# Patient Record
Sex: MALE | Race: BLACK OR AFRICAN AMERICAN | Employment: OTHER | URBAN - METROPOLITAN AREA
[De-identification: names, ages, dates, MRNs, and addresses within clinical notes are randomized per-mention and may not be internally consistent; named-entity substitution may affect disease eponyms.]

---

## 2017-01-10 ENCOUNTER — TELEPHONE (OUTPATIENT)
Dept: INTERNAL MEDICINE CLINIC | Age: 67
End: 2017-01-10

## 2017-01-10 NOTE — TELEPHONE ENCOUNTER
I called and spoke with Bryan Garcia. She states that patient has not refilled Pravastatin and wanted to know if we discontinued. She was made aware that patient is to continue this medication. She verbalized understanding.

## 2017-02-14 ENCOUNTER — APPOINTMENT (OUTPATIENT)
Dept: CT IMAGING | Age: 67
DRG: 065 | End: 2017-02-14
Attending: EMERGENCY MEDICINE
Payer: MEDICARE

## 2017-02-14 ENCOUNTER — APPOINTMENT (OUTPATIENT)
Dept: GENERAL RADIOLOGY | Age: 67
DRG: 065 | End: 2017-02-14
Attending: EMERGENCY MEDICINE
Payer: MEDICARE

## 2017-02-14 ENCOUNTER — HOSPITAL ENCOUNTER (INPATIENT)
Age: 67
LOS: 3 days | Discharge: NURSING, REHAB OR LONG-TERM CARE | DRG: 065 | End: 2017-02-17
Attending: EMERGENCY MEDICINE | Admitting: FAMILY MEDICINE
Payer: MEDICARE

## 2017-02-14 DIAGNOSIS — G45.1 TRANSIENT ISCHEMIC ATTACK INVOLVING RIGHT INTERNAL CAROTID ARTERY: ICD-10-CM

## 2017-02-14 DIAGNOSIS — I63.9 CEREBROVASCULAR ACCIDENT (CVA), UNSPECIFIED MECHANISM (HCC): Primary | ICD-10-CM

## 2017-02-14 DIAGNOSIS — I63.511 ACUTE RIGHT MCA STROKE (HCC): ICD-10-CM

## 2017-02-14 DIAGNOSIS — E66.01 MORBID OBESITY DUE TO EXCESS CALORIES (HCC): ICD-10-CM

## 2017-02-14 DIAGNOSIS — I10 ESSENTIAL HYPERTENSION, BENIGN: ICD-10-CM

## 2017-02-14 DIAGNOSIS — R53.1 LEFT-SIDED WEAKNESS: ICD-10-CM

## 2017-02-14 DIAGNOSIS — I69.354 HEMIPLEGIA AND HEMIPARESIS FOLLOWING CEREBRAL INFARCTION AFFECTING LEFT NON-DOMINANT SIDE (HCC): ICD-10-CM

## 2017-02-14 DIAGNOSIS — E11.69 UNCONTROLLED TYPE 2 DIABETES MELLITUS WITH OTHER SPECIFIED COMPLICATION, UNSPECIFIED LONG TERM INSULIN USE STATUS: ICD-10-CM

## 2017-02-14 DIAGNOSIS — I65.23 STENOSIS OF BOTH INTERNAL CAROTID ARTERIES: ICD-10-CM

## 2017-02-14 DIAGNOSIS — E11.65 UNCONTROLLED TYPE 2 DIABETES MELLITUS WITH OTHER SPECIFIED COMPLICATION, UNSPECIFIED LONG TERM INSULIN USE STATUS: ICD-10-CM

## 2017-02-14 LAB
ALBUMIN SERPL BCP-MCNC: 3.4 G/DL (ref 3.5–5)
ALBUMIN/GLOB SERPL: 0.9 {RATIO} (ref 1.1–2.2)
ALP SERPL-CCNC: 71 U/L (ref 45–117)
ALT SERPL-CCNC: 21 U/L (ref 12–78)
ANION GAP BLD CALC-SCNC: 9 MMOL/L (ref 5–15)
APPEARANCE UR: CLEAR
APTT PPP: 25.2 SEC (ref 22.1–32.5)
AST SERPL W P-5'-P-CCNC: 11 U/L (ref 15–37)
BACTERIA URNS QL MICRO: NEGATIVE /HPF
BASOPHILS # BLD AUTO: 0 K/UL (ref 0–0.1)
BASOPHILS # BLD: 0 % (ref 0–1)
BILIRUB SERPL-MCNC: 0.4 MG/DL (ref 0.2–1)
BILIRUB UR QL: NEGATIVE
BUN SERPL-MCNC: 15 MG/DL (ref 6–20)
BUN/CREAT SERPL: 14 (ref 12–20)
CALCIUM SERPL-MCNC: 8.9 MG/DL (ref 8.5–10.1)
CHLORIDE SERPL-SCNC: 103 MMOL/L (ref 97–108)
CK MB CFR SERPL CALC: NORMAL % (ref 0–2.5)
CK MB SERPL-MCNC: <1 NG/ML (ref 5–25)
CK SERPL-CCNC: 64 U/L (ref 39–308)
CO2 SERPL-SCNC: 26 MMOL/L (ref 21–32)
COLOR UR: NORMAL
CREAT SERPL-MCNC: 1.11 MG/DL (ref 0.7–1.3)
EOSINOPHIL # BLD: 0.3 K/UL (ref 0–0.4)
EOSINOPHIL NFR BLD: 4 % (ref 0–7)
EPITH CASTS URNS QL MICRO: NORMAL /LPF
ERYTHROCYTE [DISTWIDTH] IN BLOOD BY AUTOMATED COUNT: 14.5 % (ref 11.5–14.5)
GLOBULIN SER CALC-MCNC: 3.6 G/DL (ref 2–4)
GLUCOSE BLD STRIP.AUTO-MCNC: 177 MG/DL (ref 65–100)
GLUCOSE SERPL-MCNC: 189 MG/DL (ref 65–100)
GLUCOSE UR STRIP.AUTO-MCNC: NEGATIVE MG/DL
HCT VFR BLD AUTO: 41.1 % (ref 36.6–50.3)
HGB BLD-MCNC: 13.8 G/DL (ref 12.1–17)
HGB UR QL STRIP: NEGATIVE
HYALINE CASTS URNS QL MICRO: NORMAL /LPF (ref 0–5)
INR BLD: 1 (ref 0.9–1.2)
INR PPP: 1 (ref 0.9–1.1)
KETONES UR QL STRIP.AUTO: NEGATIVE MG/DL
LEUKOCYTE ESTERASE UR QL STRIP.AUTO: NEGATIVE
LYMPHOCYTES # BLD AUTO: 40 % (ref 12–49)
LYMPHOCYTES # BLD: 2.8 K/UL (ref 0.8–3.5)
MAGNESIUM SERPL-MCNC: 1.6 MG/DL (ref 1.6–2.4)
MCH RBC QN AUTO: 26.3 PG (ref 26–34)
MCHC RBC AUTO-ENTMCNC: 33.6 G/DL (ref 30–36.5)
MCV RBC AUTO: 78.4 FL (ref 80–99)
MONOCYTES # BLD: 0.3 K/UL (ref 0–1)
MONOCYTES NFR BLD AUTO: 5 % (ref 5–13)
NEUTS SEG # BLD: 3.6 K/UL (ref 1.8–8)
NEUTS SEG NFR BLD AUTO: 51 % (ref 32–75)
NITRITE UR QL STRIP.AUTO: NEGATIVE
PH UR STRIP: 5 [PH] (ref 5–8)
PLATELET # BLD AUTO: 233 K/UL (ref 150–400)
POTASSIUM SERPL-SCNC: 3.5 MMOL/L (ref 3.5–5.1)
PROT SERPL-MCNC: 7 G/DL (ref 6.4–8.2)
PROT UR STRIP-MCNC: NEGATIVE MG/DL
PROTHROMBIN TIME: 10.4 SEC (ref 9–11.1)
RBC # BLD AUTO: 5.24 M/UL (ref 4.1–5.7)
RBC #/AREA URNS HPF: NORMAL /HPF (ref 0–5)
SERVICE CMNT-IMP: ABNORMAL
SODIUM SERPL-SCNC: 138 MMOL/L (ref 136–145)
SP GR UR REFRACTOMETRY: 1.02 (ref 1–1.03)
THERAPEUTIC RANGE,PTTT: NORMAL SECS (ref 58–77)
TROPONIN I SERPL-MCNC: <0.04 NG/ML
UA: UC IF INDICATED,UAUC: NORMAL
UROBILINOGEN UR QL STRIP.AUTO: 0.2 EU/DL (ref 0.2–1)
WBC # BLD AUTO: 7 K/UL (ref 4.1–11.1)
WBC URNS QL MICRO: NORMAL /HPF (ref 0–4)

## 2017-02-14 PROCEDURE — 70496 CT ANGIOGRAPHY HEAD: CPT

## 2017-02-14 PROCEDURE — 83735 ASSAY OF MAGNESIUM: CPT | Performed by: EMERGENCY MEDICINE

## 2017-02-14 PROCEDURE — 99285 EMERGENCY DEPT VISIT HI MDM: CPT

## 2017-02-14 PROCEDURE — 81001 URINALYSIS AUTO W/SCOPE: CPT | Performed by: EMERGENCY MEDICINE

## 2017-02-14 PROCEDURE — 70450 CT HEAD/BRAIN W/O DYE: CPT

## 2017-02-14 PROCEDURE — 93005 ELECTROCARDIOGRAM TRACING: CPT

## 2017-02-14 PROCEDURE — 74011636320 HC RX REV CODE- 636/320: Performed by: EMERGENCY MEDICINE

## 2017-02-14 PROCEDURE — 84484 ASSAY OF TROPONIN QUANT: CPT | Performed by: EMERGENCY MEDICINE

## 2017-02-14 PROCEDURE — 36415 COLL VENOUS BLD VENIPUNCTURE: CPT | Performed by: EMERGENCY MEDICINE

## 2017-02-14 PROCEDURE — 85025 COMPLETE CBC W/AUTO DIFF WBC: CPT | Performed by: EMERGENCY MEDICINE

## 2017-02-14 PROCEDURE — 85610 PROTHROMBIN TIME: CPT

## 2017-02-14 PROCEDURE — 65660000000 HC RM CCU STEPDOWN

## 2017-02-14 PROCEDURE — 82550 ASSAY OF CK (CPK): CPT | Performed by: EMERGENCY MEDICINE

## 2017-02-14 PROCEDURE — 74011250636 HC RX REV CODE- 250/636: Performed by: EMERGENCY MEDICINE

## 2017-02-14 PROCEDURE — 82962 GLUCOSE BLOOD TEST: CPT

## 2017-02-14 PROCEDURE — 85610 PROTHROMBIN TIME: CPT | Performed by: EMERGENCY MEDICINE

## 2017-02-14 PROCEDURE — 85730 THROMBOPLASTIN TIME PARTIAL: CPT | Performed by: EMERGENCY MEDICINE

## 2017-02-14 PROCEDURE — 74011250637 HC RX REV CODE- 250/637: Performed by: EMERGENCY MEDICINE

## 2017-02-14 PROCEDURE — 71010 XR CHEST PORT: CPT

## 2017-02-14 PROCEDURE — 80053 COMPREHEN METABOLIC PANEL: CPT | Performed by: EMERGENCY MEDICINE

## 2017-02-14 RX ORDER — DEXTROSE 50 % IN WATER (D50W) INTRAVENOUS SYRINGE
12.5-25 AS NEEDED
Status: DISCONTINUED | OUTPATIENT
Start: 2017-02-14 | End: 2017-02-17 | Stop reason: HOSPADM

## 2017-02-14 RX ORDER — ACETAMINOPHEN 650 MG/1
650 SUPPOSITORY RECTAL
Status: DISCONTINUED | OUTPATIENT
Start: 2017-02-14 | End: 2017-02-17 | Stop reason: HOSPADM

## 2017-02-14 RX ORDER — ASPIRIN 325 MG
325 TABLET ORAL DAILY
Status: DISCONTINUED | OUTPATIENT
Start: 2017-02-15 | End: 2017-02-17 | Stop reason: HOSPADM

## 2017-02-14 RX ORDER — ENOXAPARIN SODIUM 100 MG/ML
40 INJECTION SUBCUTANEOUS EVERY 24 HOURS
Status: DISCONTINUED | OUTPATIENT
Start: 2017-02-14 | End: 2017-02-17 | Stop reason: HOSPADM

## 2017-02-14 RX ORDER — SODIUM CHLORIDE 0.9 % (FLUSH) 0.9 %
10 SYRINGE (ML) INJECTION
Status: COMPLETED | OUTPATIENT
Start: 2017-02-14 | End: 2017-02-14

## 2017-02-14 RX ORDER — SODIUM CHLORIDE 9 MG/ML
50 INJECTION, SOLUTION INTRAVENOUS
Status: COMPLETED | OUTPATIENT
Start: 2017-02-14 | End: 2017-02-16

## 2017-02-14 RX ORDER — ACETAMINOPHEN 325 MG/1
650 TABLET ORAL
Status: DISCONTINUED | OUTPATIENT
Start: 2017-02-14 | End: 2017-02-17 | Stop reason: HOSPADM

## 2017-02-14 RX ORDER — SODIUM CHLORIDE 0.9 % (FLUSH) 0.9 %
5-10 SYRINGE (ML) INJECTION AS NEEDED
Status: DISCONTINUED | OUTPATIENT
Start: 2017-02-14 | End: 2017-02-17 | Stop reason: HOSPADM

## 2017-02-14 RX ORDER — SODIUM CHLORIDE 0.9 % (FLUSH) 0.9 %
5-10 SYRINGE (ML) INJECTION EVERY 8 HOURS
Status: DISCONTINUED | OUTPATIENT
Start: 2017-02-14 | End: 2017-02-17 | Stop reason: HOSPADM

## 2017-02-14 RX ORDER — LABETALOL HYDROCHLORIDE 5 MG/ML
20 INJECTION, SOLUTION INTRAVENOUS
Status: DISPENSED | OUTPATIENT
Start: 2017-02-14 | End: 2017-02-15

## 2017-02-14 RX ORDER — INSULIN LISPRO 100 [IU]/ML
INJECTION, SOLUTION INTRAVENOUS; SUBCUTANEOUS
Status: DISCONTINUED | OUTPATIENT
Start: 2017-02-15 | End: 2017-02-17 | Stop reason: HOSPADM

## 2017-02-14 RX ORDER — MAGNESIUM SULFATE 100 %
4 CRYSTALS MISCELLANEOUS AS NEEDED
Status: DISCONTINUED | OUTPATIENT
Start: 2017-02-14 | End: 2017-02-17 | Stop reason: HOSPADM

## 2017-02-14 RX ORDER — ASPIRIN 325 MG
325 TABLET ORAL
Status: COMPLETED | OUTPATIENT
Start: 2017-02-14 | End: 2017-02-14

## 2017-02-14 RX ADMIN — IOPAMIDOL 100 ML: 755 INJECTION, SOLUTION INTRAVENOUS at 19:39

## 2017-02-14 RX ADMIN — Medication 10 ML: at 19:40

## 2017-02-14 RX ADMIN — SODIUM CHLORIDE 50 ML/HR: 900 INJECTION, SOLUTION INTRAVENOUS at 19:40

## 2017-02-14 RX ADMIN — ASPIRIN 325 MG ORAL TABLET 325 MG: 325 PILL ORAL at 22:19

## 2017-02-14 NOTE — ED PROVIDER NOTES
HPI Comments: Nakia Preciado is a 61 y.o. male with pmhx significant for DM, HTN who presents in [facility] to the ED c/o left lower extremity weakness since ~1200 today. He states he was getting out of his truck, when he noticed his left leg \"was not acting right. \" Per pt, his left leg was dragging as he used his cane to ambulate. He notes he has a cane for osteoarthritis of the knee. Pt states he was able to get into his house, but needed assistance by his son to ambulate down the steps of the house and into the truck. Per pt, he was unable to get out of his truck on arrival to the hospital. Pt also c/o left lower extremity numbness and left finger numbness. Per pt, he reports he stopped taking aspirin \"a while ago\" but is supposed to be taking it. He also notes he stopped one of his blood pressure medications ~1 month ago, as it made him dizzy. He notes he is right-hand dominant. Pt denies h/o strokes, falls or brain aneurysm. He specifically denies any chest pain, back pain, extremity pain, speech difficulty, headache or dizziness. PCP: Krista Oconnell MD    There are no other complaints, changes, or physical findings at this time. The history is provided by the patient.         Past Medical History:   Diagnosis Date    Allergic rhinitis     Arthritis      djd of knees    Diabetes ([address]     Hypercholesteremia     Hypertension     Obesity        Past Surgical History:   Procedure Laterality Date    Hx cholecystectomy       laparoscopic         Family History:   Problem Relation Age of Onset    Diabetes Mother     Hypertension Mother     Kidney Disease Mother     Arthritis-osteo Father     Diabetes Father     No Known Problems Sister     No Known Problems Sister     No Known Problems Sister     No Known Problems Sister     Cancer Sister      cancer       Social History     Social History    Marital status:      Spouse name: N/A    Number of children: N/A    Years of education: N/A     Occupational History    Not on file. Social History Main Topics    Smoking status: Never Smoker    Smokeless tobacco: Never Used    Alcohol use Yes      Comment: occ    Drug use: No    Sexual activity: Yes     Other Topics Concern    Not on file     Social History Narrative         ALLERGIES: Review of patient's allergies indicates no known allergies. Review of Systems   Constitutional: Negative for chills, fatigue and fever. HENT: Negative for congestion and rhinorrhea. Eyes: Negative for visual disturbance. Respiratory: Negative for cough, shortness of breath and wheezing. Cardiovascular: Negative for chest pain and palpitations. Gastrointestinal: Negative for abdominal distention, abdominal pain, constipation, diarrhea, nausea and vomiting. Endocrine: Negative. Genitourinary: Negative for difficulty urinating and dysuria. Musculoskeletal: Negative. Skin: Negative for rash. Neurological: Positive for weakness (left lower extremity) and numbness (left lower extremity, left fingers). Negative for dizziness and light-headedness. Psychiatric/Behavioral: Negative for suicidal ideas. All other systems reviewed and are negative. Patient Vitals for the past 12 hrs:   Temp Pulse Resp BP SpO2   02/14/17 2016 - - - - 98 %   02/14/17 2007 - 70 24 158/88 -   02/14/17 1915 - 70 16 183/88 94 %   02/14/17 1810 - 72 22 - 97 %   02/14/17 1808 - - - (!) 168/101 -   02/14/17 1754 97.4 °F (36.3 °C) 75 16 (!) 210/108 96 %     Physical Exam   Constitutional: He is oriented to person, place, and time. He appears well-developed and well-nourished. No distress. HENT:   Head: Normocephalic and atraumatic. Mouth/Throat: No oropharyngeal exudate. Eyes: Conjunctivae are normal. Pupils are equal, round, and reactive to light. Right eye exhibits no discharge. Left eye exhibits no discharge. No scleral icterus. Neck: Normal range of motion. Neck supple.    Cardiovascular: Normal rate, regular rhythm and normal heart sounds. Exam reveals no gallop and no friction rub. No murmur heard. Pulmonary/Chest: Effort normal and breath sounds normal. No stridor. No respiratory distress. He has no wheezes. He has no rales. He exhibits no tenderness. Abdominal: Soft. Normal appearance and bowel sounds are normal. He exhibits no distension, no abdominal bruit, no pulsatile midline mass and no mass. There is no hepatosplenomegaly. There is no tenderness. There is no rigidity, no rebound, no guarding, no CVA tenderness, no tenderness at McBurney's point and negative Howard's sign. No hernia. Musculoskeletal: He exhibits no tenderness. Neurological: He is alert and oriented to person, place, and time. He has normal reflexes. A sensory deficit is present. No cranial nerve deficit. He exhibits abnormal muscle tone. Coordination abnormal. He displays no Babinski's sign on the right side. He displays no Babinski's sign on the left side. Reflex Scores:       Patellar reflexes are 2+ on the right side and 2+ on the left side. 4/5 left upper extremity weakness and clumsy left leg    Numbness left hand 4th 5th fingers    No significant pronator drift   Skin: Skin is warm and dry. No rash noted. He is not diaphoretic. No erythema. No pallor. Nursing note and vitals reviewed. MDM  Number of Diagnoses or Management Options  Cerebrovascular accident (CVA), unspecified mechanism [facility]):   Diagnosis management comments: DDx: CVA, TIA, cervical/lumbar myelopathy, electrolyte abnormality, seizure, hypertensive urgency, malignancy    H/o HTN, DM and poor medical compliance to the ER with 6 hours of left leg weakness and subjective left hand numbness. Code S was called to see if candidate for possible interventional radiology, but had already passed window for systemic tPA . Discussed with neurology who thought he had NIH scale of 2 or 3 possibly. His CTA was otherwise unremarkable.  Will be admitted to hospitalist for further neurological evaluation. Amount and/or Complexity of Data Reviewed  Clinical lab tests: reviewed and ordered  Tests in the radiology section of CPT®: ordered and reviewed  Tests in the medicine section of CPT®: ordered and reviewed  Review and summarize past medical records: yes  Discuss the patient with other providers: yes (Neurology, hospitalist)  Independent visualization of images, tracings, or specimens: yes    Risk of Complications, Morbidity, and/or Mortality  Presenting problems: high  Diagnostic procedures: moderate  Management options: moderate    Patient Progress  Patient progress: stable    ED Course       Procedures     Evelina Mackey  1950    Arrival time to ED: [address]: 7912    Physician at Bedside: Stevenson Morales MD     CT Order Time: 1800    ACT Page: [id number]    ACT Call Back: [id number]    Cancel Code S: N/A    EKG interpretation: (Preliminary)  1806  Rhythm: normal sinus rhythm with sinus arrythmia; and regular . Rate (approx.): 68 bpm; Axis: normal; P wave: normal; QRS interval: normal ; ST/T wave: non-specific T wave abnormality  Written by Tyson Kan, ED Scribe, as dictated by Stevenson Morales MD.    CONSULT NOTE:  6:38 PM  Stevenson Morales MD spoke with Dr. Fei Mason  Specialty: Neurology  Discussed patient's hx, disposition, and available diagnostic and imaging results. Reviewed care plans. Consultant agrees with plans as outlined. Agrees patient is not in the window for systemic tPA. Recommends CTA, although patient is also reaching time limit for that as well. Will examine pt with tele-neuro. Written by Tyson Kan ED Scribe, as dictated by Stevenson Morales MD.    CONSULT NOTE:   8:57 PM  Stevenson Morales MD spoke with Dr. Mirtha Villanueva,   Specialty: Hospitalist  Discussed pt's hx, disposition, and available diagnostic and imaging results. Reviewed care plans. Consultant will evaluate pt for admission.   Written by Tyson Kan ED Scribe, as dictated by Lida Kebede MD.    CRITICAL CARE NOTE :    8:25 PM      IMPENDING DETERIORATION -Cardiovascular and CNS    ASSOCIATED RISK FACTORS - Dysrhythmia and CNS Decompensation    MANAGEMENT- Bedside Assessment and Supervision of Care    INTERPRETATION -  CT Scan, ECG and Blood Pressure    INTERVENTIONS - Neurologic interventions  and neurologic monitoring    CASE REVIEW - Hospitalist, Medical Sub-Specialist, Nursing and Family    TREATMENT RESPONSE -Stable    PERFORMED BY - Self        NOTES   :      I have spent 40 minutes of critical care time involved in lab review, consultations with specialist, family decision- making, bedside attention and documentation. During this entire length of time I was immediately available to the patient .     Lida Kebede MD                                                  LABORATORY TESTS:  Recent Results (from the past 12 hour(s))   GLUCOSE, POC    Collection Time: 02/14/17  5:49 PM   Result Value Ref Range    Glucose (POC) 177 (H) 65 - 100 mg/dL    Performed by Teodoro Garza    EKG, 12 LEAD, INITIAL    Collection Time: 02/14/17  6:06 PM   Result Value Ref Range    Ventricular Rate 68 BPM    Atrial Rate 68 BPM    P-R Interval 184 ms    QRS Duration 80 ms    Q-T Interval 370 ms    QTC Calculation (Bezet) 393 ms    Calculated P Axis 34 degrees    Calculated R Axis 4 degrees    Calculated T Axis 13 degrees    Diagnosis       Normal sinus rhythm with sinus arrhythmia  Nonspecific T wave abnormality  Abnormal ECG  When compared with ECG of 12-NOV-2009 13:20,  No significant change was found     CBC WITH AUTOMATED DIFF    Collection Time: 02/14/17  6:10 PM   Result Value Ref Range    WBC 7.0 4.1 - 11.1 K/uL    RBC 5.24 4.10 - 5.70 M/uL    HGB 13.8 12.1 - 17.0 g/dL    HCT 41.1 36.6 - 50.3 %    MCV 78.4 (L) 80.0 - 99.0 FL    MCH 26.3 26.0 - 34.0 PG    MCHC 33.6 30.0 - 36.5 g/dL    RDW 14.5 11.5 - 14.5 %    PLATELET 536 304 - 359 K/uL    NEUTROPHILS 51 32 - 75 %    LYMPHOCYTES 40 12 - 49 %    MONOCYTES 5 5 - 13 %    EOSINOPHILS 4 0 - 7 %    BASOPHILS 0 0 - 1 %    ABS. NEUTROPHILS 3.6 1.8 - 8.0 K/UL    ABS. LYMPHOCYTES 2.8 0.8 - 3.5 K/UL    ABS. MONOCYTES 0.3 0.0 - 1.0 K/UL    ABS. EOSINOPHILS 0.3 0.0 - 0.4 K/UL    ABS. BASOPHILS 0.0 0.0 - 0.1 K/UL   PROTHROMBIN TIME + INR    Collection Time: 02/14/17  6:10 PM   Result Value Ref Range    INR 1.0 0.9 - 1.1      Prothrombin time 10.4 9.0 - 14.2 sec   METABOLIC PANEL, COMPREHENSIVE    Collection Time: 02/14/17  6:10 PM   Result Value Ref Range    Sodium 138 136 - 145 mmol/L    Potassium 3.5 3.5 - 5.1 mmol/L    Chloride 103 97 - 108 mmol/L    CO2 26 21 - 32 mmol/L    Anion gap 9 5 - 15 mmol/L    Glucose 189 (H) 65 - 100 mg/dL    BUN 15 6 - 20 MG/DL    Creatinine 1.11 0.70 - 1.30 MG/DL    BUN/Creatinine ratio 14 12 - 20      GFR est AA >60 >60 ml/min/1.73m2    GFR est non-AA >60 >60 ml/min/1.73m2    Calcium 8.9 8.5 - 10.1 MG/DL    Bilirubin, total 0.4 0.2 - 1.0 MG/DL    ALT (SGPT) 21 12 - 78 U/L    AST (SGOT) 11 (L) 15 - 37 U/L    Alk.  phosphatase 71 45 - 117 U/L    Protein, total 7.0 6.4 - 8.2 g/dL    Albumin 3.4 (L) 3.5 - 5.0 g/dL    Globulin 3.6 2.0 - 4.0 g/dL    A-G Ratio 0.9 (L) 1.1 - 2.2     PTT    Collection Time: 02/14/17  6:10 PM   Result Value Ref Range    aPTT 25.2 22.1 - 32.5 sec    aPTT, therapeutic range     58.0 - 77.0 SECS   MAGNESIUM    Collection Time: 02/14/17  6:10 PM   Result Value Ref Range    Magnesium 1.6 1.6 - 2.4 mg/dL   CK W/ CKMB & INDEX    Collection Time: 02/14/17  6:10 PM   Result Value Ref Range    CK 64 39 - 308 U/L    CK - MB <1.0 <3.6 NG/ML    CK-MB Index CANNOT BE CALCULATED 0 - 2.5     TROPONIN I    Collection Time: 02/14/17  6:10 PM   Result Value Ref Range    Troponin-I, Qt. <0.04 <0.05 ng/mL   POC INR    Collection Time: 02/14/17  6:14 PM   Result Value Ref Range    INR (POC) 1.0 <1.2     URINALYSIS W/ REFLEX CULTURE    Collection Time: 02/14/17  8:08 PM   Result Value Ref Range    Color YELLOW/STRAW      Appearance CLEAR CLEAR      Specific gravity 1.020 1.003 - 1.030      pH (UA) 5.0 5.0 - 8.0      Protein NEGATIVE  NEG mg/dL    Glucose NEGATIVE  NEG mg/dL    Ketone NEGATIVE  NEG mg/dL    Bilirubin NEGATIVE  NEG      Blood NEGATIVE  NEG      Urobilinogen 0.2 0.2 - 1.0 EU/dL    Nitrites NEGATIVE  NEG      Leukocyte Esterase NEGATIVE  NEG      UA:UC IF INDICATED CULTURE NOT INDICATED BY UA RESULT CNI      WBC 0-4 0 - 4 /hpf    RBC 0-5 0 - 5 /hpf    Epithelial cells FEW FEW /lpf    Bacteria NEGATIVE  NEG /hpf    Hyaline cast 0-2 0 - 5 /lpf       IMAGING RESULTS:  CTA CODE NEURO HEAD AND NECK W CONT         XR CHEST PORT   Final Result      CT CODE NEURO HEAD WO CONTRAST   Final Result          INDICATION: left sided weakness started today. Left hand fourth and fifth digit  numbness, started today.     Exam: Noncontrast CT of the brain is performed with 5 mm collimation.     CT dose reduction was achieved with the use of the standardized protocol  tailored for this examination and automatic exposure control for dose  modulation.     FINDINGS: There is no acute intracranial hemorrhage, mass, mass effect or  herniation. Ventricular system is normal. The gray-white matter differentiation  is well-preserved. The mastoid air cells are well pneumatized. The visualized  paranasal sinuses are normal.     IMPRESSION  IMPRESSION: No acute intracranial hemorrhage, mass or infarct. MEDICATIONS GIVEN:  Medications   sodium chloride (NS) flush 5-10 mL (not administered)   sodium chloride (NS) flush 5-10 mL (not administered)   0.9% sodium chloride infusion (50 mL/hr IntraVENous New Bag 2/14/17 1940)   iopamidol (ISOVUE-370) 76 % injection 100 mL (100 mL IntraVENous Given 2/14/17 1939)   sodium chloride (NS) flush 10 mL (10 mL IntraVENous Given 2/14/17 1940)       IMPRESSION:  1. Cerebrovascular accident (CVA), unspecified mechanism ([employer] [facility] [address]      PLAN:  1.  Admit    ADMIT NOTE:  8:58 PM  Patient is being admitted to the hospital by Dr. Marilin Varner. The results of their tests and reasons for their admission have been discussed with them and/or available family. They convey agreement and understanding for the need to be admitted and for their admission diagnosis. Consultation has been made with the inpatient physician specialist for hospitalization. This note is prepared by Sebastian Phillip, acting as Scribe for Rashad Bruce MD.    Rashad Bruce MD: The the scribe's documentation has been prepared under my direction and personally reviewed by me in its entirety. I confirm that the note above accurately reflects all work, treatment, procedures, and medical decision making performed by me.

## 2017-02-14 NOTE — IP AVS SNAPSHOT
[address] [facility] 
[phone] Patient: Luz Marina Aguirre MRN: [id number] You are allergic to the following No active allergies Recent Documentation Height Weight BMI Smoking Status 1.778 m 115.1 kg 36.41 kg/m2 Never Smoker Emergency Contacts Name Discharge Info Relation Home Work Mobile [address]  CAREGIVER [3] Spouse [3] [phone] About your hospitalization You were admitted on:  February 14, 2017 You last received care in the:  [facility] 3 NEUROSCIENCE TELEMETRY You were discharged on:  February 17, 2017 Unit phone number:  [phone] Why you were hospitalized Your primary diagnosis was:  Not on File Your diagnoses also included:  Left-Sided Weakness, 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 
[phone] Your Appointments Tuesday March 07, 2017  9:00 AM EST  
ROUTINE CARE with Kit Galdamez [address] [place] [address],[address] [facility] [address] [facility]  
[phone] Current Discharge Medication List  
  
START taking these medications Dose & Instructions Dispensing Information Comments Morning Noon Evening Bedtime  
 aspirin 325 mg tablet Commonly known as:  ASPIRIN Replaces:  TREVA CHEWABLE ASPIRIN 81 mg chewable tablet Your next dose is: Today, Tomorrow Other:  _________ Dose:  325 mg Take 1 Tab by mouth daily. Quantity:  30 Tab Refills:  0  
     
   
   
   
  
 hydroCHLOROthiazide 25 mg tablet Commonly known as:  HYDRODIURIL Your next dose is: Today, Tomorrow Other:  _________ Dose:  25 mg Take 1 Tab by mouth daily. Quantity:  30 Tab Refills:  0  
     
   
   
   
  
 lisinopril 40 mg tablet Commonly known as:  Yoly Blake Your next dose is: Today, Tomorrow Other:  _________ Dose:  40 mg Take 1 Tab by mouth daily. Quantity:  30 Tab Refills:  0 CONTINUE these medications which have CHANGED Dose & Instructions Dispensing Information Comments Morning Noon Evening Bedtime  
 amLODIPine 10 mg tablet Commonly known as:  Juan Ramon Nieves What changed:  See the new instructions. Your next dose is: Today, Tomorrow Other:  _________ Dose:  10 mg Take 1 Tab by mouth daily. Quantity:  30 Tab Refills:  0  
     
   
   
   
  
 metFORMIN 1,000 mg tablet Commonly known as:  GLUCOPHAGE What changed:  Another medication with the same name was removed. Continue taking this medication, and follow the directions you see here. Your next dose is: Today, Tomorrow Other:  _________ Dose:  1000 mg Take 1,000 mg by mouth two (2) times daily (with meals). Refills:  0 CONTINUE these medications which have NOT CHANGED Dose & Instructions Dispensing Information Comments Morning Noon Evening Bedtime  
 clotrimazole 1 % topical cream  
Commonly known as:  Leonardo Sacks Your next dose is: Today, Tomorrow Other:  _________ Apply  to affected area two (2) times a day. Quantity:  15 g Refills:  1  
     
   
   
   
  
 * glucose blood VI test strips strip Commonly known as:  Ascensia CONTOUR Your next dose is: Today, Tomorrow Other:  _________ Test twice daily. Quantity:  3 Package Refills:  1  
     
   
   
   
  
 * glucose blood VI test strips strip Commonly known as:  ASCENSIA AUTODISC VI, ONE TOUCH ULTRA TEST VI Your next dose is: Today, Tomorrow Other:  _________ Embrace test strip--check fsbs bid 250.02 Quantity:  50 Each Refills:  11  
     
   
   
   
  
 * glucose blood VI test strips strip Commonly known as:  ASCENSIA AUTODISC VI, ONE TOUCH ULTRA TEST VI Your next dose is: Today, Tomorrow Other:  _________ Check fsbs bid  250.02  Contour next Quantity:  100 Strip Refills:  11 Insulin Needles (Disposable) 31 gauge x 5/16\" Ndle Your next dose is: Today, Tomorrow Other:  _________ Inject once daily. Quantity:  1 Package Refills:  3  
     
   
   
   
  
 pravastatin 10 mg tablet Commonly known as:  PRAVACHOL Your next dose is: Today, Tomorrow Other:  _________ TAKE ONE TABLET BY MOUTH IN THE EVENING Quantity:  30 Tab Refills:  11  
     
   
   
   
  
 sildenafil 20 mg tablet Commonly known as:  REVATIO Your next dose is: Today, Tomorrow Other:  _________ Dose:  20 mg Take 1 Tab by mouth daily as needed. Quantity:  30 Tab Refills:  3  
     
   
   
   
  
 * traMADol 50 mg tablet Commonly known as:  ULTRAM  
   
Your next dose is: Today, Tomorrow Other:  _________ Dose:  50 mg Take 1 tablet by mouth every six (6) hours as needed for Pain. Quantity:  30 tablet Refills:  1  
     
   
   
   
  
 * traMADol 50 mg tablet Commonly known as:  Vilma Ewing  
   
 Your next dose is: Today, Tomorrow Other:  _________ Dose:  50 mg Take 1 Tab by mouth every six (6) hours as needed for Pain. Max Daily Amount: 200 mg. Quantity:  60 Tab Refills:  3  
     
   
   
   
  
 * Notice: This list has 5 medication(s) that are the same as other medications prescribed for you. Read the directions carefully, and ask your doctor or other care provider to review them with you. STOP taking these medications TREVA CHEWABLE ASPIRIN 81 mg chewable tablet Generic drug:  aspirin Replaced by:  aspirin 325 mg tablet  
   
  
 doxazosin 2 mg tablet Commonly known as:  CARDURA  
   
  
 glipiZIDE 10 mg tablet Commonly known as:  GLUCOTROL  
   
  
 glipiZIDE 5 mg tablet Commonly known as:  Jeffery Holland  
   
  
 lisinopril-hydroCHLOROthiazide 20-12.5 mg per tablet Commonly known as:  Lorie Cazares Where to Get Your Medications Information on where to get these meds will be given to you by the nurse or doctor. ! Ask your nurse or doctor about these medications  
  amLODIPine 10 mg tablet  
 aspirin 325 mg tablet  
 hydroCHLOROthiazide 25 mg tablet  
 lisinopril 40 mg tablet Discharge Instructions HOSPITALIST DISCHARGE INSTRUCTIONS 
 
NAME: Waldemar Shields. :  1950 MRN:  [id number] Date/Time:  2017 1:16 PM 
 
ADMIT DATE: 2017 DISCHARGE DATE: 2017 DISCHARGE DIAGNOSIS: 
Acute CVA Accelerated HTN 
DM type 2 MEDICATIONS: 
· It is important that you take the medication exactly as they are prescribed. · Keep your medication in the bottles provided by the pharmacist and keep a list of the medication names, dosages, and times to be taken in your wallet. · Do not take other medications without consulting your doctor. Pain Management: per above medications What to do at [facility] Recommended diet:  Diabetic Diet Recommended activity: Activity as tolerated If you have questions regarding the hospital related prescriptions or hospital related issues please call Melchor Sierra at . If you experience any of the following symptoms then please call your primary care physician or return to the emergency room if you cannot get hold of your doctor: 
Fever, chills, nausea, vomiting, diarrhea, change in mentation, falling, bleeding, shortness of breath Information obtained by : 
I understand that if any problems occur once I am at home I am to contact my physician. I understand and acknowledge receipt of the instructions indicated above. Physician's or R.N.'s Signature                                                                  Date/Time Patient or Representative Signature                                                          Date/Time Discharge Orders None [email or web address] Announcement We are excited to announce that we are making your provider's discharge notes available to you in [email or web address]. You will see these notes when they are completed and signed by the physician that discharged you from your recent hospital stay. If you have any questions or concerns about any information you see in [email or web address], please call the Health Information Department where you were seen or reach out to your Primary Care Provider for more information about your plan of care. Introducing [facility]! Daenna Dumont introduces [email or web address] patient portal. Now you can access parts of your medical record, email your doctor's office, and request medication refills online.    
 
1. In your internet browser, go to [email or web address] 2. Click on the First Time User? Click Here link in the Sign In box. You will see the New Member Sign Up page. 3. Enter your [employer] Access Code exactly as it appears below. You will not need to use this code after youve completed the sign-up process. If you do not sign up before the expiration date, you must request a new code. · [employer] Access Code: [email or web address] Expires: 5/15/2017  6:26 PM 
 
4. Enter the last four digits of your Social Security Number (xxxx) and Date of Birth (mm/dd/yyyy) as indicated and click Submit. You will be taken to the next sign-up page. 5. Create a [employer] ID. This will be your [employer] login ID and cannot be changed, so think of one that is secure and easy to remember. 6. Create a [employer] password. You can change your password at any time. 7. Enter your Password Reset Question and Answer. This can be used at a later time if you forget your password. 8. Enter your e-mail address. You will receive e-mail notification when new information is available in [address] 9. Click Sign Up. You can now view and download portions of your medical record. 10. Click the Download Summary menu link to download a portable copy of your medical information. If you have questions, please visit the Frequently Asked Questions section of the [employer] website. Remember, [employer] is NOT to be used for urgent needs. For medical emergencies, dial 911. Now available from your iPhone and Android! General Information Please provide this summary of care documentation to your next provider. Patient Signature:  ____________________________________________________________ Date:  ____________________________________________________________  
  
[place] Provider Signature:  ____________________________________________________________ Date:  ____________________________________________________________

## 2017-02-14 NOTE — ED NOTES
Pt complains of left leg weakness and left arm numbness starting at 12:00 today. Pt denies any other symptoms. Pt in no apparent distress at this time. Pt placed on monitor x3, call bell within reach and plan of care discussed.

## 2017-02-15 ENCOUNTER — APPOINTMENT (OUTPATIENT)
Dept: MRI IMAGING | Age: 67
DRG: 065 | End: 2017-02-15
Attending: FAMILY MEDICINE
Payer: MEDICARE

## 2017-02-15 PROBLEM — I65.23 STENOSIS OF BOTH INTERNAL CAROTID ARTERIES: Status: ACTIVE | Noted: 2017-02-15

## 2017-02-15 PROBLEM — G45.1 TRANSIENT ISCHEMIC ATTACK INVOLVING RIGHT INTERNAL CAROTID ARTERY: Status: ACTIVE | Noted: 2017-02-15

## 2017-02-15 LAB
ATRIAL RATE: 68 BPM
CALCULATED P AXIS, ECG09: 34 DEGREES
CALCULATED R AXIS, ECG10: 4 DEGREES
CALCULATED T AXIS, ECG11: 13 DEGREES
CHOLEST SERPL-MCNC: 152 MG/DL
DIAGNOSIS, 93000: NORMAL
EST. AVERAGE GLUCOSE BLD GHB EST-MCNC: 223 MG/DL
GLUCOSE BLD STRIP.AUTO-MCNC: 115 MG/DL (ref 65–100)
GLUCOSE BLD STRIP.AUTO-MCNC: 166 MG/DL (ref 65–100)
GLUCOSE BLD STRIP.AUTO-MCNC: 184 MG/DL (ref 65–100)
GLUCOSE BLD STRIP.AUTO-MCNC: 213 MG/DL (ref 65–100)
GLUCOSE BLD STRIP.AUTO-MCNC: 216 MG/DL (ref 65–100)
HBA1C MFR BLD: 9.4 % (ref 4.2–6.3)
HDLC SERPL-MCNC: 30 MG/DL
HDLC SERPL: 5.1 {RATIO} (ref 0–5)
LDLC SERPL CALC-MCNC: 70 MG/DL (ref 0–100)
LIPID PROFILE,FLP: ABNORMAL
P-R INTERVAL, ECG05: 184 MS
Q-T INTERVAL, ECG07: 370 MS
QRS DURATION, ECG06: 80 MS
QTC CALCULATION (BEZET), ECG08: 393 MS
SERVICE CMNT-IMP: ABNORMAL
TRIGL SERPL-MCNC: 260 MG/DL (ref ?–150)
VENTRICULAR RATE, ECG03: 68 BPM
VLDLC SERPL CALC-MCNC: 52 MG/DL

## 2017-02-15 PROCEDURE — 36415 COLL VENOUS BLD VENIPUNCTURE: CPT | Performed by: FAMILY MEDICINE

## 2017-02-15 PROCEDURE — G8978 MOBILITY CURRENT STATUS: HCPCS

## 2017-02-15 PROCEDURE — 97161 PT EVAL LOW COMPLEX 20 MIN: CPT

## 2017-02-15 PROCEDURE — 74011250636 HC RX REV CODE- 250/636: Performed by: FAMILY MEDICINE

## 2017-02-15 PROCEDURE — 70551 MRI BRAIN STEM W/O DYE: CPT

## 2017-02-15 PROCEDURE — 74011250637 HC RX REV CODE- 250/637: Performed by: FAMILY MEDICINE

## 2017-02-15 PROCEDURE — 93880 EXTRACRANIAL BILAT STUDY: CPT

## 2017-02-15 PROCEDURE — 82962 GLUCOSE BLOOD TEST: CPT

## 2017-02-15 PROCEDURE — 97530 THERAPEUTIC ACTIVITIES: CPT

## 2017-02-15 PROCEDURE — 83036 HEMOGLOBIN GLYCOSYLATED A1C: CPT | Performed by: FAMILY MEDICINE

## 2017-02-15 PROCEDURE — G8979 MOBILITY GOAL STATUS: HCPCS

## 2017-02-15 PROCEDURE — 74011636637 HC RX REV CODE- 636/637: Performed by: FAMILY MEDICINE

## 2017-02-15 PROCEDURE — 93306 TTE W/DOPPLER COMPLETE: CPT

## 2017-02-15 PROCEDURE — 65660000000 HC RM CCU STEPDOWN

## 2017-02-15 PROCEDURE — 80061 LIPID PANEL: CPT | Performed by: FAMILY MEDICINE

## 2017-02-15 RX ORDER — DOXAZOSIN 2 MG/1
2 TABLET ORAL DAILY
Status: DISCONTINUED | OUTPATIENT
Start: 2017-02-15 | End: 2017-02-17 | Stop reason: HOSPADM

## 2017-02-15 RX ORDER — PRAVASTATIN SODIUM 10 MG/1
10 TABLET ORAL DAILY
Status: DISCONTINUED | OUTPATIENT
Start: 2017-02-15 | End: 2017-02-17 | Stop reason: HOSPADM

## 2017-02-15 RX ORDER — AMLODIPINE BESYLATE 5 MG/1
5 TABLET ORAL DAILY
Status: DISCONTINUED | OUTPATIENT
Start: 2017-02-15 | End: 2017-02-16

## 2017-02-15 RX ADMIN — ENOXAPARIN SODIUM 40 MG: 40 INJECTION SUBCUTANEOUS at 00:13

## 2017-02-15 RX ADMIN — ASPIRIN 325 MG ORAL TABLET 325 MG: 325 PILL ORAL at 10:48

## 2017-02-15 RX ADMIN — Medication 10 ML: at 19:35

## 2017-02-15 RX ADMIN — PRAVASTATIN SODIUM 10 MG: 10 TABLET ORAL at 10:48

## 2017-02-15 RX ADMIN — INSULIN LISPRO 2 UNITS: 100 INJECTION, SOLUTION INTRAVENOUS; SUBCUTANEOUS at 10:58

## 2017-02-15 RX ADMIN — AMLODIPINE BESYLATE 5 MG: 5 TABLET ORAL at 10:48

## 2017-02-15 RX ADMIN — Medication 10 ML: at 23:18

## 2017-02-15 RX ADMIN — ENOXAPARIN SODIUM 40 MG: 40 INJECTION SUBCUTANEOUS at 23:33

## 2017-02-15 RX ADMIN — HYDROCHLOROTHIAZIDE: 25 TABLET ORAL at 10:48

## 2017-02-15 NOTE — PROCEDURES
[facility]  *** FINAL REPORT ***    Name: Elvis Temple  MRN: [id number]    Inpatient  : 07 Aug 1950  HIS Order #: [id number]  [id number] [facility] Visit #: [id number]  Date: 15 Feb 2017    TYPE OF TEST: Cerebrovascular Duplex    REASON FOR TEST  Stroke    Right Carotid:-             Proximal               Mid                 Distal  cm/s  Systolic  Diastolic  Systolic  Diastolic  Systolic  Diastolic  CCA:     36.5                                      64.0  Bulb:  ECA:     44.0  ICA:     39.0                 60.0                 67.0  ICA/CCA:  0.6    ICA Stenosis: Normal    Right Vertebral:-  Finding: Antegrade  Sys:       26.0  Diane:    Right Subclavian: Normal    Left Carotid:-            Proximal                Mid                 Distal  cm/s  Systolic  Diastolic  Systolic  Diastolic  Systolic  Diastolic  CCA:     12.1                                      60.0  Bulb:  ECA:     58.0  ICA:     32.0                 44.0                 57.0  ICA/CCA:  0.5    ICA Stenosis: Normal    Left Vertebral:-  Finding: Antegrade  Sys:       45.0  Diane:    Left Subclavian: Normal    INTERPRETATION/FINDINGS  PROCEDURE:  Carotid Duplex Examination using B-mode, color and  spectral Doppler of the extracranial cerebrovascular arteries. 1. No evidence of significant arterial occlusive disease in the  internal carotid arteries. 2. No significant stenosis in the external carotid arteries  bilaterally. 3. Antegrade flow in both vertebral arteries. 4. Normal flow in both subclavian arteries. ADDITIONAL COMMENTS    I have personally reviewed the data relevant to the interpretation of  this  study. TECHNOLOGIST: Jackie Ribeiro RVT  Signed: 02/15/2017 02:58 PM    PHYSICIAN: Cece Eduardo MD  Signed: 02/15/2017 04:04 PM

## 2017-02-15 NOTE — H&P
Hospitalist Admission Note    NAME: Shlomo Gutierrez. :  1950   MRN:  [id number]     Date/Time:  2017 11:36 PM    Patient PCP: Porfirio Jesus MD  ______________________________________________________________________        My assessment of this patient's clinical condition and my plan of care is as follows. Given the patient's current clinical presentation, I have a high level of concern for decompensation if discharged from the ED. Complex decision making was performed which includes reviewing the patient's available past medical records, laboratory results, and Xray films. I have also directly communicated my plan and discussed this case with the involved ED physician.      Assessment / Plan:  1. Left sided weakness(POA): possibly CVA vs TIA. CT head negative. CTA done, results pending. Tele neurology consulted. Given ASA. Neurology consult. Check ECHO, carotid doppler. chk lipid profile. 2. Accelerated HTN (POA): now improved. Will not aggressively lower BP as concern for CVA. Resume home meds. 3. DM: holding metformin as got CTA. Add SSI, accu checks. chk hgA1c  4. Speech/PT/OT  5. H/o prostate ca. Code Status: FULL  Surrogate Decision Maker: his wife    DVT Prophylaxis: Sq lovenox  GI Prophylaxis: not indicated    Baseline: ambulatory with cane        Subjective:   CHIEF COMPLAINT:  Left sided weakness with gait difficulty    HISTORY OF PRESENT ILLNESS:     Tino Marcelino is a 61 y.o.  male who presents with  Left sided weakness with gait difficulty. Pt with h/o DM, HTN, obesity, prostate ca comes with above. Per pt, this started this afternoon when he was getting off his truck. He felt numbness in his LLE and felt it gave away. also having tingling in his LUE. Denies slurring of speech/LOC/CP/SOB/headcahe. Says his BP have been running high sometimes, BS in the 200's. No change in diet/no change in medications. In ED, /108, cr 1.1, CT head negative. Tele neurology consulted. Given ASA. We were asked to admit for work up and evaluation of the above problems. Past Medical History   Diagnosis Date    Allergic rhinitis     Arthritis      djd of knees    Diabetes ([address]     Hypercholesteremia     Hypertension     Obesity         Past Surgical History   Procedure Laterality Date    Hx cholecystectomy       laparoscopic       Social History   Substance Use Topics    Smoking status: Never Smoker    Smokeless tobacco: Never Used    Alcohol use Yes      Comment: occ        Family History   Problem Relation Age of Onset    Diabetes Mother     Hypertension Mother     Kidney Disease Mother     Arthritis-osteo Father     Diabetes Father     No Known Problems Sister     No Known Problems Sister     No Known Problems Sister     No Known Problems Sister     Cancer Sister      cancer      No Known Allergies     Prior to Admission medications    Medication Sig Start Date End Date Taking? Authorizing Provider   pravastatin (PRAVACHOL) 10 mg tablet TAKE ONE TABLET BY MOUTH IN THE EVENING 12/7/16   Domenica Turk MD   glipiZIDE (GLUCOTROL) 10 mg tablet Take 10 mg by mouth two (2) times a day. Historical Provider   metFORMIN (GLUCOPHAGE) 1,000 mg tablet Take 1,000 mg by mouth two (2) times daily (with meals). Historical Provider   sildenafil (REVATIO) 20 mg tablet Take 1 Tab by mouth daily as needed. 10/5/16   Domenica Turk MD   traMADol [place]) 50 mg tablet Take 1 Tab by mouth every six (6) hours as needed for Pain. Max Daily Amount: 200 mg. 10/5/16   Domenica Turk MD   amLODIPine (NORVASC) 5 mg tablet TAKE ONE TABLET BY MOUTH ONCE DAILY 8/29/16   Domenica Turk MD   lisinopril-hydrochlorothiazide (PRINZIDE, ZESTORETIC) 20-12.5 mg per tablet TAKE TWO TABLETS BY MOUTH ONCE DAILY 8/19/16   Domenica Turk MD   clotrimazole (LOTRIMIN) 1 % topical cream Apply  to affected area two (2) times a day.  7/25/16   Domenica Turk MD   metFORMIN (GLUCOPHAGE) 500 mg tablet TAKE ONE TABLET BY MOUTH TWICE DAILY WITH MEALS 6/2/16   Crescencio Mcdaniels MD   doxazosin (CARDURA) 2 mg tablet TAKE ONE TABLET BY MOUTH ONCE DAILY 1/14/16   Crescencio Mcdaniels MD   glipiZIDE (GLUCOTROL) 5 mg tablet Take 1 Tab by mouth two (2) times a day. 1/14/16   Crescencio Mcdaniels MD   glucose blood VI test strips (ASCENSIA AUTODISC VI, ONE TOUCH ULTRA TEST VI) strip Check fsbs bid  250.02  Contour next 9/11/15   Crescencio Mcdaniels MD   Lancets misc Check blood sugar twice daily. 9/11/15   Crescencio Mcdaniels MD   glucose blood VI test strips (ASCENSIA AUTODISC VI, ONE TOUCH ULTRA TEST VI) strip Embrace test strip--check fsbs bid 250.02 7/6/15   Crescencio Mcdaniels MD   aspirin (TREVA CHEWABLE ASPIRIN) 81 mg chewable tablet Take 81 mg by mouth daily. Historical Provider   tramadol (ULTRAM) 50 mg tablet Take 1 tablet by mouth every six (6) hours as needed for Pain. 9/3/14   Crescencio Mcdaniels MD   glucose blood VI test strips (ASCENSIA CONTOUR) strip Test twice daily. 5/23/14   Crescencio Mcdaniels MD   Insulin Needles, Disposable, 31 X 5/16 \" ndle Inject once daily. 5/8/14   Crescencio Mcdaniels MD       REVIEW OF SYSTEMS:     I am not able to complete the review of systems because:    The patient is intubated and sedated    The patient has altered mental status due to his acute medical problems    The patient has baseline aphasia from prior stroke(s)    The patient has baseline dementia and is not reliable historian    The patient is in acute medical distress and unable to provide information           Total of 12 systems reviewed as follows:       POSITIVE= underlined text  Negative = text not underlined  General:  fever, chills, sweats, generalized weakness, weight loss/gain,      loss of appetite   Eyes:    blurred vision, eye pain, loss of vision, double vision  ENT:    rhinorrhea, pharyngitis   Respiratory:   cough, sputum production, SOB, DE JESUS, wheezing, pleuritic pain   Cardiology:   chest pain, palpitations, orthopnea, PND, edema, syncope   Gastrointestinal: abdominal pain , N/V, diarrhea, dysphagia, constipation, bleeding   Genitourinary:  frequency, urgency, dysuria, hematuria, incontinence   Muskuloskeletal :  arthralgia, myalgia, back pain  Hematology:  easy bruising, nose or gum bleeding, lymphadenopathy   Dermatological: rash, ulceration, pruritis, color change / jaundice  Endocrine:   hot flashes or polydipsia   Neurological:  headache, dizziness, confusion, focal weakness, paresthesia,     Speech difficulties, memory loss, gait difficulty  Psychological: Feelings of anxiety, depression, agitation    Objective:   VITALS:    Visit Vitals    BP (!) 160/96    Pulse 75    Temp 97.4 °F (36.3 °C)    Resp 20    Ht 5' 10\" (1.778 m)    Wt 115.1 kg (253 lb 12 oz)    SpO2 98%    BMI 36.41 kg/m2       PHYSICAL EXAM:    General:    Alert, cooperative, no distress, appears stated age. HEENT: Atraumatic, anicteric sclerae, pink conjunctivae     No oral ulcers, mucosa moist, throat clear, dentition fair  Neck:  Supple, symmetrical,  thyroid: non tender  Lungs:   Clear to auscultation bilaterally. No Wheezing or Rhonchi. No rales. Chest wall:  No tenderness  No Accessory muscle use. Heart:   Regular  rhythm,  No  murmur   No edema  Abdomen:   Soft, non-tender. Not distended. Bowel sounds normal  Extremities: No cyanosis. No clubbing  Skin:     Not pale. Not Jaundiced  No rashes   Psych:   Not depressed. Not anxious or agitated. Neurologic: EOMs intact. No facial asymmetry. No aphasia or slurred speech. power 5/5 in both UE, 4/5 in LLE 5/5 in RLE Sensation grossly intact.  Alert and oriented X 4.     _______________________________________________________________________  Care Plan discussed with:    Comments   Patient x    Family  x    RN x    Care Manager                    Consultant:      _______________________________________________________________________  Recommended Disposition:   Home with Family x   HH/PT/OT/RN    SNF/LTC    [facility] ________________________________________________________________________  TOTAL TIME:  55 Minutes    Critical Care Provided     Minutes non procedure based      Comments   >50% of visit spent in counseling and coordination of care  Chart review  Discussion with patient and/or family and questions answered     ________________________________________________________________________  Adeel Alvardao MD    Procedures: see electronic medical records for all procedures/Xrays and details which were not copied into this note but were reviewed prior to creation of Plan. LAB DATA REVIEWED:    Recent Results (from the past 24 hour(s))   GLUCOSE, POC    Collection Time: 02/14/17  5:49 PM   Result Value Ref Range    Glucose (POC) 177 (H) 65 - 100 mg/dL    Performed by Munsonviki Jacobs    EKG, 12 LEAD, INITIAL    Collection Time: 02/14/17  6:06 PM   Result Value Ref Range    Ventricular Rate 68 BPM    Atrial Rate 68 BPM    P-R Interval 184 ms    QRS Duration 80 ms    Q-T Interval 370 ms    QTC Calculation (Bezet) 393 ms    Calculated P Axis 34 degrees    Calculated R Axis 4 degrees    Calculated T Axis 13 degrees    Diagnosis       Normal sinus rhythm with sinus arrhythmia  Nonspecific T wave abnormality  Abnormal ECG  When compared with ECG of 12-NOV-2009 13:20,  No significant change was found     CBC WITH AUTOMATED DIFF    Collection Time: 02/14/17  6:10 PM   Result Value Ref Range    WBC 7.0 4.1 - 11.1 K/uL    RBC 5.24 4.10 - 5.70 M/uL    HGB 13.8 12.1 - 17.0 g/dL    HCT 41.1 36.6 - 50.3 %    MCV 78.4 (L) 80.0 - 99.0 FL    MCH 26.3 26.0 - 34.0 PG    MCHC 33.6 30.0 - 36.5 g/dL    RDW 14.5 11.5 - 14.5 %    PLATELET 320 582 - 354 K/uL    NEUTROPHILS 51 32 - 75 %    LYMPHOCYTES 40 12 - 49 %    MONOCYTES 5 5 - 13 %    EOSINOPHILS 4 0 - 7 %    BASOPHILS 0 0 - 1 %    ABS. NEUTROPHILS 3.6 1.8 - 8.0 K/UL    ABS. LYMPHOCYTES 2.8 0.8 - 3.5 K/UL    ABS. MONOCYTES 0.3 0.0 - 1.0 K/UL    ABS. EOSINOPHILS 0.3 0.0 - 0.4 K/UL    ABS. BASOPHILS 0.0 0.0 - 0.1 K/UL   PROTHROMBIN TIME + INR    Collection Time: 02/14/17  6:10 PM   Result Value Ref Range    INR 1.0 0.9 - 1.1      Prothrombin time 10.4 9.0 - 53.1 sec   METABOLIC PANEL, COMPREHENSIVE    Collection Time: 02/14/17  6:10 PM   Result Value Ref Range    Sodium 138 136 - 145 mmol/L    Potassium 3.5 3.5 - 5.1 mmol/L    Chloride 103 97 - 108 mmol/L    CO2 26 21 - 32 mmol/L    Anion gap 9 5 - 15 mmol/L    Glucose 189 (H) 65 - 100 mg/dL    BUN 15 6 - 20 MG/DL    Creatinine 1.11 0.70 - 1.30 MG/DL    BUN/Creatinine ratio 14 12 - 20      GFR est AA >60 >60 ml/min/1.73m2    GFR est non-AA >60 >60 ml/min/1.73m2    Calcium 8.9 8.5 - 10.1 MG/DL    Bilirubin, total 0.4 0.2 - 1.0 MG/DL    ALT (SGPT) 21 12 - 78 U/L    AST (SGOT) 11 (L) 15 - 37 U/L    Alk.  phosphatase 71 45 - 117 U/L    Protein, total 7.0 6.4 - 8.2 g/dL    Albumin 3.4 (L) 3.5 - 5.0 g/dL    Globulin 3.6 2.0 - 4.0 g/dL    A-G Ratio 0.9 (L) 1.1 - 2.2     PTT    Collection Time: 02/14/17  6:10 PM   Result Value Ref Range    aPTT 25.2 22.1 - 32.5 sec    aPTT, therapeutic range     58.0 - 77.0 SECS   MAGNESIUM    Collection Time: 02/14/17  6:10 PM   Result Value Ref Range    Magnesium 1.6 1.6 - 2.4 mg/dL   CK W/ CKMB & INDEX    Collection Time: 02/14/17  6:10 PM   Result Value Ref Range    CK 64 39 - 308 U/L    CK - MB <1.0 <3.6 NG/ML    CK-MB Index CANNOT BE CALCULATED 0 - 2.5     TROPONIN I    Collection Time: 02/14/17  6:10 PM   Result Value Ref Range    Troponin-I, Qt. <0.04 <0.05 ng/mL   POC INR    Collection Time: 02/14/17  6:14 PM   Result Value Ref Range    INR (POC) 1.0 <1.2     URINALYSIS W/ REFLEX CULTURE    Collection Time: 02/14/17  8:08 PM   Result Value Ref Range    Color YELLOW/STRAW      Appearance CLEAR CLEAR      Specific gravity 1.020 1.003 - 1.030      pH (UA) 5.0 5.0 - 8.0      Protein NEGATIVE  NEG mg/dL    Glucose NEGATIVE  NEG mg/dL    Ketone NEGATIVE  NEG mg/dL    Bilirubin NEGATIVE  NEG      Blood NEGATIVE  NEG Urobilinogen 0.2 0.2 - 1.0 EU/dL    Nitrites NEGATIVE  NEG      Leukocyte Esterase NEGATIVE  NEG      UA:UC IF INDICATED CULTURE NOT INDICATED BY UA RESULT CNI      WBC 0-4 0 - 4 /hpf    RBC 0-5 0 - 5 /hpf    Epithelial cells FEW FEW /lpf    Bacteria NEGATIVE  NEG /hpf    Hyaline cast 0-2 0 - 5 /lpf

## 2017-02-15 NOTE — PROGRESS NOTES
Problem: Mobility Impaired (Adult and Pediatric)  Goal: *Acute Goals and Plan of Care (Insert Text)  Physical Therapy Goals  Initiated 2/15/2017  1. Patient will transfer from bed to chair and chair to bed with modified independence using the least restrictive device within 7 day(s). 2. Patient will perform sit to stand with modified independence within 7 day(s). 3. Patient will ambulate with modified independence for 250 feet with the least restrictive device within 7 day(s). 4. Patient will ascend/descend 4 stairs with single handrail(s) with modified independence within 7 day(s). 5. Patient will improve Jeronimo Balance score by 7 points within 7 days. PHYSICAL THERAPY EVALUATION- NEURO POPULATION     Patient: Hollis Petty (64 y.o. male)  Date: 2/15/2017  Primary Diagnosis: Left-sided weakness        Precautions:   Fall      ASSESSMENT :  Based on the objective data described below, the patient presents with impaired balance and coordination, impaired sensation, ataxic gait, and overall decreased functional mobility from baseline level of function. Pt admitted w/ possible CVA/TIA 2/2 c/o LUE/LE weakness/numbness; CT negative and awaiting MRI. Pt lives on 1st floor of 2 [place] home w/ wife and children, 1 WILD; amb w/ cane at baseline 2/2 B knee OA. Pt reports ind w/ ADLs and functional mobility at baseline. Mod I for bed mobility w/ additional time; transfers required Min A. Amb x 50 ft w/ Min A L HHA, pt w/ ataxic gait pattern. VCs for decreased L foot clearance (despite DF strength WFL); upon cueing pt compensating w/ exaggerated hip/knee flexion in order to clear foot. Patient and/or family was verbally educated on the BE FAST acronym for signs/symptoms of CVA and TIA. BE FAST was written on patient's communication board  for visual education and reinforcement. All questions answered with patient indicating good understanding.  In addition to BEFAST principle, patient educated on their specifiic CVA/TIA risk factors (including non-modifiable, and modifiable), as well as role of neuroplasticity principles on recovery via activity. Patient educated on CVA/TIA effects to neurological, musculoskeletal systems with metaphor of construction site and detour around in order to emphasize importance of neuroplasticity on recovery in terms that patient is able to easily understand. DC recommendations TBD based on pt progress, considering pt has progressed well since onset of symptoms. Patient will benefit from skilled intervention to address the above impairments. Patients rehabilitation potential is considered to be Good  Factors which may influence rehabilitation potential include:   [X]           None noted  [ ]           Mental ability/status  [ ]           Medical condition  [ ]           Home/family situation and support systems  [ ]           Safety awareness  [ ]           Pain tolerance/management  [ ]           Other:        PLAN :  Recommendations and Planned Interventions:  [X]             Bed Mobility Training             [X]      Neuromuscular Re-Education  [X]             Transfer Training                   [ ]      Orthotic/Prosthetic Training  [X]             Gait Training                         [ ]      Modalities  [X]             Therapeutic Exercises           [ ]      Edema Management/Control  [X]             Therapeutic Activities            [X]      Patient and Family Training/Education  [ ]             Other (comment):  Frequency/Duration: Patient will be followed by physical therapy 5 times a week to address goals. Discharge Recommendations: To be determined  Further Equipment Recommendations for Discharge: TBD       SUBJECTIVE:   Patient stated It didn't use to be this hard to move.       OBJECTIVE DATA SUMMARY:   HISTORY:    Past Medical History   Diagnosis Date    Allergic rhinitis      Arthritis         djd of knees    Diabetes ([address]      Hypercholesteremia      Hypertension  Obesity       Past Surgical History   Procedure Laterality Date    Hx cholecystectomy           laparoscopic     Prior Level of Function/Home Situation: Lives at home w/ wife and children; ind w/ ADLs, use of SPC for amb 2/2 knee OA  Personal factors and/or comorbidities impacting plan of care:      Home Situation  Home Environment: Private residence  # Steps to Enter: 1  Rails to Enter: No  One/Two Story Residence: Two story, live on 1st floor  Living Alone: No  Support Systems: Spouse/Significant Other/Partner, Child(sergei)  Current DME Used/Available at Home: Cane, quad  Tub or Shower Type: Tub/Shower combination     EXAMINATION/PRESENTATION/DECISION MAKING:   Critical Behavior:  Neurologic State: Alert  Orientation Level: Oriented X4  Cognition: Appropriate decision making  Safety/Judgement: Awareness of environment     Strength:    Strength: Within functional limits                    Coordination:  Coordination: Generally decreased, functional  Tone & Sensation:   Tone: Normal              Sensation: Impaired               Range Of Motion:  AROM: Generally decreased, functional                       Functional Mobility:  Bed Mobility:  Rolling: Modified independent; Additional time  Supine to Sit: Modified independent; Additional time        Transfers:  Sit to Stand: Minimum assistance  Stand to Sit: Minimum assistance (for safe positioning)                       Balance:   Sitting: Intact; Without support  Standing: Impaired; With support  Ambulation/Gait Training:  Distance (ft): 50 Feet (ft)  Assistive Device: Gait belt  Ambulation - Level of Assistance: Minimal assistance; Moderate assistance (L HHA)     Gait Description (WDL): Exceptions to WDL  Gait Abnormalities: Ataxic; Altered arm swing;Decreased step clearance; Path deviations        Base of Support: Widened     Speed/Saige: Slow  Step Length: Left shortened;Right shortened  Swing Pattern: Left asymmetrical                          Pt demonstrates ataxic gait w/ decreased foot clearance on L. When cued to  L foot pt w/ exaggerated hip and knee flexion in order to clear foot (despite WFL L DF strength). Functional Measure  Jeronimo Balance Test:      Sitting to Standin  Standing Unsupported: 3  Sitting with Back Unsupported: 3  Standing to Sitting: 3  Transfers: 2  Standing Unsupported with Eyes Closed: 3  Standing Unsupported with Feet Together: 1  Reach Forward with Outstretched Arm: 1   Object: 3  Turn to Look Over Shoulders: 1  Turn 360 Degrees: 1  Alternate Foot on Step/Stool: 1  Standing Unsupported One Foot in Front: 0  Stand on One Le  Total: 23             56=Maximum possible score;   0-20=High fall risk  21-40=Moderate fall risk   41-56=Low fall risk      Jeronimo Balance Test and G-code impairment scale:  Percentage of Impairment CH     0%    CI     1-19% CJ     20-39% CK     40-59% CL     60-79% CM     80-99% CN      100%   Jeronimo   Score 0-56 56 45-55 34-44 23-33 12-22 1-11 0         G codes: In compliance with CMSs Claims Based Outcome Reporting, the following G-code set was chosen for this patient based on their primary functional limitation being treated: The outcome measure chosen to determine the severity of the functional limitation was the [facility] Scale with a score of 23/56 which was correlated with the impairment scale. · Mobility - Walking and Moving Around:               - CURRENT STATUS:    CK - 40%-59% impaired, limited or restricted               - GOAL STATUS:           CJ - 20%-39% impaired, limited or restricted               - D/C STATUS:                       ---------------To be determined---------------                  Activity Tolerance: WNL  Please refer to the flowsheet for vital signs taken during this treatment.   After treatment:   [X]     Patient left in no apparent distress sitting up in chair  [ ]     Patient left in no apparent distress in bed  [X]     Call bell left within reach  [X]     Nursing notified  [ ]     Caregiver present  [ ]     Bed alarm activated      COMMUNICATION/EDUCATION:   The patients plan of care was discussed with: Registered Nurse and . Patient was educated regarding His deficit(s) of impaired balance, decreased sensation, and decreased functional mobility as this relates to His diagnosis of possible CVA. He demonstrated Good understanding as evidenced by report back. [X]  Fall prevention education was provided and the patient/caregiver indicated understanding. [X]  Patient/family have participated as able in goal setting and plan of care. [X]  Patient/family agree to work toward stated goals and plan of care. [ ]  Patient understands intent and goals of therapy, but is neutral about his/her participation. [ ]  Patient is unable to participate in goal setting and plan of care. Thank you for this referral.  Elena Wade, [facility]   Time Calculation: 24 mins  Regarding student involvement in patient care:  A student participated in this treatment session. Per CMS Medicare statements and APTA guidelines I certify that the following was true:  1. I was present and directly observed the entire session. 2. I made all skilled judgments and clinical decisions regarding care. 3. I am the practitioner responsible for assessment, treatment, and documentation.

## 2017-02-15 NOTE — ED NOTES
Bedside and Verbal shift change report given to Benjy Sims RN (oncoming nurse) by Will Wolf RN  (offgoing nurse). Report included the following information SBAR, Kardex, Intake/Output, MAR, Recent Results and Med Rec Status.

## 2017-02-15 NOTE — ED NOTES
Bedside and Verbal shift change report given to Chely Rivera (oncoming nurse) by Vonda Maurer RN (offgoing nurse). Report included the following information SBAR, ED Summary and MAR.

## 2017-02-15 NOTE — PROGRESS NOTES
OT consult received, chart reviewed. Patient is currently off the floor for testing. Will follow up tomorrow to complete OT evaluation.

## 2017-02-15 NOTE — PROGRESS NOTES
Spiritual Care Assessment/Progress Notes    Yakelin Dia [id number]  [id number]    1950  61 y.o.  male    Patient Telephone Number: [phone] (home)   Jew Affiliation: Jocelynn Levi   Language: English   Extended Emergency Contact Information  Primary Emergency Contact: Leo العراقي Phone: [phone]  Mobile Phone: [phone]  Relation: Spouse   Patient Active Problem List    Diagnosis Date Noted    Left-sided weakness 02/14/2017    OA (osteoarthritis) of knee 08/06/2013    [facility] 03/10/2011    Type II or unspecified type diabetes mellitus without mention of complication, uncontrolled 08/03/2009    Essential hypertension, benign 08/03/2009    Pure hypercholesterolemia 08/03/2009    Obesity 08/03/2009        Date: 2/15/2017       Level of Jew/Spiritual Activity:  []         Involved in tata tradition/spiritual practice    []         Not involved in tata tradition/spiritual practice  [x]         Spiritually oriented    []         Claims no spiritual orientation    []         seeking spiritual identity  []         Feels alienated from Muslim practice/tradition  []         Feels angry about Muslim practice/tradition  [x]         Spirituality/Muslim tradition is a resource for coping at this time.   []         Not able to assess due to medical condition    Services Provided Today:  []         crisis intervention    []         reading Scriptures  [x]         spiritual assessment    [x]         prayer  [x]         empathic listening/emotional support  []         rites and rituals (cite in comments)  []         life review     []         Muslim support  []         theological development    []         advocacy  []         ethical dialog     []         blessing  []         bereavement support    []         support to family  []         anticipatory grief support   []         help with AMD  []         spiritual guidance    [] meditation      Spiritual Care Needs  []         Emotional Support  [x]         Spiritual/Scientology Care  []         Loss/Adjustment  []         Advocacy/Referral                /Ethics  []         No needs expressed at               this time  [x]         Other: (note in               comments)  [address]  []         Follow up visits with               pt/family  []         Provide materials  []         Schedule sacraments  []         Contact Community               Clergy  [x]         Follow up as needed  []         Other: (note in               comments)     Comments:   Visited with patient in ER. Patient is expecting his wife to return soon. Offered supportive listening presence as patient shared events of yesterday that brought him to the ER. He added that he feels there is some improvement in the movement of his left leg and arm, but still not completely resolved. His spirits are good. Patient requested prayer which was shared aloud. Consulted patient about Advance Medical Directive. Explained the purpose, but at this time patient is comfortable knowing that his wife knows what medical choices he would make and she would be his medical power of  as next of kin. Offered assurance of continued prayer and advised him of  availability.   Abrahan Patient, MPS, [facility], [address] [facility] [address]     Paging Service  [phone]

## 2017-02-15 NOTE — PROGRESS NOTES
[facility]  Preliminary Report:  Carotid Duplex Scan    Right:  No plaque noted in the right carotid system. Right ICA velocities suggest 0% diameter reduction. Right vertebral artery flow is antegrade. Left:  No plaque noted in the left carotid system. Left ICA velocities suggest 0% diameter reduction. Left vertebral artery flow is antegrade. Final report to follow.

## 2017-02-15 NOTE — ED NOTES
TRANSFER - OUT REPORT:    Verbal report given to Raymundo Corea RN (name) on Alfredo Branham  being transferred to [facility] (unit) for routine progression of care       Report consisted of patients Situation, Background, Assessment and   Recommendations(SBAR). Information from the following report(s) SBAR, Kardex, ED Summary, Intake/Output, MAR, Recent Results and Med Rec Status was reviewed with the receiving nurse. Lines:   Peripheral IV 02/14/17 Right Antecubital (Active)   Site Assessment Clean, dry, & intact 2/14/2017  6:32 PM   Phlebitis Assessment 0 2/14/2017  6:32 PM   Infiltration Assessment 0 2/14/2017  6:32 PM   Dressing Status Clean, dry, & intact 2/14/2017  6:32 PM   Dressing Type Tape;Transparent 2/14/2017  6:32 PM   Hub Color/Line Status Pink;Flushed;Patent 2/14/2017  6:32 PM   Action Taken Blood drawn 2/14/2017  6:32 PM        Opportunity for questions and clarification was provided.       Patient transported with:   Casabu

## 2017-02-15 NOTE — PROGRESS NOTES
ORDERS RECEIVED AND ACKNOWLEDGED. PATIENT IS OFF THE FLOOR FOR TESTING. Will f/u tomorrow as needed. Thanks!     Kaykay Lopez MS CCC-SLP

## 2017-02-15 NOTE — PROGRESS NOTES
Hospitalist Progress Note    NAME: Noah Sofia :  1950   MRN:  [id number]       Interim Hospital Summary: 61 y.o. male whom presented on 2017 with      Assessment / Plan:  Left sided weakness   Concern for CVA  CT head / CTA negative  Carotid Dopplers negative  Awaiting MRI and 2D echo  Awaiting neurology input  PT/OT  Continue ASA/Statins  LDL at goal  Continue tele monitoring  PT/OT    Accelerated HTN  Permissive HTN due to concerning CVA  Continue home meds    DM type 2  holding metformin as got CTA  Continue SSI  A1c 9.4    H/o prostate ca.     Code Status: FULL  Surrogate Decision Maker: his wife     DVT Prophylaxis: Sq lovenox  GI Prophylaxis: not indicated     Baseline: ambulatory with cane          Subjective: Pt seen and examined at beside. Left sided weakness. Overnight events d/w RN   CHIEF COMPLAINT: f/u \"Left sided weakness with gait difficulty\"       Review of Systems:  Symptom Y/N Comments  Symptom Y/N Comments   Fever/Chills n   Chest Pain n    Poor Appetite    Edema     Cough n   Abdominal Pain n    Sputum    Joint Pain     SOB/DE JESUS n   Pruritis/Rash     Nausea/vomit    Tolerating PT/OT     Diarrhea    Tolerating Diet y    Constipation    Other y Left sided weakness     Could NOT obtain due to:      Objective:     VITALS:   Last 24hrs VS reviewed since prior progress note.  Most recent are:  Patient Vitals for the past 24 hrs:   Temp Pulse Resp BP SpO2   02/15/17 1245 - 79 21 151/82 94 %   02/15/17 1145 - 78 15 98/87 95 %   02/15/17 1045 - 79 24 (!) 157/107 97 %   02/15/17 0945 - 75 20 164/86 97 %   02/15/17 0931 - 74 19 (!) 187/102 98 %   02/15/17 0815 - 70 20 (!) 151/98 95 %   02/15/17 0345 - 77 20 (!) 154/93 96 %   02/15/17 0300 - 80 20 (!) 147/121 95 %   02/15/17 0145 - 77 20 166/89 94 %   02/15/17 0045 - 72 22 (!) 165/93 97 %   17 2345 - 74 21 148/89 98 %   17 2230 - 75 20 (!) 160/96 98 %   17 2215 - 81 20 (!) 158/99 94 %   17 1440 - 79 20 (!) 182/94 95 %   02/14/17 2016 - - - - 98 %   02/14/17 2007 - 70 24 158/88 -   02/14/17 1915 - 70 16 183/88 94 %   02/14/17 1810 - 72 22 - 97 %   02/14/17 1808 - - - (!) 168/101 -   02/14/17 1754 97.4 °F (36.3 °C) 75 16 (!) 210/108 96 %       Intake/Output Summary (Last 24 hours) at 02/15/17 1658  Last data filed at 02/15/17 0500   Gross per 24 hour   Intake                0 ml   Output              250 ml   Net             -250 ml        PHYSICAL EXAM:  General: WD, WN. Alert, cooperative, no acute distress    EENT:  EOMI. Anicteric sclerae. MMM  Resp:  CTA bilaterally, no wheezing or rales. No accessory muscle use  CV:  Regular  rhythm,  No edema  GI:  Soft, Non distended, Non tender.  +Bowel sounds  Neurologic:  Alert and oriented X 3, normal speech, left sided weakness  Psych:   Good insight. Not anxious nor agitated  Skin:  No rashes. No jaundice    Reviewed most current lab test results and cultures  YES  Reviewed most current radiology test results   YES  Review and summation of old records today    NO  Reviewed patient's current orders and MAR    YES  PMH/SH reviewed - no change compared to H&P  ________________________________________________________________________  Care Plan discussed with:    Comments   Patient y    Family  y wife   RN y    Care Manager     Consultant                        Multidiciplinary team rounds were held today with , nursing, pharmacist and clinical coordinator. Patient's plan of care was discussed; medications were reviewed and discharge planning was addressed.      ________________________________________________________________________  Total NON critical care TIME:  35  Minutes    Total CRITICAL CARE TIME Spent:   Minutes non procedure based      Comments   >50% of visit spent in counseling and coordination of care     ________________________________________________________________________  Carroll Jones MD     Procedures: see electronic medical records for all procedures/Xrays and details which were not copied into this note but were reviewed prior to creation of Plan. LABS:  I reviewed today's most current labs and imaging studies.   Pertinent labs include:  Recent Labs      02/14/17 1810   WBC  7.0   HGB  13.8   HCT  41.1   PLT  233     Recent Labs      02/14/17 1814 02/14/17 1810   NA   --   138   K   --   3.5   CL   --   103   CO2   --   26   GLU   --   189*   BUN   --   15   CREA   --   1.11   CA   --   8.9   MG   --   1.6   ALB   --   3.4*   TBILI   --   0.4   SGOT   --   11*   ALT   --   21   INR  1.0  1.0       Signed: Steven Cates MD

## 2017-02-15 NOTE — ED NOTES
Bedside and Verbal shift change report given to Jens Martin (oncoming nurse) by Yrn Drake RN (offgoing nurse). Report included the following information SBAR, ED Summary and MAR. Patient in CT at this time.

## 2017-02-15 NOTE — CDMP QUERY
Please clarify if this patient is being treated/managed for:    =>What is the most likely cause of pt's L sided numbness, weakness, gait difficulty in the setting of uncontrolled BP and diabetes? TIA POA? CVA POA? Diabetic neuropathy POA? Hemiplegia POA? Hypertensive crisis POA?    =>Other Explanation of clinical findings  =>Unable to Determine (no explanation of clinical findings)    The medical record reflects the following clinical findings, treatment, and risk factors:    Risk Factors: 66yoM pmh t2dm, HTN, obesity, OA, prostate ca c/o L sided weakness, gait diff x 18 hrs while getting out of truck, leg gave away tingling. Clinical Indicators: hgb a1c-9.4, 210/108 adm, after norvasc, cardura, lisinopril, labetelol came down to 147/121-154/93    Treatment: CTA, tele, asa, neuro cs, echo, cduplex, ot/pt cs, ssi, lvnx, norvasc, cardura, lisinopril, prn labetelol. Please clarify and document your clinical opinion in the progress notes and discharge summary. Thank you!   Kandace Reyes, [facility] [address]

## 2017-02-16 LAB
ANION GAP BLD CALC-SCNC: 10 MMOL/L (ref 5–15)
BUN SERPL-MCNC: 11 MG/DL (ref 6–20)
BUN/CREAT SERPL: 12 (ref 12–20)
CALCIUM SERPL-MCNC: 8.6 MG/DL (ref 8.5–10.1)
CHLORIDE SERPL-SCNC: 104 MMOL/L (ref 97–108)
CO2 SERPL-SCNC: 26 MMOL/L (ref 21–32)
CREAT SERPL-MCNC: 0.94 MG/DL (ref 0.7–1.3)
GLUCOSE BLD STRIP.AUTO-MCNC: 189 MG/DL (ref 65–100)
GLUCOSE BLD STRIP.AUTO-MCNC: 235 MG/DL (ref 65–100)
GLUCOSE BLD STRIP.AUTO-MCNC: 250 MG/DL (ref 65–100)
GLUCOSE BLD STRIP.AUTO-MCNC: 276 MG/DL (ref 65–100)
GLUCOSE BLD STRIP.AUTO-MCNC: 308 MG/DL (ref 65–100)
GLUCOSE SERPL-MCNC: 166 MG/DL (ref 65–100)
POTASSIUM SERPL-SCNC: 3.6 MMOL/L (ref 3.5–5.1)
SERVICE CMNT-IMP: ABNORMAL
SODIUM SERPL-SCNC: 140 MMOL/L (ref 136–145)

## 2017-02-16 PROCEDURE — 97166 OT EVAL MOD COMPLEX 45 MIN: CPT | Performed by: OCCUPATIONAL THERAPIST

## 2017-02-16 PROCEDURE — 80048 BASIC METABOLIC PNL TOTAL CA: CPT | Performed by: INTERNAL MEDICINE

## 2017-02-16 PROCEDURE — 36415 COLL VENOUS BLD VENIPUNCTURE: CPT | Performed by: INTERNAL MEDICINE

## 2017-02-16 PROCEDURE — 65660000000 HC RM CCU STEPDOWN

## 2017-02-16 PROCEDURE — 97535 SELF CARE MNGMENT TRAINING: CPT | Performed by: OCCUPATIONAL THERAPIST

## 2017-02-16 PROCEDURE — G8988 SELF CARE GOAL STATUS: HCPCS | Performed by: OCCUPATIONAL THERAPIST

## 2017-02-16 PROCEDURE — 74011636637 HC RX REV CODE- 636/637: Performed by: FAMILY MEDICINE

## 2017-02-16 PROCEDURE — 97112 NEUROMUSCULAR REEDUCATION: CPT

## 2017-02-16 PROCEDURE — 74011250637 HC RX REV CODE- 250/637: Performed by: FAMILY MEDICINE

## 2017-02-16 PROCEDURE — 82962 GLUCOSE BLOOD TEST: CPT

## 2017-02-16 PROCEDURE — 77030032490 HC SLV COMPR SCD KNE COVD -B

## 2017-02-16 PROCEDURE — 97116 GAIT TRAINING THERAPY: CPT

## 2017-02-16 PROCEDURE — G8987 SELF CARE CURRENT STATUS: HCPCS | Performed by: OCCUPATIONAL THERAPIST

## 2017-02-16 RX ORDER — AMLODIPINE BESYLATE 5 MG/1
10 TABLET ORAL DAILY
Status: DISCONTINUED | OUTPATIENT
Start: 2017-02-17 | End: 2017-02-17 | Stop reason: HOSPADM

## 2017-02-16 RX ADMIN — INSULIN LISPRO 1 UNITS: 100 INJECTION, SOLUTION INTRAVENOUS; SUBCUTANEOUS at 22:20

## 2017-02-16 RX ADMIN — HYDROCHLOROTHIAZIDE: 25 TABLET ORAL at 10:06

## 2017-02-16 RX ADMIN — ASPIRIN 325 MG ORAL TABLET 325 MG: 325 PILL ORAL at 10:05

## 2017-02-16 RX ADMIN — AMLODIPINE BESYLATE 5 MG: 5 TABLET ORAL at 10:06

## 2017-02-16 RX ADMIN — Medication 10 ML: at 20:20

## 2017-02-16 RX ADMIN — DOXAZOSIN 2 MG: 2 TABLET ORAL at 10:05

## 2017-02-16 RX ADMIN — PRAVASTATIN SODIUM 10 MG: 10 TABLET ORAL at 10:06

## 2017-02-16 RX ADMIN — INSULIN LISPRO 4 UNITS: 100 INJECTION, SOLUTION INTRAVENOUS; SUBCUTANEOUS at 10:12

## 2017-02-16 RX ADMIN — Medication 10 ML: at 17:01

## 2017-02-16 RX ADMIN — Medication 10 ML: at 03:26

## 2017-02-16 RX ADMIN — INSULIN LISPRO 3 UNITS: 100 INJECTION, SOLUTION INTRAVENOUS; SUBCUTANEOUS at 17:00

## 2017-02-16 RX ADMIN — ACETAMINOPHEN 650 MG: 325 TABLET, FILM COATED ORAL at 10:06

## 2017-02-16 NOTE — ROUTINE PROCESS
* No surgery found *  Bedside and Verbal shift change report given to [address] [place] [address] (oncoming nurse) by Sasha Elam RN (offgoing nurse). Report included the following information SBAR, Kardex, MAR and Recent Results. Zone Phone:   [phone]      Significant changes during shift:    1) none      Patient Information    Reagan Zepeda  61 y.o.  2/14/2017  5:56 PM by Enzo Atwood MD. Reagan Zepeda was admitted from Home    Problem List    Patient Active Problem List    Diagnosis Date Noted    Transient ischemic attack involving right internal carotid artery 02/15/2017    Stenosis of both internal carotid arteries 02/15/2017    Left-sided weakness 02/14/2017    OA (osteoarthritis) of knee 08/06/2013    Prostate cancer ([facility] 03/10/2011    Type II or unspecified type diabetes mellitus without mention of complication, uncontrolled 08/03/2009    Essential hypertension, benign 08/03/2009    Pure hypercholesterolemia 08/03/2009    Obesity 08/03/2009     Past Medical History   Diagnosis Date    Allergic rhinitis     Arthritis      djd of knees    Diabetes ([facility]     Hypercholesteremia     Hypertension     Obesity          Core Measures:    CVA: Yes Yes  CHF:No Not applicable  PNA:No Not applicable    Activity Status:    OOB to Chair No  Ambulated this shift Yes to bathroom  Bed Rest No    Supplemental O2: (If Applicable)    NC No  NRB No  Venti-mask No  On room air Liters/min      LINES AND DRAINS:    Central Line? No     PICC LINE? No     Urinary Catheter? No     DVT prophylaxis:    DVT prophylaxis Med- Yes  DVT prophylaxis SCD or MOHINI- No     Wounds: (If Applicable)    Wounds- No    Location none    Patient Safety:    Falls Score Total Score: 3  Safety Level_______  Bed Alarm On? Yes  Sitter?  No    Plan for upcoming shift: PT, OT        Discharge Plan: No just admitted    Active Consults:  IP CONSULT TO HOSPITALIST  IP CONSULT TO NEUROLOGY

## 2017-02-16 NOTE — PROGRESS NOTES
CM was advised [facility] is not in-network for [employer]. CM sent referral to [address] [place] [address] via [employer].      ConradNENITA Roth, [address] [place] [address]   [phone]

## 2017-02-16 NOTE — PROGRESS NOTES
Speech pathology  Orders received, chart reviewed and spoke with RN. Patient passed STAND yesterday and was placed on a soft solid diet. Patient has tolerated without difficulty. Spoke with patient and his speech is clear and fluent. He denies any changes with speech/language and his only concern is decreasing numbless and tingling on his L side. Formal ST eval not indicated at this time. Thanks.  Karolee Severe M.S. CCC-SLP

## 2017-02-16 NOTE — CONSULTS
NEUROLOGY HISTORY AND PHYSICAL    Name Shlomo Gutierrez. Age 61 y.o. MRN [id number]  1950     Consulting Physician: Tiffanie Mcbride MD      Chief Complaint: left hand weakness     This is a 61 y.o. right handed female with a medical history of morbid obesity, hyperlipidemia and hypertension. He is admitted with acute onset left sided weakness and sensory loss. Onset was 12PM yesterday. He did not meet criteria for tpa. Symptoms have minimally resolved. He has no associated cognitive issues. I have personally reviewed the chart   I have personally reviewed available imaging   I have the reviewed the available laboratory results. Assessment and Plan     1. Cerebrovascular accident (CVA), unspecified mechanism ([facility]  · Goal systolic blood pressure 786 or below  · Imaging: MRI, carotid dopplers, transthoracic echocardiogram (reviewed)  · Labs:B12 and folate, HGBA1C, hyperlipidemia, homocysteine, TSH, lipid profile and migraines   · Consults: speech therapy, physical therapy and occupational therapy. · Start:81mg aspirin daily   · Educated on stroke risk factors, prevention and treatment. 2. Left hemiparesis  · Fall precautions  · Unsafe gait recommend inpatient rehabilitation. Heavy, clumsy will break something/someone if he falls. 3. Diabetes  · continue insulin  · A1C elevated    4. Hyperlipidemia  · Continue pravastatin        Patient Allergies    Review of patient's allergies indicates no known allergies.      Current Facility-Administered Medications   Medication Dose Route Frequency    amLODIPine (NORVASC) tablet 5 mg  5 mg Oral DAILY    doxazosin (CARDURA) tablet 2 mg  2 mg Oral DAILY    pravastatin (PRAVACHOL) tablet 10 mg  10 mg Oral DAILY    lisinopril/hydroCHLOROthiazide(PRINZIDE/ZESTORETIC) 20/12.5 mg   Oral DAILY    aspirin (ASPIRIN) tablet 325 mg  325 mg Oral DAILY    enoxaparin (LOVENOX) injection 40 mg  40 mg SubCUTAneous Q24H    insulin lispro (HUMALOG) injection SubCUTAneous AC&HS    glucose chewable tablet 16 g  4 Tab Oral PRN    glucagon (GLUCAGEN) injection 1 mg  1 mg IntraMUSCular PRN       Past Medical History   Diagnosis Date    Allergic rhinitis     Arthritis      djd of knees    Diabetes ([address]     Hypercholesteremia     Hypertension     Obesity        Social History   Substance Use Topics    Smoking status: Never Smoker    Smokeless tobacco: Never Used    Alcohol use Yes      Comment: occ       Family History   Problem Relation Age of Onset    Diabetes Mother     Hypertension Mother     Kidney Disease Mother     Arthritis-osteo Father     Diabetes Father     No Known Problems Sister     No Known Problems Sister     No Known Problems Sister     No Known Problems Sister     Cancer Sister      cancer       REVIEW OF SYSTEMS:   Neuro: No headaches, dizziness, seizures, memory problems,   paresthesia, tremor has weakness   Constitutional: fevers, chills  fatigue  Eyes: contacts/glasses, visual disturbance, irritation  ENT: hearing loss, tinnitus, ear drainage  RESP: cough, sputum, hemoptysis  CVS: chest pain, dyspnea, palpitations  GI: nausea, vomiting, constipation  :dysuria, nocturia, urinary incontinence  HEME: bleeding, lymphadenopathy, petechiae  MSKL:has myalgias, arthralgias, stiff joints,No neck pain  BHV: anxiety, mood swings, sleep disturbance      Exam      Visit Vitals    BP (!) 179/97 (BP 1 Location: Left arm, BP Patient Position: Sitting)    Pulse 83    Temp 97.4 °F (36.3 °C)    Resp 18    Ht 5' 10\" (1.778 m)    Wt 253 lb 12 oz (115.1 kg)    SpO2 100%    BMI 36.41 kg/m2        General: Well developed, well nourished. Patient in no apparent distress   Head: Normocephalic, atraumatic, anicteric sclera   Lungs:  Clear to auscultation bilaterally, No wheezes or rubs   Cardiac: regular, rate, rythm and no murmur   Abd: Bowel sounds were audible.  No tenderness on palpation   Ext: No pedal edema   Skin: No overt signs of rash or insect bites     Neurological Exam:  Mental Status:  alert, and oriented person, place and time   Speech: Fluent, no aphasia and no dysarthria   Cranial Nerves:   Intact visual fields. Facial sensation is normal. Facial movement is symmetric. Palate is midline. Normal sternocleidomastoid strength. Tongue is midline. Hearing is intact bilaterally. Eyes: PERRL, EOM's full, no nystagmus, no ptosis. Motor:  Left hemiparesis arm more than leg \"clumsy hand\"   Reflexes:   +2 and symmetric   Sensory:   symmetric with left dysthesia   Gait :  precarious needs support even for a few steps. Circumducts left leg. Tremor:   No tremor. Cerebellar:  Left dysmetria finger to nose   Neurovascular: no carotid bruits. No JVD     Reviewed Data  Imaging    MRI Results (most recent):    Results from Hospital Encounter encounter on 02/14/17   MRI BRAIN WO CONT   Narrative INDICATION:  CVA, numbness left upper extremity and left lower extremity. EXAM: MRI BRAIN WITHOUT CONTRAST. COMPARISON: CT head 2/14/2017 . CONTRAST: None administered. PULSE SEQUENCES: Sagittal T1-weighted images, axial T1-weighted images, coronal  T1-weighted images , axial FLAIR and T2 star weighted images, axial diffusion  weighted echo planar images, axial ADC mapping. FINDINGS: Image quality is degraded by patient motion artifact. Several tiny  foci of restricted diffusion appear in the right thalamus. There is no  associated hemorrhage or mass effect. Small foci of T2 and FLAIR hyperintensity  are scattered throughout the deep white matter tracts of both cerebral  hemispheres, extending into the basal ganglia and right thalamus and also to the  vibha. The brain parenchyma and ventricular system are otherwise unremarkable in  appearance for age. No parenchymal mass or hemorrhage and no shift of midline  structures. No extra-axial fluid or blood collection. No additional signal  abnormality.  The craniocervical junction is normal for age, as are other midline  structures. Incidentally noted large retention cyst in the right maxillary  sinus. .          Impression IMPRESSION:  1. Tiny foci of nonhemorrhagic acute or subacute ischemia in the right thalamus,  without associated mass effect. 2. Microvascular ischemic and other age-related change. The findings were called to Ms. Mckinney, the patient's nurse, on 2/15/2017 at 2211  hours by Dr. Terrance Geronimo. 1              CT Results (most recent):    Results from Hospital Encounter encounter on 02/14/17   CTA CODE NEURO HEAD AND NECK W CONT   Narrative Preliminary report: No dissection, stenosis or occlusion. Final report to  follow. CLINICAL HISTORY: Acute left-sided weakness    EXAMINATION:  CT ANGIOGRAPHY HEAD AND NECK     COMPARISON: CT head 2/14/2017    TECHNIQUE:  Following the uneventful administration of iodinated contrast  material, axial CT angiography of the head and neck was performed. Delayed axial  images through the head were also obtained. Coronal and sagittal reconstructions  were obtained. Manual postprocessing of images was performed. 3-D  Sagittal  maximal intensity projection images were obtained. 3-D Coronal maximal  intensity projections were obtained. CT dose reduction was achieved through use  of a standardized protocol tailored for this examination and automatic exposure  control for dose modulation. FINDINGS:    Delayed contrast-enhanced head CT:    The ventricles are midline without hydrocephalus. There is no acute intra or  extra-axial hemorrhage. Mild bilateral subcortical and periventricular areas of  patchy low attenuation is nonspecific but likely related to changes of chronic  small vessel ischemic disease. The basal cisterns are clear. Right maxillary  retention cyst versus polyp.     CTA NECK:    Great vessels: Patent    Right subclavian artery: Patent    Left subclavian artery: Patent    Right common carotid artery: Patent    Left common carotid artery: Patent    Cervical right internal carotid artery: Patent  with no significant stenosis by  NASCET criteria. Slight medial retropharyngeal course    Cervical left internal carotid artery: Patent  with no significant stenosis by  NASCET criteria. Slight medial retropharyngeal course    Right vertebral artery: Patent    Left vertebral artery: Patent    Moderate cervical spondylosis    CTA HEAD:    Right cavernous internal carotid artery: Patent    Left cavernous internal carotid artery: Patent    Anterior cerebral arteries: Patent with hypoplastic right A1 segment    Anterior communicating artery: Patent    Right middle cerebral artery: Patent    Left middle cerebral artery: Patent    Posterior communicating arteries: Right is patent. Left is hypoplastic    Posterior cerebral arteries: Patent with bilateral atherosclerotic change and  mild to moderate grade short segment stenoses     Basilar artery: Patent    Distal vertebral arteries: Patent         Impression Impression:    No acute large vessel arterial occlusion. Bilateral internal carotid arteries are patent with no significant stenosis by  NASCET criteria.     Mild to moderate atherosclerotic disease in bilateral posterior cerebral  arteries              Lab Review  Lab Results   Component Value Date/Time    WBC 7.0 02/14/2017 06:10 PM    HCT 41.1 02/14/2017 06:10 PM    HGB 13.8 02/14/2017 06:10 PM    PLATELET 507 14/03/4589 06:10 PM     Lab Results   Component Value Date/Time    Sodium 140 02/16/2017 03:31 AM    Potassium 3.6 02/16/2017 03:31 AM    Chloride 104 02/16/2017 03:31 AM    CO2 26 02/16/2017 03:31 AM    Glucose 166 02/16/2017 03:31 AM    BUN 11 02/16/2017 03:31 AM    Creatinine 0.94 02/16/2017 03:31 AM    Calcium 8.6 02/16/2017 03:31 AM     No results found for: B12LT, FOL, RBCF  Lab Results   Component Value Date/Time    LDL, calculated 70 02/15/2017 03:59 AM     Lab Results   Component Value Date/Time    Hemoglobin A1c 9.4 02/15/2017 03:59 AM    Hemoglobin A1c (POC) 10.0 10/05/2016 03:27 PM

## 2017-02-16 NOTE — PROGRESS NOTES
Bedside and Verbal shift change report given to Rena RN (oncoming nurse) by Cris Lord RN (offgoing nurse). Report included the following information SBAR, Kardex, [facility] and Recent Results.     Zone Phone: [phone]        Significant changes during shift:   1) none        Patient Information     Toby Carreon  61 y.o.  2/14/2017 5:56 PM by Chastity Abreu MD. Toby Carreon was admitted from Home     Problem List          Patient Active Problem List     Diagnosis Date Noted    Transient ischemic attack involving right internal carotid artery 02/15/2017    Stenosis of both internal carotid arteries 02/15/2017    Left-sided weakness 02/14/2017    OA (osteoarthritis) of knee 08/06/2013    Prostate cancer ([facility] [address] 03/10/2011    Type II or unspecified type diabetes mellitus without mention of complication, uncontrolled 08/03/2009    Essential hypertension, benign 08/03/2009    Pure hypercholesterolemia 08/03/2009    Obesity 08/03/2009            Past Medical History   Diagnosis Date    Allergic rhinitis      Arthritis         djd of knees    Diabetes ([facility] [address]      Hypercholesteremia      Hypertension      Obesity              Core Measures:     CVA: Yes Yes  CHF:No Not applicable  PNA:No Not applicable     Activity Status:     OOB to Chair YES  Ambulated this shift YES - in toney  Bed Rest No     Supplemental O2: (If Applicable)     NC No  NRB No  Venti-mask No  On room air Liters/min        LINES AND DRAINS:     Central Line? No      PICC LINE? No      Urinary Catheter? No      DVT prophylaxis:     DVT prophylaxis Med- Yes  DVT prophylaxis SCD or MOHINI- No      Wounds: (If Applicable)     Wounds- No     Location none     Patient Safety:     Falls Score Total Score: 3  Safety Level_______  Bed Alarm On? Yes  Sitter?  No     Plan for upcoming shift: PT, OT           Discharge Plan: DC to IP rehab, possibly tomorrow, [facility]     Active Consults:  IP CONSULT TO HOSPITALIST  IP CONSULT TO NEUROLOGY

## 2017-02-16 NOTE — DIABETES MGMT
[facility]    Elevated A1C Visit Note    Recommendations/ Comments: Please consider resuming glipizide and metformin. Patient is a 61 y.o. male with known Type 2 Diabetes on glipizide 10mg ac b/d and metformin 1000mg ac b/d at home. Pt reports occasionally missing medication doses in the evening. Discussed ways to remember taking his medications like placing them near where he eats or setting an alarm on his phone. Pt reports he was on insulin at one time. Discussed a1c value and meaning, a1c goal.  Reviewed basic meal planning using plate method. Enc pt to avoid concentrated sweets as he reports previously drinking a lot of regular soda. A1c:   Lab Results   Component Value Date/Time    Hemoglobin A1c 9.4 02/15/2017 03:59 AM    Hemoglobin A1c 13.1 04/10/2015 12:52 PM       Recent Glucose Results:   Lab Results   Component Value Date/Time     (H) 02/16/2017 03:31 AM    GLUCPOC 276 (H) 02/16/2017 11:28 AM    GLUCPOC 308 (H) 02/16/2017 10:08 AM    GLUCPOC 189 (H) 02/16/2017 06:45 AM        Lab Results   Component Value Date/Time    Creatinine 0.94 02/16/2017 03:31 AM       Active Orders   Diet    DIET DIABETIC CONSISTENT CARB Soft Solids        Provided patient with the following: [x]          Diabetes Self-Care Guide               []          Insulin education materials               []          Diabetes survival skills handout               []          BG guidelines for post-op patients               []          \"Decreasing  the Cost of Diabetes Care\"               [x]          Outpatient DTC contact number          Patient to follow up with PCP after discharge. Will continue to follow as needed. Thank you.   Abdon Garcia RN, [phone]

## 2017-02-16 NOTE — PROGRESS NOTES
* No surgery found *  Bedside shift change report given to Rena (oncoming nurse) by Moisés Ryder (offgoing nurse). Report included the following information SBAR, Kardex, Intake/Output, MAR and Recent Results. Zone Phone:   [phone]      Significant changes during shift:    Admitted to [facility]    Patient [address] Nieves.  61 y.o.  2/14/2017  5:56 PM by Tony Mccallum MD. Marleen Bridges. was admitted from Home    Problem List    Patient Active Problem List    Diagnosis Date Noted    Left-sided weakness 02/14/2017    OA (osteoarthritis) of knee 08/06/2013    Prostate cancer ([facility] [address] 03/10/2011    Type II or unspecified type diabetes mellitus without mention of complication, uncontrolled 08/03/2009    Essential hypertension, benign 08/03/2009    Pure hypercholesterolemia 08/03/2009    Obesity 08/03/2009     Past Medical History   Diagnosis Date    Allergic rhinitis     Arthritis      djd of knees    Diabetes ([facility] [address]     Hypercholesteremia     Hypertension     Obesity          Core Measures:    CVA: Yes Yes  CHF:No No  PNA:No No    Post Op Surgical (If Applicable): Activity Status:    OOB to Chair YES  Ambulated this shift YES  Bed Rest NO    Supplemental O2: (If Applicable)    NC No  NRB No  Venti-mask No  On - Liters/min      LINES AND DRAINS:    Central Line? No     PICC LINE? No     Urinary Catheter? No     DVT prophylaxis:    DVT prophylaxis Med- Yes  DVT prophylaxis SCD or MOHINI- No     Wounds: (If Applicable)    Wounds- No    Location -    Patient Safety:    Falls Score Total Score: 2  Safety Level_______  Bed Alarm On? No  Sitter?  No    Plan for upcoming shift: MRI        Discharge Plan: unk    Active Consults:  IP CONSULT TO HOSPITALIST  IP CONSULT TO NEUROLOGY

## 2017-02-16 NOTE — PROGRESS NOTES
Occupational Therapy Goals  Initiated 2/16/2017   1. Patient will perform grooming standing at sink with supervision/set-up within 7 day(s). 2.  Patient will perform upper body dressing with supervision/set-up within 7 day(s). 3.  Patient will perform lower body dressing with supervision/set-up within 7 day(s). 4.  Patient will perform toilet transfers with supervision/set-up within 7 day(s). 5.  Patient will perform all aspects of toileting with supervision/set-up within 7 day(s). 6.  Patient will participate in upper extremity therapeutic exercise/activities with supervision/set-up for 10 minutes within 7 day(s). 7.  Patient will improve their Fugl Martines score by 5 points within 7 days. Occupational Therapy EVALUATION  Patient: Nidhi Tran (64 y.o. male)  Date: 2/16/2017  Primary Diagnosis: Left-sided weakness        Precautions:  Fall    ASSESSMENT :  Based on the objective data described below, the patient presents with L hemiparesis, decreased balance, decreased safety awareness, decreased insight into the severity of his deficits, decreased activity tolerance and impaired LUE and LLE proprioception which is impairing his functional independence. He is below his independent to mod I baseline, now performing ADLs at a supervision/setup to mod A level , and is supervision to min A for transfers and ambulation with a RW. Patient will benefit from skilled intervention to address the above impairments.   Patients rehabilitation potential is considered to be Good  Factors which may influence rehabilitation potential include:   []                None noted  []                Mental ability/status  [x]                Medical condition  [x]                Home/family situation and support systems  [x]                Safety awareness  []                Pain tolerance/management  []                Other:      PLAN :  Recommendations and Planned Interventions:  [x]                  Self Care Training                  [x]           Therapeutic Activities  [x]                  Functional Mobility Training    []           Cognitive Retraining  [x]                  Therapeutic Exercises           [x]           Endurance Activities  [x]                  Balance Training                   [x]           Neuromuscular Re-Education  []                  Visual/Perceptual Training     [x]      Home Safety Training  [x]                  Patient Education                 [x]           Family Training/Education  []                  Other (comment):    Frequency/Duration: Patient will be followed by occupational therapy 5 times a week to address goals. Discharge Recommendations: Inpatient Rehab  Further Equipment Recommendations for Discharge: TBD       OBJECTIVE DATA SUMMARY:   HISTORY:   Past Medical History   Diagnosis Date    Allergic rhinitis     Arthritis      djd of knees    Diabetes ([facility] [address]     Hypercholesteremia     Hypertension     Obesity      Past Surgical History   Procedure Laterality Date    Hx cholecystectomy       laparoscopic       Prior Level of Function/Home Situation: independent with ADLs, ambulates with cane  Expanded or extensive additional review of patient history:     Home Situation  Home Environment: Private residence  # Steps to Enter: 1  Rails to Enter: No  One/Two Story Residence: Two story, live on 1st floor  Living Alone: No  Support Systems: Spouse/Significant Other/Partner, Child(sergei)  Patient Expects to be Discharged to[de-identified] Private residence  Current DME Used/Available at Home: Cane, quad  Tub or Shower Type: Tub/Shower combination  [x]  Right hand dominant   []  Left hand dominant    EXAMINATION OF PERFORMANCE DEFICITS:  Cognitive/Behavioral Status:  Neurologic State: Alert  Orientation Level: Oriented X4  Cognition: Impaired decision making;Poor safety awareness; Appropriate for age attention/concentration; Follows commands        Safety/Judgement: Decreased insight into deficits    Vision/Perceptual:                           Acuity: Within Defined Limits    Corrective Lenses: Glasses  Range of Motion:  AROM: Within functional limits (RUE and RLE, Generally decreased/functional for LLE and LUE)                       Strength:  Strength: Within functional limits (RUE and RLE, Generally decreased/functional for LLE and LUE)   LUE MMT: 2/5 L shoulder, 4-4+/5 t/o elbow, wrist and hand. Coordination:  Coordination: Within functional limits  Fine Motor Skills-Upper: Left Impaired;Right Intact    Gross Motor Skills-Upper: Left Impaired;Right Intact  Tone & Sensation:  Tone: Normal  Sensation: Impaired (light touch intact, proprioception impaired, paresthesias L hand and foot)                      Balance:  Sitting: Impaired  Sitting - Static: Good (unsupported)  Sitting - Dynamic: Fair (occasional)  Standing: Impaired  Standing - Static: Fair  Standing - Dynamic : Fair    Functional Mobility and Transfers for ADLs:  Bed Mobility:  Rolling: Supervision  Supine to Sit: Supervision  Sit to Supine: Supervision  Scooting: Supervision    Transfers:  Sit to Stand: Minimum assistance (2/2 patient losing balance Left)  Stand to Sit: Minimum assistance  Bed to Chair: Minimum assistance (ambulating with a RW)  Toilet Transfer : Minimum assistance (using grab bar)    ADL Assessment:  Feeding: Supervision;Setup    Oral Facial Hygiene/Grooming: Minimum assistance (standing at sink, for balance. )    Bathing: Moderate assistance    Upper Body Dressing: Moderate assistance    Lower Body Dressing: Moderate assistance    Toileting: Minimum assistance                ADL Intervention and task modifications:  Initiated dressing ADL, transfer, toileting and standing grooming and safety training.       Functional Measure:   Fugl-Martines Assessment of Motor Recovery after Stroke:     Reflex Activity  Flexors/Biceps/Fingers: Can be elicited  Extensors/Triceps: Can be elicited  Reflex Subtotal: 4    Volitional Movement Within Synergies  Shoulder Retraction: Partial  Shoulder Elevation: Partial  Shoulder Abduction (90 degrees): Partial  Shoulder External Rotation: Full  Elbow Flexion: Full  Forearm Supination: Full  Shoulder Adduction/Internal Rotation: Full  Elbow Extension: Full  Forearm Pronation: Full  Subtotal: 15    Volitional Movement Mixing Synergies  Hand to Lumbar Spine: Full  Shoulder Flexion (0-90 degrees): Partial  Pronation-Supination: Partial  Subtotal: 4    Volitional Movement With Little or No Synergy  Shoulder Abduction (0-90 degrees): Partial  Shoulder Flexion ( degrees): Partial  Pronation/Supination: Full  Subtotal : 4    Normal Reflex Activity  Biceps, Triceps, Finger Flexors: Full  Subtotal : 2    Upper Extremity Total   Upper Extremity Total: 29    Wrist  Stability at 15 Degree Dorsiflexion: Full  Repeated Dorsiflexion/ Volar Flexion: Full  Stability at 15 Degree Dorsiflexion: Full  Repeated Dorsiflexion/ Volar Flexion: Full  Circumduction: Partial  Wrist Total: 9    Hand  Mass Flexion: Full  Mass Extension: Full  Grasp A: Full  Grasp B: Full  Grasp C: Full  Grasp D: Full  Grasp E: Full  Hand Total: 14    Coordination/Speed  Tremor: Slight  Dysmetria: Marked  Time: >5s  Coordination/Speed Total : 1    Total A-D  Total A-D (Motor Function): 53/66     Percentage of impairment CH  0% CI  1-19% CJ  20-39% CK  40-59% CL  60-79% CM  80-99% CN  100%   Fugl-Martines score: 0-66 66 53-65 39-52 26-38 13-25 1-12   0      This is a reliable/valid measure of arm function after a neurological event. It has established value to characterize functional status and for measuring spontaneous and therapy-induced recovery; tests proximal and distal motor functions. Fugl-Martines Assessment - UE scores recorded between five and 30 days post neurologic event can be used to predict UE recovery at six months post neurologic event.   Severe = 0-21 points   Moderately Severe = 22-33 points   Moderate = 34-47 points   Mild = 48-66 points  MILES Craig, ALEXA Patel, & AUREA Dominguez (1992). Measurement of motor recovery after stroke: Outcome assessment and sample size requirements. Stroke, 23, pp. 7326-6416.   ------------------------------------------------------------------------------------------------------------------------------------------------------------------  MCID:  Stroke:   Chava Chase et al, 2001; n = 171; mean age 79 (5) years; assessed within 16 (12) days of stroke, Acute Stroke)  FMA Motor Scores from Admission to Discharge   10 point increase in FMA Upper Extremity = 1.5 change in discharge FIM   10 point increase in FMA Lower Extremity = 1.9 change in discharge FIM  MDC:   Stroke:   Ambar Giang et al, 2008, n = 14, mean age = 59.9 (14.6) years, assessed on average 14 (6.5) months post stroke, Chronic Stroke)   FMA = 5.2 points for the Upper Extremity portion of the assessment     G codes: In compliance with CMSs Claims Based Outcome Reporting, the following G-code set was chosen for this patient based on their primary functional limitation being treated: The outcome measure chosen to determine the severity of the functional limitation was the Fugl-Martines with a score of 53/66 which was correlated with the impairment scale. ?  Self Care:     - CURRENT STATUS: CI - 1%-19% impaired, limited or restricted    - GOAL STATUS: CH - 0% impaired, limited or restricted    - D/C STATUS:  ---------------To be determined---------------      Occupational Therapy Evaluation Charge Determination   History Examination Decision-Making   MEDIUM Complexity : Expanded review of history including physical, cognitive and psychosocial  history  MEDIUM Complexity : 3-5 performance deficits relating to physical, cognitive , or psychosocial skils that result in activity limitations and / or participation restrictions MEDIUM Complexity : Patient may present with comorbidities that affect occupational performnce. Miniml to moderate modification of tasks or assistance (eg, physical or verbal ) with assesment(s) is necessary to enable patient to complete evaluation       Based on the above components, the patient evaluation is determined to be of the following complexity level: MEDIUM    Pain:   no complaint of pain           After treatment:   []  Patient left in no apparent distress sitting up in chair  [x]  Patient left in no apparent distress in bed  [x]  Call bell left within reach  [x]  Nursing notified  []  Caregiver present  []  Bed alarm activated    COMMUNICATION/EDUCATION:   The patients plan of care was discussed with: Registered Nurse, Physician and . Patient was educated regarding His deficit(s) of L hemiparesis as this relates to His diagnosis of CVA. He demonstrated Good understanding as evidenced by verbalization. [x]    Home safety education was provided and the patient/caregiver indicated understanding. [x]    Patient/family have participated as able in goal setting and plan of care. [x]    Patient/family agree to work toward stated goals and plan of care. []    Patient understands intent and goals of therapy, but is neutral about his/her participation. []    Patient is unable to participate in goal setting and plan of care. This patients plan of care is appropriate for delegation to [facility].     Thank you for this referral.  Hugh Storm, OTR/L  Time Calculation: 31 mins

## 2017-02-16 NOTE — PROGRESS NOTES
Hospitalist Progress Note    NAME: Traci Lux. :  1950   MRN:  [id number]       Interim Hospital Summary: 61 y.o. male whom presented on 2017 with      Assessment / Plan:  Acute CVA POA  MRI with acute or subacute ischemia in the right thalamus  CTA head and neck negative  Carotid Dopplers negative  2D echo with normal EF  Appreciate neurology input  Need inpatient rehab as per OT recommendations, will ask CM for arrangements  Continue ASA/Statins  LDL at goal  Continue tele monitoring  PT/OT    Accelerated HTN  Permissive HTN due to concerning CVA  Continue cardura, lisinopril and HCTZ  Increase Norvasc to 10mg    DM type 2  holding metformin as got CTA  Continue SSI  A1c 9.4    H/o prostate ca.     Code Status: FULL  Surrogate Decision Maker: his wife     DVT Prophylaxis: Sq lovenox  GI Prophylaxis: not indicated     Baseline: ambulatory with cane          Subjective: Pt seen and examined at beside. Left sided weakness. Overnight events d/w RN   CHIEF COMPLAINT: f/u \"Left sided weakness with gait difficulty\"       Review of Systems:  Symptom Y/N Comments  Symptom Y/N Comments   Fever/Chills n   Chest Pain n    Poor Appetite    Edema     Cough n   Abdominal Pain n    Sputum    Joint Pain     SOB/DE JESUS n   Pruritis/Rash     Nausea/vomit    Tolerating PT/OT     Diarrhea    Tolerating Diet y    Constipation    Other y Left sided weakness     Could NOT obtain due to:      Objective:     VITALS:   Last 24hrs VS reviewed since prior progress note.  Most recent are:  Patient Vitals for the past 24 hrs:   Temp Pulse Resp BP SpO2   17 1514 97.8 °F (36.6 °C) 77 16 166/80 97 %   17 1144 97.4 °F (36.3 °C) 83 18 (!) 179/97 100 %   17 0725 98.1 °F (36.7 °C) 70 18 (!) 163/95 96 %   17 0321 98.1 °F (36.7 °C) 69 18 149/85 93 %   02/15/17 2323 98.1 °F (36.7 °C) 67 18 157/79 96 %   02/15/17 2025 98.1 °F (36.7 °C) 74 16 155/79 95 %       Intake/Output Summary (Last 24 hours) at 17 75 Jennifer Suárez filed at 02/16/17 0646   Gross per 24 hour   Intake                0 ml   Output              900 ml   Net             -900 ml        PHYSICAL EXAM:  General: WD, WN. Alert, cooperative, no acute distress    EENT:  EOMI. Anicteric sclerae. MMM  Resp:  CTA bilaterally, no wheezing or rales. No accessory muscle use  CV:  Regular  rhythm,  No edema  GI:  Soft, Non distended, Non tender.  +Bowel sounds  Neurologic:  Alert and oriented X 3, normal speech, left sided weakness  Psych:   Good insight. Not anxious nor agitated  Skin:  No rashes. No jaundice    Reviewed most current lab test results and cultures  YES  Reviewed most current radiology test results   YES  Review and summation of old records today    NO  Reviewed patient's current orders and MAR    YES  PMH/SH reviewed - no change compared to H&P  ________________________________________________________________________  Care Plan discussed with:    Comments   Patient y    Family  y wife   RN y    Care Manager     Consultant                        Multidiciplinary team rounds were held today with , nursing, pharmacist and clinical coordinator. Patient's plan of care was discussed; medications were reviewed and discharge planning was addressed. ________________________________________________________________________  Total NON critical care TIME:  25  Minutes    Total CRITICAL CARE TIME Spent:   Minutes non procedure based      Comments   >50% of visit spent in counseling and coordination of care     ________________________________________________________________________  Carroll Jones MD     Procedures: see electronic medical records for all procedures/Xrays and details which were not copied into this note but were reviewed prior to creation of Plan. LABS:  I reviewed today's most current labs and imaging studies.   Pertinent labs include:  Recent Labs      02/14/17   1810   WBC  7.0   HGB  13.8   HCT  41.1   PLT  233 Recent Labs      02/16/17   0331  02/14/17   1814 02/14/17 1810   NA  140   --   138   K  3.6   --   3.5   CL  104   --   103   CO2  26   --   26   GLU  166*   --   189*   BUN  11   --   15   CREA  0.94   --   1.11   CA  8.6   --   8.9   MG   --    --   1.6   ALB   --    --   3.4*   TBILI   --    --   0.4   SGOT   --    --   11*   ALT   --    --   21   INR   --   1.0  1.0       Signed: Cindy Ortiz MD

## 2017-02-16 NOTE — PROGRESS NOTES
Pt is a 60 y/o Black man who was admitted to the hospital for left-sided weakness. Pt lives with his wife in a one story home. Pt has no DME. Pt's wife will transport him at discharge. Pt usually drives himself and has supportive family. He has an adult son living at home with a flexible work schedule who can also help.     12:29 pm    PT told MSW intern that pt will need a rolling walker at discharge. MSW intern sent a referral to [facility] through [employer]. Pt was approved to get a walker and MSW intern may take one from the closet. The company will call the pt directly if there is a copay. MSW intern needs to call pt's health insurance company ([phone]) and give an authorization code () for pt. MSW intern called Lourdes Dasilva with the insurance company and left a voicemail with her giving the authorization code and asking her to call back. 12:59 pm    MSW intern received a call back from Lourdes Dasilva. Pt was approved to receive a walker. She requested a face sheet and H&P that MSW intern sent by fax ([phone]). Lourdes Dasilva will need to be called back if pt needs HH. MSW intern is waiting on notes from PT and OT to order [place].    2:19 pm    MSW intern spoke with pt about his [place] agency preference. Pt chose [address] Pt also stated that his bedroom is upstairs and that he wants to know if he can get a hospital bed set up downstairs. MSW intern sent a referral for [place] through [facility]. MSW intern called Lourdes Dasilva and left a VM updating her on pt's [place] choice and asking if his health insurance will provide a hospital bed for his home. 2:55 pm    MSW intern got a message that pt's discharge plan has been changed from [place] to inpatient rehab. MSW intern cancelled pt's [place] and [place] DME referrals. MSW intern sent a referral to [facility] through [employer]. MSW intern called Lourdes Dasilva and left a VM and updated her on pt's discharge plan.      3:49 pm    Dee Dee called MSW intern back and stated that pt is not in network for [facility] and that he can only have inpatient rehab if he needs in all 3 modalities (PT, OT, SLP). Pt can go to an in network SNF ([address] [place] [address] or [place]; [place]; [facility], or [facility]). Care Management Interventions  PCP Verified by CM: Yes  Mode of Transport at Discharge:  Other (see comment) (Pt's spouse will transport at discharge. )  Transition of Care Consult (CM Consult): Discharge Planning  Physical Therapy Consult: Yes  Occupational Therapy Consult: Yes  Speech Therapy Consult: Yes  Current Support Network: Lives with Spouse (Pt livse with his wife and adult son in a two story house; no DME)  Confirm Follow Up Transport: Self (Pt drives and has supportive family)    Arben Alford MSW Intern    NENITA Vicente  [facility]  Ext [phone]

## 2017-02-16 NOTE — PROGRESS NOTES
Problem: Mobility Impaired (Adult and Pediatric)  Goal: *Acute Goals and Plan of Care (Insert Text)  Physical Therapy Goals  Initiated 2/15/2017  1. Patient will transfer from bed to chair and chair to bed with modified independence using the least restrictive device within 7 day(s). 2. Patient will perform sit to stand with modified independence within 7 day(s). 3. Patient will ambulate with modified independence for 250 feet with the least restrictive device within 7 day(s). 4. Patient will ascend/descend 4 stairs with single handrail(s) with modified independence within 7 day(s). 5. Patient will improve Jeronimo Balance score by 7 points within 7 days. physical Therapy TREATMENT  Patient: Yaritza Escamilla (64 y.o. male)  Date: 2/16/2017  Diagnosis: Left-sided weakness <principal problem not specified>       Precautions: Fall    ASSESSMENT:  Pt progressing well toward goals w/ improvement noted in functional mobility and activity tolerance during todays session. Performed repeated trials of fwd/retro/side stepping w/ use of R kathy-rail and visual feedback via mirror. Pt requires continuous verbal/visual cues for increased foot clearance on L, with pt continuing to drag L foot at times remaining a major safety concern. Pt performed stair amb x 4 steps w/ use of B hand rails, CGA, and VCs for LE sequencing. Performed amb trial w/ SPC on R w/ pt mildly unsteady. Improved balance and safety noted w/ use of RW for amb. At end of session, pt seated in chair, all needs met. Patient educated on neuro plasticity principles and benefit of activity ahead as well as modifiable CVA risk factors. Pt would benefit from [facility] PT upon DC for return to prior level of functioning. Recommend use of RW for safety going home.    Progression toward goals:  [x]       Improving appropriately and progressing toward goals  []       Improving slowly and progressing toward goals  []       Not making progress toward goals and plan of care will be adjusted     PLAN:  Patient continues to benefit from skilled intervention to address the above impairments. Continue treatment per established plan of care. Discharge Recommendations:  Home Health  Further Equipment Recommendations for Discharge:  rolling walker     SUBJECTIVE:   Patient stated I feel like I'm getting around better than yesterday.     OBJECTIVE DATA SUMMARY:   Critical Behavior:  Neurologic State: Alert  Orientation Level: Oriented X4  Cognition: Follows commands  Safety/Judgement: Awareness of environment  Functional Mobility Training:  Transfers:  Sit to Stand: Contact guard assistance  Stand to Sit: Contact guard assistance                             Balance:  Sitting: Intact; Without support  Standing: Impaired; With support  Standing - Static: Good  Standing - Dynamic : Fair  Ambulation/Gait Training:  Distance (ft): 30 Feet (ft) (repeated trials fwd/retro/side stepping)  Assistive Device: Gait belt (trial w/ SPC and RW)  Ambulation - Level of Assistance: Minimal assistance (L HHA; CGA w/ use of R kathy-rail or RW)        Gait Abnormalities: Ataxic; Altered arm swing;Decreased step clearance; Path deviations        Base of Support: Widened     Speed/Saige: Slow  Step Length: Left shortened;Right shortened                  Constant visual/verbal cues for L foot clearance. Improvement noted w/ use of mirror vs w/out. Stairs:  Number of Stairs Trained: 4  Stairs - Level of Assistance: Contact guard assistance   Rail Use: Both  Neuro Re-Education:  Visual feedback w/ use of mirror for fwd/retro/side stepping w/ R kathy-rail. Activity Tolerance:   Good; fatigue noted w/ prolonged activity. Please refer to the flowsheet for vital signs taken during this treatment.   After treatment:   [x] Patient left in no apparent distress sitting up in chair  [] Patient left in no apparent distress in bed  [x] Call bell left within reach  [x] Nursing notified  [] Caregiver present  [] Bed alarm activated    COMMUNICATION/COLLABORATION:   The patients plan of care was discussed with: Registered Nurse and . Patient was educated regarding His deficit(s) of impaired balance, decreased sensation, and decreased functional mobility as this relates to His diagnosis of possible CVA. He demonstrated Good understanding as evidenced by report back. Rowena Ayala, SPT   Time Calculation: 36 mins  Regarding student involvement in patient care:  A student participated in this treatment session. Per CMS Medicare statements and APTA guidelines I certify that the following was true:  1. I was present and directly observed the entire session. 2. I made all skilled judgments and clinical decisions regarding care. 3. I am the practitioner responsible for assessment, treatment, and documentation.

## 2017-02-16 NOTE — PROGRESS NOTES
Occupational Therapy Goals  Initiated 2/16/2017   1. Patient will perform grooming standing at sink with supervision/set-up within 7 day(s). 2. Patient will perform upper body dressing with supervision/set-up within 7 day(s). 3. Patient will perform lower body dressing with supervision/set-up within 7 day(s). 4. Patient will perform toilet transfers with supervision/set-up within 7 day(s). 5. Patient will perform all aspects of toileting with supervision/set-up within 7 day(s). 6. Patient will participate in upper extremity therapeutic exercise/activities with supervision/set-up for 10 minutes within 7 day(s). 7. Patient will improve their Fugl Martines score by 5 points within 7 days.

## 2017-02-17 VITALS
OXYGEN SATURATION: 99 % | DIASTOLIC BLOOD PRESSURE: 94 MMHG | TEMPERATURE: 98.2 F | RESPIRATION RATE: 18 BRPM | SYSTOLIC BLOOD PRESSURE: 154 MMHG | HEIGHT: 70 IN | BODY MASS INDEX: 36.33 KG/M2 | WEIGHT: 253.75 LBS | HEART RATE: 83 BPM

## 2017-02-17 LAB
GLUCOSE BLD STRIP.AUTO-MCNC: 200 MG/DL (ref 65–100)
GLUCOSE BLD STRIP.AUTO-MCNC: 330 MG/DL (ref 65–100)
SERVICE CMNT-IMP: ABNORMAL
SERVICE CMNT-IMP: ABNORMAL

## 2017-02-17 PROCEDURE — 97116 GAIT TRAINING THERAPY: CPT

## 2017-02-17 PROCEDURE — 74011250637 HC RX REV CODE- 250/637: Performed by: FAMILY MEDICINE

## 2017-02-17 PROCEDURE — 74011250637 HC RX REV CODE- 250/637: Performed by: INTERNAL MEDICINE

## 2017-02-17 PROCEDURE — 97535 SELF CARE MNGMENT TRAINING: CPT | Performed by: OCCUPATIONAL THERAPIST

## 2017-02-17 PROCEDURE — 74011250636 HC RX REV CODE- 250/636: Performed by: FAMILY MEDICINE

## 2017-02-17 PROCEDURE — 97530 THERAPEUTIC ACTIVITIES: CPT | Performed by: OCCUPATIONAL THERAPIST

## 2017-02-17 PROCEDURE — 82962 GLUCOSE BLOOD TEST: CPT

## 2017-02-17 PROCEDURE — 74011636637 HC RX REV CODE- 636/637: Performed by: FAMILY MEDICINE

## 2017-02-17 PROCEDURE — 97112 NEUROMUSCULAR REEDUCATION: CPT

## 2017-02-17 RX ORDER — HYDROCHLOROTHIAZIDE 25 MG/1
25 TABLET ORAL DAILY
Qty: 30 TAB | Refills: 0 | Status: SHIPPED
Start: 2017-02-17 | End: 2017-03-07 | Stop reason: SDUPTHER

## 2017-02-17 RX ORDER — LISINOPRIL 40 MG/1
40 TABLET ORAL DAILY
Qty: 30 TAB | Refills: 0 | Status: SHIPPED
Start: 2017-02-17 | End: 2017-03-07 | Stop reason: SDUPTHER

## 2017-02-17 RX ORDER — AMLODIPINE BESYLATE 10 MG/1
10 TABLET ORAL DAILY
Qty: 30 TAB | Refills: 0 | Status: SHIPPED | OUTPATIENT
Start: 2017-02-17 | End: 2017-03-07 | Stop reason: SDUPTHER

## 2017-02-17 RX ORDER — ASPIRIN 325 MG
325 TABLET ORAL DAILY
Qty: 30 TAB | Refills: 0 | Status: SHIPPED
Start: 2017-02-17 | End: 2017-04-05

## 2017-02-17 RX ADMIN — INSULIN LISPRO 2 UNITS: 100 INJECTION, SOLUTION INTRAVENOUS; SUBCUTANEOUS at 09:55

## 2017-02-17 RX ADMIN — INSULIN LISPRO 4 UNITS: 100 INJECTION, SOLUTION INTRAVENOUS; SUBCUTANEOUS at 12:10

## 2017-02-17 RX ADMIN — PRAVASTATIN SODIUM 10 MG: 10 TABLET ORAL at 09:55

## 2017-02-17 RX ADMIN — AMLODIPINE BESYLATE 10 MG: 5 TABLET ORAL at 09:55

## 2017-02-17 RX ADMIN — HYDROCHLOROTHIAZIDE: 25 TABLET ORAL at 09:55

## 2017-02-17 RX ADMIN — ACETAMINOPHEN 650 MG: 325 TABLET, FILM COATED ORAL at 01:13

## 2017-02-17 RX ADMIN — ASPIRIN 325 MG ORAL TABLET 325 MG: 325 PILL ORAL at 09:55

## 2017-02-17 RX ADMIN — Medication 10 ML: at 07:16

## 2017-02-17 RX ADMIN — ENOXAPARIN SODIUM 40 MG: 40 INJECTION SUBCUTANEOUS at 00:03

## 2017-02-17 NOTE — PROGRESS NOTES
Problem: Mobility Impaired (Adult and Pediatric)  Goal: *Acute Goals and Plan of Care (Insert Text)  Physical Therapy Goals  Initiated 2/15/2017  1. Patient will transfer from bed to chair and chair to bed with modified independence using the least restrictive device within 7 day(s). 2. Patient will perform sit to stand with modified independence within 7 day(s). 3. Patient will ambulate with modified independence for 250 feet with the least restrictive device within 7 day(s). 4. Patient will ascend/descend 4 stairs with single handrail(s) with modified independence within 7 day(s). 5. Patient will improve Jeronimo Balance score by 7 points within 7 days. PHYSICAL THERAPY TREATMENT  Patient: Tyrese George (64 y.o. male)  Date: 2/17/2017  Diagnosis: Left-sided weakness <principal problem not specified>       Precautions: Fall      ASSESSMENT:  Patient quite fatigued this AM with session occurring immediately post OT treatment. With fatigue, he demonstrates significantly impaired functioning with up to total A required several times for avoidance of falls with tasks below. Patient received in bedside chair, able to complete initial gait period without use of AD in order to promote recovery with L and R kathy-bar utilized for UE weight bearing and stability within toney. Upon return to room, completed x5 sit<>stand with facilitation of L knee motor control through light touch provided; he demonstrates posterior lean in standing beyond 50% of full sit<>stand that impacts ability. Patient then completed second gait period, where x3 (turn around, entering doorway, sitting at chair) required total A for upright recovery as he inappropriately stutter steps without righting strategy. Patient also increasingly collapses with tasks despite reporting awareness of such posing significant concerns to functional mobility as recovery continues.  As such, prior recommendation of [facility] therapy is inappropriate; recommend IP rehabilitation for best recovery from CVA sequelae ahead. Patient is eager, motivated, and will do well with intensive rehabilitation for best recovery. Progression toward goals:  [ ]       Improving appropriately and progressing toward goals  [X]       Improving slowly and progressing toward goals  [ ]       Not making progress toward goals and plan of care will be adjusted       PLAN:  Patient continues to benefit from skilled intervention to address the above impairments. Continue treatment per established plan of care. Discharge Recommendations:  Inpatient Rehab  Further Equipment Recommendations for Discharge:  Defer to rehab       SUBJECTIVE:   Patient stated oh alright.       OBJECTIVE DATA SUMMARY:   Critical Behavior:  Neurologic State: Alert  Orientation Level: Oriented X4  Cognition: Decreased attention/concentration, Follows commands  Safety/Judgement: Decreased awareness of need for assistance, Decreased awareness of need for safety  Functional Mobility Training:  Bed Mobility:  Rolling:  (at bedside chair)  Supine to Sit: Contact guard assistance     Scooting: Independent        Transfers:  Sit to Stand: Contact guard assistance;Minimum assistance; Additional time  Stand to Sit: Contact guard assistance; Total assistance;Assist x1 (total A for collapse into chair)        Bed to Chair: Minimum assistance                    Balance:  Sitting: Intact; Without support  Standing: Impaired; With support  Standing - Static: Fair;Constant support  Standing - Dynamic : Fair (to poor with instability, fatigue)  Ambulation/Gait Training:  Distance (ft): 75 Feet (ft) (seated rest senior living)  Assistive Device: Gait belt  Ambulation - Level of Assistance: Minimal assistance; Moderate assistance; Total assistance;Assist x1 (x2 significant LOB with total A )        Gait Abnormalities: Ataxic;Decreased step clearance        Base of Support: Center of gravity altered;Shift to right     Speed/Saige: Fluctuations  Step Length: Left shortened (greater with fatigue)  Swing Pattern: Left asymmetrical (neglects L foot swing phase initiation, greater with fatigue)                          Patient demonstrates decreased stability, awareness of safety and fall avoidance strategies; provided VC's for increased L step height, avoidance of collapse upon turns, smaller spaces (ie door frame), especially with fatigue. Neuro Re-Education:  Manual facilitation of upright through light touch at B gluts with gait, as well as L knee extensor during sit<>stand with fading as began to demonstrate increased motor control. Activity Tolerance:   Fair; fatigue and R knee pain limiting      Please refer to the flowsheet for vital signs taken during this treatment. After treatment:   [X] Patient left in no apparent distress sitting up in chair  [ ] Patient left in no apparent distress in bed  [X] Call bell left within reach  [X] Nursing notified  [X] Caregiver present  [ ] Bed alarm activated      COMMUNICATION/COLLABORATION:   Patient was educated regarding His deficit(s) of impaired balance, coordination, mobility as this relates to His diagnosis of CVA. He demonstrated Fair understanding as evidenced by voiced understanding but lack of demonstration with activity.  .  The patients plan of care was discussed with: Occupational Therapist and Registered Nurse     Yosef Shetty, PT, DPT    Time Calculation: 25 mins

## 2017-02-17 NOTE — ROUTINE PROCESS
Called report to [facility] and rehab, [place]. Used SBAR, Kardex, doc flow and MAR. All questions answered and gave my number incase she has some later. Patient already on the way.

## 2017-02-17 NOTE — PROGRESS NOTES
MSW intern spoke with pt and updated him on his discharge plan. MSW intern let pt know that his [facility] and rolling walker order were cancelled. MSW intern gave pt a SNF list to choose from. Pt chose [facility] and [facility]. MSW intern sent a referral through [facility]. MSW intern will continue to follow and assist with discharge planning needs. 10:11 am    [facility] and has one bed available if pt will discharge today. MSW intern consulted with pt's nurse who stated that pt will likely discharge today. MSW intern informed [address] [place] [address] staff. Pt's bed will be available at 2:00pm. MSW intern informed pt of bed availability. Pt stated that is son will come to the hospital and can transport him to SNF if that is medically appropriate. 12:27 pm     Pt has [facility] to [facility] to [facility]. Pt's insurance program offered to send a wheel chair [facility] for pt at no charge. Pt agreed to this arrangement. MSW intern called [place] ([phone]) and gave pt's authorization number ([id number]) and arranged for the van to pick pt up at 2:30 pm. Pt's wife will ride in the [facility] with him to the rehab facility. Care Management Interventions  PCP Verified by CM: Yes  Mode of Transport at Discharge:  Other (see comment) (Pt's spouse will transport at discharge. )  Transition of Care Consult (CM Consult): SNF [facility] and Rehab)  Physical Therapy Consult: Yes  Occupational Therapy Consult: Yes  Speech Therapy Consult: Yes  Current Support Network: Lives with Spouse (Pt livse with his wife and adult son in a two story house; no DME)  Confirm Follow Up Transport: Self (Pt drives and has supportive family)  Plan discussed with Pt/Family/Caregiver: Yes  Freedom of Choice Offered: Yes  Discharge Location  Discharge Placement: Skilled nursing facility [facility] and Rehab)    Arben Watson MSW Intern Stephen Gasser, MSW  [employer]  Ext [phone]

## 2017-02-17 NOTE — DISCHARGE INSTRUCTIONS
HOSPITALIST DISCHARGE INSTRUCTIONS    NAME: Kailey Horton. :  1950   MRN:  [id number]     Date/Time:  2017 1:16 PM    ADMIT DATE: 2017     DISCHARGE DATE: 2017     DISCHARGE DIAGNOSIS:  Acute CVA   Accelerated HTN  DM type 2    MEDICATIONS:  · It is important that you take the medication exactly as they are prescribed. · Keep your medication in the bottles provided by the pharmacist and keep a list of the medication names, dosages, and times to be taken in your wallet. · Do not take other medications without consulting your doctor. Pain Management: per above medications    What to do at Home    Recommended diet:  Diabetic Diet    Recommended activity: Activity as tolerated    If you have questions regarding the hospital related prescriptions or hospital related issues please call [facility] at [phone]. If you experience any of the following symptoms then please call your primary care physician or return to the emergency room if you cannot get hold of your doctor:  Fever, chills, nausea, vomiting, diarrhea, change in mentation, falling, bleeding, shortness of breath        Information obtained by :  I understand that if any problems occur once I am at home I am to contact my physician. I understand and acknowledge receipt of the instructions indicated above.                                                                                                                                            Physician's or R.N.'s Signature                                                                  Date/Time                                                                                                                                              Patient or Representative Signature                                                          Date/Time

## 2017-02-17 NOTE — PROGRESS NOTES
Occupational Therapy Goals  Initiated 2/16/2017   1. Patient will perform grooming standing at sink with supervision/set-up within 7 day(s). 2. Patient will perform upper body dressing with supervision/set-up within 7 day(s). 3. Patient will perform lower body dressing with supervision/set-up within 7 day(s). 4. Patient will perform toilet transfers with supervision/set-up within 7 day(s). 5. Patient will perform all aspects of toileting with supervision/set-up within 7 day(s). 6. Patient will participate in upper extremity therapeutic exercise/activities with supervision/set-up for 10 minutes within 7 day(s). 7. Patient will improve their Fugl Martines score by 5 points within 7 days. Occupational Therapy TREATMENT  Patient: Elke Camilo (64 y.o. male)  Date: 2/17/2017  Diagnosis: Left-sided weakness <principal problem not specified>       Precautions: Fall    ASSESSMENT:  Patient remains very motivated and eager to regain his functional independence. He was able to perform ADLs at a supervision to min A level, bed mobility with CGA, transfers with CGA, and ambulation to/from the bathroom with CGA to min A. Patient requires intermittent cueing for safety during functional mobility and standing grooming. Tactile, verbal and visual cueing required for patient to maintain an upright standing posture to during standing ADLs and ambulation, demonstrating a flexed posture as he fatigues. Patient consistently incorporating use LUE as an active assist and coordination is improving. At this point patient will benefit from inpatient rehab after discharge in order to maximize his independence and safety.   Progression toward goals:  [x]       Improving appropriately and progressing toward goals  []       Improving slowly and progressing toward goals  []       Not making progress toward goals and plan of care will be adjusted     PLAN:  Patient continues to benefit from skilled intervention to address the above impairments. Continue treatment per established plan of care. Discharge Recommendations:  Inpatient Rehab  Further Equipment Recommendations for Discharge:  TBD         OBJECTIVE DATA SUMMARY:   Cognitive/Behavioral Status:  Neurologic State: Alert  Orientation Level: Oriented X4  Cognition: Decreased attention/concentration; Follows commands        Safety/Judgement: Decreased awareness of need for assistance;Decreased awareness of need for safety  Functional Mobility and Transfers for ADLs:  Bed Mobility:  Rolling: Modified independent  Supine to Sit: Contact guard assistance     Scooting: Independent  Transfers:  Sit to Stand: Contact guard assistance     Bed to Chair: Minimum assistance          Toilet Transfer : Contact guard assistance           Bathroom Mobility: Minimum assistance (ambulating with RW)      Balance:  Sitting: Intact  Standing: Impaired  Standing - Static: Good  Standing - Dynamic : Fair  ADL Intervention:     Grooming  Grooming Assistance: Contact guard assistance;Minimum assistance (occasional min A for standing balance)  Washing Face: Contact guard assistance;Minimum assistance  Washing Hands: Stand-by assistance;Minimum assistance  Brushing Teeth: Contact guard assistance;Minimum assistance  Cues: Tactile cues provided;Verbal cues provided;Visual cues provided    Lower Body Dressing Assistance  Underpants: Minimum assistance  Socks: Supervision/set-up (seated propping each foot on chair, bending forward)  Position Performed: Bending forward method;Seated edge of bed;Standing  Cues: Verbal cues provided;Visual cues provided     Toileting:   Minimal Assistance for clothing management    Cognitive Retraining  Attention to Task: Distractibility; Single task  Safety/Judgement: Decreased awareness of need for assistance;Decreased awareness of need for safety  Cues: Visual cues provided    Neuro Re-Education:  Focused on patient using LUE consistently as an active assist during bed mobility, LB dressing and grooming ADLs. Tapping required for facilitation of L triceps when pushing up from side lying on L. Verbal cueing and encouragement to visual focus on LUE during ADL performance given to promote the use of refined normal movement patterns.        After treatment:   [x] Patient left in no apparent distress sitting up in chair  [] Patient left in no apparent distress in bed  [x] Call bell left within reach  [x] Nursing notified  [] Caregiver present  [] Bed alarm activated    COMMUNICATION/COLLABORATION:   The patients plan of care was discussed with: Registered Nurse and     Hugh Cisse, OTR/L  Time Calculation: 20 mins

## 2017-02-17 NOTE — DISCHARGE SUMMARY
Hospitalist Discharge Summary     Patient ID:  Flakita Triplett  [id number]  61 y.o.  1950    PCP on record: Emilia Luevano MD    Admit date: 2/14/2017  Discharge date and time: 2/17/2017      DISCHARGE DIAGNOSIS:  Acute CVA   Accelerated HTN  DM type 2    CONSULTATIONS:  IP CONSULT TO HOSPITALIST  IP CONSULT TO NEUROLOGY    Excerpted HPI from H&P of Xuan Baig MD:  Alberta Case is a 61 y.o.  male who presents with Left sided weakness with gait difficulty. Pt with h/o DM, HTN, obesity, prostate ca comes with above. Per pt, this started this afternoon when he was getting off his truck. He felt numbness in his LLE and felt it gave away. also having tingling in his LUE. Denies slurring of speech/LOC/CP/SOB/headcahe. Says his BP have been running high sometimes, BS in the 200's. No change in diet/no change in medications. In ED, /108, cr 1.1, CT head negative. Tele neurology consulted. Given ASA.     We were asked to admit for work up and evaluation of the above problems. ______________________________________________________________________  DISCHARGE SUMMARY/HOSPITAL COURSE:  for full details see H&P, daily progress notes, labs, consult notes.      Acute CVA POA  MRI with acute or subacute ischemia in the right thalamus  CTA head and neck negative  Carotid Dopplers negative  2D echo with normal EF  Appreciate neurology input  Need inpatient rehab as per OT recommendations, insurance decline and approved for SNF instead, pt discharged to SNF  Continue ASA/Statins (pt stopped taking aspirin many days before stroke happened)  LDL at goal  Continue tele monitoring     Accelerated HTN  Permissive HTN due to concerning CVA  Pt claims to have stopped taking cardura OP due to side affect of dizziness  Increased dose of Norvasc, lisinopril and HCTZ     DM type 2  Resume metformin on discharge  Holding Sulfonylureas as blood sugar not running high at this time  A1c 9.4     H/o prostate ca.  _______________________________________________________________________  Patient seen and examined by me on discharge day. Pertinent Findings:  Gen:    Not in distress  Chest: Clear lungs  CVS:   Regular rhythm. No edema  Abd:  Soft, not distended, not tender  Neuro:  Alert, left side weakness  _______________________________________________________________________  DISCHARGE MEDICATIONS:   Current Discharge Medication List      START taking these medications    Details   aspirin (ASPIRIN) 325 mg tablet Take 1 Tab by mouth daily. Qty: 30 Tab, Refills: 0      lisinopril (PRINIVIL, ZESTRIL) 40 mg tablet Take 1 Tab by mouth daily. Qty: 30 Tab, Refills: 0      hydroCHLOROthiazide (HYDRODIURIL) 25 mg tablet Take 1 Tab by mouth daily. Qty: 30 Tab, Refills: 0         CONTINUE these medications which have CHANGED    Details   amLODIPine (NORVASC) 10 mg tablet Take 1 Tab by mouth daily. Qty: 30 Tab, Refills: 0         CONTINUE these medications which have NOT CHANGED    Details   pravastatin (PRAVACHOL) 10 mg tablet TAKE ONE TABLET BY MOUTH IN THE EVENING  Qty: 30 Tab, Refills: 11      metFORMIN (GLUCOPHAGE) 1,000 mg tablet Take 1,000 mg by mouth two (2) times daily (with meals). sildenafil (REVATIO) 20 mg tablet Take 1 Tab by mouth daily as needed. Qty: 30 Tab, Refills: 3      clotrimazole (LOTRIMIN) 1 % topical cream Apply  to affected area two (2) times a day. Qty: 15 g, Refills: 1      !! tramadol (ULTRAM) 50 mg tablet Take 1 tablet by mouth every six (6) hours as needed for Pain. Qty: 30 tablet, Refills: 1      !! traMADol (ULTRAM) 50 mg tablet Take 1 Tab by mouth every six (6) hours as needed for Pain. Max Daily Amount: 200 mg.   Qty: 60 Tab, Refills: 3      !! glucose blood VI test strips (ASCENSIA AUTODISC VI, ONE TOUCH ULTRA TEST VI) strip Check fsbs bid  250.02  Contour next  Qty: 100 Strip, Refills: 11      !! glucose blood VI test strips (ASCENSIA AUTODISC VI, ONE TOUCH ULTRA TEST VI) strip Embrace test strip--check fsbs bid 250.02  Qty: 50 Each, Refills: 11      !! glucose blood VI test strips (ASCENSIA CONTOUR) strip Test twice daily. Qty: 3 Package, Refills: 1      Insulin Needles, Disposable, 31 X 5/16 \" ndle Inject once daily. Qty: 1 Package, Refills: 3    Associated Diagnoses: Type II or unspecified type diabetes mellitus without mention of complication, uncontrolled       !! - Potential duplicate medications found. Please discuss with provider. STOP taking these medications       glipiZIDE (GLUCOTROL) 10 mg tablet Comments:   Reason for Stopping:         lisinopril-hydrochlorothiazide (PRINZIDE, ZESTORETIC) 20-12.5 mg per tablet Comments:   Reason for Stopping:         aspirin (TREVA CHEWABLE ASPIRIN) 81 mg chewable tablet Comments:   Reason for Stopping:         doxazosin (CARDURA) 2 mg tablet Comments:   Reason for Stopping:         glipiZIDE (GLUCOTROL) 5 mg tablet Comments:   Reason for Stopping:         Lancets misc Comments:   Reason for Stopping:               My Recommended Diet, Activity, Wound Care, and follow-up labs are listed in the patient's Discharge Insturctions which I have personally completed and reviewed.     _______________________________________________________________________  DISPOSITION:    Home with Family: y   Home with HH/PT/OT/RN:    SNF/LTC:    FERNANDEZ:    OTHER:        Condition at Discharge:  Stable  _______________________________________________________________________  Follow up with:   PCP : Marylene Prim, MD  Follow-up Information     Follow up With Details Comments [address], [place] Schedule an appointment as soon as possible for a visit  [facility] Wendy Driver  [address]  [phone]                Total time in minutes spent coordinating this discharge (includes going over instructions, follow-up, prescriptions, and preparing report for sign off to her PCP) : 35 minutes    Signed:  Cheri Duffy MD

## 2017-02-17 NOTE — ROUTINE PROCESS
Bedside and Verbal shift change report given to [address] [place] [address] (oncoming nurse) by Machelle Gomez RN (offgoing nurse). Report included the following information SBAR, Kardex, MAR and Recent Results.      Zone Phone: [phone]          Significant changes during shift:   1) patient reported hand pain at approx 0100- gave tylenol          Patient Information      Saristeven Treviño  61 y.o.  2/14/2017 5:56 PM by Dianna Calero MD. [facility]      Problem List              Patient Active Problem List      Diagnosis Date Noted    Transient ischemic attack involving right internal carotid artery 02/15/2017    Stenosis of both internal carotid arteries 02/15/2017    Left-sided weakness 02/14/2017    OA (osteoarthritis) of knee 08/06/2013    Prostate cancer ([facility] [address] 03/10/2011    Type II or unspecified type diabetes mellitus without mention of complication, uncontrolled 08/03/2009    Essential hypertension, benign 08/03/2009    Pure hypercholesterolemia 08/03/2009    Obesity 08/03/2009                 Past Medical History   Diagnosis Date    Allergic rhinitis       Arthritis            djd of knees    Diabetes ([facility] [address]       Hypercholesteremia       Hypertension       Obesity                  Core Measures:      CVA: Yes Yes  CHF:No Not applicable  PNA:No Not applicable      Activity Status:      OOB to Chair NO  Ambulated this shift NO  Bed Rest No      Supplemental O2: (If Applicable)      NC No  NRB No  Venti-mask No  On room air Liters/min          LINES AND DRAINS:  Sari Rajput? No       PICC LINE? No       Urinary Catheter? No       DVT prophylaxis:      DVT prophylaxis Med- Yes  DVT prophylaxis SCD or MOHINI- No       Wounds: (If Applicable)      Wounds- No      Location none      Patient Safety:      Falls Score Total Score: 3  Safety Level_______  Bed Alarm On? Yes  Sitter?  No      Plan for upcoming shift: PT, OT              Discharge Plan: YES CM sent referral to [facility] rehab (inpatient) 2/16 [facility]  Active Consults:  IP CONSULT TO HOSPITALIST  IP CONSULT TO Fermín Joy

## 2017-02-20 ENCOUNTER — PATIENT OUTREACH (OUTPATIENT)
Dept: INTERNAL MEDICINE CLINIC | Age: 67
End: 2017-02-20

## 2017-02-20 NOTE — PROGRESS NOTES
[facility] Hospitalization             Referral from [facility]. Patient admitted to [facility] on 2/14/17 and discharged on 2/17/17 with diagnosis of Acute CVA.             Significant Lab/Diagnostic Findings:  Lab Results  Component Value Date/Time   WBC 7.0 02/14/2017 06:10 PM   HGB 13.8 02/14/2017 06:10 PM   HCT 41.1 02/14/2017 06:10 PM   PLATELET 189 80/82/3326 06:10 PM   MCV 78.4 02/14/2017 06:10 PM       Lab Results  Component Value Date/Time   Cholesterol, total 152 02/15/2017 03:59 AM   HDL Cholesterol 30 02/15/2017 03:59 AM   LDL, calculated 70 02/15/2017 03:59 AM   Triglyceride 260 02/15/2017 03:59 AM   CHOL/HDL Ratio 5.1 02/15/2017 03:59 AM       Lab Results   Component Value Date/Time    INR 1.0 02/14/2017 06:10 PM    INR (POC) 1.0 02/14/2017 06:14 PM    Prothrombin time 10.4 02/14/2017 06:10 PM      Lab Results   Component Value Date/Time    CK 64 02/14/2017 06:10 PM    CK - MB <1.0 02/14/2017 06:10 PM    CK-MB Index CANNOT BE CALCULATED 02/14/2017 06:10 PM    Troponin-I, Qt. <0.04 02/14/2017 06:10 PM      No results found for: BNP, BNPP, BNPPPOC, XBNPT, BNPNT   Lab Results   Component Value Date/Time    Sodium 140 02/16/2017 03:31 AM    Potassium 3.6 02/16/2017 03:31 AM    Chloride 104 02/16/2017 03:31 AM    CO2 26 02/16/2017 03:31 AM    Anion gap 10 02/16/2017 03:31 AM    Glucose 166 02/16/2017 03:31 AM    BUN 11 02/16/2017 03:31 AM    Creatinine 0.94 02/16/2017 03:31 AM    BUN/Creatinine ratio 12 02/16/2017 03:31 AM    GFR est AA >60 02/16/2017 03:31 AM    GFR est non-AA >60 02/16/2017 03:31 AM    Calcium 8.6 02/16/2017 03:31 AM      Lab Results   Component Value Date/Time    Sodium 140 02/16/2017 03:31 AM    Potassium 3.6 02/16/2017 03:31 AM    Chloride 104 02/16/2017 03:31 AM    CO2 26 02/16/2017 03:31 AM    Anion gap 10 02/16/2017 03:31 AM    Glucose 166 02/16/2017 03:31 AM    BUN 11 02/16/2017 03:31 AM    Creatinine 0.94 02/16/2017 03:31 AM    BUN/Creatinine ratio 12 02/16/2017 03:31 AM GFR est AA >60 02/16/2017 03:31 AM    GFR est non-AA >60 02/16/2017 03:31 AM    Calcium 8.6 02/16/2017 03:31 AM    Bilirubin, total 0.4 02/14/2017 06:10 PM    ALT (SGPT) 21 02/14/2017 06:10 PM    AST (SGOT) 11 02/14/2017 06:10 PM    Alk. phosphatase 71 02/14/2017 06:10 PM    Protein, total 7.0 02/14/2017 06:10 PM    Albumin 3.4 02/14/2017 06:10 PM    Globulin 3.6 02/14/2017 06:10 PM    A-G Ratio 0.9 02/14/2017 06:10 PM      Lab Results   Component Value Date/Time    Hemoglobin A1c 9.4 02/15/2017 03:59 AM    Hemoglobin A1c (POC) 10.0 10/05/2016 03:27 PM      Component      Latest Ref Rng & Units 2/14/2017           6:10 PM   Magnesium      1.6 - 2.4 mg/dL 1.6                 RRAT score:   Medium Risk            15       Total Score        3 Relationship with PCP    2 Patient Living Status    4 More than 1 Admission in calendar year    6 Charlson Comorbidity Score        Criteria that do not apply:    Patient Length of Stay > 5    Patient Insurance is Medicare, Medicaid or Self Pay                  Advance Medical Directive on file in EMR? No.  Patient was evaluated by care management during hospitalization. Per notes, Discharge Disposition from [facility]: [address] [place] [address] ([facility]). Recommended Post-Hospital Discharge follow-up (see below as listed on Hospital Discharge AVS/Instructions). Follow-up Information     Follow up With Details Comments Contact Info     Varinder Saravia MD Schedule an appointment as soon as possible for a visit   [address] [phone]                 Most recent HIPAA form in the patient's chart (dated 1/14/16) was reviewed; authorized individual(s) listed on HIPAA form include Moisés Gallegos. - Patient currently admitted to [facility] and [address] [place] [address] ([facility]).         NN follow-up  NN fax sent to [facility]) today requesting a faxed copy of the patient's current list of medications and plan of care as well as a faxed copy of the patient's discharge medication list and instructions/plan of care upon discharge from SNF. New medications at discharge include: Aspirin 325 mg tablet, hydrochlorothiazide, lisinopril  Medication(s) changed at hospital discharge include: amlodipine, metformin  Medication(s) discontinued at hospital discharge include: aspirin 81 mg tablet, doxazosin, glipizide 10 mg tablet, glipizide 5 mg tablet, lisinopril-hctz            NN will route this encounter to Dr. Beryle Levee for notification/review. This note will not be viewable in [address]

## 2017-03-07 ENCOUNTER — OFFICE VISIT (OUTPATIENT)
Dept: INTERNAL MEDICINE CLINIC | Age: 67
End: 2017-03-07

## 2017-03-07 VITALS
DIASTOLIC BLOOD PRESSURE: 86 MMHG | OXYGEN SATURATION: 97 % | HEIGHT: 70 IN | SYSTOLIC BLOOD PRESSURE: 160 MMHG | BODY MASS INDEX: 35.5 KG/M2 | TEMPERATURE: 98 F | HEART RATE: 72 BPM | WEIGHT: 248 LBS

## 2017-03-07 DIAGNOSIS — I10 ESSENTIAL HYPERTENSION, BENIGN: ICD-10-CM

## 2017-03-07 DIAGNOSIS — E78.00 PURE HYPERCHOLESTEROLEMIA: ICD-10-CM

## 2017-03-07 DIAGNOSIS — Z86.73 HISTORY OF CVA (CEREBROVASCULAR ACCIDENT): Primary | ICD-10-CM

## 2017-03-07 RX ORDER — GLIPIZIDE 10 MG/1
10 TABLET ORAL 2 TIMES DAILY
Qty: 60 TAB | Refills: 11 | Status: SHIPPED | OUTPATIENT
Start: 2017-03-07 | End: 2018-11-03 | Stop reason: SDUPTHER

## 2017-03-07 RX ORDER — AMLODIPINE BESYLATE 10 MG/1
10 TABLET ORAL DAILY
Qty: 30 TAB | Refills: 6 | Status: SHIPPED | OUTPATIENT
Start: 2017-03-07 | End: 2017-11-07 | Stop reason: SDUPTHER

## 2017-03-07 RX ORDER — HYDROCHLOROTHIAZIDE 25 MG/1
25 TABLET ORAL DAILY
Qty: 30 TAB | Refills: 6 | Status: SHIPPED | OUTPATIENT
Start: 2017-03-07 | End: 2017-11-07 | Stop reason: SDUPTHER

## 2017-03-07 RX ORDER — LISINOPRIL 40 MG/1
40 TABLET ORAL DAILY
Qty: 30 TAB | Refills: 6 | Status: ON HOLD | OUTPATIENT
Start: 2017-03-07 | End: 2017-04-05

## 2017-03-07 NOTE — PROGRESS NOTES
HISTORY OF PRESENT ILLNESS  Yaritza Escamilla is a 61 y.o. male. HPI   Pt here for TCM visit  Admitted to ED [facility] 2/14-2/17 with acute CVA  Left leg was dragging PTA  MRI--right thalamus cva  bp was up-norvasc increased, permissive htn. Pt stopped cardura d/t dizziness  Not taking HCTZ for some reason now  Went to [facility] rehab and signed out 3-3-17 d/t family issues  Only c/o some numbness of left hand now. Strength intact  Has been off aspirin PTA, now taking 325 mg every day  fsbs  this week per pt      Patient Active Problem List    Diagnosis Date Noted    Transient ischemic attack involving right internal carotid artery 02/15/2017    Stenosis of both internal carotid arteries 02/15/2017    Left-sided weakness 02/14/2017    OA (osteoarthritis) of knee 08/06/2013    Prostate cancer ([facility] [address] 03/10/2011    Type II or unspecified type diabetes mellitus without mention of complication, uncontrolled 08/03/2009    Essential hypertension, benign 08/03/2009    Pure hypercholesterolemia 08/03/2009    Obesity 08/03/2009     Current Outpatient Prescriptions   Medication Sig Dispense Refill    amLODIPine (NORVASC) 10 mg tablet Take 1 Tab by mouth daily. 30 Tab 6    lisinopril (PRINIVIL, ZESTRIL) 40 mg tablet Take 1 Tab by mouth daily. 30 Tab 6    hydroCHLOROthiazide (HYDRODIURIL) 25 mg tablet Take 1 Tab by mouth daily. 30 Tab 6    glipiZIDE (GLUCOTROL) 10 mg tablet Take 1 Tab by mouth two (2) times a day. 60 Tab 11    aspirin (ASPIRIN) 325 mg tablet Take 1 Tab by mouth daily. 30 Tab 0    pravastatin (PRAVACHOL) 10 mg tablet TAKE ONE TABLET BY MOUTH IN THE EVENING 30 Tab 11    metFORMIN (GLUCOPHAGE) 1,000 mg tablet Take 1,000 mg by mouth two (2) times daily (with meals).  sildenafil (REVATIO) 20 mg tablet Take 1 Tab by mouth daily as needed.  30 Tab 3    glucose blood VI test strips (ASCENSIA AUTODISC VI, ONE TOUCH ULTRA TEST VI) strip Check fsbs bid  250.02  Contour next 100 Strip 11    glucose blood VI test strips (ASCENSIA AUTODISC VI, ONE TOUCH ULTRA TEST VI) strip Embrace test strip--check fsbs bid 250.02 50 Each 11    tramadol (ULTRAM) 50 mg tablet Take 1 tablet by mouth every six (6) hours as needed for Pain. 30 tablet 1    glucose blood VI test strips (ASCENSIA CONTOUR) strip Test twice daily. 3 Package 1    Insulin Needles, Disposable, 31 X 5/16 \" ndle Inject once daily. 1 Package 3    clotrimazole (LOTRIMIN) 1 % topical cream Apply  to affected area two (2) times a day. 15 g 1     No Known Allergies  Social History   Substance Use Topics    Smoking status: Never Smoker    Smokeless tobacco: Never Used    Alcohol use No      Comment: Stop 5 months       Lab Results  Component Value Date/Time   Hemoglobin A1c 9.4 02/15/2017 03:59 AM   Hemoglobin A1c 13.1 04/10/2015 12:52 PM   Hemoglobin A1c 12.9 05/08/2014 11:12 AM   Glucose 166 02/16/2017 03:31 AM   Glucose (POC) 330 02/17/2017 11:02 AM   Microalb/Creat ratio (ug/mg creat.) 10.5 01/14/2016 11:18 AM   LDL, calculated 70 02/15/2017 03:59 AM   Creatinine 0.94 02/16/2017 03:31 AM      Lab Results  Component Value Date/Time   Cholesterol, total 152 02/15/2017 03:59 AM   HDL Cholesterol 30 02/15/2017 03:59 AM   LDL, calculated 70 02/15/2017 03:59 AM   Triglyceride 260 02/15/2017 03:59 AM   CHOL/HDL Ratio 5.1 02/15/2017 03:59 AM       Lab Results  Component Value Date/Time   GFR est AA >60 02/16/2017 03:31 AM   GFR est non-AA >60 02/16/2017 03:31 AM   Creatinine 0.94 02/16/2017 03:31 AM   BUN 11 02/16/2017 03:31 AM   Sodium 140 02/16/2017 03:31 AM   Potassium 3.6 02/16/2017 03:31 AM   Chloride 104 02/16/2017 03:31 AM   CO2 26 02/16/2017 03:31 AM         ROS    Physical Exam   Constitutional: He is oriented to person, place, and time. He appears well-developed and well-nourished. No distress. Appears stated age   HENT:   Head: Normocephalic. Cardiovascular: Normal rate, regular rhythm and normal heart sounds.   Exam reveals no gallop and no friction rub. No murmur heard. Pulmonary/Chest: Effort normal and breath sounds normal. No respiratory distress. He has no wheezes. He has no rales. He exhibits no tenderness. Abdominal: Soft. He exhibits no distension and no mass. There is no tenderness. There is no rebound and no guarding. Musculoskeletal: He exhibits no edema. Neurological: He is alert and oriented to person, place, and time. He has normal reflexes. He displays normal reflexes. No cranial nerve deficit. He exhibits normal muscle tone. Coordination normal.   Psychiatric: He has a normal mood and affect. Nursing note and vitals reviewed. DISCHARGE DIAGNOSIS:  Acute CVA   Accelerated HTN  DM type 2     CONSULTATIONS:  IP CONSULT TO HOSPITALIST  IP CONSULT TO NEUROLOGY     Excerpted HPI from H&P of Xuan Baig MD:  Alberta Case is a 61 y.o.  male who presents with Left sided weakness with gait difficulty. Pt with h/o DM, HTN, obesity, prostate ca comes with above. Per pt, this started this afternoon when he was getting off his truck. He felt numbness in his LLE and felt it gave away. also having tingling in his LUE. Denies slurring of speech/LOC/CP/SOB/headcahe. Says his BP have been running high sometimes, BS in the 200's. No change in diet/no change in medications. In ED, /108, cr 1.1, CT head negative. Tele neurology consulted. Given ASA.      We were asked to admit for work up and evaluation of the above problems.    ______________________________________________________________________  DISCHARGE SUMMARY/HOSPITAL COURSE: for full details see H&P, daily progress notes, labs, consult notes.      Acute CVA POA  MRI with acute or subacute ischemia in the right thalamus  CTA head and neck negative  Carotid Dopplers negative  2D echo with normal EF  Appreciate neurology input  Need inpatient rehab as per OT recommendations, insurance decline and approved for SNF instead, pt discharged to SNF  Continue ASA/Statins (pt stopped taking aspirin many days before stroke happened)  LDL at goal  Continue tele monitoring      Accelerated HTN  Permissive HTN due to concerning CVA  Pt claims to have stopped taking cardura OP due to side affect of dizziness  Increased dose of Norvasc, lisinopril and HCTZ      DM type 2  Resume metformin on discharge  Holding Sulfonylureas as blood sugar not running high at this time  A1c 9.4      H/o prostate ca.  _______________________________________________________________________  Patient seen and examined by me on discharge day. Pertinent Findings:  Gen:  Not in distress  Chest: Clear lungs  CVS:  Regular rhythm. No edema  Abd:  Soft, not distended, not tender  Neuro: Alert, left side weakness  _______________________________________________________________________  DISCHARGE MEDICATIONS:         Current Discharge Medication List             START taking these medications     Details   aspirin (ASPIRIN) 325 mg tablet Take 1 Tab by mouth daily. Qty: 30 Tab, Refills: 0       lisinopril (PRINIVIL, ZESTRIL) 40 mg tablet Take 1 Tab by mouth daily. Qty: 30 Tab, Refills: 0       hydroCHLOROthiazide (HYDRODIURIL) 25 mg tablet Take 1 Tab by mouth daily. Qty: 30 Tab, Refills: 0                 CONTINUE these medications which have CHANGED     Details   amLODIPine (NORVASC) 10 mg tablet Take 1 Tab by mouth daily. Qty: 30 Tab, Refills: 0                 CONTINUE these medications which have NOT CHANGED     Details   pravastatin (PRAVACHOL) 10 mg tablet TAKE ONE TABLET BY MOUTH IN THE EVENING  Qty: 30 Tab, Refills: 11       metFORMIN (GLUCOPHAGE) 1,000 mg tablet Take 1,000 mg by mouth two (2) times daily (with meals).     sildenafil (REVATIO) 20 mg tablet Take 1 Tab by mouth daily as needed. Qty: 30 Tab, Refills: 3       clotrimazole (LOTRIMIN) 1 % topical cream Apply to affected area two (2) times a day.   Qty: 15 g, Refills: 1       !! tramadol (ULTRAM) 50 mg tablet Take 1 tablet by mouth every six (6) hours as needed for Pain. Qty: 30 tablet, Refills: 1       !! traMADol (ULTRAM) 50 mg tablet Take 1 Tab by mouth every six (6) hours as needed for Pain. Max Daily Amount: 200 mg. Qty: 60 Tab, Refills: 3       !! glucose blood VI test strips (ASCENSIA AUTODISC VI, ONE TOUCH ULTRA TEST VI) strip Check fsbs bid 250.02 Contour next  Qty: 100 Strip, Refills: 11       !! glucose blood VI test strips (ASCENSIA AUTODISC VI, ONE TOUCH ULTRA TEST VI) strip Embrace test strip--check fsbs bid 250.02  Qty: 50 Each, Refills: 11       !! glucose blood VI test strips (ASCENSIA CONTOUR) strip Test twice daily. Qty: 3 Package, Refills: 1       Insulin Needles, Disposable, 31 X 5/16 \" ndle Inject once daily. Qty: 1 Package, Refills: 3     Associated Diagnoses: Type II or unspecified type diabetes mellitus without mention of complication, uncontrolled       !! - Potential duplicate medications found. Please discuss with provider.            STOP taking these medications         glipiZIDE (GLUCOTROL) 10 mg tablet Comments:   Reason for Stopping:            lisinopril-hydrochlorothiazide (PRINZIDE, ZESTORETIC) 20-12.5 mg per tablet Comments:   Reason for Stopping:            aspirin (TREVA CHEWABLE ASPIRIN) 81 mg chewable tablet Comments:   Reason for Stopping:            doxazosin (CARDURA) 2 mg tablet Comments:   Reason for Stopping:            glipiZIDE (GLUCOTROL) 5 mg tablet Comments:   Reason for Stopping:            Lancets misc Comments:   Reason for Stopping:                    My Recommended Diet, Activity, Wound Care, and follow-up labs are listed in the patient's Discharge Insturctions which I have personally completed and reviewed.     _______________________________________________________________________  DISPOSITION:     Home with Family: y   Home with HH/PT/OT/RN:     SNF/LTC:     FERNANDEZ:     OTHER:          ASSESSMENT and PLAN  [facility] was seen today for hospital follow up and tingling.     Diagnoses and all orders for this visit:    History of CVA (cerebrovascular accident)   Continue aspirin 325 mg every day   Need to lower BP and glucose average   No outp PT/OT needs  Essential hypertension, benign   Moderate elevation   rx for hctz   Stressed med compliance    Pure hypercholesterolemia   LDL at goal on statin      DM-2 uncontrolled   a1c 9.4 last month   Refer to NN in our office for education   Might need insulin or weekly GLP -1  Other orders  -     amLODIPine (NORVASC) 10 mg tablet; Take 1 Tab by mouth daily. -     lisinopril (PRINIVIL, ZESTRIL) 40 mg tablet; Take 1 Tab by mouth daily. -     hydroCHLOROthiazide (HYDRODIURIL) 25 mg tablet; Take 1 Tab by mouth daily.  -     glipiZIDE (GLUCOTROL) 10 mg tablet; Take 1 Tab by mouth two (2) times a day. Follow-up Disposition:  Return in about 1 month (around 4/7/2017) for CVA HTN DM-2.

## 2017-03-07 NOTE — MR AVS SNAPSHOT
Visit Information Date & Time Provider Department Dept. Phone Encounter #  
 3/7/2017  4:00 PM Milly Ayoub, [address] [phone] [id number] Follow-up Instructions Return in about 1 month (around 4/7/2017) for CVA HTN DM-2. Upcoming Health Maintenance Date Due Hepatitis C Screening 1950 Pneumococcal 19-64 Highest Risk (1 of 3 - PCV13) 8/7/1971 DTaP/Tdap/Td series (1 - Tdap) 8/7/1973 FOBT Q 1 YEAR AGE 50-75 8/7/2002 ZOSTER VACCINE AGE 60> 8/7/2012 EYE EXAM RETINAL OR DILATED Q1 9/6/2014 MEDICARE YEARLY EXAM 4/9/2016 FOOT EXAM Q1 1/14/2017 MICROALBUMIN Q1 1/14/2017 HEMOGLOBIN A1C Q6M 8/15/2017 LIPID PANEL Q1 2/15/2018 Allergies as of 3/7/2017  Review Complete On: 3/7/2017 By: Milly Ayoub MD  
 No Known Allergies Current Immunizations  Never Reviewed Name Date Influenza Vaccine 10/1/2016 Influenza Vaccine (Quad) PF 1/14/2016 Not reviewed this visit You Were Diagnosed With   
  
 Codes Comments History of CVA (cerebrovascular accident)    -  Primary ICD-10-CM: Z86.73 
ICD-9-CM: V12.54 Essential hypertension, benign     ICD-10-CM: I10 
ICD-9-CM: 401.1 Pure hypercholesterolemia     ICD-10-CM: E78.00 ICD-9-CM: 272.0 Vitals BP Pulse Temp Height(growth percentile) Weight(growth percentile) SpO2  
 160/86 72 98 °F (36.7 °C) (Oral) 5' 10\" (1.778 m) 248 lb (112.5 kg) 97% BMI Smoking Status 35.58 kg/m2 Never Smoker Vitals History BMI and BSA Data Body Mass Index Body Surface Area 35.58 kg/m 2 2.36 m 2 Preferred Pharmacy Pharmacy Name Phone [facility] [address] , [address] [place] [address] [phone] Your Updated Medication List  
  
   
This list is accurate as of: 3/7/17  5:00 PM.  Always use your most recent med list. amLODIPine 10 mg tablet Commonly known as:  Melinda Subramanian Take 1 Tab by mouth daily. aspirin 325 mg tablet Commonly known as:  ASPIRIN Take 1 Tab by mouth daily. clotrimazole 1 % topical cream  
Commonly known as:  Floranchardy Mareska Apply  to affected area two (2) times a day. glipiZIDE 10 mg tablet Commonly known as:  Katherine Pelaez Take 1 Tab by mouth two (2) times a day. * glucose blood VI test strips strip Commonly known as:  Ascensia CONTOUR Test twice daily. * glucose blood VI test strips strip Commonly known as:  ASCENSIA AUTODISC VI, ONE TOUCH ULTRA TEST VI Embrace test strip--check fsbs bid 250.02  
  
 * glucose blood VI test strips strip Commonly known as:  ASCENSIA AUTODISC VI, ONE TOUCH ULTRA TEST VI Check fsbs bid  250.02  Contour next  
  
 hydroCHLOROthiazide 25 mg tablet Commonly known as:  HYDRODIURIL Take 1 Tab by mouth daily. Insulin Needles (Disposable) 31 gauge x 5/16\" Ndle Inject once daily. lisinopril 40 mg tablet Commonly known as:  Sukhjinder Economy Take 1 Tab by mouth daily. metFORMIN 1,000 mg tablet Commonly known as:  GLUCOPHAGE Take 1,000 mg by mouth two (2) times daily (with meals). pravastatin 10 mg tablet Commonly known as:  PRAVACHOL  
TAKE ONE TABLET BY MOUTH IN THE EVENING  
  
 sildenafil 20 mg tablet Commonly known as:  REVATIO Take 1 Tab by mouth daily as needed. traMADol 50 mg tablet Commonly known as:  ULTRAM  
Take 1 tablet by mouth every six (6) hours as needed for Pain. * Notice: This list has 3 medication(s) that are the same as other medications prescribed for you. Read the directions carefully, and ask your doctor or other care provider to review them with you. Prescriptions Sent to Pharmacy Refills  
 amLODIPine (NORVASC) 10 mg tablet 6 Sig: Take 1 Tab by mouth daily. Class: Normal  
 Pharmacy: [address], [address] [place] [address] Ph #: [phone]  Route: Oral  
 lisinopril (PRINIVIL, ZESTRIL) 40 mg tablet 6 Sig: Take 1 Tab by mouth daily. Class: Normal  
 Pharmacy: [facility] [address], [address] [place] [address] Ph #: [phone] Route: Oral  
 hydroCHLOROthiazide (HYDRODIURIL) 25 mg tablet 6 Sig: Take 1 Tab by mouth daily. Class: Normal  
 Pharmacy: [facility] [address], [address] [place] [address] Ph #: [phone] Route: Oral  
 glipiZIDE (GLUCOTROL) 10 mg tablet 11 Sig: Take 1 Tab by mouth two (2) times a day. Class: Normal  
 Pharmacy: [facility] [address], [address] [place] [address] Ph #: [phone] Route: Oral  
  
Follow-up Instructions Return in about 1 month (around 4/7/2017) for CVA HTN DM-2. Introducing [facility]! Michael Mustafa introduces MEPS Real-Time patient portal. Now you can access parts of your medical record, email your doctor's office, and request medication refills online. 1. In your internet browser, go to [email or web address] Story 2. Click on the First Time User? Click Here link in the Sign In box. You will see the New Member Sign Up page. 3. Enter your MEPS Real-Time Access Code exactly as it appears below. You will not need to use this code after youve completed the sign-up process. If you do not sign up before the expiration date, you must request a new code. · MEPS Real-Time Access Code: [email or web address] Expires: 5/15/2017  6:26 PM 
 
4. Enter the last four digits of your Social Security Number (xxxx) and Date of Birth (mm/dd/yyyy) as indicated and click Submit. You will be taken to the next sign-up page. 5. Create a CoverItLivet ID. This will be your CoverItLivet login ID and cannot be changed, so think of one that is secure and easy to remember. 6. Create a MEPS Real-Time password. You can change your password at any time. 7. Enter your Password Reset Question and Answer. This can be used at a later time if you forget your password. 8. Enter your e-mail address. You will receive e-mail notification when new information is available in [address] 9. Click Sign Up. You can now view and download portions of your medical record. 10. Click the Download Summary menu link to download a portable copy of your medical information. If you have questions, please visit the Frequently Asked Questions section of the Secpanel website. Remember, Secpanel is NOT to be used for urgent needs. For medical emergencies, dial 911. Now available from your iPhone and Android! Please provide this summary of care documentation to your next provider. Your primary care clinician is listed as Juan DAVIS. If you have any questions after today's visit, please call [phone].

## 2017-03-08 ENCOUNTER — TELEPHONE (OUTPATIENT)
Dept: INTERNAL MEDICINE CLINIC | Age: 67
End: 2017-03-08

## 2017-03-08 DIAGNOSIS — Z86.73 HISTORY OF CVA (CEREBROVASCULAR ACCIDENT): Primary | ICD-10-CM

## 2017-03-08 NOTE — TELEPHONE ENCOUNTER
I called and spoke with Rebel. She states that patient is to be scheduled for DM education. Pt scheduled with Jassi Esquivel 3/20/2017 @ 1550. She would like to know if patient should do PT for his stroke. If so he needs referral sent to [facility].

## 2017-03-08 NOTE — TELEPHONE ENCOUNTER
Patient's wife, Isaak Azul, states she needs a call back in reference to finding out who patient is to go see or schedule appt with for his diabetes that was discuss at office visit yesterday, 3/7/17. Please call.  Thank you

## 2017-03-08 NOTE — TELEPHONE ENCOUNTER
I called and spoke with Rebel. She states that patient would be going to [facility] after [facility] which is a [facility] facility. Information given to JARVIS OTT.

## 2017-03-20 ENCOUNTER — OFFICE VISIT (OUTPATIENT)
Dept: INTERNAL MEDICINE CLINIC | Age: 67
End: 2017-03-20

## 2017-03-20 ENCOUNTER — PATIENT OUTREACH (OUTPATIENT)
Dept: INTERNAL MEDICINE CLINIC | Age: 67
End: 2017-03-20

## 2017-03-20 DIAGNOSIS — E11.65 UNCONTROLLED TYPE 2 DIABETES MELLITUS WITH HYPERGLYCEMIA, WITHOUT LONG-TERM CURRENT USE OF INSULIN (HCC): Primary | ICD-10-CM

## 2017-03-20 RX ORDER — TRAMADOL HYDROCHLORIDE 50 MG/1
50 TABLET ORAL
Qty: 30 TAB | Refills: 1 | Status: SHIPPED | OUTPATIENT
Start: 2017-03-20 | End: 2017-05-01 | Stop reason: SDUPTHER

## 2017-03-20 NOTE — PROGRESS NOTES
Dr. Jazz Rivera referred pt today to diabetes education for meal planning. See patient outreach encounter.

## 2017-03-20 NOTE — TELEPHONE ENCOUNTER
Requested Prescriptions     Pending Prescriptions Disp Refills    traMADol (ULTRAM) 50 mg tablet 30 Tab 1     Sig: Take 1 Tab by mouth every six (6) hours as needed for Pain.    Last OV-3/7/17  Next OV-4/7/17  Med refilled-9/3/14

## 2017-03-20 NOTE — PROGRESS NOTES
NN - progress note: diabetes-uncontrolled; meal planning    Was asked by Dr. Srinivas Bowles to see pt for diabetes meal planning teaching with a goal to lose weight. Pt's son Robbi Rodriguez is present for visit today. Last lab results include A1c of 9.4% on 2/15/17, down from 10% on 10/5/17. Pt stated he wants to lose weight and not start taking insulin. Pt currently takes the following meds for diabetes: metformin and glipizide. Pt currently checks blood sugar twice daily. Orders from Dr. Srinivas Bowles. Using teaching material from [facility], Plate Method from [facility] [address] pt was instructed to follow a 1800 calorie diet. Understanding was verbalized. Any and all questions were answered. Pt will call Rai Davis at [phone] with any questions or concerns before next f/u. Pt is to see Dr. Srinivas Bowles for f/u 4/7/17. Pt also sees the NP at [facility] who is working with pt on diet and exercise. Pt states he craves sweets and has been following a strict diet. Pt eats light strawberry yogurt for a snack, substitutes turkey almeida for almeida for breakfast with oatmeal or 1 pancake, mixes 2% and 1% milk until he can drink 1% alone, and bakes meat. Goals      Understand Diabetic action plan. (Ie. when to seek medical care,  what to do with high and low blood glucose, how and when to adjust diabetes treatment. (pt-stated)            Pt will follow the 1800 calorie diet plan provided by [facility] and The Plate Method by [facility] and [facility]. Pt will ride the exercise bike at [facility]. Pt will take metformin and glipizide as prescribed. Pt will check blood sugar fasting before breakfast and before dinner and record in log book or store in meter. Pt will follow up with his provider at [facility] and ask them to fax visit notes to Dr. Srinivas Bowles at [phone]. Greater than thirty minutes was spent with coordination of pt's personal healthcare.      Future Appointments      Provider Yonathan Pena   4/7/2017 2:00 PM Abdoulaye King MD [facility] [address]     Will follow.

## 2017-03-21 ENCOUNTER — TELEPHONE (OUTPATIENT)
Dept: INTERNAL MEDICINE CLINIC | Age: 67
End: 2017-03-21

## 2017-04-04 ENCOUNTER — HOSPITAL ENCOUNTER (INPATIENT)
Age: 67
LOS: 1 days | Discharge: HOME | DRG: 069 | End: 2017-04-05
Attending: EMERGENCY MEDICINE | Admitting: HOSPITALIST
Payer: MEDICARE

## 2017-04-04 ENCOUNTER — APPOINTMENT (OUTPATIENT)
Dept: CT IMAGING | Age: 67
DRG: 069 | End: 2017-04-04
Attending: EMERGENCY MEDICINE
Payer: MEDICARE

## 2017-04-04 DIAGNOSIS — I65.23 STENOSIS OF BOTH INTERNAL CAROTID ARTERIES: ICD-10-CM

## 2017-04-04 DIAGNOSIS — R53.1 LEFT-SIDED WEAKNESS: ICD-10-CM

## 2017-04-04 DIAGNOSIS — I10 ESSENTIAL HYPERTENSION, BENIGN: ICD-10-CM

## 2017-04-04 DIAGNOSIS — G45.9 TRANSIENT CEREBRAL ISCHEMIA, UNSPECIFIED TYPE: Primary | ICD-10-CM

## 2017-04-04 DIAGNOSIS — E78.00 PURE HYPERCHOLESTEROLEMIA: ICD-10-CM

## 2017-04-04 LAB
ALBUMIN SERPL BCP-MCNC: 3.5 G/DL (ref 3.5–5)
ALBUMIN/GLOB SERPL: 0.9 {RATIO} (ref 1.1–2.2)
ALP SERPL-CCNC: 69 U/L (ref 45–117)
ALT SERPL-CCNC: 21 U/L (ref 12–78)
ANION GAP BLD CALC-SCNC: 8 MMOL/L (ref 5–15)
APPEARANCE UR: CLEAR
APTT PPP: 25.4 SEC (ref 22.1–32.5)
AST SERPL W P-5'-P-CCNC: 11 U/L (ref 15–37)
BACTERIA URNS QL MICRO: NEGATIVE /HPF
BASOPHILS # BLD AUTO: 0 K/UL (ref 0–0.1)
BASOPHILS # BLD: 0 % (ref 0–1)
BILIRUB SERPL-MCNC: 0.3 MG/DL (ref 0.2–1)
BILIRUB UR QL: NEGATIVE
BUN SERPL-MCNC: 18 MG/DL (ref 6–20)
BUN/CREAT SERPL: 15 (ref 12–20)
CALCIUM SERPL-MCNC: 8.7 MG/DL (ref 8.5–10.1)
CHLORIDE SERPL-SCNC: 105 MMOL/L (ref 97–108)
CK SERPL-CCNC: 57 U/L (ref 39–308)
CO2 SERPL-SCNC: 28 MMOL/L (ref 21–32)
COLOR UR: NORMAL
CREAT SERPL-MCNC: 1.2 MG/DL (ref 0.7–1.3)
EOSINOPHIL # BLD: 0.2 K/UL (ref 0–0.4)
EOSINOPHIL NFR BLD: 3 % (ref 0–7)
EPITH CASTS URNS QL MICRO: NORMAL /LPF
ERYTHROCYTE [DISTWIDTH] IN BLOOD BY AUTOMATED COUNT: 14.6 % (ref 11.5–14.5)
GLOBULIN SER CALC-MCNC: 3.9 G/DL (ref 2–4)
GLUCOSE BLD STRIP.AUTO-MCNC: 115 MG/DL (ref 65–100)
GLUCOSE BLD STRIP.AUTO-MCNC: 155 MG/DL (ref 65–100)
GLUCOSE SERPL-MCNC: 147 MG/DL (ref 65–100)
GLUCOSE UR STRIP.AUTO-MCNC: NEGATIVE MG/DL
HCT VFR BLD AUTO: 40.6 % (ref 36.6–50.3)
HGB BLD-MCNC: 13.4 G/DL (ref 12.1–17)
HGB UR QL STRIP: NEGATIVE
HYALINE CASTS URNS QL MICRO: NORMAL /LPF (ref 0–5)
INR PPP: 1 (ref 0.9–1.1)
KETONES UR QL STRIP.AUTO: NEGATIVE MG/DL
LEUKOCYTE ESTERASE UR QL STRIP.AUTO: NEGATIVE
LYMPHOCYTES # BLD AUTO: 41 % (ref 12–49)
LYMPHOCYTES # BLD: 2.7 K/UL (ref 0.8–3.5)
MAGNESIUM SERPL-MCNC: 1.7 MG/DL (ref 1.6–2.4)
MCH RBC QN AUTO: 26.6 PG (ref 26–34)
MCHC RBC AUTO-ENTMCNC: 33 G/DL (ref 30–36.5)
MCV RBC AUTO: 80.7 FL (ref 80–99)
MONOCYTES # BLD: 0.4 K/UL (ref 0–1)
MONOCYTES NFR BLD AUTO: 5 % (ref 5–13)
NEUTS SEG # BLD: 3.4 K/UL (ref 1.8–8)
NEUTS SEG NFR BLD AUTO: 51 % (ref 32–75)
NITRITE UR QL STRIP.AUTO: NEGATIVE
PH UR STRIP: 5 [PH] (ref 5–8)
PLATELET # BLD AUTO: 253 K/UL (ref 150–400)
POTASSIUM SERPL-SCNC: 3.4 MMOL/L (ref 3.5–5.1)
PROT SERPL-MCNC: 7.4 G/DL (ref 6.4–8.2)
PROT UR STRIP-MCNC: NEGATIVE MG/DL
PROTHROMBIN TIME: 9.9 SEC (ref 9–11.1)
RBC # BLD AUTO: 5.03 M/UL (ref 4.1–5.7)
RBC #/AREA URNS HPF: NORMAL /HPF (ref 0–5)
SERVICE CMNT-IMP: ABNORMAL
SERVICE CMNT-IMP: ABNORMAL
SODIUM SERPL-SCNC: 141 MMOL/L (ref 136–145)
SP GR UR REFRACTOMETRY: 1.03 (ref 1–1.03)
THERAPEUTIC RANGE,PTTT: NORMAL SECS (ref 58–77)
TROPONIN I SERPL-MCNC: <0.04 NG/ML
UROBILINOGEN UR QL STRIP.AUTO: 1 EU/DL (ref 0.2–1)
WBC # BLD AUTO: 6.6 K/UL (ref 4.1–11.1)
WBC URNS QL MICRO: NORMAL /HPF (ref 0–4)

## 2017-04-04 PROCEDURE — 74011636637 HC RX REV CODE- 636/637: Performed by: HOSPITALIST

## 2017-04-04 PROCEDURE — 74011250637 HC RX REV CODE- 250/637: Performed by: EMERGENCY MEDICINE

## 2017-04-04 PROCEDURE — 74011250637 HC RX REV CODE- 250/637: Performed by: HOSPITALIST

## 2017-04-04 PROCEDURE — 82962 GLUCOSE BLOOD TEST: CPT

## 2017-04-04 PROCEDURE — 80053 COMPREHEN METABOLIC PANEL: CPT | Performed by: EMERGENCY MEDICINE

## 2017-04-04 PROCEDURE — 70450 CT HEAD/BRAIN W/O DYE: CPT

## 2017-04-04 PROCEDURE — 84484 ASSAY OF TROPONIN QUANT: CPT | Performed by: EMERGENCY MEDICINE

## 2017-04-04 PROCEDURE — 85610 PROTHROMBIN TIME: CPT | Performed by: EMERGENCY MEDICINE

## 2017-04-04 PROCEDURE — 81003 URINALYSIS AUTO W/O SCOPE: CPT | Performed by: EMERGENCY MEDICINE

## 2017-04-04 PROCEDURE — 85025 COMPLETE CBC W/AUTO DIFF WBC: CPT | Performed by: EMERGENCY MEDICINE

## 2017-04-04 PROCEDURE — 99285 EMERGENCY DEPT VISIT HI MDM: CPT

## 2017-04-04 PROCEDURE — 74011250636 HC RX REV CODE- 250/636: Performed by: HOSPITALIST

## 2017-04-04 PROCEDURE — 36415 COLL VENOUS BLD VENIPUNCTURE: CPT | Performed by: EMERGENCY MEDICINE

## 2017-04-04 PROCEDURE — 82550 ASSAY OF CK (CPK): CPT | Performed by: EMERGENCY MEDICINE

## 2017-04-04 PROCEDURE — 83735 ASSAY OF MAGNESIUM: CPT | Performed by: EMERGENCY MEDICINE

## 2017-04-04 PROCEDURE — 65660000000 HC RM CCU STEPDOWN

## 2017-04-04 PROCEDURE — 85730 THROMBOPLASTIN TIME PARTIAL: CPT | Performed by: EMERGENCY MEDICINE

## 2017-04-04 RX ORDER — MAGNESIUM SULFATE 100 %
4 CRYSTALS MISCELLANEOUS AS NEEDED
Status: DISCONTINUED | OUTPATIENT
Start: 2017-04-04 | End: 2017-04-05 | Stop reason: HOSPADM

## 2017-04-04 RX ORDER — POTASSIUM CHLORIDE 20 MEQ/1
20 TABLET, EXTENDED RELEASE ORAL
Status: COMPLETED | OUTPATIENT
Start: 2017-04-04 | End: 2017-04-04

## 2017-04-04 RX ORDER — SODIUM CHLORIDE 0.9 % (FLUSH) 0.9 %
5-10 SYRINGE (ML) INJECTION EVERY 8 HOURS
Status: DISCONTINUED | OUTPATIENT
Start: 2017-04-04 | End: 2017-04-05 | Stop reason: HOSPADM

## 2017-04-04 RX ORDER — HYDROCHLOROTHIAZIDE 25 MG/1
25 TABLET ORAL DAILY
Status: DISCONTINUED | OUTPATIENT
Start: 2017-04-05 | End: 2017-04-04

## 2017-04-04 RX ORDER — POTASSIUM CHLORIDE 750 MG/1
40 TABLET, FILM COATED, EXTENDED RELEASE ORAL
Status: COMPLETED | OUTPATIENT
Start: 2017-04-04 | End: 2017-04-04

## 2017-04-04 RX ORDER — ACETAMINOPHEN 325 MG/1
650 TABLET ORAL
Status: DISCONTINUED | OUTPATIENT
Start: 2017-04-04 | End: 2017-04-05 | Stop reason: HOSPADM

## 2017-04-04 RX ORDER — GLIPIZIDE 5 MG/1
10 TABLET ORAL 2 TIMES DAILY
Status: DISCONTINUED | OUTPATIENT
Start: 2017-04-04 | End: 2017-04-05 | Stop reason: HOSPADM

## 2017-04-04 RX ORDER — ASPIRIN 325 MG
325 TABLET ORAL DAILY
Status: DISCONTINUED | OUTPATIENT
Start: 2017-04-05 | End: 2017-04-04

## 2017-04-04 RX ORDER — SODIUM CHLORIDE 0.9 % (FLUSH) 0.9 %
5-10 SYRINGE (ML) INJECTION AS NEEDED
Status: DISCONTINUED | OUTPATIENT
Start: 2017-04-04 | End: 2017-04-05 | Stop reason: HOSPADM

## 2017-04-04 RX ORDER — DEXTROSE 50 % IN WATER (D50W) INTRAVENOUS SYRINGE
12.5-25 AS NEEDED
Status: DISCONTINUED | OUTPATIENT
Start: 2017-04-04 | End: 2017-04-05 | Stop reason: HOSPADM

## 2017-04-04 RX ORDER — AMLODIPINE BESYLATE 5 MG/1
10 TABLET ORAL DAILY
Status: DISCONTINUED | OUTPATIENT
Start: 2017-04-05 | End: 2017-04-05 | Stop reason: HOSPADM

## 2017-04-04 RX ORDER — PRAVASTATIN SODIUM 10 MG/1
10 TABLET ORAL DAILY
Status: DISCONTINUED | OUTPATIENT
Start: 2017-04-05 | End: 2017-04-05 | Stop reason: HOSPADM

## 2017-04-04 RX ORDER — LISINOPRIL 20 MG/1
40 TABLET ORAL DAILY
Status: DISCONTINUED | OUTPATIENT
Start: 2017-04-05 | End: 2017-04-04

## 2017-04-04 RX ORDER — SODIUM CHLORIDE 9 MG/ML
75 INJECTION, SOLUTION INTRAVENOUS CONTINUOUS
Status: DISCONTINUED | OUTPATIENT
Start: 2017-04-04 | End: 2017-04-05

## 2017-04-04 RX ORDER — INSULIN LISPRO 100 [IU]/ML
INJECTION, SOLUTION INTRAVENOUS; SUBCUTANEOUS
Status: DISCONTINUED | OUTPATIENT
Start: 2017-04-04 | End: 2017-04-05 | Stop reason: HOSPADM

## 2017-04-04 RX ORDER — ACETAMINOPHEN 650 MG/1
650 SUPPOSITORY RECTAL
Status: DISCONTINUED | OUTPATIENT
Start: 2017-04-04 | End: 2017-04-05 | Stop reason: HOSPADM

## 2017-04-04 RX ORDER — ENOXAPARIN SODIUM 100 MG/ML
40 INJECTION SUBCUTANEOUS EVERY 24 HOURS
Status: DISCONTINUED | OUTPATIENT
Start: 2017-04-04 | End: 2017-04-05 | Stop reason: HOSPADM

## 2017-04-04 RX ORDER — CLOPIDOGREL BISULFATE 75 MG/1
75 TABLET ORAL DAILY
Status: DISCONTINUED | OUTPATIENT
Start: 2017-04-05 | End: 2017-04-05 | Stop reason: HOSPADM

## 2017-04-04 RX ORDER — CLOPIDOGREL BISULFATE 75 MG/1
75 TABLET ORAL
Status: COMPLETED | OUTPATIENT
Start: 2017-04-04 | End: 2017-04-04

## 2017-04-04 RX ADMIN — GLIPIZIDE 10 MG: 5 TABLET ORAL at 18:10

## 2017-04-04 RX ADMIN — ACETAMINOPHEN 650 MG: 325 TABLET, FILM COATED ORAL at 17:57

## 2017-04-04 RX ADMIN — POTASSIUM CHLORIDE 40 MEQ: 750 TABLET, FILM COATED, EXTENDED RELEASE ORAL at 11:41

## 2017-04-04 RX ADMIN — SODIUM CHLORIDE 75 ML/HR: 900 INJECTION, SOLUTION INTRAVENOUS at 18:54

## 2017-04-04 RX ADMIN — POTASSIUM CHLORIDE 20 MEQ: 20 TABLET, EXTENDED RELEASE ORAL at 17:57

## 2017-04-04 RX ADMIN — CLOPIDOGREL BISULFATE 75 MG: 75 TABLET, FILM COATED ORAL at 12:09

## 2017-04-04 RX ADMIN — ENOXAPARIN SODIUM 40 MG: 40 INJECTION SUBCUTANEOUS at 17:57

## 2017-04-04 RX ADMIN — INSULIN LISPRO 2 UNITS: 100 INJECTION, SOLUTION INTRAVENOUS; SUBCUTANEOUS at 18:10

## 2017-04-04 NOTE — PROGRESS NOTES
Speech path  Pt had speech therapy at [facility] and he left AMA. Will see if records can be retrieved from that facility.    Leopold Counts, SLP

## 2017-04-04 NOTE — ED NOTES
TRANSFER - OUT REPORT:  Verbal report given to Jarrod Zuniga on [facility].  being transferred to NSTU room [phone] for routine progression of care       Report consisted of patients Situation, Background, Assessment and   Recommendations(SBAR). Information from the following report(s) SBAR was reviewed with the receiving nurse. Lines:   Peripheral IV 04/04/17 Right Antecubital (Active)   Site Assessment Clean, dry, & intact 4/4/2017 10:53 AM   Phlebitis Assessment 0 4/4/2017 10:53 AM   Infiltration Assessment 0 4/4/2017 10:53 AM   Dressing Status Clean, dry, & intact 4/4/2017 10:53 AM   Dressing Type Tape;Transparent 4/4/2017 10:53 AM   Hub Color/Line Status Pink 4/4/2017 10:53 AM        Opportunity for questions and clarification was provided.

## 2017-04-04 NOTE — ROUTINE PROCESS
* No surgery found *  Bedside shift change report given to [facility] (oncoming nurse) by Mily Roberts (offgoing nurse). Report included the following information SBAR, Kardex and MAR. Zone Phone:   [phone]      Significant changes during shift:  Started fluids        Patient Information    Emely Decker.  61 y.o.  4/4/2017  9:44 AM by Rom Restrepo MD. Emely Pinedo was admitted from Home    Problem List    Patient Active Problem List    Diagnosis Date Noted    TIA (transient ischemic attack) 04/04/2017    Transient ischemic attack involving right internal carotid artery 02/15/2017    Stenosis of both internal carotid arteries 02/15/2017    Left-sided weakness 02/14/2017    OA (osteoarthritis) of knee 08/06/2013    Prostate cancer ([facility] 03/10/2011    Type II or unspecified type diabetes mellitus without mention of complication, uncontrolled 08/03/2009    Essential hypertension, benign 08/03/2009    Pure hypercholesterolemia 08/03/2009    Obesity 08/03/2009     Past Medical History:   Diagnosis Date    Allergic rhinitis     Arthritis     djd of knees    Diabetes ([facility]     Hypercholesteremia     Hypertension     Obesity     Stroke ([facility]     2/14/2017         Core Measures:    CVA: Yes Yes  CHF:No No  PNA:No No    Post Op Surgical (If Applicable):     Number times ambulated in hallway past shift:  0  Number of times OOB to chair past shift:   0    Activity Status:    OOB to Chair No  Ambulated this shift No   Bed Rest No    Supplemental O2: (If Applicable)    NC No  NRB No      DVT prophylaxis:    DVT prophylaxis Med- Yes  DVT prophylaxis SCD or MOHINI- No     Wounds: (If Applicable)    Wounds- No      Patient Safety:    Falls Score Total Score: 2  Safety Level_______  Bed Alarm On? No  Sitter?  No    Plan for upcoming shift: monitor and continue fluids        Discharge Plan: Yes when stable    Active Consults:  IP CONSULT TO NEUROLOGY  IP CONSULT TO NEUROLOGY  IP CONSULT TO HOSPITALIST

## 2017-04-04 NOTE — IP AVS SNAPSHOT
[address] [facility] 
[phone] Patient: Mayito Alvarez. MRN: [id number] QY:5[birth year]85 You are allergic to the following No active allergies Recent Documentation Height Weight BMI Smoking Status 1.778 m 111.9 kg 35.4 kg/m2 Never Smoker Emergency Contacts Name Discharge Info Relation Home Work Mobile [address] CAREGIVER [3] Spouse [3] [phone] About your hospitalization You were admitted on:  April 4, 2017 You last received care in the:  [facility] 3 NEUROSCIENCE TELEMETRY You were discharged on:  April 5, 2017 Unit phone number:  [phone] Why you were hospitalized Your primary diagnosis was:  Not on File Your diagnoses also included:  Tia (Transient Ischemic Attack), Stenosis Of Both Internal Carotid Arteries, Oa (Osteoarthritis) Of Knee, Essential Hypertension, Benign Providers Seen During Your Hospitalizations Provider Role Specialty Primary office phone Governor Timo MD Attending Provider Emergency Medicine [phone] Kyle Pichardo MD Attending Provider Internal Medicine [phone] Leopoldo Mcconnell MD Attending Provider Internal Medicine [phone] Your Primary Care Physician (PCP) Primary Care Physician Office Phone Office Fax Ciaran Vance [phone] Follow-up Information Follow up With Details Comments Contact Info [place]   [address] [facility] [address] [facility] 
[phone] Your Appointments Friday April 07, 2017  2:00 PM EDT ROUTINE CARE with Kit Vargas [address] [place] [address],4[address] Floor [facility]) [address] [facility] [address] [facility]  
[phone] Current Discharge Medication List  
  
START taking these medications Dose & Instructions Dispensing Information Comments Morning Noon Evening Bedtime  
 clopidogrel 75 mg Tab Commonly known as:  PLAVIX Your last dose was: Your next dose is:    
   
   
 Dose:  75 mg Take 1 Tab by mouth daily. Quantity:  30 Tab Refills:  1 CONTINUE these medications which have CHANGED Dose & Instructions Dispensing Information Comments Morning Noon Evening Bedtime  
 lisinopril 40 mg tablet Commonly known as:  Dulcie Mcburney What changed:  how much to take Your last dose was: Your next dose is:    
   
   
 Dose:  20 mg Take 0.5 Tabs by mouth daily. Quantity:  30 Tab Refills:  6 CONTINUE these medications which have NOT CHANGED Dose & Instructions Dispensing Information Comments Morning Noon Evening Bedtime  
 amLODIPine 10 mg tablet Commonly known as:  Kisha Yanez Your last dose was: Your next dose is:    
   
   
 Dose:  10 mg Take 1 Tab by mouth daily. Quantity:  30 Tab Refills:  6  
     
   
   
   
  
 clotrimazole 1 % topical cream  
Commonly known as:  Sheldon Lemus Your last dose was: Your next dose is:    
   
   
 Apply  to affected area two (2) times a day. Quantity:  15 g Refills:  1  
     
   
   
   
  
 glipiZIDE 10 mg tablet Commonly known as:  Jarvis Papa Your last dose was: Your next dose is:    
   
   
 Dose:  10 mg Take 1 Tab by mouth two (2) times a day. Quantity:  60 Tab Refills:  11  
     
   
   
   
  
 * glucose blood VI test strips strip Commonly known as:  Ascensia CONTOUR Your last dose was: Your next dose is:    
   
   
 Test twice daily. Quantity:  3 Package Refills:  1  
     
   
   
   
  
 * glucose blood VI test strips strip Commonly known as:  ASCENSIA AUTODISC VI, ONE TOUCH ULTRA TEST VI Your last dose was: Your next dose is: Embrace test strip--check fsbs bid 250.02 Quantity:  50 Each Refills:  11  
     
   
   
   
  
 * glucose blood VI test strips strip Commonly known as:  ASCENSIA AUTODISC VI, ONE TOUCH ULTRA TEST VI Your last dose was: Your next dose is:    
   
   
 Check fsbs bid  250.02  Contour next Quantity:  100 Strip Refills:  11  
     
   
   
   
  
 hydroCHLOROthiazide 25 mg tablet Commonly known as:  HYDRODIURIL Your last dose was: Your next dose is:    
   
   
 Dose:  25 mg Take 1 Tab by mouth daily. Quantity:  30 Tab Refills:  6 Insulin Needles (Disposable) 31 gauge x 5/16\" Ndle Your last dose was: Your next dose is:    
   
   
 Inject once daily. Quantity:  1 Package Refills:  3  
     
   
   
   
  
 metFORMIN 1,000 mg tablet Commonly known as:  GLUCOPHAGE Your last dose was: Your next dose is:    
   
   
 Dose:  1000 mg Take 1,000 mg by mouth two (2) times daily (with meals). Refills:  0  
     
   
   
   
  
 pravastatin 10 mg tablet Commonly known as:  PRAVACHOL Your last dose was: Your next dose is: TAKE ONE TABLET BY MOUTH IN THE EVENING Quantity:  30 Tab Refills:  11  
     
   
   
   
  
 traMADol 50 mg tablet Commonly known as:  ULTRAM  
   
Your last dose was: Your next dose is:    
   
   
 Dose:  50 mg Take 1 Tab by mouth every six (6) hours as needed for Pain. Quantity:  30 Tab Refills:  1  
     
   
   
   
  
 * Notice: This list has 3 medication(s) that are the same as other medications prescribed for you. Read the directions carefully, and ask your doctor or other care provider to review them with you. STOP taking these medications   
 aspirin 325 mg tablet Commonly known as:  ASPIRIN  
   
  
 sildenafil 20 mg tablet Commonly known as:  REVATIO Where to Get Your Medications Information on where to get these meds will be given to you by the nurse or doctor. ! Ask your nurse or doctor about these medications  
  clopidogrel 75 mg Tab  
 lisinopril 40 mg tablet Discharge Instructions None Discharge Orders None Phybridge Announcement We are excited to announce that we are making your provider's discharge notes available to you in Phybridge. You will see these notes when they are completed and signed by the physician that discharged you from your recent hospital stay. If you have any questions or concerns about any information you see in Phybridge, please call the Health Information Department where you were seen or reach out to your Primary Care Provider for more information about your plan of care. Introducing [facility] & HEALTH SERVICES! [employer] introduces Phybridge patient portal. Now you can access parts of your medical record, email your doctor's office, and request medication refills online. 1. In your internet browser, go to [email or web address] 2. Click on the First Time User? Click Here link in the Sign In box. You will see the New Member Sign Up page. 3. Enter your Phybridge Access Code exactly as it appears below. You will not need to use this code after youve completed the sign-up process. If you do not sign up before the expiration date, you must request a new code. · Phybridge Access Code: [email or web address] Expires: 5/15/2017  7:26 PM 
 
4. Enter the last four digits of your Social Security Number (xxxx) and Date of Birth (mm/dd/yyyy) as indicated and click Submit. You will be taken to the next sign-up page. 5. Create a T[employer]ine [employer] Enginest ID. This will be your Phybridge login ID and cannot be changed, so think of one that is secure and easy to remember. 6. Create a Phybridge password. You can change your password at any time. 7. Enter your Password Reset Question and Answer.  This can be used at a later time if you forget your password. 8. Enter your e-mail address. You will receive e-mail notification when new information is available in [address] 9. Click Sign Up. You can now view and download portions of your medical record. 10. Click the Download Summary menu link to download a portable copy of your medical information. If you have questions, please visit the Frequently Asked Questions section of the light website. Remember, light is NOT to be used for urgent needs. For medical emergencies, dial 911. Now available from your iPhone and Android! General Information Please provide this summary of care documentation to your next provider. Patient Signature:  ____________________________________________________________ Date:  ____________________________________________________________  
  
Rita Greenyle Provider Signature:  ____________________________________________________________ Date:  ____________________________________________________________

## 2017-04-04 NOTE — PROGRESS NOTES
OT referral received, chart reviewed, and attempted to see patient for evaluation. Patient is presently being seen by Neurologist. Will follow up tomorrow for evaluation.

## 2017-04-04 NOTE — H&P
Hospitalist Admission Note    NAME: Soumya Pardo. :  1950   MRN:  [id number]     Date/Time:  2017 2:24 PM    Patient PCP: Cristy Buenrostro MD  ________________________________________________________________________    My assessment of this patient's clinical condition and my plan of care is as follows. Assessment / Plan:  TIA r/o Stroke  With symptoms left foot heaviness which is resolved  Recent stroke in 2017 with residual left hand numbness and weak   Walks with a cane  Seen by tele neurology recommended admission , and start plavix and echo with bubble study which was not done in     CT: Chronic small vessel ischemic and/or senescent white matter change and right thalamic lacune   Risk factors: hypertension, diabetes, history of stroke    Neurology In house consult. Changed to plavix, cont pravachol  Risk factor evaluation with ECHO, with bubble study ( spoke with echo lab)   lipids panel recent with ldl 70,   A1C 9.4,    Monitor on tele for 24 hours  Would hold off on MRI for now, defer to neurology regarding the need for this  Pt/ot    Recent admission  Carotids neg  MRI Tiny foci of nonhemorrhagic acute or subacute ischemia in the right thalamus,  without associated mass effect.     Hypertension  PTA norvasc,hctz and lisinopril  norvasc for now, permissive hypertension for today  Would resume other meds in am depending on kidney function      Risk of kidney dysfunction  With 1.5 fold increase in cr    Holding hctz and lisinopril, gentle hydration  Need to resume meds in am    Diabetes: poc an achs  glipizide        Code Status:full  Surrogate Decision Maker:    DVT Prophylaxis: lovenox  GI Prophylaxis: not indicated    Baseline: fair functional status        Subjective:   CHIEF COMPLAINT: left foot heaviness    HISTORY OF PRESENT ILLNESS:     Sarahy Rosas is a 61 y.o.   male who with history of hypertension and diabetes  Recent admission with stroke in 2/2017 was discharged on asa, pravachol and  Antihypertensive meds  Got the therapy, residual, left hand fingers numbness, and weak   Came to the ED with complains of left foot heaviness,  Since resolved. Walks with a cane  No slurring of speech, no weakness at this time, no chest pain or sob    We were asked to admit for work up and evaluation of the above problems. Past Medical History:   Diagnosis Date    Allergic rhinitis     Arthritis     djd of knees    Diabetes ([facility] [address]     Hypercholesteremia     Hypertension     Obesity     Stroke ([facility] [address]     2/14/2017        Past Surgical History:   Procedure Laterality Date    HX CHOLECYSTECTOMY      laparoscopic       Social History   Substance Use Topics    Smoking status: Never Smoker    Smokeless tobacco: Never Used    Alcohol use No      Comment: Stop 5 months         Family History   Problem Relation Age of Onset    Diabetes Mother     Hypertension Mother     Kidney Disease Mother     Arthritis-osteo Father     Diabetes Father     No Known Problems Sister     No Known Problems Sister     No Known Problems Sister     No Known Problems Sister     Cancer Sister      cancer     No Known Allergies     Prior to Admission medications    Medication Sig Start Date End Date Taking? Authorizing Provider   traMADol (ULTRAM) 50 mg tablet Take 1 Tab by mouth every six (6) hours as needed for Pain. 3/20/17   Joanie Sharp MD   amLODIPine (NORVASC) 10 mg tablet Take 1 Tab by mouth daily. 3/7/17   Joanie Sharp MD   lisinopril (PRINIVIL, ZESTRIL) 40 mg tablet Take 1 Tab by mouth daily. 3/7/17   Joanie Sharp MD   hydroCHLOROthiazide (HYDRODIURIL) 25 mg tablet Take 1 Tab by mouth daily. 3/7/17   Joanie Sharp MD   glipiZIDE (GLUCOTROL) 10 mg tablet Take 1 Tab by mouth two (2) times a day. 3/7/17   Joanie Sharp MD   aspirin (ASPIRIN) 325 mg tablet Take 1 Tab by mouth daily.  2/17/17   Dane Coyne MD   pravastatin (PRAVACHOL) 10 mg tablet TAKE ONE TABLET BY MOUTH IN THE EVENING 12/7/16   Carissa Dawkins MD   metFORMIN (GLUCOPHAGE) 1,000 mg tablet Take 1,000 mg by mouth two (2) times daily (with meals). Historical Provider   sildenafil (REVATIO) 20 mg tablet Take 1 Tab by mouth daily as needed. 10/5/16   Carissa Dawkins MD   clotrimazole (LOTRIMIN) 1 % topical cream Apply  to affected area two (2) times a day. 7/25/16   Carissa Dawkins MD   glucose blood VI test strips (ASCENSIA AUTODISC VI, ONE TOUCH ULTRA TEST VI) strip Check fsbs bid  250.02  Contour next 9/11/15   Carissa Dawkins MD   glucose blood VI test strips (ASCENSIA AUTODISC VI, ONE TOUCH ULTRA TEST VI) strip Embrace test strip--check fsbs bid 250.02 7/6/15   Carissa Dawkins MD   glucose blood VI test strips (ASCENSIA CONTOUR) strip Test twice daily. 5/23/14   Carissa Dawkins MD   Insulin Needles, Disposable, 31 X 5/16 \" ndle Inject once daily.  5/8/14   Carissa Dawkins MD       REVIEW OF SYSTEMS:         Total of 12 systems reviewed as follows:       POSITIVE= underlined text  Negative = text not underlined  General:  fever, chills, sweats, generalized weakness, weight loss/gain,      loss of appetite   Eyes:    blurred vision, eye pain, loss of vision, double vision  ENT:    rhinorrhea, pharyngitis   Respiratory:   cough, sputum production, SOB, DE JESUS, wheezing, pleuritic pain   Cardiology:   chest pain, palpitations, orthopnea, PND, edema, syncope   Gastrointestinal:  abdominal pain , N/V, diarrhea, dysphagia, constipation, bleeding   Genitourinary:  frequency, urgency, dysuria, hematuria, incontinence   Muskuloskeletal :  arthralgia, myalgia, back pain  Hematology:  easy bruising, nose or gum bleeding, lymphadenopathy   Dermatological: rash, ulceration, pruritis, color change / jaundice  Endocrine:   hot flashes or polydipsia   Neurological:  headache, dizziness, confusion, focal weakness, paresthesia,     Speech difficulties, memory loss, gait difficulty, left foot heaviness  Psychological: Feelings of anxiety, depression, agitation    Objective:   VITALS:    Visit Vitals    BP (!) 138/100    Pulse 78    Temp 97.8 °F (36.6 °C)    Resp 22    Ht 5' 10\" (1.778 m)    Wt 111.9 kg (246 lb 11.1 oz)    SpO2 94%    BMI 35.4 kg/m2       PHYSICAL EXAM:    General:    Alert, cooperative, no distress, appears stated age. HEENT: Atraumatic, anicteric sclerae, pink conjunctivae     No oral ulcers, mucosa moist, throat clear, dentition fair  Neck:  Supple, symmetrical,  thyroid: non tender  Lungs:   Clear to auscultation bilaterally. No Wheezing or Rhonchi. No rales. Chest wall:  No tenderness  No Accessory muscle use. Heart:   Regular  rhythm,  No  murmur   No edema  Abdomen:   Soft, non-tender. Not distended. Bowel sounds normal  Extremities: No cyanosis. No clubbing  ,   Skin:     Not pale. Not Jaundiced  No rashes   Psych:  Good insight. Not depressed. Not anxious or agitated. Neurologic: EOMs intact. No facial asymmetry. No aphasia or slurred speech. strength 4-5/5 left side, left hand weak ._______________________________________________________________________  Care Plan discussed with:    Comments   Patient y    Family      RN y    Care Manager                    Consultant:      _______________________________________________________________________  Expected  Disposition:   Home with Family    HH/PT/OT/RN y   SNF/LTC    FERNANDEZ    ________________________________________________________________________  TOTAL TIME:  72 Minutes    Critical Care Provided     Minutes non procedure based      Comments    y Reviewed previous records   >50% of visit spent in counseling and coordination of care y Discussion with patient and/or family and questions answered       ________________________________________________________________________  Signed: Isabell Lopez MD    Procedures: see electronic medical records for all procedures/Xrays and details which were not copied into this note but were reviewed prior to creation of Plan.    LAB DATA REVIEWED:    Recent Results (from the past 24 hour(s))   CBC WITH AUTOMATED DIFF    Collection Time: 04/04/17 10:50 AM   Result Value Ref Range    WBC 6.6 4.1 - 11.1 K/uL    RBC 5.03 4. 10 - 5.70 M/uL    HGB 13.4 12.1 - 17.0 g/dL    HCT 40.6 36.6 - 50.3 %    MCV 80.7 80.0 - 99.0 FL    MCH 26.6 26.0 - 34.0 PG    MCHC 33.0 30.0 - 36.5 g/dL    RDW 14.6 (H) 11.5 - 14.5 %    PLATELET 814 594 - 483 K/uL    NEUTROPHILS 51 32 - 75 %    LYMPHOCYTES 41 12 - 49 %    MONOCYTES 5 5 - 13 %    EOSINOPHILS 3 0 - 7 %    BASOPHILS 0 0 - 1 %    ABS. NEUTROPHILS 3.4 1.8 - 8.0 K/UL    ABS. LYMPHOCYTES 2.7 0.8 - 3.5 K/UL    ABS. MONOCYTES 0.4 0.0 - 1.0 K/UL    ABS. EOSINOPHILS 0.2 0.0 - 0.4 K/UL    ABS. BASOPHILS 0.0 0.0 - 0.1 K/UL   PROTHROMBIN TIME + INR    Collection Time: 04/04/17 10:50 AM   Result Value Ref Range    INR 1.0 0.9 - 1.1      Prothrombin time 9.9 9.0 - 11.1 sec   PTT    Collection Time: 04/04/17 10:50 AM   Result Value Ref Range    aPTT 25.4 22.1 - 32.5 sec    aPTT, therapeutic range     58.0 - 68.9 SECS   METABOLIC PANEL, COMPREHENSIVE    Collection Time: 04/04/17 10:50 AM   Result Value Ref Range    Sodium 141 136 - 145 mmol/L    Potassium 3.4 (L) 3.5 - 5.1 mmol/L    Chloride 105 97 - 108 mmol/L    CO2 28 21 - 32 mmol/L    Anion gap 8 5 - 15 mmol/L    Glucose 147 (H) 65 - 100 mg/dL    BUN 18 6 - 20 MG/DL    Creatinine 1.20 0.70 - 1.30 MG/DL    BUN/Creatinine ratio 15 12 - 20      GFR est AA >60 >60 ml/min/1.73m2    GFR est non-AA >60 >60 ml/min/1.73m2    Calcium 8.7 8.5 - 10.1 MG/DL    Bilirubin, total 0.3 0.2 - 1.0 MG/DL    ALT (SGPT) 21 12 - 78 U/L    AST (SGOT) 11 (L) 15 - 37 U/L    Alk.  phosphatase 69 45 - 117 U/L    Protein, total 7.4 6.4 - 8.2 g/dL    Albumin 3.5 3.5 - 5.0 g/dL    Globulin 3.9 2.0 - 4.0 g/dL    A-G Ratio 0.9 (L) 1.1 - 2.2     MAGNESIUM    Collection Time: 04/04/17 10:50 AM   Result Value Ref Range    Magnesium 1.7 1.6 - 2.4 mg/dL   CK    Collection Time: 04/04/17 10:50 AM Result Value Ref Range    CK 57 39 - 308 U/L   TROPONIN I    Collection Time: 04/04/17 10:50 AM   Result Value Ref Range    Troponin-I, Qt. <0.04 <0.05 ng/mL   URINALYSIS W/ RFLX MICROSCOPIC    Collection Time: 04/04/17 11:13 AM   Result Value Ref Range    Color YELLOW/STRAW      Appearance CLEAR CLEAR      Specific gravity 1.028 1.003 - 1.030      pH (UA) 5.0 5.0 - 8.0      Protein NEGATIVE  NEG mg/dL    Glucose NEGATIVE  NEG mg/dL    Ketone NEGATIVE  NEG mg/dL    Bilirubin NEGATIVE  NEG      Blood NEGATIVE  NEG      Urobilinogen 1.0 0.2 - 1.0 EU/dL    Nitrites NEGATIVE  NEG      Leukocyte Esterase NEGATIVE  NEG      WBC 0-4 0 - 4 /hpf    RBC 0-5 0 - 5 /hpf    Epithelial cells FEW FEW /lpf    Bacteria NEGATIVE  NEG /hpf    Hyaline cast 0-2 0 - 5 /lpf

## 2017-04-04 NOTE — ED PROVIDER NOTES
HPI Comments:   Wendy Pope is a 61 y.o. male with a hx of stroke, DM, HTN, hypercholesteremia, and arthritis presenting to the ED with his wife and son C/O left foot \"heaviness\" which started yesterday and worsened this morning. Pt experienced a CVA on 02/14/17, was admitted to this hospital, and has had residual left hand numbness/weakness and left foot numbness since then, which remains unchanged today. He is currently taking 325mg aspirin daily and was given Plavix while in the hospital but not given a prescription to take home. Patient denies HA, CP, SOB, dizziness, or any other symptoms or complaints. PCP: Samia Scott MD    There are no other complaints, changes or physical findings at this time. Written by JARET Strickland, as dictated by Willem Bhatia MD      The history is provided by the patient. No  was used. Past Medical History:   Diagnosis Date    Allergic rhinitis     Arthritis     djd of knees    Diabetes ([facility] [address]     Hypercholesteremia     Hypertension     Obesity     Stroke ([facility] [address]     2/14/2017       Past Surgical History:   Procedure Laterality Date    HX CHOLECYSTECTOMY      laparoscopic         Family History:   Problem Relation Age of Onset    Diabetes Mother     Hypertension Mother     Kidney Disease Mother     Arthritis-osteo Father     Diabetes Father     No Known Problems Sister     No Known Problems Sister     No Known Problems Sister     No Known Problems Sister     Cancer Sister      cancer       Social History     Social History    Marital status:      Spouse name: N/A    Number of children: N/A    Years of education: N/A     Occupational History    Not on file.      Social History Main Topics    Smoking status: Never Smoker    Smokeless tobacco: Never Used    Alcohol use No      Comment: Stop 5 months     Drug use: Yes     Special: Prescription, OTC    Sexual activity: Yes     Other Topics Concern    Not on file     Social History Narrative         ALLERGIES: Review of patient's allergies indicates no known allergies. Review of Systems   HENT: Negative for congestion. Eyes: Negative. Respiratory: Negative for shortness of breath. Cardiovascular: Negative for chest pain. Gastrointestinal: Negative for abdominal pain. Endocrine: Negative for heat intolerance. Genitourinary: Negative. Musculoskeletal: Negative for back pain. Skin: Negative for rash. Allergic/Immunologic: Negative for immunocompromised state. Neurological: Positive for weakness (left hand) and numbness (left hand and left foot). Negative for dizziness and headaches. +Left foot \"heaviness\"   Hematological: Does not bruise/bleed easily. Psychiatric/Behavioral: Negative. All other systems reviewed and are negative. Vitals:    04/04/17 1049 04/04/17 1100 04/04/17 1130 04/04/17 1200   BP: (!) 153/91 (!) 156/96 139/78 151/83   Pulse: 70 71 81 76   Resp: 21 19 25 16   Temp:       SpO2: 94% 94% 96% 96%   Weight:       Height:                Physical Exam   Constitutional: He is oriented to person, place, and time. He appears well-developed and well-nourished. No acute distress   HENT:   Head: Normocephalic and atraumatic. Eyes: EOM are normal. Pupils are equal, round, and reactive to light. Neck: Normal range of motion. Neck supple. Cardiovascular: Normal rate, regular rhythm and normal heart sounds. Pulmonary/Chest: Effort normal and breath sounds normal. He has no wheezes. Abdominal: Soft. Bowel sounds are normal. There is no tenderness. Musculoskeletal: Normal range of motion. He exhibits no edema or tenderness. Neurological: He is alert and oriented to person, place, and time. No cranial nerve deficit. Decreased sensation to LUE and LLE and slightly decreased  on the left (baseline), motor intact to BLE's   Skin: Skin is warm and dry. Psychiatric: He has a normal mood and affect.    Nursing note and vitals reviewed. MDM  Number of Diagnoses or Management Options  Diagnosis management comments: DDx: TIA, electrolyte abnormality, peripheral neuropathy       Amount and/or Complexity of Data Reviewed  Clinical lab tests: ordered and reviewed  Tests in the radiology section of CPT®: ordered and reviewed  Review and summarize past medical records: yes  Discuss the patient with other providers: yes (Neurology  Hospitalist)    Patient Progress  Patient progress: stable    Procedures    CONSULT NOTE:   11:47 AM  Km DeL una MD spoke with Dr. Luis F Young   Specialty: Neurology  Discussed pt's hx, disposition, and available diagnostic and imaging results over the telephone. Reviewed care plans. Consulting physician agrees with plans as outlined. Dr. Luis F Young states the pt had everything performed when he was recently admitted for a stroke that she would recommend for a TIA workup except a bubble echo so admit him to have that performed and give Plavix. Written by Karlee Vail, ED Scribe, as dictated by Km De Luna MD.     CONSULT NOTE:   12:14 PM  Km De Luna MD spoke with Dr. Bartolo Dyer,   Specialty: Hospitalist  Discussed pt's hx, disposition, and available diagnostic and imaging results. Reviewed care plans. Consultant will evaluate pt for admission. Written by Karlee Vail, ED Scribe, as dictated by Km De Luna MD.    LABORATORY TESTS:  Recent Results (from the past 12 hour(s))   CBC WITH AUTOMATED DIFF    Collection Time: 04/04/17 10:50 AM   Result Value Ref Range    WBC 6.6 4.1 - 11.1 K/uL    RBC 5.03 4. 10 - 5.70 M/uL    HGB 13.4 12.1 - 17.0 g/dL    HCT 40.6 36.6 - 50.3 %    MCV 80.7 80.0 - 99.0 FL    MCH 26.6 26.0 - 34.0 PG    MCHC 33.0 30.0 - 36.5 g/dL    RDW 14.6 (H) 11.5 - 14.5 %    PLATELET 221 993 - 911 K/uL    NEUTROPHILS 51 32 - 75 %    LYMPHOCYTES 41 12 - 49 %    MONOCYTES 5 5 - 13 %    EOSINOPHILS 3 0 - 7 %    BASOPHILS 0 0 - 1 %    ABS.  NEUTROPHILS 3.4 1.8 - 8.0 K/UL ABS. LYMPHOCYTES 2.7 0.8 - 3.5 K/UL    ABS. MONOCYTES 0.4 0.0 - 1.0 K/UL    ABS. EOSINOPHILS 0.2 0.0 - 0.4 K/UL    ABS. BASOPHILS 0.0 0.0 - 0.1 K/UL   PROTHROMBIN TIME + INR    Collection Time: 04/04/17 10:50 AM   Result Value Ref Range    INR 1.0 0.9 - 1.1      Prothrombin time 9.9 9.0 - 11.1 sec   PTT    Collection Time: 04/04/17 10:50 AM   Result Value Ref Range    aPTT 25.4 22.1 - 32.5 sec    aPTT, therapeutic range     58.0 - 52.4 SECS   METABOLIC PANEL, COMPREHENSIVE    Collection Time: 04/04/17 10:50 AM   Result Value Ref Range    Sodium 141 136 - 145 mmol/L    Potassium 3.4 (L) 3.5 - 5.1 mmol/L    Chloride 105 97 - 108 mmol/L    CO2 28 21 - 32 mmol/L    Anion gap 8 5 - 15 mmol/L    Glucose 147 (H) 65 - 100 mg/dL    BUN 18 6 - 20 MG/DL    Creatinine 1.20 0.70 - 1.30 MG/DL    BUN/Creatinine ratio 15 12 - 20      GFR est AA >60 >60 ml/min/1.73m2    GFR est non-AA >60 >60 ml/min/1.73m2    Calcium 8.7 8.5 - 10.1 MG/DL    Bilirubin, total 0.3 0.2 - 1.0 MG/DL    ALT (SGPT) 21 12 - 78 U/L    AST (SGOT) 11 (L) 15 - 37 U/L    Alk.  phosphatase 69 45 - 117 U/L    Protein, total 7.4 6.4 - 8.2 g/dL    Albumin 3.5 3.5 - 5.0 g/dL    Globulin 3.9 2.0 - 4.0 g/dL    A-G Ratio 0.9 (L) 1.1 - 2.2     MAGNESIUM    Collection Time: 04/04/17 10:50 AM   Result Value Ref Range    Magnesium 1.7 1.6 - 2.4 mg/dL   CK    Collection Time: 04/04/17 10:50 AM   Result Value Ref Range    CK 57 39 - 308 U/L   TROPONIN I    Collection Time: 04/04/17 10:50 AM   Result Value Ref Range    Troponin-I, Qt. <0.04 <0.05 ng/mL   URINALYSIS W/ RFLX MICROSCOPIC    Collection Time: 04/04/17 11:13 AM   Result Value Ref Range    Color YELLOW/STRAW      Appearance CLEAR CLEAR      Specific gravity 1.028 1.003 - 1.030      pH (UA) 5.0 5.0 - 8.0      Protein NEGATIVE  NEG mg/dL    Glucose NEGATIVE  NEG mg/dL    Ketone NEGATIVE  NEG mg/dL    Bilirubin NEGATIVE  NEG      Blood NEGATIVE  NEG      Urobilinogen 1.0 0.2 - 1.0 EU/dL    Nitrites NEGATIVE  NEG Leukocyte Esterase NEGATIVE  NEG      WBC 0-4 0 - 4 /hpf    RBC 0-5 0 - 5 /hpf    Epithelial cells FEW FEW /lpf    Bacteria NEGATIVE  NEG /hpf    Hyaline cast 0-2 0 - 5 /lpf       IMAGING RESULTS:  CT HEAD WO CONT   Final Result   EXAM: CT HEAD WO CONT     INDICATION: extremity weakness. Numbness to the L hand that started when he  had his stroke in February and \"heaviness\" to the L foot that started yesterday. PT. States that he is completing rehab to help with the numbness.      COMPARISON: CT 2/14/2017. MRI 2/15/2017.     TECHNIQUE: Unenhanced CT of the head was performed using 5 mm images. Brain and  bone windows were generated. CT dose reduction was achieved through use of a  standardized protocol tailored for this examination and automatic exposure  control for dose modulation.      FINDINGS:  The ventricles and sulci are normal in size, shape and configuration and  midline. There is a right thalamic lacune. There is mild periventricular white  matter hypodensity. . There is no intracranial hemorrhage, extra-axial  collection, mass, mass effect or midline shift. The basilar cisterns are open. No acute infarct is identified. The bone windows demonstrate no abnormalities. The visualized portions of the paranasal sinuses and mastoid air cells are  clear.     IMPRESSION  IMPRESSION: No acute findings. Chronic small vessel ischemic and/or senescent  white matter change and right thalamic lacune. Signed by      Signed Date/Time    Phone Pager     Kenn Lo 4/04/2017 11:02 [phone]           MEDICATIONS GIVEN:  Medications   potassium chloride SR (KLOR-CON 10) tablet 40 mEq (40 mEq Oral Given 4/4/17 1141)   clopidogrel (PLAVIX) tablet 75 mg (75 mg Oral Given 4/4/17 1209)       IMPRESSION:  1. Transient cerebral ischemia, unspecified type    2. Essential hypertension, benign    3. Stenosis of both internal carotid arteries    4. Left-sided weakness    5.  Pure hypercholesterolemia        PLAN: Admit to Hospitalist    Admit Note:  12:14 PM  Patient is being admitted to the hospital by Dr. Frances Berry. The results of their tests and reasons for their admission have been discussed with the patient and/or available family. They convey their agreement and understanding for the need to be admitted and for their admission diagnosis. Written by Caden Wells, ED Scribe, as dictated by Oral Vasquez MD.     Attestation: This note is prepared by Caden Wells, acting as Scribe for Oral Vasquez MD.    Oral Vasquez MD: The scribe's documentation has been prepared under my direction and personally reviewed by me in its entirety. I confirm that the note above accurately reflects all work, treatment, procedures, and medical decision making performed by me.

## 2017-04-04 NOTE — ED NOTES
Spoke with Nicol RUEDA on Neuro. Aware that writer is unable to complete NIH at this time as patient is off the floor and none completed previously.

## 2017-04-04 NOTE — ROUTINE PROCESS
Looking at chart, noticed that the NIH was not done. Called down and spoke with [address], informed her it wasn't done.  She stated that the patient was on the way up and she would \"put one in.\"

## 2017-04-04 NOTE — CONSULTS
NEUROLOGY CONSULTATION NOTE       DATE OF CONSULTATION: 4/4/2017    CONSULTED BY: Jesús Pierre MD    Reason for Consult  I have been asked to see the patient in neurological consultation to render advice and opinion regarding stroke. HISTORY OF PRESENT ILLNESS  Sara Pate is a 61 y.o. male who presents to the hospital because of today at around 730 am, he did have sudden onset left foot heaviness that lasted for 2 hrs and resolved. He did not have any bulbar symptoms or any new sensory loss. He is back to his baseline. He was recently admitted in Feb 2017 because of left sided numbness and was diagnosed with right thalamic infarct. He does htn, dm and hld and is presently taking ASA at home after the stroke.      ROS  A ten system review of constitutional, cardiovascular, respiratory, musculoskeletal, endocrine, skin, SHEENT, genitourinary, psychiatric and neurologic systems was obtained and is unremarkable except as stated in HPI     PMH  Past Medical History:   Diagnosis Date    Allergic rhinitis     Arthritis     djd of knees    [facility] [address]     Hypercholesteremia     Hypertension     Obesity     Stroke ([facility] [address]     2/14/2017       FH  Family History   Problem Relation Age of Onset    Diabetes Mother     Hypertension Mother     Kidney Disease Mother     Arthritis-osteo Father     Diabetes Father     No Known Problems Sister     No Known Problems Sister     No Known Problems Sister     No Known Problems Sister     Cancer Sister      cancer       SH  Social History     Social History    Marital status:      Spouse name: N/A    Number of children: N/A    Years of education: N/A     Social History Main Topics    Smoking status: Never Smoker    Smokeless tobacco: Never Used    Alcohol use No      Comment: Stop 5 months     Drug use: Yes     Special: Prescription, OTC    Sexual activity: Yes     Other Topics Concern    None     Social History Narrative       ALLERGIES  No Known Allergies    PHYSICAL EXAM  EXAMINATION:   Patient Vitals for the past 24 hrs:   Temp Pulse Resp BP SpO2   04/04/17 1506 97.8 °F (36.6 °C) 72 - (!) 145/99 96 %   04/04/17 1330 - 78 22 (!) 138/100 94 %   04/04/17 1300 - 76 17 139/88 96 %   04/04/17 1230 - 75 15 (!) 155/100 96 %   04/04/17 1200 - 76 16 151/83 96 %   04/04/17 1130 - 81 25 139/78 96 %   04/04/17 1100 - 71 19 (!) 156/96 94 %   04/04/17 1049 - 70 21 (!) 153/91 94 %   04/04/17 1000 - 75 16 (!) 172/96 96 %   04/04/17 0953 - - - (!) 162/98 -   04/04/17 0943 97.8 °F (36.6 °C) 72 16 163/90 99 %        General:   General appearance: Pt is in no acute distress   Distal pulses are preserved  Fundoscopic exam: attempted    Neurological Examination:   Mental Status:  AAO x3. Speech is fluent. Follows commands, has normal fund of knowledge, attention, short term recall, comprehension and insight. Cranial Nerves: Visual fields are full. PERRL, Extraocular movements are full. Facial sensation intact V1- V3. Facial movement intact, symmetric. Hearing intact to conversation. Palate elevates symmetrically. Shoulder shrug symmetric. Tongue midline. Motor: Strength is 5/5 in all 4 ext. Normal tone. No atrophy. Sensation: Left upper and lower ext numbness    Reflexes: DTRs 2+ throughout. Plantar responses downgoing. Coordination/Cerebellar: Intact to finger-nose-finger     Gait: deferred     Skin: No significant bruising or lacerations. LAB DATA REVIEWED:    Results for orders placed or performed during the hospital encounter of 04/04/17   CBC WITH AUTOMATED DIFF   Result Value Ref Range    WBC 6.6 4.1 - 11.1 K/uL    RBC 5.03 4. 10 - 5.70 M/uL    HGB 13.4 12.1 - 17.0 g/dL    HCT 40.6 36.6 - 50.3 %    MCV 80.7 80.0 - 99.0 FL    MCH 26.6 26.0 - 34.0 PG    MCHC 33.0 30.0 - 36.5 g/dL    RDW 14.6 (H) 11.5 - 14.5 %    PLATELET 976 082 - 488 K/uL    NEUTROPHILS 51 32 - 75 %    LYMPHOCYTES 41 12 - 49 %    MONOCYTES 5 5 - 13 %    EOSINOPHILS 3 0 - 7 % BASOPHILS 0 0 - 1 %    ABS. NEUTROPHILS 3.4 1.8 - 8.0 K/UL    ABS. LYMPHOCYTES 2.7 0.8 - 3.5 K/UL    ABS. MONOCYTES 0.4 0.0 - 1.0 K/UL    ABS. EOSINOPHILS 0.2 0.0 - 0.4 K/UL    ABS. BASOPHILS 0.0 0.0 - 0.1 K/UL   PROTHROMBIN TIME + INR   Result Value Ref Range    INR 1.0 0.9 - 1.1      Prothrombin time 9.9 9.0 - 11.1 sec   PTT   Result Value Ref Range    aPTT 25.4 22.1 - 32.5 sec    aPTT, therapeutic range     58.0 - 17.4 SECS   METABOLIC PANEL, COMPREHENSIVE   Result Value Ref Range    Sodium 141 136 - 145 mmol/L    Potassium 3.4 (L) 3.5 - 5.1 mmol/L    Chloride 105 97 - 108 mmol/L    CO2 28 21 - 32 mmol/L    Anion gap 8 5 - 15 mmol/L    Glucose 147 (H) 65 - 100 mg/dL    BUN 18 6 - 20 MG/DL    Creatinine 1.20 0.70 - 1.30 MG/DL    BUN/Creatinine ratio 15 12 - 20      GFR est AA >60 >60 ml/min/1.73m2    GFR est non-AA >60 >60 ml/min/1.73m2    Calcium 8.7 8.5 - 10.1 MG/DL    Bilirubin, total 0.3 0.2 - 1.0 MG/DL    ALT (SGPT) 21 12 - 78 U/L    AST (SGOT) 11 (L) 15 - 37 U/L    Alk.  phosphatase 69 45 - 117 U/L    Protein, total 7.4 6.4 - 8.2 g/dL    Albumin 3.5 3.5 - 5.0 g/dL    Globulin 3.9 2.0 - 4.0 g/dL    A-G Ratio 0.9 (L) 1.1 - 2.2     MAGNESIUM   Result Value Ref Range    Magnesium 1.7 1.6 - 2.4 mg/dL   URINALYSIS W/ RFLX MICROSCOPIC   Result Value Ref Range    Color YELLOW/STRAW      Appearance CLEAR CLEAR      Specific gravity 1.028 1.003 - 1.030      pH (UA) 5.0 5.0 - 8.0      Protein NEGATIVE  NEG mg/dL    Glucose NEGATIVE  NEG mg/dL    Ketone NEGATIVE  NEG mg/dL    Bilirubin NEGATIVE  NEG      Blood NEGATIVE  NEG      Urobilinogen 1.0 0.2 - 1.0 EU/dL    Nitrites NEGATIVE  NEG      Leukocyte Esterase NEGATIVE  NEG      WBC 0-4 0 - 4 /hpf    RBC 0-5 0 - 5 /hpf    Epithelial cells FEW FEW /lpf    Bacteria NEGATIVE  NEG /hpf    Hyaline cast 0-2 0 - 5 /lpf   CK   Result Value Ref Range    CK 57 39 - 308 U/L   TROPONIN I   Result Value Ref Range    Troponin-I, Qt. <0.04 <0.05 ng/mL        Imaging review:  See below.    Stroke workup  CT head  No acute findings. Chronic small vessel ischemic and/or senescent  white matter change and right thalamic lacune. MRI Brain:   Pending    Carotids:   1. No evidence of significant arterial occlusive disease in the  internal carotid arteries. 2. No significant stenosis in the external carotid arteries  bilaterally. 3. Antegrade flow in both vertebral arteries. 4. Normal flow in both subclavian arteries. TTE:     Stroke labs:  HgBA1c    Lab Results   Component Value Date/Time    Hemoglobin A1c 9.4 02/15/2017 03:59 AM     LDL   Lab Results   Component Value Date/Time    LDL, calculated 70 02/15/2017 03:59 AM       HOME MEDS  Prior to Admission Medications   Prescriptions Last Dose Informant Patient Reported? Taking? Insulin Needles, Disposable, 31 X 5/16 \" ndle   No No   Sig: Inject once daily. amLODIPine (NORVASC) 10 mg tablet   No No   Sig: Take 1 Tab by mouth daily. aspirin (ASPIRIN) 325 mg tablet   No No   Sig: Take 1 Tab by mouth daily. clotrimazole (LOTRIMIN) 1 % topical cream   No No   Sig: Apply  to affected area two (2) times a day. glipiZIDE (GLUCOTROL) 10 mg tablet   No No   Sig: Take 1 Tab by mouth two (2) times a day. glucose blood VI test strips (ASCENSIA AUTODISC VI, ONE TOUCH ULTRA TEST VI) strip   No No   Sig: Embrace test strip--check fsbs bid 250.02   glucose blood VI test strips (ASCENSIA AUTODISC VI, ONE TOUCH ULTRA TEST VI) strip   No No   Sig: Check fsbs bid  250.02  Contour next   glucose blood VI test strips (ASCENSIA CONTOUR) strip   No No   Sig: Test twice daily. hydroCHLOROthiazide (HYDRODIURIL) 25 mg tablet   No No   Sig: Take 1 Tab by mouth daily. lisinopril (PRINIVIL, ZESTRIL) 40 mg tablet   No No   Sig: Take 1 Tab by mouth daily. metFORMIN (GLUCOPHAGE) 1,000 mg tablet   Yes No   Sig: Take 1,000 mg by mouth two (2) times daily (with meals).    pravastatin (PRAVACHOL) 10 mg tablet   No No   Sig: TAKE ONE TABLET BY MOUTH IN THE EVENING   sildenafil (REVATIO) 20 mg tablet   No No   Sig: Take 1 Tab by mouth daily as needed. traMADol (ULTRAM) 50 mg tablet   No No   Sig: Take 1 Tab by mouth every six (6) hours as needed for Pain. Facility-Administered Medications: None       CURRENT MEDS  Current Facility-Administered Medications   Medication Dose Route Frequency    [START ON 4/5/2017] amLODIPine (NORVASC) tablet 10 mg  10 mg Oral DAILY    glipiZIDE (GLUCOTROL) tablet 10 mg  10 mg Oral BID    [START ON 4/5/2017] pravastatin (PRAVACHOL) tablet 10 mg  10 mg Oral DAILY    sodium chloride (NS) flush 5-10 mL  5-10 mL IntraVENous Q8H    enoxaparin (LOVENOX) injection 40 mg  40 mg SubCUTAneous Q24H    [START ON 4/5/2017] clopidogrel (PLAVIX) tablet 75 mg  75 mg Oral DAILY    0.9% sodium chloride infusion  75 mL/hr IntraVENous CONTINUOUS    potassium chloride (K-DUR, KLOR-CON) SR tablet 20 mEq  20 mEq Oral NOW    insulin lispro (HUMALOG) injection   SubCUTAneous AC&HS       IMPRESSION:  Rogelio Boucher is a 61 y.o. male who presents with new onset left foot numbness which lasted for 2 hrs and resolved. Pt did have a stroke 6 wks ago. Pt does have htn, hld and dm. Will repeat MRI brain and carotid US. Will escalate ASA to plavix 75 mg a day. 1. Recent right thalamic infarct with new symptom of left leg heaviness  2. HTN  3. DM  4. HLD    RECOMMENDATIONS:  - MRI brain w/o C - pending  - Carotid US - pending  - TTE - pending  - Telemetry  - Permissive HTN (SBP<220/<120) for 24 hrs from symptom onset and then BP goal is less than 140/90  - Stroke labs (HgbA1c, TSH, lipid panel)  - Continue plavix 75 mg a daily  - Continue pravastatin 10mg daily   - ST/OT/PT eval  - Discussed healthy lifestyle changes, and modifiable risk factors for stroke with patient and family    Thank you very much for this consultation. We will follow up on the above studies and give further recommendations as indicated.       Jan Jones MD  Diplomate, American Board of Psychiatry & Neurology (Neurology)  [place] Board of Psychiatry & Neurology (Clinical Neurophysiology)

## 2017-04-04 NOTE — ED TRIAGE NOTES
PT. Presents to ED today for complaints of numbness to the L hand that started when he had his stroke in February and \"heaviness\" to the L foot that started yesterday. PT. States that he is completing rehab to help with the numbness. Pt. Alert and oriented x4. Pt. Placed on monitor x4. Pt. Placed in position of comfort with call bell in reach.

## 2017-04-05 VITALS
DIASTOLIC BLOOD PRESSURE: 88 MMHG | TEMPERATURE: 98.2 F | WEIGHT: 246.69 LBS | SYSTOLIC BLOOD PRESSURE: 155 MMHG | BODY MASS INDEX: 35.32 KG/M2 | OXYGEN SATURATION: 96 % | HEIGHT: 70 IN | RESPIRATION RATE: 18 BRPM | HEART RATE: 62 BPM

## 2017-04-05 LAB
ANION GAP BLD CALC-SCNC: 12 MMOL/L (ref 5–15)
BUN SERPL-MCNC: 14 MG/DL (ref 6–20)
BUN/CREAT SERPL: 14 (ref 12–20)
CALCIUM SERPL-MCNC: 8.5 MG/DL (ref 8.5–10.1)
CHLORIDE SERPL-SCNC: 108 MMOL/L (ref 97–108)
CHOLEST SERPL-MCNC: 177 MG/DL
CO2 SERPL-SCNC: 23 MMOL/L (ref 21–32)
CREAT SERPL-MCNC: 0.98 MG/DL (ref 0.7–1.3)
ERYTHROCYTE [DISTWIDTH] IN BLOOD BY AUTOMATED COUNT: 14.8 % (ref 11.5–14.5)
EST. AVERAGE GLUCOSE BLD GHB EST-MCNC: 217 MG/DL
GLUCOSE BLD STRIP.AUTO-MCNC: 174 MG/DL (ref 65–100)
GLUCOSE BLD STRIP.AUTO-MCNC: 99 MG/DL (ref 65–100)
GLUCOSE SERPL-MCNC: 106 MG/DL (ref 65–100)
HBA1C MFR BLD: 9.2 % (ref 4.2–6.3)
HCT VFR BLD AUTO: 38.8 % (ref 36.6–50.3)
HDLC SERPL-MCNC: 28 MG/DL
HDLC SERPL: 6.3 {RATIO} (ref 0–5)
HGB BLD-MCNC: 12.5 G/DL (ref 12.1–17)
LDLC SERPL CALC-MCNC: 90.4 MG/DL (ref 0–100)
LIPID PROFILE,FLP: ABNORMAL
MCH RBC QN AUTO: 25.9 PG (ref 26–34)
MCHC RBC AUTO-ENTMCNC: 32.2 G/DL (ref 30–36.5)
MCV RBC AUTO: 80.3 FL (ref 80–99)
PLATELET # BLD AUTO: 236 K/UL (ref 150–400)
POTASSIUM SERPL-SCNC: 3.7 MMOL/L (ref 3.5–5.1)
RBC # BLD AUTO: 4.83 M/UL (ref 4.1–5.7)
SERVICE CMNT-IMP: ABNORMAL
SERVICE CMNT-IMP: NORMAL
SODIUM SERPL-SCNC: 143 MMOL/L (ref 136–145)
TRIGL SERPL-MCNC: 293 MG/DL (ref ?–150)
VLDLC SERPL CALC-MCNC: 58.6 MG/DL
WBC # BLD AUTO: 5.9 K/UL (ref 4.1–11.1)

## 2017-04-05 PROCEDURE — G8988 SELF CARE GOAL STATUS: HCPCS | Performed by: OCCUPATIONAL THERAPIST

## 2017-04-05 PROCEDURE — G8980 MOBILITY D/C STATUS: HCPCS

## 2017-04-05 PROCEDURE — 74011250637 HC RX REV CODE- 250/637: Performed by: HOSPITALIST

## 2017-04-05 PROCEDURE — G8979 MOBILITY GOAL STATUS: HCPCS

## 2017-04-05 PROCEDURE — G8978 MOBILITY CURRENT STATUS: HCPCS

## 2017-04-05 PROCEDURE — 96374 THER/PROPH/DIAG INJ IV PUSH: CPT

## 2017-04-05 PROCEDURE — 80061 LIPID PANEL: CPT | Performed by: HOSPITALIST

## 2017-04-05 PROCEDURE — 97165 OT EVAL LOW COMPLEX 30 MIN: CPT | Performed by: OCCUPATIONAL THERAPIST

## 2017-04-05 PROCEDURE — G8989 SELF CARE D/C STATUS: HCPCS | Performed by: OCCUPATIONAL THERAPIST

## 2017-04-05 PROCEDURE — 85027 COMPLETE CBC AUTOMATED: CPT | Performed by: HOSPITALIST

## 2017-04-05 PROCEDURE — 74011636637 HC RX REV CODE- 636/637: Performed by: HOSPITALIST

## 2017-04-05 PROCEDURE — 97530 THERAPEUTIC ACTIVITIES: CPT

## 2017-04-05 PROCEDURE — 83036 HEMOGLOBIN GLYCOSYLATED A1C: CPT | Performed by: HOSPITALIST

## 2017-04-05 PROCEDURE — 82962 GLUCOSE BLOOD TEST: CPT

## 2017-04-05 PROCEDURE — 36415 COLL VENOUS BLD VENIPUNCTURE: CPT | Performed by: HOSPITALIST

## 2017-04-05 PROCEDURE — G8987 SELF CARE CURRENT STATUS: HCPCS | Performed by: OCCUPATIONAL THERAPIST

## 2017-04-05 PROCEDURE — 80048 BASIC METABOLIC PNL TOTAL CA: CPT | Performed by: HOSPITALIST

## 2017-04-05 PROCEDURE — 97161 PT EVAL LOW COMPLEX 20 MIN: CPT

## 2017-04-05 RX ORDER — LISINOPRIL 40 MG/1
20 TABLET ORAL DAILY
Qty: 30 TAB | Refills: 6 | Status: SHIPPED | OUTPATIENT
Start: 2017-04-05 | End: 2018-04-03 | Stop reason: SDUPTHER

## 2017-04-05 RX ORDER — HYDROCHLOROTHIAZIDE 25 MG/1
25 TABLET ORAL DAILY
Status: DISCONTINUED | OUTPATIENT
Start: 2017-04-06 | End: 2017-04-05 | Stop reason: HOSPADM

## 2017-04-05 RX ORDER — CLOPIDOGREL BISULFATE 75 MG/1
75 TABLET ORAL DAILY
Qty: 30 TAB | Refills: 1 | Status: SHIPPED | OUTPATIENT
Start: 2017-04-05 | End: 2017-04-07 | Stop reason: SDUPTHER

## 2017-04-05 RX ORDER — LISINOPRIL 20 MG/1
20 TABLET ORAL DAILY
Status: DISCONTINUED | OUTPATIENT
Start: 2017-04-05 | End: 2017-04-05 | Stop reason: HOSPADM

## 2017-04-05 RX ADMIN — GLIPIZIDE 10 MG: 5 TABLET ORAL at 09:12

## 2017-04-05 RX ADMIN — Medication 10 ML: at 05:05

## 2017-04-05 RX ADMIN — ACETAMINOPHEN 650 MG: 325 TABLET, FILM COATED ORAL at 05:01

## 2017-04-05 RX ADMIN — LISINOPRIL 20 MG: 20 TABLET ORAL at 12:20

## 2017-04-05 RX ADMIN — Medication 10 ML: at 00:53

## 2017-04-05 RX ADMIN — AMLODIPINE BESYLATE 10 MG: 5 TABLET ORAL at 09:12

## 2017-04-05 RX ADMIN — PRAVASTATIN SODIUM 10 MG: 10 TABLET ORAL at 09:12

## 2017-04-05 RX ADMIN — CLOPIDOGREL BISULFATE 75 MG: 75 TABLET ORAL at 09:12

## 2017-04-05 RX ADMIN — INSULIN LISPRO 2 UNITS: 100 INJECTION, SOLUTION INTRAVENOUS; SUBCUTANEOUS at 12:20

## 2017-04-05 NOTE — PROGRESS NOTES
Speech path   Pt deferred speech eval of cognition and memory. He reported he has no deficits. We will sign off.    Louie Torres, SLP

## 2017-04-05 NOTE — PROGRESS NOTES
physical Therapy EVALUATION/DISCHARGE- neuro population    Patient: Geronimo Issa (64 y.o. male)  Date: 4/5/2017  Primary Diagnosis: TIA (transient ischemic attack)        Precautions:       ASSESSMENT :  Based on the objective data described below, the patient presents with mildly impacted coordination at baseline 2/2 prior CVA as noted below. He demonstrates no focal new neurological concerns. Patient well known to author from prior admission where he was DC to IPR>home>fitness program. Patient safely demonstrated utilization of SPC with gait, stair training as detailed. Recommend return to home with resuming fitness programming. Patient educated in length regarding modifiable and non-modifiable CVA risk factors, BEFAST. He can mobilize ad xavier throughout remainder of admission. PLAN : DC PT        SUBJECTIVE:   Patient stated I don't need you man.     OBJECTIVE DATA SUMMARY:   HISTORY:    Past Medical History:   Diagnosis Date    Allergic rhinitis     Arthritis     djd of knees    [facility] [address]     Hypercholesteremia     Hypertension     Obesity     Stroke ([facility] [address]     2/14/2017     Past Surgical History:   Procedure Laterality Date    HX CHOLECYSTECTOMY      laparoscopic     Prior Level of Function/Home Situation: Pt lives with spouse, son and grandson in a two story home. Pt has 13 steps to the entrance of his residence. Pt has two steps to the entrance of his home. Pt is modified independent for mobility with use of SPC majority, at times furniture walking within the home. He attends an OP fitness program titled \"[employer]\" three times a week. He denies falls. He had been at [facility] and since then DC to home.    Personal factors and/or comorbidities impacting plan of care: HTN, DM, HLD, obesity, prior CVA         EXAMINATION/PRESENTATION/DECISION MAKING:   Critical Behavior:  Neurologic State: Alert  Orientation Level: Oriented X4  Cognition: Follows commands, Appropriate for age attention/concentration  Safety/Judgement: Awareness of environment, Insight into deficits  Hearing: Auditory  Auditory Impairment: None  Range Of Motion:  AROM: Within functional limits                       Strength:    Strength: Within functional limits                    Tone & Sensation:                  Sensation: Impaired (hypersensitivity in palm and ulnars side of L forearm )               Coordination:  Coordination: Generally decreased, functional (LUE and LLE, WFL RUE/RLE)  Vision:   Acuity: Within Defined Limits  Corrective Lenses: Glasses  Functional Mobility:  Bed Mobility:  Rolling: Independent  Supine to Sit: Independent  Sit to Supine: Independent  Scooting: Independent  Transfers:  Sit to Stand: Independent  Stand to Sit: Independent        Bed to Chair: Independent              Balance:   Sitting: Intact  Standing: Impaired  Standing - Static: Good  Standing - Dynamic : Good  Ambulation/Gait Training:  Distance (ft): 300 Feet (ft)  Assistive Device: Gait belt;Cane, straight  Ambulation - Level of Assistance: Modified independent     Gait Description (WDL): Exceptions to WDL           Base of Support: Widened     Speed/Saige: Pace decreased (<100 feet/min)                     No concerns.      Functional Measure  Jeronimo Balance Test:    Sitting to Standing: 3  Standing Unsupported: 4  Sitting with Back Unsupported: 4  Standing to Sitting: 3  Transfers: 4  Standing Unsupported with Eyes Closed: 4  Standing Unsupported with Feet Together: 4  Reach Forward with Outstretched Arm: 3   Object: 4  Turn to Look Over Shoulders: 4  Turn 360 Degrees: 3  Alternate Foot on Step/Stool: 3  Standing Unsupported One Foot in Front: 3  Stand on One Le  Total: 48         56=Maximum possible score;   0-20=High fall risk  21-40=Moderate fall risk   41-56=Low fall risk     Jeronimo Balance Test and G-code impairment scale:  Percentage of Impairment CH    0%   CI    1-19% CJ    20-39% CK    40-59% CL    60-79% CM    80-99% CN     100%   Jeronimo   Score 0-56 56 45-55 34-44 23-33 12-22 1-11 0       G codes: In compliance with CMSs Claims Based Outcome Reporting, the following G-code set was chosen for this patient based on their primary functional limitation being treated: The outcome measure chosen to determine the severity of the functional limitation was the 48 with a score of 48/56 which was correlated with the impairment scale. ? Mobility - Walking and Moving Around:     - CURRENT STATUS: CI - 1%-19% impaired, limited or restricted    - GOAL STATUS: CI - 1%-19% impaired, limited or restricted    - D/C STATUS:  CI - 1%-19% impaired, limited or restricted       Pain:  Pain Scale 1: Numeric (0 - 10)  Pain Intensity 1: 0              Activity Tolerance: WNL    Please refer to the flowsheet for vital signs taken during this treatment. After treatment:   []     Patient left in no apparent distress sitting up in chair  []     Patient left in no apparent distress in bed  []     Call bell left within reach  []     Nursing notified  []     Caregiver present  []     Bed alarm activated    COMMUNICATION/EDUCATION:   The patients plan of care was discussed with: Occupational Therapist and Registered Nurse. Patient and/or family was verbally educated on the BE FAST acronym for signs/symptoms of CVA and TIA. BE FAST was written on patient's communication board  for visual education and reinforcement. All questions answered with patient indicating full understanding. [x]  Fall prevention education was provided and the patient/caregiver indicated understanding.     Thank you for this referral.  Toby Robertson, PT   Time Calculation: 17 mins

## 2017-04-05 NOTE — PROGRESS NOTES
Pt is a 60 y/o AAM admitted for TIA. Pt lives with spouse, son and grandson in a two story home. Pt has 13 steps to the entrance of his residence. Pt has two steps to the entrance of his home. Pt is independent not including driving. Pt has no previous [facility] provider. Pt had previous SNF provider with [facility] for three weeks (it was reported pt left AMA). Pt has access to rolling walker, shower chair and three prong cane. Pt will be transported by family at discharge via private vehicle. CM met with pt to complete initial assessment. Pt is alert and oriented to person, place, time and situation. Pt attends a [facility] program [facility] [place] [id number] on [address] [place] [address]. Pt describes program as a six week program on Monday and Wednesday's from 2:00 pm - 3:00 pm.     Care Management Interventions  PCP Verified by CM: Yes (Dr. Alan Santamaria)  Mode of Transport at Discharge:  Other (see comment) (private vehicle )  Transition of Care Consult (CM Consult): Discharge Planning (CM to assist as needed )  Discharge Durable Medical Equipment: No (pt has access to three prong cane, rolling walker and  chair)  Health Maintenance Reviewed: Yes  Physical Therapy Consult: Yes  Occupational Therapy Consult: Yes  Speech Therapy Consult: Yes  Current Support Network: Lives with Spouse (pt lives with spouse, son and grandson )  Confirm Follow Up Transport: Family  Plan discussed with Pt/Family/Caregiver: Yes  Discharge Location  Discharge Placement: Home    NENITA Peoples, [facility]   [phone]

## 2017-04-05 NOTE — PROGRESS NOTES
Report given to Sudhir Elise RN shift change. Patient's condition and treatments discussed at bedside with oncoming nurse. Patient questions were answered. Today goals were discussed and written on the eraser board.

## 2017-04-05 NOTE — PROGRESS NOTES
Patient seen for PT evaluation; full note to follow. Will f/u on trial basis while admitted for higher level instability likely from prior recent CVA. No rec for DC other than return to his OP PT \"[employer]\" program.     Nikia Thornton PT, DPT.

## 2017-04-05 NOTE — PROGRESS NOTES
Pt discharged to home, instructions reviewed with pt and copies given along with prescription for plavix. Pt's IV & telemetry removed, opportunity for questions provided.

## 2017-04-05 NOTE — PROGRESS NOTES
Occupational Therapy EVALUATION/discharge  Patient: Reilly Ruiz. (64 y.o. male)  Date: 4/5/2017  Primary Diagnosis: TIA (transient ischemic attack)        Precautions: falls       ASSESSMENT:   Based on the objective data described below, the patient presents with mild coordination deficits in the LUE and LLE, as well as decreased balance from previous CVA. He is performing all ADLs and functional mobility at an independent to mod I level which is his recent baseline. No new focal neurological deficits noted with formal testing. Further skilled acute occupational therapy is not indicated at this time, but he will benefit from returning to out patient PT and OT to continue address LUE/LLE function and balance. Discharge Recommendations: Outpatient OT and PT          OBJECTIVE DATA SUMMARY:   HISTORY:   Past Medical History:   Diagnosis Date    Allergic rhinitis     Arthritis     djd of knees    Diabetes ([facility] [address]     Hypercholesteremia     Hypertension     Obesity     Stroke ([facility] [address]     2/14/2017     Past Surgical History:   Procedure Laterality Date    HX CHOLECYSTECTOMY      laparoscopic       Prior Level of Function/Home Situation: independent to mod I for ADLs, using shower seat for and grab bars in bathroom for bathing. Ambulates with a cane. Has a cane, grab bars and a a shower seat. Expanded or extensive additional review of patient history:        [x]  Right hand dominant   []  Left hand dominant    EXAMINATION OF PERFORMANCE DEFICITS:  Cognitive/Behavioral Status:  Neurologic State: Alert  Orientation Level: Oriented X4  Cognition: Follows commands; Appropriate for age attention/concentration  Perception: Appears intact  Perseveration: No perseveration noted  Safety/Judgement: Awareness of environment; Insight into deficits        Hearing:   Auditory  Auditory Impairment: None    Vision/Perceptual:    Acuity: Within Defined Limits    Corrective Lenses: Glasses    Range of Motion:  AROM: Within functional limits                         Strength:  Strength: Within functional limits                Coordination:  Coordination: Generally decreased, functional (LUE and LLE, WFL RUE/RLE)            Tone & Sensation:     Sensation: Impaired (hypersensitivity in palm and ulnars side of L forearm )                      Balance:  Sitting: Intact  Standing: Impaired  Standing - Static: Good  Standing - Dynamic : Good    Functional Mobility and Transfers for ADLs:  Bed Mobility:  Rolling: Independent  Supine to Sit: Independent  Sit to Supine: Independent  Scooting: Independent    Transfers:  Sit to Stand: Independent  Stand to Sit: Independent  Bed to Chair: Independent  Toilet Transfer : Independent    ADL Assessment:  Feeding: Independent    Oral Facial Hygiene/Grooming: Independent    Bathing: Independent    Upper Body Dressing: Independent    Lower Body Dressing: Independent    Toileting: Independent              Functional Measure:  Fugl-Martines Assessment of Motor Recovery after Stroke:     Reflex Activity  Flexors/Biceps/Fingers: Can be elicited  Extensors/Triceps: Can be elicited  Reflex Subtotal: 4    Volitional Movement Within Synergies  Shoulder Retraction: Full  Shoulder Elevation: Full  Shoulder Abduction (90 degrees): Full  Shoulder External Rotation: Full  Elbow Flexion: Full  Forearm Supination: Full  Shoulder Adduction/Internal Rotation: Full  Elbow Extension: Full  Forearm Pronation: Full  Subtotal: 18    Volitional Movement Mixing Synergies  Hand to Lumbar Spine: Full  Shoulder Flexion (0-90 degrees): Full  Pronation-Supination: Full  Subtotal: 6    Volitional Movement With Little or No Synergy  Shoulder Abduction (0-90 degrees): Full  Shoulder Flexion ( degrees): Full  Pronation/Supination: Full  Subtotal : 6    Normal Reflex Activity  Biceps, Triceps, Finger Flexors:  Full  Subtotal : 2    Upper Extremity Total   Upper Extremity Total: 36    Wrist  Stability at 15 Degree Dorsiflexion: Full  Repeated Dorsiflexion/ Volar Flexion: Full  Stability at 15 Degree Dorsiflexion: Full  Repeated Dorsiflexion/ Volar Flexion: Full  Circumduction: Full  Wrist Total: 10    Hand  Mass Flexion: Full  Mass Extension: Full  Grasp A: Full  Grasp B: Full  Grasp C: Full  Grasp D: Full  Grasp E: Full  Hand Total: 14    Coordination/Speed  Tremor: None  Dysmetria: Slight  Time: 2-5s  Coordination/Speed Total : 4    Total A-D  Total A-D (Motor Function): 64/66       Percentage of impairment CH  0% CI  1-19% CJ  20-39% CK  40-59% CL  60-79% CM  80-99% CN  100%   Fugl-Martines score: 0-66 66 53-65 39-52 26-38 13-25 1-12   0      This is a reliable/valid measure of arm function after a neurological event. It has established value to characterize functional status and for measuring spontaneous and therapy-induced recovery; tests proximal and distal motor functions. Fugl-Martines Assessment - UE scores recorded between five and 30 days post neurologic event can be used to predict UE recovery at six months post neurologic event. Severe = 0-21 points   Moderately Severe = 22-33 points   Moderate = 34-47 points   Mild = 48-66 points  MILES Pham, ALEXA Patel, & AUREA Dominguez (1992). Measurement of motor recovery after stroke: Outcome assessment and sample size requirements.  Stroke, 23, pp. 8842-3909.   --------------------------------------------------------------------------------------------------------------------------------------------------------------------  MCID:  Stroke:   Prabhakar Triana, 2001; n = 171; mean age 79 (5) years; assessed within 16 (12) days of stroke, Acute Stroke)  FMA Motor Scores from Admission to Discharge   10 point increase in FMA Upper Extremity = 1.5 change in discharge FIM   10 point increase in FMA Lower Extremity = 1.9 change in discharge FIM  MDC:   Stroke:   Audley Jeans et al, 2008, n = 14, mean age = 59.9 (14.6) years, assessed on average 14 (6.5) months post stroke, Chronic Stroke)   FMA = 5.2 points for the Upper Extremity portion of the assessment       G codes: In compliance with CMSs Claims Based Outcome Reporting, the following G-code set was chosen for this patient based on their primary functional limitation being treated: The outcome measure chosen to determine the severity of the functional limitation was the fugl-aparicio with a score of 64/66 which was correlated with the impairment scale. ? Self Care:     - CURRENT STATUS: CI - 1%-19% impaired, limited or restricted    - GOAL STATUS: CI - 1%-19% impaired, limited or restricted    - D/C STATUS:  CI - 1%-19% impaired, limited or restricted     Occupational Therapy Evaluation Charge Determination   History Examination Decision-Making   LOW Complexity : Brief history review  LOW Complexity : 1-3 performance deficits relating to physical, cognitive , or psychosocial skils that result in activity limitations and / or participation restrictions  LOW Complexity : No comorbidities that affect functional and no verbal or physical assistance needed to complete eval tasks       Based on the above components, the patient evaluation is determined to be of the following complexity level: LOW   Pain:  Pain Scale 1: Numeric (0 - 10)  Pain Intensity 1: 0     After treatment:   [x]  Patient left in no apparent distress sitting up in chair  []  Patient left in no apparent distress in bed  [x]  Call bell left within reach  [x]  Nursing notified  []  Caregiver present  []  Bed alarm activated    COMMUNICATION/EDUCATION:   Communication/Collaboration:  [x]      Home safety education was provided and the patient/caregiver indicated understanding. [x]      Patient/family have participated as able and agree with findings and recommendations. []      Patient is unable to participate in plan of care at this time.   Findings and recommendations were discussed with: Physical Therapist, Speech Therapist and     Hugh PACHECO Emeterio OTR/L  Time Calculation: 15 mins

## 2017-04-05 NOTE — PROGRESS NOTES
NEUROLOGY progress NOTE       DATE OF CONSULTATION: 4/5/2017    CONSULTED BY: Herbie Saravia MD    Reason for Consult  I have been asked to see the patient in neurological consultation to render advice and opinion regarding stroke. Subjective  Pt states that he is back to baseline. TTE performed and result is pending. HPI  Beatriz Sheikh is a 61 y.o. male who presents to the hospital because of today at around 730 am, he did have sudden onset left foot heaviness that lasted for 2 hrs and resolved. He did not have any bulbar symptoms or any new sensory loss. He is back to his baseline. He was recently admitted in Feb 2017 because of left sided numbness and was diagnosed with right thalamic infarct. He does htn, dm and hld and is presently taking ASA at home after the stroke. ROS  A ten system review of constitutional, cardiovascular, respiratory, musculoskeletal, endocrine, skin, SHEENT, genitourinary, psychiatric and neurologic systems was obtained and is unremarkable except as stated in HPI     ALLERGIES  No Known Allergies    PHYSICAL EXAM  EXAMINATION:   Patient Vitals for the past 24 hrs:   Temp Pulse Resp BP SpO2   04/05/17 0412 97.4 °F (36.3 °C) 64 18 (!) 154/101 97 %   04/05/17 0119 98.1 °F (36.7 °C) 80 18 (!) 154/94 96 %   04/04/17 1932 98.3 °F (36.8 °C) 78 18 (!) 153/93 97 %   04/04/17 1506 97.8 °F (36.6 °C) 72 - (!) 145/99 96 %   04/04/17 1330 - 78 22 (!) 138/100 94 %   04/04/17 1300 - 76 17 139/88 96 %   04/04/17 1230 - 75 15 (!) 155/100 96 %   04/04/17 1200 - 76 16 151/83 96 %   04/04/17 1130 - 81 25 139/78 96 %   04/04/17 1100 - 71 19 (!) 156/96 94 %   04/04/17 1049 - 70 21 (!) 153/91 94 %   04/04/17 1000 - 75 16 (!) 172/96 96 %   04/04/17 0953 - - - (!) 162/98 -   04/04/17 0943 97.8 °F (36.6 °C) 72 16 163/90 99 %        General:   General appearance: Pt is in no acute distress   Distal pulses are preserved    Neurological Examination:   Mental Status:  AAO x3. Speech is fluent. Follows commands, has normal fund of knowledge, attention, short term recall, comprehension and insight. Cranial Nerves: Visual fields are full. PERRL, Extraocular movements are full. Facial sensation intact V1- V3. Facial movement intact, symmetric. Hearing intact to conversation. Palate elevates symmetrically. Shoulder shrug symmetric. Tongue midline. Motor: Strength is 5/5 in all 4 ext. Normal tone. No atrophy. Sensation: Left upper and lower ext numbness    Reflexes: DTRs 2+ throughout. Plantar responses downgoing. Coordination/Cerebellar: Intact to finger-nose-finger     Gait: deferred     Skin: No significant bruising or lacerations. LAB DATA REVIEWED:    Results for orders placed or performed during the hospital encounter of 04/04/17   CBC WITH AUTOMATED DIFF   Result Value Ref Range    WBC 6.6 4.1 - 11.1 K/uL    RBC 5.03 4. 10 - 5.70 M/uL    HGB 13.4 12.1 - 17.0 g/dL    HCT 40.6 36.6 - 50.3 %    MCV 80.7 80.0 - 99.0 FL    MCH 26.6 26.0 - 34.0 PG    MCHC 33.0 30.0 - 36.5 g/dL    RDW 14.6 (H) 11.5 - 14.5 %    PLATELET 122 367 - 858 K/uL    NEUTROPHILS 51 32 - 75 %    LYMPHOCYTES 41 12 - 49 %    MONOCYTES 5 5 - 13 %    EOSINOPHILS 3 0 - 7 %    BASOPHILS 0 0 - 1 %    ABS. NEUTROPHILS 3.4 1.8 - 8.0 K/UL    ABS. LYMPHOCYTES 2.7 0.8 - 3.5 K/UL    ABS. MONOCYTES 0.4 0.0 - 1.0 K/UL    ABS. EOSINOPHILS 0.2 0.0 - 0.4 K/UL    ABS.  BASOPHILS 0.0 0.0 - 0.1 K/UL   PROTHROMBIN TIME + INR   Result Value Ref Range    INR 1.0 0.9 - 1.1      Prothrombin time 9.9 9.0 - 11.1 sec   PTT   Result Value Ref Range    aPTT 25.4 22.1 - 32.5 sec    aPTT, therapeutic range     58.0 - 51.4 SECS   METABOLIC PANEL, COMPREHENSIVE   Result Value Ref Range    Sodium 141 136 - 145 mmol/L    Potassium 3.4 (L) 3.5 - 5.1 mmol/L    Chloride 105 97 - 108 mmol/L    CO2 28 21 - 32 mmol/L    Anion gap 8 5 - 15 mmol/L    Glucose 147 (H) 65 - 100 mg/dL    BUN 18 6 - 20 MG/DL    Creatinine 1.20 0.70 - 1.30 MG/DL    BUN/Creatinine ratio 15 12 - 20      GFR est AA >60 >60 ml/min/1.73m2    GFR est non-AA >60 >60 ml/min/1.73m2    Calcium 8.7 8.5 - 10.1 MG/DL    Bilirubin, total 0.3 0.2 - 1.0 MG/DL    ALT (SGPT) 21 12 - 78 U/L    AST (SGOT) 11 (L) 15 - 37 U/L    Alk.  phosphatase 69 45 - 117 U/L    Protein, total 7.4 6.4 - 8.2 g/dL    Albumin 3.5 3.5 - 5.0 g/dL    Globulin 3.9 2.0 - 4.0 g/dL    A-G Ratio 0.9 (L) 1.1 - 2.2     MAGNESIUM   Result Value Ref Range    Magnesium 1.7 1.6 - 2.4 mg/dL   URINALYSIS W/ RFLX MICROSCOPIC   Result Value Ref Range    Color YELLOW/STRAW      Appearance CLEAR CLEAR      Specific gravity 1.028 1.003 - 1.030      pH (UA) 5.0 5.0 - 8.0      Protein NEGATIVE  NEG mg/dL    Glucose NEGATIVE  NEG mg/dL    Ketone NEGATIVE  NEG mg/dL    Bilirubin NEGATIVE  NEG      Blood NEGATIVE  NEG      Urobilinogen 1.0 0.2 - 1.0 EU/dL    Nitrites NEGATIVE  NEG      Leukocyte Esterase NEGATIVE  NEG      WBC 0-4 0 - 4 /hpf    RBC 0-5 0 - 5 /hpf    Epithelial cells FEW FEW /lpf    Bacteria NEGATIVE  NEG /hpf    Hyaline cast 0-2 0 - 5 /lpf   CK   Result Value Ref Range    CK 57 39 - 308 U/L   TROPONIN I   Result Value Ref Range    Troponin-I, Qt. <0.04 <0.05 ng/mL   LIPID PANEL   Result Value Ref Range    LIPID PROFILE          Cholesterol, total 177 <200 MG/DL    Triglyceride 293 (H) <150 MG/DL    HDL Cholesterol 28 MG/DL    LDL, calculated 90.4 0 - 100 MG/DL    VLDL, calculated 58.6 MG/DL    CHOL/HDL Ratio 6.3 (H) 0 - 5.0     HEMOGLOBIN A1C WITH EAG   Result Value Ref Range    Hemoglobin A1c 9.2 (H) 4.2 - 6.3 %    Est. average glucose 323 mg/dL   METABOLIC PANEL, BASIC   Result Value Ref Range    Sodium 143 136 - 145 mmol/L    Potassium 3.7 3.5 - 5.1 mmol/L    Chloride 108 97 - 108 mmol/L    CO2 23 21 - 32 mmol/L    Anion gap 12 5 - 15 mmol/L    Glucose 106 (H) 65 - 100 mg/dL    BUN 14 6 - 20 MG/DL    Creatinine 0.98 0.70 - 1.30 MG/DL    BUN/Creatinine ratio 14 12 - 20      GFR est AA >60 >60 ml/min/1.73m2    GFR est non-AA >60 >60 ml/min/1.73m2 Calcium 8.5 8.5 - 10.1 MG/DL   CBC W/O DIFF   Result Value Ref Range    WBC 5.9 4.1 - 11.1 K/uL    RBC 4.83 4.10 - 5.70 M/uL    HGB 12.5 12.1 - 17.0 g/dL    HCT 38.8 36.6 - 50.3 %    MCV 80.3 80.0 - 99.0 FL    MCH 25.9 (L) 26.0 - 34.0 PG    MCHC 32.2 30.0 - 36.5 g/dL    RDW 14.8 (H) 11.5 - 14.5 %    PLATELET 343 355 - 845 K/uL   GLUCOSE, POC   Result Value Ref Range    Glucose (POC) 155 (H) 65 - 100 mg/dL    Performed by Kelley Howard ([id number])    GLUCOSE, POC   Result Value Ref Range    Glucose (POC) 115 (H) 65 - 100 mg/dL    Performed by Carmen Salgado    GLUCOSE, POC   Result Value Ref Range    Glucose (POC) 99 65 - 100 mg/dL    Performed by Carmen Salgado         Imaging review:  See below. Stroke workup  CT head  No acute findings. Chronic small vessel ischemic and/or senescent  white matter change and right thalamic lacune. Carotids:   1. No evidence of significant arterial occlusive disease in the  internal carotid arteries. 2. No significant stenosis in the external carotid arteries  bilaterally. 3. Antegrade flow in both vertebral arteries. 4. Normal flow in both subclavian arteries. TTE:     Stroke labs:  HgBA1c    Lab Results   Component Value Date/Time    Hemoglobin A1c 9.2 04/05/2017 04:03 AM     LDL   Lab Results   Component Value Date/Time    LDL, calculated 90.4 04/05/2017 04:03 AM       HOME MEDS  Prior to Admission Medications   Prescriptions Last Dose Informant Patient Reported? Taking? Insulin Needles, Disposable, 31 X 5/16 \" ndle   No No   Sig: Inject once daily. amLODIPine (NORVASC) 10 mg tablet   No No   Sig: Take 1 Tab by mouth daily. aspirin (ASPIRIN) 325 mg tablet   No No   Sig: Take 1 Tab by mouth daily. clotrimazole (LOTRIMIN) 1 % topical cream   No No   Sig: Apply  to affected area two (2) times a day. glipiZIDE (GLUCOTROL) 10 mg tablet   No No   Sig: Take 1 Tab by mouth two (2) times a day.    glucose blood VI test strips (ASCENSIA AUTODISC VI, ONE TOUCH ULTRA TEST VI) strip   No No   Sig: Embrace test strip--check fsbs bid 250.02   glucose blood VI test strips (ASCENSIA AUTODISC VI, ONE TOUCH ULTRA TEST VI) strip   No No   Sig: Check fsbs bid  250.02  Contour next   glucose blood VI test strips (ASCENSIA CONTOUR) strip   No No   Sig: Test twice daily. hydroCHLOROthiazide (HYDRODIURIL) 25 mg tablet   No No   Sig: Take 1 Tab by mouth daily. lisinopril (PRINIVIL, ZESTRIL) 40 mg tablet   No No   Sig: Take 1 Tab by mouth daily. metFORMIN (GLUCOPHAGE) 1,000 mg tablet   Yes No   Sig: Take 1,000 mg by mouth two (2) times daily (with meals). pravastatin (PRAVACHOL) 10 mg tablet   No No   Sig: TAKE ONE TABLET BY MOUTH IN THE EVENING   sildenafil (REVATIO) 20 mg tablet   No No   Sig: Take 1 Tab by mouth daily as needed. traMADol (ULTRAM) 50 mg tablet   No No   Sig: Take 1 Tab by mouth every six (6) hours as needed for Pain. Facility-Administered Medications: None       CURRENT MEDS  Current Facility-Administered Medications   Medication Dose Route Frequency    amLODIPine (NORVASC) tablet 10 mg  10 mg Oral DAILY    glipiZIDE (GLUCOTROL) tablet 10 mg  10 mg Oral BID    pravastatin (PRAVACHOL) tablet 10 mg  10 mg Oral DAILY    sodium chloride (NS) flush 5-10 mL  5-10 mL IntraVENous Q8H    enoxaparin (LOVENOX) injection 40 mg  40 mg SubCUTAneous Q24H    clopidogrel (PLAVIX) tablet 75 mg  75 mg Oral DAILY    insulin lispro (HUMALOG) injection   SubCUTAneous AC&HS       IMPRESSION:  Clay White is a 61 y.o. male who presents with new onset left foot numbness which lasted for 2 hrs and resolved. Pt did have a stroke 6 wks ago. Pt does have htn, hld and dm. Will escalate ASA to plavix 75 mg a day. 1. Recent right thalamic infarct with new symptom of left leg heaviness  2. HTN  3. DM  4.  HLD    RECOMMENDATIONS:  - TTE - pending  - Telemetry  - Permissive HTN (SBP<220/<120) for 24 hrs from symptom onset and then BP goal is less than 140/90  - Stroke labs (HgbA1c, TSH, lipid panel)  - Continue plavix 75 mg a daily  - Continue pravastatin 10mg daily   - ST/OT/PT eval  - Discussed healthy lifestyle changes, and modifiable risk factors for stroke with patient and family    OK to d/c after TTE.        Estrella Lyons MD  Diplomate, American Board of Psychiatry & Neurology (Neurology)  [place] Board of Psychiatry & Neurology (Clinical Neurophysiology)

## 2017-04-06 ENCOUNTER — PATIENT OUTREACH (OUTPATIENT)
Dept: INTERNAL MEDICINE CLINIC | Age: 67
End: 2017-04-06

## 2017-04-06 NOTE — Clinical Note
Jalen Alfredo - unable to reach patient today; patient has scheduled appt with Dr. Ramon Machado tomorrow; [id number].

## 2017-04-06 NOTE — PROGRESS NOTES
[facility] Hospitalization           Referral from [place]. Patient admitted to [facility] on 4/4/17 and discharged on 4/5/17 with diagnosis of TIA.      - TTE completed on 4/5/17 and Ejection fraction was estimated in the range of 55 % to 60 %.         Significant Lab/Diagnostic Findings:  Lab Results  Component Value Date/Time   WBC 5.9 04/05/2017 04:03 AM   HGB 12.5 04/05/2017 04:03 AM   HCT 38.8 04/05/2017 04:03 AM   PLATELET 888 94/64/1711 04:03 AM   MCV 80.3 04/05/2017 04:03 AM       Lab Results  Component Value Date/Time   Cholesterol, total 177 04/05/2017 04:03 AM   HDL Cholesterol 28 04/05/2017 04:03 AM   LDL, calculated 90.4 04/05/2017 04:03 AM   Triglyceride 293 04/05/2017 04:03 AM   CHOL/HDL Ratio 6.3 04/05/2017 04:03 AM       Lab Results   Component Value Date/Time    CK 57 04/04/2017 10:50 AM    CK - MB <1.0 02/14/2017 06:10 PM    CK-MB Index CANNOT BE CALCULATED 02/14/2017 06:10 PM    Troponin-I, Qt. <0.04 04/04/2017 10:50 AM      No results found for: BNP, BNPP, BNPPPOC, XBNPT, BNPNT   Lab Results   Component Value Date/Time    Sodium 143 04/05/2017 04:03 AM    Potassium 3.7 04/05/2017 04:03 AM    Chloride 108 04/05/2017 04:03 AM    CO2 23 04/05/2017 04:03 AM    Anion gap 12 04/05/2017 04:03 AM    Glucose 106 04/05/2017 04:03 AM    BUN 14 04/05/2017 04:03 AM    Creatinine 0.98 04/05/2017 04:03 AM    BUN/Creatinine ratio 14 04/05/2017 04:03 AM    GFR est AA >60 04/05/2017 04:03 AM    GFR est non-AA >60 04/05/2017 04:03 AM    Calcium 8.5 04/05/2017 04:03 AM      Lab Results   Component Value Date/Time    Sodium 143 04/05/2017 04:03 AM    Potassium 3.7 04/05/2017 04:03 AM    Chloride 108 04/05/2017 04:03 AM    CO2 23 04/05/2017 04:03 AM    Anion gap 12 04/05/2017 04:03 AM    Glucose 106 04/05/2017 04:03 AM    BUN 14 04/05/2017 04:03 AM    Creatinine 0.98 04/05/2017 04:03 AM    BUN/Creatinine ratio 14 04/05/2017 04:03 AM    GFR est AA >60 04/05/2017 04:03 AM    GFR est non-AA >60 04/05/2017 04:03 AM    Calcium 8.5 04/05/2017 04:03 AM    Bilirubin, total 0.3 04/04/2017 10:50 AM    ALT (SGPT) 21 04/04/2017 10:50 AM    AST (SGOT) 11 04/04/2017 10:50 AM    Alk. phosphatase 69 04/04/2017 10:50 AM    Protein, total 7.4 04/04/2017 10:50 AM    Albumin 3.5 04/04/2017 10:50 AM    Globulin 3.9 04/04/2017 10:50 AM    A-G Ratio 0.9 04/04/2017 10:50 AM      Lab Results   Component Value Date/Time    Hemoglobin A1c 9.2 04/05/2017 04:03 AM    Hemoglobin A1c (POC) 10.0 10/05/2016 03:27 PM              NN has updated care team members in the patient's chart. RRAT score:   Medium Risk            15       Total Score        3 Relationship with PCP    2 Patient Living Status    4 More than 1 Admission in calendar year    6 Charlson Comorbidity Score        Criteria that do not apply:    Patient Length of Stay > 5    Patient Insurance is Medicare, Medicaid or Self Pay                  Advance Medical Directive on file in EMR? No.  Patient was evaluated by care management during hospitalization. Per notes, Discharge Disposition from [facility]: Home. Recommended Post-Hospital Discharge follow-up (see below as listed on Hospital Discharge AVS/Instructions).   - Dr. Garcia Beltran            Most recent HIPAA form in the patient's chart (dated 3/7/17) was reviewed; authorized individual(s) listed on HIPAA form include Mike Miles, Marene Lombard, Agapito Moran. NN follow-up phone call - First Attempt  NN attempted to reach the patient today in order to follow-up, complete post-hospital discharge functional assessment and medication reconciliation and to remind the patient of his scheduled appointment with Dr. Garcia Beltran tomorrow. NN was unable to reach the patient today. LVM requesting a return phone call to this NN; NN direct contact information provided.               New medications at discharge include: Plavix  Medication(s) changed at hospital discharge include: Lisinopril  Medication(s) discontinued at hospital discharge include: Aspirin, Revatio  Patient able to fill/pickup new medications without difficulty: *Unable to assess. Any Questions/Concerns regarding new Medication(s) and/or Medication Change(s): *Unable to assess.      -Goals:  Goals        Diabetes     Understand Diabetic action plan. (Ie. when to seek medical care,  what to do with high and low blood glucose, how and when to adjust diabetes treatment. (pt-stated)            Pt will follow the 1800 calorie diet plan provided by [facility] and The Plate Method by [employer] and [employer]. Pt will ride the exercise bike at [facility]. Pt will take metformin and glipizide as prescribed. Pt will check blood sugar fasting before breakfast and before dinner and record in log book or store in meter. Pt will follow up with his provider at [facility] and ask them to fax visit notes to Dr. Georgie Espinosa at [phone]. Post Hospitalization     Prevent complications post hospitalization. 4/6/17: NN attempted to reach the patient today to complete post-hospital discharge functional assessment, medication reconciliation and to remind the patient of his scheduled appointment with Dr. Georgie Espinosa tomorrow. NN was unable to reach the patient today; lvm. Patient's currently scheduled future appointments are listed below. Future Appointments      Provider Yonathan Pena   4/7/2017 2:00 PM Naveen Guevara [id number]                 NN will route this encounter to Dr. Georgie Espinosa for notification/review. NN will route this encounter to Ambulatory CM NN for notification/review and continued follow-up during FLY period. This note will not be viewable in [address]

## 2017-04-07 ENCOUNTER — PATIENT OUTREACH (OUTPATIENT)
Dept: INTERNAL MEDICINE CLINIC | Age: 67
End: 2017-04-07

## 2017-04-07 ENCOUNTER — OFFICE VISIT (OUTPATIENT)
Dept: INTERNAL MEDICINE CLINIC | Age: 67
End: 2017-04-07

## 2017-04-07 VITALS
WEIGHT: 248 LBS | BODY MASS INDEX: 35.5 KG/M2 | TEMPERATURE: 97.3 F | DIASTOLIC BLOOD PRESSURE: 90 MMHG | HEART RATE: 61 BPM | RESPIRATION RATE: 17 BRPM | SYSTOLIC BLOOD PRESSURE: 140 MMHG | HEIGHT: 70 IN | OXYGEN SATURATION: 95 %

## 2017-04-07 DIAGNOSIS — G89.29 CHRONIC PAIN OF BOTH KNEES: ICD-10-CM

## 2017-04-07 DIAGNOSIS — M25.562 CHRONIC PAIN OF BOTH KNEES: ICD-10-CM

## 2017-04-07 DIAGNOSIS — E78.00 PURE HYPERCHOLESTEROLEMIA: ICD-10-CM

## 2017-04-07 DIAGNOSIS — Z86.73 HISTORY OF CVA (CEREBROVASCULAR ACCIDENT): Primary | ICD-10-CM

## 2017-04-07 DIAGNOSIS — E11.49 OTHER DIABETIC NEUROLOGICAL COMPLICATION ASSOCIATED WITH TYPE 2 DIABETES MELLITUS (HCC): ICD-10-CM

## 2017-04-07 DIAGNOSIS — G45.9 TRANSIENT CEREBRAL ISCHEMIA, UNSPECIFIED TYPE: ICD-10-CM

## 2017-04-07 DIAGNOSIS — M25.561 CHRONIC PAIN OF BOTH KNEES: ICD-10-CM

## 2017-04-07 RX ORDER — GABAPENTIN 100 MG/1
100 CAPSULE ORAL 2 TIMES DAILY
Qty: 60 CAP | Refills: 11 | Status: SHIPPED | OUTPATIENT
Start: 2017-04-07 | End: 2018-03-08 | Stop reason: SDUPTHER

## 2017-04-07 RX ORDER — CLOPIDOGREL BISULFATE 75 MG/1
75 TABLET ORAL DAILY
Qty: 30 TAB | Refills: 11 | Status: SHIPPED | OUTPATIENT
Start: 2017-04-07 | End: 2018-04-29 | Stop reason: SDUPTHER

## 2017-04-07 RX ORDER — PRAVASTATIN SODIUM 20 MG/1
TABLET ORAL
Qty: 30 TAB | Refills: 11 | Status: SHIPPED | OUTPATIENT
Start: 2017-04-07 | End: 2018-09-04 | Stop reason: SDUPTHER

## 2017-04-07 NOTE — MR AVS SNAPSHOT
Visit Information Date & Time Provider Department Dept. Phone Encounter #  
 4/7/2017  2:00 PM Aubree , [address] [place] [address],[address] [phone] [id number] Follow-up Instructions Return in about 4 weeks (around 5/5/2017) for f/u DM-2. Upcoming Health Maintenance Date Due Hepatitis C Screening 1950 Pneumococcal 19-64 Highest Risk (1 of 3 - PCV13) 8/7/1971 DTaP/Tdap/Td series (1 - Tdap) 8/7/1973 FOBT Q 1 YEAR AGE 50-75 8/7/2002 ZOSTER VACCINE AGE 60> 8/7/2012 MEDICARE YEARLY EXAM 4/9/2016 FOOT EXAM Q1 1/14/2017 MICROALBUMIN Q1 1/14/2017 EYE EXAM RETINAL OR DILATED Q1 6/1/2017* HEMOGLOBIN A1C Q6M 10/5/2017 LIPID PANEL Q1 4/5/2018 *Topic was postponed. The date shown is not the original due date. Allergies as of 4/7/2017  Review Complete On: 4/7/2017 By: Janet President No Known Allergies Current Immunizations  Never Reviewed Name Date Influenza Vaccine 10/1/2016 Influenza Vaccine (Quad) PF 1/14/2016 Not reviewed this visit You Were Diagnosed With   
  
 Codes Comments History of CVA (cerebrovascular accident)    -  Primary ICD-10-CM: Z86.73 
ICD-9-CM: V12.54 Transient cerebral ischemia, unspecified type     ICD-10-CM: G45.9 ICD-9-CM: 435.9 Uncontrolled type 2 diabetes mellitus with complication, without long-term current use of insulin (HCC)     ICD-10-CM: E11.8, E11.65 ICD-9-CM: 250.82 Other diabetic neurological complication associated with type 2 diabetes mellitus ([facility] [address]     ICD-10-CM: E11.49 
ICD-9-CM: 250.60 Chronic pain of both knees     ICD-10-CM: M25.561, M25.562, G89.29 ICD-9-CM: 719.46, 338.29 Vitals BP Pulse Temp Resp Height(growth percentile) Weight(growth percentile) 140/90 61 97.3 °F (36.3 °C) (Oral) 17 5' 10\" (1.778 m) 248 lb (112.5 kg) SpO2 BMI Smoking Status 95% 35.58 kg/m2 Never Smoker Vitals History BMI and BSA Data Body Mass Index Body Surface Area 35.58 kg/m 2 2.36 m 2 [facility] [address] [phone] Your Updated Medication List  
  
   
This list is accurate as of: 4/7/17  2:37 PM.  Always use your most recent med list. amLODIPine 10 mg tablet Commonly known as:  Kisha Renata Take 1 Tab by mouth daily. clopidogrel 75 mg Tab Commonly known as:  PLAVIX Take 1 Tab by mouth daily. clotrimazole 1 % topical cream  
Commonly known as:  Sheldon Allan Apply  to affected area two (2) times a day.  
  
 gabapentin 100 mg capsule Commonly known as:  NEURONTIN Take 1 Cap by mouth two (2) times a day. glipiZIDE 10 mg tablet Commonly known as:  Arleta Papa Take 1 Tab by mouth two (2) times a day. * glucose blood VI test strips strip Commonly known as:  Ascensia CONTOUR Test twice daily. * glucose blood VI test strips strip Commonly known as:  ASCENSIA AUTODISC VI, ONE TOUCH ULTRA TEST VI Embrace test strip--check fsbs bid 250.02  
  
 * glucose blood VI test strips strip Commonly known as:  ASCENSIA AUTODISC VI, ONE TOUCH ULTRA TEST VI Check fsbs bid  250.02  Contour next  
  
 hydroCHLOROthiazide 25 mg tablet Commonly known as:  HYDRODIURIL Take 1 Tab by mouth daily. Insulin Needles (Disposable) 31 gauge x 5/16\" Ndle Inject once daily. insulin NPH/insulin regular 100 unit/mL (70-30) injection Commonly known as:  NOVOLIN 70/30, HUMULIN 70/30  
10 units sc bid  
  
 insulin syringe-needle U-100 Dispense ultrafine 0.5 mL syringes with 31 gauge needle  
  
 lisinopril 40 mg tablet Commonly known as:  Dulcie Mcburney Take 0.5 Tabs by mouth daily. metFORMIN 1,000 mg tablet Commonly known as:  GLUCOPHAGE Take 1,000 mg by mouth two (2) times daily (with meals). pravastatin 20 mg tablet Commonly known as:  PRAVACHOL  
 TAKE ONE TABLET BY MOUTH IN THE EVENING  
  
 traMADol 50 mg tablet Commonly known as:  ULTRAM  
Take 1 Tab by mouth every six (6) hours as needed for Pain. * Notice: This list has 3 medication(s) that are the same as other medications prescribed for you. Read the directions carefully, and ask your doctor or other care provider to review them with you. Prescriptions Sent to Pharmacy Refills  
 pravastatin (PRAVACHOL) 20 mg tablet 11 Sig: TAKE ONE TABLET BY MOUTH IN THE EVENING Class: Normal  
 Pharmacy: [facility] [id number], [address] [place] [address] Ph #: [phone]  
 insulin NPH/insulin regular (NOVOLIN 70/30, HUMULIN 70/30) 100 unit/mL (70-30) injection 11 Sig: 10 units sc bid Class: Normal  
 Pharmacy: [facility] [address] [facility] Ph #: [phone]  
 insulin syringe-needle U-100 11 Sig: Dispense ultrafine 0.5 mL syringes with 31 gauge needle Class: Normal  
 Pharmacy: [facility] [id number] [facility], [address] [place] [address] Ph #: [phone]  
 gabapentin (NEURONTIN) 100 mg capsule 11 Sig: Take 1 Cap by mouth two (2) times a day. Class: Normal  
 Pharmacy: [facility] [id number], [address] [place] [address] Ph #: [phone] Route: Oral  
  
We Performed the Following REFERRAL TO ORTHOPEDICS [EYP100 Custom] Follow-up Instructions Return in about 4 weeks (around 5/5/2017) for f/u DM-2. Referral Information Referral ID Referred By Referred To  
  
 [id number] JOBY DAVIS   
   [phone] [facility] [address] [place] [address] Phone: [phone] Visits Status Start Date End Date 1 New Request 4/7/17 4/7/18 If your referral has a status of pending review or denied, additional information will be sent to support the outcome of this decision. Introducing [facility]! Huma Marvin introduces Flotype patient portal. Now you can access parts of your medical record, email your doctor's office, and request medication refills online. 1. In your internet browser, go to [email or web address] 2. Click on the First Time User? Click Here link in the Sign In box. You will see the New Member Sign Up page. 3. Enter your Flotype Access Code exactly as it appears below. You will not need to use this code after youve completed the sign-up process. If you do not sign up before the expiration date, you must request a new code. · Flotype Access Code: [email or web address] Expires: 5/15/2017  7:26 PM 
 
4. Enter the last four digits of your Social Security Number (xxxx) and Date of Birth (mm/dd/yyyy) as indicated and click Submit. You will be taken to the next sign-up page. 5. Create a Flotype ID. This will be your Flotype login ID and cannot be changed, so think of one that is secure and easy to remember. 6. Create a Flotype password. You can change your password at any time. 7. Enter your Password Reset Question and Answer. This can be used at a later time if you forget your password. 8. Enter your e-mail address. You will receive e-mail notification when new information is available in [address] 9. Click Sign Up. You can now view and download portions of your medical record. 10. Click the Download Summary menu link to download a portable copy of your medical information. If you have questions, please visit the Frequently Asked Questions section of the Flotype website. Remember, Flotype is NOT to be used for urgent needs. For medical emergencies, dial 911. Now available from your iPhone and Android! Please provide this summary of care documentation to your next provider. Your primary care clinician is listed as Lalo DAVIS. If you have any questions after today's visit, please call [phone].

## 2017-04-07 NOTE — PROGRESS NOTES
This note will not be viewable in [address] NNTOCIP Post Hospitalization - admitted to [address] [facility] on 4/4/17 and discharged on 4/5/17 with diagnosis of TIA.      - Patient attended [facility] appointment with Dr. Kamar Gonzalez on 4/7/17; was advised to follow-up in 1 month. While pt was being wheeled down the toney by his son, was able to discuss with pt how high blood sugar affects all areas of the body. Showed pt the diabetes blood sugar wands. Pt stated his wife will give him the insulin and it is the same type she takes. Future Appointments:  5/5/2017 2:00 PM Alberto Mcknight, [address]     Goals      Prevent complications post hospitalization. 4/6/17: NN attempted to reach the patient today to complete post-hospital discharge functional assessment, medication reconciliation and to remind the patient of his scheduled appointment with Dr. Kamar Gonzalez tomorrow. NN was unable to reach the patient today; lvm.    4/7/17- pt was seen for [facility] appt with Dr. Kamar Gonzalez today. DW       Understand Diabetic action plan. (Ie. when to seek medical care,  what to do with high and low blood glucose, how and when to adjust diabetes treatment. (pt-stated)            Pt will follow the 1800 calorie diet plan provided by [facility] and The [employer] Method by [employer] and [employer]. Pt will ride the exercise bike at [facility]. Pt will take metformin and glipizide as prescribed. Pt will check blood sugar fasting before breakfast and before dinner and record in log book or store in meter. Pt will follow up with his provider at [facility] and ask them to fax visit notes to Dr. Kamar Gonzalez at [phone].    4/7/17-per Dr. Kamar Gonzalez, pt is not taking his diabetes meds as prescribed and was started on 70/30 insulin today 10 units twice daily. DW          Will follow.

## 2017-04-07 NOTE — PROGRESS NOTES
Chief Complaint   Patient presents with   [facility] Follow Up     stroke symptoms foot was heavy

## 2017-04-07 NOTE — PROGRESS NOTES
HISTORY OF PRESENT ILLNESS  Olvin Vazquez is a 61 y.o. male. 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  Bubble study negative  Recent carotids neg  LDL 70 on pravastatin a1c 9.2  C/o burning pain in feet and toes b/l x months    Patient Active Problem List    Diagnosis Date Noted    TIA (transient ischemic attack) 04/04/2017    Transient ischemic attack involving right internal carotid artery 02/15/2017    Stenosis of both internal carotid arteries 02/15/2017    Left-sided weakness 02/14/2017    OA (osteoarthritis) of knee 08/06/2013    Prostate cancer ([facility] [address] 03/10/2011    Type II or unspecified type diabetes mellitus without mention of complication, uncontrolled 08/03/2009    Essential hypertension, benign 08/03/2009    Pure hypercholesterolemia 08/03/2009    Obesity 08/03/2009     Current Outpatient Prescriptions   Medication Sig Dispense Refill    pravastatin (PRAVACHOL) 20 mg tablet TAKE ONE TABLET BY MOUTH IN THE EVENING 30 Tab 11    insulin NPH/insulin regular (NOVOLIN 70/30, HUMULIN 70/30) 100 unit/mL (70-30) injection 10 units sc bid 10 mL 11    insulin syringe-needle U-100 Dispense ultrafine 0.5 mL syringes with 31 gauge needle 100 Syringe 11    gabapentin (NEURONTIN) 100 mg capsule Take 1 Cap by mouth two (2) times a day. 60 Cap 11    clopidogrel (PLAVIX) 75 mg tab Take 1 Tab by mouth daily. 30 Tab 11    lisinopril (PRINIVIL, ZESTRIL) 40 mg tablet Take 0.5 Tabs by mouth daily. 30 Tab 6    traMADol (ULTRAM) 50 mg tablet Take 1 Tab by mouth every six (6) hours as needed for Pain. 30 Tab 1    amLODIPine (NORVASC) 10 mg tablet Take 1 Tab by mouth daily. 30 Tab 6    hydroCHLOROthiazide (HYDRODIURIL) 25 mg tablet Take 1 Tab by mouth daily. 30 Tab 6    glipiZIDE (GLUCOTROL) 10 mg tablet Take 1 Tab by mouth two (2) times a day.  60 Tab 11    metFORMIN (GLUCOPHAGE) 1,000 mg tablet Take 1,000 mg by mouth two (2) times daily (with meals).  clotrimazole (LOTRIMIN) 1 % topical cream Apply  to affected area two (2) times a day. 15 g 1    glucose blood VI test strips (ASCENSIA AUTODISC VI, ONE TOUCH ULTRA TEST VI) strip Check fsbs bid  250.02  Contour next 100 Strip 11    glucose blood VI test strips (ASCENSIA AUTODISC VI, ONE TOUCH ULTRA TEST VI) strip Embrace test strip--check fsbs bid 250.02 50 Each 11    glucose blood VI test strips (ASCENSIA CONTOUR) strip Test twice daily. 3 Package 1    Insulin Needles, Disposable, 31 X 5/16 \" ndle Inject once daily. 1 Package 3     No Known Allergies   Lab Results  Component Value Date/Time   WBC 5.9 04/05/2017 04:03 AM   HGB 12.5 04/05/2017 04:03 AM   HCT 38.8 04/05/2017 04:03 AM   PLATELET 786 42/08/8058 04:03 AM   MCV 80.3 04/05/2017 04:03 AM       Lab Results  Component Value Date/Time   Hemoglobin A1c 9.2 04/05/2017 04:03 AM   Hemoglobin A1c 9.4 02/15/2017 03:59 AM   Hemoglobin A1c 13.1 04/10/2015 12:52 PM   Glucose 106 04/05/2017 04:03 AM   Glucose (POC) 174 04/05/2017 11:11 AM   Microalb/Creat ratio (ug/mg creat.) 10.5 01/14/2016 11:18 AM   LDL, calculated 90.4 04/05/2017 04:03 AM   Creatinine 0.98 04/05/2017 04:03 AM      Lab Results  Component Value Date/Time   Cholesterol, total 177 04/05/2017 04:03 AM   HDL Cholesterol 28 04/05/2017 04:03 AM   LDL, calculated 90.4 04/05/2017 04:03 AM   Triglyceride 293 04/05/2017 04:03 AM   CHOL/HDL Ratio 6.3 04/05/2017 04:03 AM       Lab Results  Component Value Date/Time   ALT (SGPT) 21 04/04/2017 10:50 AM   AST (SGOT) 11 04/04/2017 10:50 AM   Alk. phosphatase 69 04/04/2017 10:50 AM   Bilirubin, total 0.3 04/04/2017 10:50 AM          ROS    Physical Exam   Constitutional: He is oriented to person, place, and time. He appears well-developed and well-nourished. No distress. Appears stated age, sitting in WC, NAD   HENT:   Head: Normocephalic.    Cardiovascular: Normal rate and regular rhythm. Exam reveals friction rub. Exam reveals no gallop. No murmur heard. Pulmonary/Chest: Effort normal and breath sounds normal.   Abdominal: Soft. Musculoskeletal: He exhibits no edema. Neurological: He is alert and oriented to person, place, and time. He displays normal reflexes. No cranial nerve deficit. He exhibits normal muscle tone. Coordination normal.   Mild left leg weakness   Psychiatric: He has a normal mood and affect. Nursing note and vitals reviewed. ASSESSMENT and PLAN  Nicky Rosario was seen today for hospital follow up and foot pain. Diagnoses and all orders for this visit:    History of CVA (cerebrovascular accident)   Continue plavix   bp and DM-2 control important-discussed  Transient cerebral ischemia, unspecified type   As per above   Reviewed hospital records and NN notes  Uncontrolled type 2 diabetes mellitus with complication, without long-term current use of insulin ([address]   a1c still up in hospital   Start humulin 70/30 10 units bid   Wife will assist with insulin injections  Other diabetic neurological complication associated with type 2 diabetes mellitus (HCC)   Start gabapentin 100mg bid  Chronic pain of both knees  -     REFERRAL TO ORTHOPEDICS  HLD   LDL < 100 on pravastatin, refilled  Other orders  -     pravastatin (PRAVACHOL) 20 mg tablet; TAKE ONE TABLET BY MOUTH IN THE EVENING  -     insulin NPH/insulin regular (NOVOLIN 70/30, HUMULIN 70/30) 100 unit/mL (70-30) injection; 10 units sc bid  -     insulin syringe-needle U-100; Dispense ultrafine 0.5 mL syringes with 31 gauge needle  -     gabapentin (NEURONTIN) 100 mg capsule; Take 1 Cap by mouth two (2) times a day. -     clopidogrel (PLAVIX) 75 mg tab; Take 1 Tab by mouth daily. Follow-up Disposition:  Return in about 4 weeks (around 5/5/2017) for f/u DM-2.

## 2017-05-01 ENCOUNTER — TELEPHONE (OUTPATIENT)
Dept: INTERNAL MEDICINE CLINIC | Age: 67
End: 2017-05-01

## 2017-05-01 RX ORDER — TRAMADOL HYDROCHLORIDE 50 MG/1
TABLET ORAL
Qty: 30 TAB | Refills: 0 | Status: SHIPPED | OUTPATIENT
Start: 2017-05-01 | End: 2017-05-16 | Stop reason: SDUPTHER

## 2017-05-05 ENCOUNTER — PATIENT OUTREACH (OUTPATIENT)
Dept: INTERNAL MEDICINE CLINIC | Age: 67
End: 2017-05-05

## 2017-05-05 ENCOUNTER — OFFICE VISIT (OUTPATIENT)
Dept: INTERNAL MEDICINE CLINIC | Age: 67
End: 2017-05-05

## 2017-05-05 VITALS
WEIGHT: 247 LBS | TEMPERATURE: 98.2 F | OXYGEN SATURATION: 97 % | SYSTOLIC BLOOD PRESSURE: 131 MMHG | BODY MASS INDEX: 35.36 KG/M2 | DIASTOLIC BLOOD PRESSURE: 81 MMHG | HEART RATE: 74 BPM | HEIGHT: 70 IN

## 2017-05-05 DIAGNOSIS — E11.65 UNCONTROLLED TYPE 2 DIABETES MELLITUS WITH COMPLICATION, UNSPECIFIED LONG TERM INSULIN USE STATUS: Primary | ICD-10-CM

## 2017-05-05 DIAGNOSIS — E11.42 DIABETIC POLYNEUROPATHY ASSOCIATED WITH TYPE 2 DIABETES MELLITUS (HCC): ICD-10-CM

## 2017-05-05 DIAGNOSIS — E11.8 UNCONTROLLED TYPE 2 DIABETES MELLITUS WITH COMPLICATION, UNSPECIFIED LONG TERM INSULIN USE STATUS: Primary | ICD-10-CM

## 2017-05-05 PROBLEM — M17.10 ARTHRITIS OF KNEE: Status: ACTIVE | Noted: 2017-05-05

## 2017-05-05 LAB — GLUCOSE POC: 274 MG/DL (ref 70–110)

## 2017-05-05 NOTE — PROGRESS NOTES
HISTORY OF PRESENT ILLNESS  Cathi aHrdwick is a 61 y.o. male. HPI   F/u DM-2   Has started taking humulin 70/30 10 units bid ac  Am glucose 59-80's per pt. Evening readings around 100-120  Has had mild hypoglycemic sxs a few days per week  a1c was 9.2 last month    Has not had any recurrent TIA sxs on plavix  Takes gabapentin bid for neuropathic pain in feet    Saw ortho MD yesterday per pt and got a cortisone shot to each knee  Having to walk with a walker due to CVA  With left leg weakness and b/l knee OA-requests handicap parking  Patient Active Problem List    Diagnosis Date Noted    TIA (transient ischemic attack) 04/04/2017    Transient ischemic attack involving right internal carotid artery 02/15/2017    Stenosis of both internal carotid arteries 02/15/2017    Left-sided weakness 02/14/2017    OA (osteoarthritis) of knee 08/06/2013    Prostate cancer ([facility] [address] 03/10/2011    Type II or unspecified type diabetes mellitus without mention of complication, uncontrolled 08/03/2009    Essential hypertension, benign 08/03/2009    Pure hypercholesterolemia 08/03/2009    Obesity 08/03/2009     Current Outpatient Prescriptions   Medication Sig Dispense Refill    traMADol (ULTRAM) 50 mg tablet TAKE ONE TABLET BY MOUTH EVERY 6 HOURS AS NEEDED FOR PAIN 30 Tab 0    pravastatin (PRAVACHOL) 20 mg tablet TAKE ONE TABLET BY MOUTH IN THE EVENING 30 Tab 11    insulin NPH/insulin regular (NOVOLIN 70/30, HUMULIN 70/30) 100 unit/mL (70-30) injection 10 units sc bid 10 mL 11    insulin syringe-needle U-100 Dispense ultrafine 0.5 mL syringes with 31 gauge needle 100 Syringe 11    gabapentin (NEURONTIN) 100 mg capsule Take 1 Cap by mouth two (2) times a day. 60 Cap 11    clopidogrel (PLAVIX) 75 mg tab Take 1 Tab by mouth daily. 30 Tab 11    lisinopril (PRINIVIL, ZESTRIL) 40 mg tablet Take 0.5 Tabs by mouth daily. 30 Tab 6    amLODIPine (NORVASC) 10 mg tablet Take 1 Tab by mouth daily.  30 Tab 6    hydroCHLOROthiazide (HYDRODIURIL) 25 mg tablet Take 1 Tab by mouth daily. 30 Tab 6    glipiZIDE (GLUCOTROL) 10 mg tablet Take 1 Tab by mouth two (2) times a day. 60 Tab 11    metFORMIN (GLUCOPHAGE) 1,000 mg tablet Take 1,000 mg by mouth two (2) times daily (with meals).  clotrimazole (LOTRIMIN) 1 % topical cream Apply  to affected area two (2) times a day. 15 g 1    glucose blood VI test strips (ASCENSIA AUTODISC VI, ONE TOUCH ULTRA TEST VI) strip Check fsbs bid  250.02  Contour next 100 Strip 11    glucose blood VI test strips (ASCENSIA AUTODISC VI, ONE TOUCH ULTRA TEST VI) strip Embrace test strip--check fsbs bid 250.02 50 Each 11    glucose blood VI test strips (ASCENSIA CONTOUR) strip Test twice daily. 3 Package 1    Insulin Needles, Disposable, 31 X 5/16 \" ndle Inject once daily. 1 Package 3     No Known Allergies   Lab Results  Component Value Date/Time   Hemoglobin A1c 9.2 04/05/2017 04:03 AM   Hemoglobin A1c 9.4 02/15/2017 03:59 AM   Hemoglobin A1c 13.1 04/10/2015 12:52 PM   Glucose 106 04/05/2017 04:03 AM   Glucose (POC) 174 04/05/2017 11:11 AM   Microalb/Creat ratio (ug/mg creat.) 10.5 01/14/2016 11:18 AM   LDL, calculated 90.4 04/05/2017 04:03 AM   Creatinine 0.98 04/05/2017 04:03 AM      Lab Results  Component Value Date/Time   Cholesterol, total 177 04/05/2017 04:03 AM   HDL Cholesterol 28 04/05/2017 04:03 AM   LDL, calculated 90.4 04/05/2017 04:03 AM   Triglyceride 293 04/05/2017 04:03 AM   CHOL/HDL Ratio 6.3 04/05/2017 04:03 AM       Lab Results  Component Value Date/Time   GFR est AA >60 04/05/2017 04:03 AM   GFR est non-AA >60 04/05/2017 04:03 AM   Creatinine 0.98 04/05/2017 04:03 AM   BUN 14 04/05/2017 04:03 AM   Sodium 143 04/05/2017 04:03 AM   Potassium 3.7 04/05/2017 04:03 AM   Chloride 108 04/05/2017 04:03 AM   CO2 23 04/05/2017 04:03 AM         ROS    Physical Exam   Constitutional: He appears well-developed and well-nourished. No distress.    Appears stated age   HENT:   Head: Normocephalic. Cardiovascular: Normal rate, regular rhythm and normal heart sounds. Exam reveals no gallop and no friction rub. No murmur heard. Pulmonary/Chest: Effort normal and breath sounds normal.   Abdominal: Soft. Musculoskeletal: He exhibits no edema. Neurological: He is alert. Psychiatric: He has a normal mood and affect. Nursing note and vitals reviewed. ASSESSMENT and PLAN  Isis Zhao was seen today for diabetes. Diagnoses and all orders for this visit:    Uncontrolled type 2 diabetes mellitus with complication, unspecified long term insulin use status  -     AMB POC GLUCOSE BLOOD, BY GLUCOSE MONITORING DEVICE 274 post meal   Possibly elevated further from cortisone shots yesterday   Need to record fsbs bid--log book was given to pt   Family reports he has been taking insulin shots bid-will avoid titration due to hypoglycemic sxs and readings    Diabetic polyneuropathy associated with type 2 diabetes mellitus (HCC)  -     AMB POC GLUCOSE BLOOD, BY GLUCOSE MONITORING DEVICE   Continue gabapentin    Knee OA   Completed DMV handicap parking form  Follow-up Disposition:  Return in about 2 months (around 7/5/2017) for dm-2 .

## 2017-05-05 NOTE — MR AVS SNAPSHOT
Visit Information Date & Time Provider Department Dept. Phone Encounter #  
 5/5/2017  2:00 PM Winston May, Kit [address] [place] [address],[address] [phone] [id number] Follow-up Instructions Return in about 2 months (around 7/5/2017) for dm-2 . Upcoming Health Maintenance Date Due Hepatitis C Screening 1950 Pneumococcal 19-64 Highest Risk (1 of 3 - PCV13) 8/7/1971 DTaP/Tdap/Td series (1 - Tdap) 8/7/1973 FOBT Q 1 YEAR AGE 50-75 8/7/2002 ZOSTER VACCINE AGE 60> 8/7/2012 MEDICARE YEARLY EXAM 4/9/2016 FOOT EXAM Q1 1/14/2017 MICROALBUMIN Q1 1/14/2017 EYE EXAM RETINAL OR DILATED Q1 6/1/2017* INFLUENZA AGE 9 TO ADULT 8/1/2017 HEMOGLOBIN A1C Q6M 10/5/2017 LIPID PANEL Q1 4/5/2018 *Topic was postponed. The date shown is not the original due date. Allergies as of 5/5/2017  Review Complete On: 5/5/2017 By: Charley Lopez LPN No Known Allergies Current Immunizations  Never Reviewed Name Date Influenza Vaccine 10/1/2016 Influenza Vaccine (Quad) PF 1/14/2016 Not reviewed this visit You Were Diagnosed With   
  
 Codes Comments Uncontrolled type 2 diabetes mellitus with complication, unspecified long term insulin use status    -  Primary ICD-10-CM: E11.8, E11.65 ICD-9-CM: 250.92 Diabetic polyneuropathy associated with type 2 diabetes mellitus ([facility]     ICD-10-CM: E11.42 
ICD-9-CM: 250.60, 357.2 Vitals BP Pulse Temp Height(growth percentile) Weight(growth percentile) SpO2  
 131/81 (BP 1 Location: Left arm, BP Patient Position: Sitting) 74 98.2 °F (36.8 °C) (Oral) 5' 10\" (1.778 m) 247 lb (112 kg) 97% BMI Smoking Status 35.44 kg/m2 Never Smoker BMI and BSA Data Body Mass Index Body Surface Area  
 35.44 kg/m 2 2.35 m 2 Preferred Pharmacy Pharmacy Name Phone [facility] [address] , [address] [place] [address] [phone] Your Updated Medication List  
 This list is accurate as of: 5/5/17  2:38 PM.  Always use your most recent med list. amLODIPine 10 mg tablet Commonly known as:  Tian Staggers Take 1 Tab by mouth daily. clopidogrel 75 mg Tab Commonly known as:  PLAVIX Take 1 Tab by mouth daily. clotrimazole 1 % topical cream  
Commonly known as:  Alva Sat Apply  to affected area two (2) times a day.  
  
 gabapentin 100 mg capsule Commonly known as:  NEURONTIN Take 1 Cap by mouth two (2) times a day. glipiZIDE 10 mg tablet Commonly known as:  Elane Ruiz Take 1 Tab by mouth two (2) times a day. * glucose blood VI test strips strip Commonly known as:  Ascensia CONTOUR Test twice daily. * glucose blood VI test strips strip Commonly known as:  ASCENSIA AUTODISC VI, ONE TOUCH ULTRA TEST VI Embrace test strip--check fsbs bid 250.02  
  
 * glucose blood VI test strips strip Commonly known as:  ASCENSIA AUTODISC VI, ONE TOUCH ULTRA TEST VI Check fsbs bid  250.02  Contour next  
  
 hydroCHLOROthiazide 25 mg tablet Commonly known as:  HYDRODIURIL Take 1 Tab by mouth daily. Insulin Needles (Disposable) 31 gauge x 5/16\" Ndle Inject once daily. insulin NPH/insulin regular 100 unit/mL (70-30) injection Commonly known as:  NOVOLIN 70/30, HUMULIN 70/30  
10 units sc bid  
  
 insulin syringe-needle U-100 Dispense ultrafine 0.5 mL syringes with 31 gauge needle  
  
 lisinopril 40 mg tablet Commonly known as:  Ortiz [place] Take 0.5 Tabs by mouth daily. metFORMIN 1,000 mg tablet Commonly known as:  GLUCOPHAGE Take 1,000 mg by mouth two (2) times daily (with meals). pravastatin 20 mg tablet Commonly known as:  PRAVACHOL  
TAKE ONE TABLET BY MOUTH IN THE EVENING  
  
 traMADol 50 mg tablet Commonly known as:  ULTRAM  
TAKE ONE TABLET BY MOUTH EVERY 6 HOURS AS NEEDED FOR PAIN  
  
 * Notice:   This list has 3 medication(s) that are the same as other medications prescribed for you. Read the directions carefully, and ask your doctor or other care provider to review them with you. We Performed the Following AMB POC GLUCOSE BLOOD, BY GLUCOSE MONITORING DEVICE [60109 CPT(R)] Follow-up Instructions Return in about 2 months (around 7/5/2017) for dm-2 . Introducing [facility] SERVICES! Devonte Leslie introduces [employer] patient portal. Now you can access parts of your medical record, email your doctor's office, and request medication refills online. 1. In your internet browser, go to [email or web address] 2. Click on the First Time User? Click Here link in the Sign In box. You will see the New Member Sign Up page. 3. Enter your [employer] Access Code exactly as it appears below. You will not need to use this code after youve completed the sign-up process. If you do not sign up before the expiration date, you must request a new code. · [employer] Access Code: [email or web address] Expires: 5/15/2017  7:26 PM 
 
4. Enter the last four digits of your Social Security Number (xxxx) and Date of Birth (mm/dd/yyyy) as indicated and click Submit. You will be taken to the next sign-up page. 5. Create a [employer] ID. This will be your [employer] login ID and cannot be changed, so think of one that is secure and easy to remember. 6. Create a [employer] password. You can change your password at any time. 7. Enter your Password Reset Question and Answer. This can be used at a later time if you forget your password. 8. Enter your e-mail address. You will receive e-mail notification when new information is available in [address] 9. Click Sign Up. You can now view and download portions of your medical record. 10. Click the Download Summary menu link to download a portable copy of your medical information.  
 
If you have questions, please visit the Frequently Asked Questions section of the [employer]. Remember, [employer] is NOT to be used for urgent needs. For medical emergencies, dial 911. Now available from your iPhone and Android! Please provide this summary of care documentation to your next provider. Your primary care clinician is listed as Deborah Perkins. If you have any questions after today's visit, please call [phone].

## 2017-05-05 NOTE — PROGRESS NOTES
[facility] Post Hospitalization -FLY [place] [facility] 4/4/17-4/5/17) TIA.    - Patient attended [facility] appointment with Dr. Curry Rhodes on 4/7/17; was advised to follow-up in 4 weeks. To the best of this NN's knowledge, this patient had no additional ED visits or Hospital Admissions during 30 day FLY period following admission to [facility]. FLY period has ended. Post-Hospitalization Episode Resolved. Patient has NN contact information if any questions/concerns arise in the future. Future Appointments:  Dr. Curry Rhodes 5/5/17    Pt is open to disease specific care management for T2DM.

## 2017-05-05 NOTE — PROGRESS NOTES
Chart reviewed. Reached out to pt regarding disease specific care management for T2DM. Left pt message reminding him of appt with Dr. Taco Singh today at 2:00 and to bring blood glucose log to appt.

## 2017-05-09 ENCOUNTER — PATIENT OUTREACH (OUTPATIENT)
Dept: INTERNAL MEDICINE CLINIC | Age: 67
End: 2017-05-09

## 2017-05-09 NOTE — PROGRESS NOTES
Received vm from [address] [place] [address] with Angela Stephens that pt brought in two rxs today for w/c and raised toilet seat and Pily needs clinicals in order to put in the order. Spoke to [facility] [address]. Faxed ov note from 5/5/17 to [address] [place] [address] at [phone].

## 2017-05-09 NOTE — PROGRESS NOTES
Spoke to pt's wife today who was in to see Dr. Rolf Gray. Pt's wife gives pt his insulin injections Novolin 70/30 10 units twice daily. See goals:    Goals      Understand Diabetic action plan. (Ie. when to seek medical care,  what to do with high and low blood glucose, how and when to adjust diabetes treatment. (pt-stated)            Pt will follow the 1800 calorie diet plan provided by [facility] and The Plate Method by Gloria and Paula. Pt will ride the exercise bike at [facility]. Pt will take metformin and glipizide as prescribed. Pt will check blood sugar fasting before breakfast and before dinner and record in log book or store in meter. Pt will follow up with his provider at [facility] and ask them to fax visit notes to Dr. Hussain Felder at [phone].    4/7/17-per Dr. Hussain Felder, pt is not taking his diabetes meds as prescribed and was started on 70/30 insulin today 10 units twice daily. DW    5/9/17-pt now takes Novolin 70/30 insulin 10 units bid, metformin; per pt's wife, FSBS are done twice daily, before breakfast and dinner. Blood sugar range in the morning is ; before dinner around 100. Pt had a cortisone injection in each knee last week by Dr. Lolly Cheng. DW          Future Appointments      Provider Yonathan Pena   6/30/2017 2:45 PM Winston May [address]     Will follow.

## 2017-05-16 RX ORDER — TRAMADOL HYDROCHLORIDE 50 MG/1
TABLET ORAL
Qty: 30 TAB | Refills: 0 | Status: SHIPPED | OUTPATIENT
Start: 2017-05-16 | End: 2017-06-02 | Stop reason: SDUPTHER

## 2017-05-19 ENCOUNTER — TELEPHONE (OUTPATIENT)
Dept: INTERNAL MEDICINE CLINIC | Age: 67
End: 2017-05-19

## 2017-05-19 DIAGNOSIS — R53.1 LEFT-SIDED WEAKNESS: Primary | ICD-10-CM

## 2017-05-19 DIAGNOSIS — M17.11 PRIMARY OSTEOARTHRITIS OF RIGHT KNEE: ICD-10-CM

## 2017-05-19 DIAGNOSIS — E66.01 MORBID OBESITY DUE TO EXCESS CALORIES (HCC): ICD-10-CM

## 2017-05-19 NOTE — TELEPHONE ENCOUNTER
Pt states that he is needing a call back in regards to the Tramadol medication that was requested on 5/16/17.  Please efren pt to advise the status

## 2017-05-19 NOTE — TELEPHONE ENCOUNTER
Call completed to [address], left a voice mail Auth for traMADol (ULTRAM) 50 mg tablet. Call completed to patient, two identifiers verified. Patient advised that requested Rx was approved and phoned in to [facility]. Patient verbalized an understanding.

## 2017-05-19 NOTE — TELEPHONE ENCOUNTER
Shawn Said is asking if you can fax a new order stating pt needs a   3 in 1 commode instead of a raised commode. (they do the same thing)  Fax #[phone]

## 2017-05-22 NOTE — TELEPHONE ENCOUNTER
Florencia?giulia called back to get the status of New Order for 3 N 1 commode. Please see previous encounter & information.  Thank you

## 2017-05-22 NOTE — TELEPHONE ENCOUNTER
Requested order was faxed to [facility] at [phone]. Conformation received.

## 2017-06-02 RX ORDER — TRAMADOL HYDROCHLORIDE 50 MG/1
TABLET ORAL
Qty: 30 TAB | Refills: 0 | OUTPATIENT
Start: 2017-06-02 | End: 2017-06-19 | Stop reason: SDUPTHER

## 2017-06-05 NOTE — TELEPHONE ENCOUNTER
Pt called requesting a refill for Tramadol Rx. Pt use the [address] on [email or web address] [phone]. Pt is requesting a 90 day supply. Pt best contact number is [phone].        Message received & copied from [facility]

## 2017-06-06 RX ORDER — TRAMADOL HYDROCHLORIDE 50 MG/1
TABLET ORAL
Qty: 30 TAB | Refills: 0 | OUTPATIENT
Start: 2017-06-06

## 2017-06-15 ENCOUNTER — TELEPHONE (OUTPATIENT)
Dept: INTERNAL MEDICINE CLINIC | Age: 67
End: 2017-06-15

## 2017-06-15 NOTE — TELEPHONE ENCOUNTER
[place], from [place] [address], fax number is (f) [phone], they are looking for lab results of pt's A1C, date of service was 5/5/17, please fax to the above number     Copy/paste from [facility] 6/15/17 [address] [place] [address]

## 2017-06-19 RX ORDER — TRAMADOL HYDROCHLORIDE 50 MG/1
TABLET ORAL
Qty: 30 TAB | Refills: 0 | Status: SHIPPED | OUTPATIENT
Start: 2017-06-19 | End: 2017-07-12 | Stop reason: SDUPTHER

## 2017-06-19 NOTE — TELEPHONE ENCOUNTER
Requested Prescriptions     Pending Prescriptions Disp Refills    traMADol (ULTRAM) 50 mg tablet 30 Tab 0     Sig: TAKE ONE TABLET BY MOUTH EVERY 6 HOURS AS NEEDED FOR PAIN  Indications: Pain   Last OV-5/5/17  Next OV-6/30/17  Med refilled-6/2/17

## 2017-06-21 ENCOUNTER — TELEPHONE (OUTPATIENT)
Dept: INTERNAL MEDICINE CLINIC | Age: 67
End: 2017-06-21

## 2017-06-28 ENCOUNTER — PATIENT OUTREACH (OUTPATIENT)
Dept: INTERNAL MEDICINE CLINIC | Age: 67
End: 2017-06-28

## 2017-06-28 NOTE — PROGRESS NOTES
Chart reviewed. Reached out to pt regarding pt in case management for diabetes education. Unable to reach pt. Left message on [phone] (home) . Also left appt reminder message that next appt with Dr. Yasmany Berrios is this Friday 6/30/17 at 2:45.

## 2017-07-12 RX ORDER — TRAMADOL HYDROCHLORIDE 50 MG/1
TABLET ORAL
Qty: 30 TAB | Refills: 0 | Status: SHIPPED | OUTPATIENT
Start: 2017-07-12 | End: 2017-08-01 | Stop reason: SDUPTHER

## 2017-07-12 NOTE — TELEPHONE ENCOUNTER
Last Refill:6/19/17    Last Office Visit: 4/7/17    Upcoming Appointment: 7/18/17    Requested Prescriptions     Pending Prescriptions Disp Refills    traMADol (ULTRAM) 50 mg tablet 30 Tab 0     Sig: TAKE ONE TABLET BY MOUTH EVERY 6 HOURS AS NEEDED FOR PAIN  Indications: Pain

## 2017-07-25 ENCOUNTER — PATIENT OUTREACH (OUTPATIENT)
Dept: INTERNAL MEDICINE CLINIC | Age: 67
End: 2017-07-25

## 2017-07-25 NOTE — PROGRESS NOTES
Chart reviewed. Reached out to pt regarding diabetes. Pt stated doing better. Lab Results   Component Value Date/Time    Hemoglobin A1c 9.2 04/05/2017 04:03 AM    Hemoglobin A1c 9.4 02/15/2017 03:59 AM    Hemoglobin A1c 13.1 04/10/2015 12:52 PM      See glucose log below. Comments Glucose Log Breakfast  Lunch  Dinner Bedtime   7/23/17 Sunday 83  89    7/24/17 Monday 69  123    7/25/17 Tuesday 112       Wednesday Thursday Friday Saturday         Comments Glucose Log Breakfast  Lunch  Dinner Bedtime   7/16/17 Sunday 7/17/17 Monday 7/18/17 Tuesday 7/19/17 Wednesday 83  140    7/20/17 Thursday 97  180-after dinner    7/21/17 Friday 112  230-after dinner    7/22/17 Saturday 128  97      Pt is hoping A1c will be less in August. Pt appreciative of phone call.      Future Appointments:  Future Appointments  Date Time Provider Yonathan Pena   8/8/2017 1:45 PM Memory MD Merle Fletcher [facility]        Last Appointment My Department:  5/5/2017

## 2017-07-26 NOTE — PROGRESS NOTES
Discussed with Dr. Ken Smith that pt takes glipizide in addition to metformin and insulin. He will review blood sugar log when pt comes in on 8/8/17 and consider discontinuing glipizide. Glucose log sent to Dr. Ken Smith.

## 2017-08-02 ENCOUNTER — TELEPHONE (OUTPATIENT)
Dept: INTERNAL MEDICINE CLINIC | Age: 67
End: 2017-08-02

## 2017-08-02 NOTE — TELEPHONE ENCOUNTER
8/2/17 Tramadol 50 mg Qty # 30 with 0 refill called to [facility]. Patient is in good standards with his pain med. Contract.

## 2017-08-08 ENCOUNTER — OFFICE VISIT (OUTPATIENT)
Dept: INTERNAL MEDICINE CLINIC | Age: 67
End: 2017-08-08

## 2017-08-08 VITALS
OXYGEN SATURATION: 97 % | BODY MASS INDEX: 35.93 KG/M2 | HEIGHT: 70 IN | DIASTOLIC BLOOD PRESSURE: 87 MMHG | HEART RATE: 74 BPM | TEMPERATURE: 97.9 F | SYSTOLIC BLOOD PRESSURE: 143 MMHG | WEIGHT: 251 LBS

## 2017-08-08 LAB — HBA1C MFR BLD HPLC: 6.6 % (ref 4.8–5.6)

## 2017-08-08 RX ORDER — FLUTICASONE PROPIONATE 50 MCG
2 SPRAY, SUSPENSION (ML) NASAL DAILY
Qty: 1 BOTTLE | Refills: 11 | Status: SHIPPED | OUTPATIENT
Start: 2017-08-08 | End: 2018-06-05

## 2017-08-08 NOTE — MR AVS SNAPSHOT
Visit Information Date & Time Provider Department Dept. Phone Encounter #  
 8/8/2017  1:45 PM Elkin Fregoso, [address] [phone] [id number] Follow-up Instructions Return in about 4 months (around 12/8/2017) for dm02 htn hld. Upcoming Health Maintenance Date Due Hepatitis C Screening 1950 DTaP/Tdap/Td series (1 - Tdap) 8/7/1973 FOBT Q 1 YEAR AGE 50-75 8/7/2002 ZOSTER VACCINE AGE 60> 6/7/2012 EYE EXAM RETINAL OR DILATED Q1 9/6/2014 FOOT EXAM Q1 1/14/2017 MICROALBUMIN Q1 1/14/2017 INFLUENZA AGE 9 TO ADULT 8/1/2017 GLAUCOMA SCREENING Q2Y 8/7/2017 Pneumococcal 65+ High/Highest Risk (1 of 2 - PCV13) 8/7/2017 MEDICARE YEARLY EXAM 8/7/2017 HEMOGLOBIN A1C Q6M 10/5/2017 LIPID PANEL Q1 4/5/2018 Allergies as of 8/8/2017  Review Complete On: 8/8/2017 By: Osmar Peterson LPN No Known Allergies Current Immunizations  Never Reviewed Name Date Influenza Vaccine 10/1/2016 Influenza Vaccine (Quad) PF 1/14/2016 Not reviewed this visit You Were Diagnosed With   
  
 Codes Comments Uncontrolled type 2 diabetes mellitus without complication, with long-term current use of insulin ([facility]    -  Primary ICD-10-CM: E11.65, Z79.4 ICD-9-CM: 250.02, V58.67 Vitals BP Pulse Temp Height(growth percentile) Weight(growth percentile) SpO2  
 143/87 (BP 1 Location: Left arm, BP Patient Position: Sitting) 74 97.9 °F (36.6 °C) (Oral) 5' 10\" (1.778 m) 251 lb (113.9 kg) 97% BMI Smoking Status 36.01 kg/m2 Never Smoker BMI and BSA Data Body Mass Index Body Surface Area 36.01 kg/m 2 2.37 m 2 Preferred Pharmacy Pharmacy Name Phone [facility] [address] , [address] [place] [address] [phone] Your Updated Medication List  
  
   
This list is accurate as of: 8/8/17  2:42 PM.  Always use your most recent med list. amLODIPine 10 mg tablet Commonly known as:  Bobby Rings Take 1 Tab by mouth daily. clopidogrel 75 mg Tab Commonly known as:  PLAVIX Take 1 Tab by mouth daily. clotrimazole 1 % topical cream  
Commonly known as:  [place] Raid Apply  to affected area two (2) times a day. fluticasone 50 mcg/actuation nasal spray Commonly known as:  Marcine Infield 2 Sprays by Both Nostrils route daily. gabapentin 100 mg capsule Commonly known as:  NEURONTIN Take 1 Cap by mouth two (2) times a day. glipiZIDE 10 mg tablet Commonly known as:  Velta Longs Take 1 Tab by mouth two (2) times a day. * glucose blood VI test strips strip Commonly known as:  Ascensia CONTOUR Test twice daily. * glucose blood VI test strips strip Commonly known as:  ASCENSIA AUTODISC VI, ONE TOUCH ULTRA TEST VI Embrace test strip--check fsbs bid 250.02  
  
 * glucose blood VI test strips strip Commonly known as:  ASCENSIA AUTODISC VI, ONE TOUCH ULTRA TEST VI Check fsbs bid  250.02  Contour next  
  
 hydroCHLOROthiazide 25 mg tablet Commonly known as:  HYDRODIURIL Take 1 Tab by mouth daily. Insulin Needles (Disposable) 31 gauge x 5/16\" Ndle Inject once daily. insulin NPH/insulin regular 100 unit/mL (70-30) injection Commonly known as:  NOVOLIN 70/30, HUMULIN 70/30  
10 units sc bid  
  
 insulin syringe-needle U-100 Dispense ultrafine 0.5 mL syringes with 31 gauge needle  
  
 lisinopril 40 mg tablet Commonly known as:  Burma Fairy Take 0.5 Tabs by mouth daily. metFORMIN 1,000 mg tablet Commonly known as:  GLUCOPHAGE Take 1,000 mg by mouth two (2) times daily (with meals). pravastatin 20 mg tablet Commonly known as:  PRAVACHOL  
TAKE ONE TABLET BY MOUTH IN THE EVENING  
  
 traMADol 50 mg tablet Commonly known as:  ULTRAM  
TAKE ONE TABLET BY MOUTH EVERY 6 HOURS AS NEEDED FOR PAIN  
  
 * Notice:   This list has 3 medication(s) that are the same as other medications prescribed for you. Read the directions carefully, and ask your doctor or other care provider to review them with you. Prescriptions Sent to Pharmacy Refills  
 fluticasone (FLONASE) 50 mcg/actuation nasal spray 11 Si Sprays by Both Nostrils route daily. Class: Normal  
 Pharmacy: [address], [address] [place] [address] #: [phone] Route: Both Nostrils We Performed the Following AMB POC HEMOGLOBIN A1C [59253 CPT(R)] Follow-up Instructions Return in about 4 months (around 2017) for dm02 htn hld. Introducing [facility] SERVICES! [facility] introduces [employer] patient portal. Now you can access parts of your medical record, email your doctor's office, and request medication refills online. 1. In your internet browser, go to [email or web address] 2. Click on the First Time User? Click Here link in the Sign In box. You will see the New Member Sign Up page. 3. Enter your [employer] Access Code exactly as it appears below. You will not need to use this code after youve completed the sign-up process. If you do not sign up before the expiration date, you must request a new code. · [employer] Access Code: [email or web address] Expires: 2017  2:42 PM 
 
4. Enter the last four digits of your Social Security Number (xxxx) and Date of Birth (mm/dd/yyyy) as indicated and click Submit. You will be taken to the next sign-up page. 5. Create a [employer] ID. This will be your [employer] login ID and cannot be changed, so think of one that is secure and easy to remember. 6. Create a [employer] password. You can change your password at any time. 7. Enter your Password Reset Question and Answer. This can be used at a later time if you forget your password. 8. Enter your e-mail address. You will receive e-mail notification when new information is available in [address] 9. Click Sign Up. You can now view and download portions of your medical record. 10. Click the Download Summary menu link to download a portable copy of your medical information. If you have questions, please visit the Frequently Asked Questions section of the [employer] website. Remember, [employer] is NOT to be used for urgent needs. For medical emergencies, dial 911. Now available from your iPhone and Android! Please provide this summary of care documentation to your next provider. Your primary care clinician is listed as Monica DAVIS. If you have any questions after today's visit, please call [phone].

## 2017-08-08 NOTE — PROGRESS NOTES
Patient is here today for 8 week follow up  Diabetes . Patient's  A1C is 6.6 today. Patient is   Complaining of nasal congestion and chest congestion  Onset x 3 months per patient.

## 2017-08-08 NOTE — PROGRESS NOTES
HISTORY OF PRESENT ILLNESS  Clint Hunt is a 74 y.o. male. HPI   F/u DM-2   Has started taking humulin 70/30 10 units bid ac  amb a1c is 6.6 today   Has made diet changed--low card  No longer has neuropathic sxs in toes  Coughing up phlegm x 3 months-clear white ,   Has some numbness left arm , weakness has resolved  Left leg strenght has improved  Uses walker d/t knee OA    Patient Active Problem List    Diagnosis Date Noted    TIA (transient ischemic attack) 04/04/2017    Transient ischemic attack involving right internal carotid artery 02/15/2017    Stenosis of both internal carotid arteries 02/15/2017    Left-sided weakness 02/14/2017    OA (osteoarthritis) of knee 08/06/2013    Prostate cancer ([facility] 03/10/2011    Type II or unspecified type diabetes mellitus without mention of complication, uncontrolled 08/03/2009    Essential hypertension, benign 08/03/2009    Pure hypercholesterolemia 08/03/2009    Obesity 08/03/2009     Current Outpatient Prescriptions   Medication Sig Dispense Refill    fluticasone (FLONASE) 50 mcg/actuation nasal spray 2 Sprays by Both Nostrils route daily. 1 Bottle 11    traMADol (ULTRAM) 50 mg tablet TAKE ONE TABLET BY MOUTH EVERY 6 HOURS AS NEEDED FOR PAIN 30 Tab 0    pravastatin (PRAVACHOL) 20 mg tablet TAKE ONE TABLET BY MOUTH IN THE EVENING 30 Tab 11    insulin NPH/insulin regular (NOVOLIN 70/30, HUMULIN 70/30) 100 unit/mL (70-30) injection 10 units sc bid 10 mL 11    insulin syringe-needle U-100 Dispense ultrafine 0.5 mL syringes with 31 gauge needle 100 Syringe 11    gabapentin (NEURONTIN) 100 mg capsule Take 1 Cap by mouth two (2) times a day. 60 Cap 11    clopidogrel (PLAVIX) 75 mg tab Take 1 Tab by mouth daily. 30 Tab 11    lisinopril (PRINIVIL, ZESTRIL) 40 mg tablet Take 0.5 Tabs by mouth daily. (Patient taking differently: Take 40 mg by mouth daily.) 30 Tab 6    amLODIPine (NORVASC) 10 mg tablet Take 1 Tab by mouth daily.  30 Tab 6    hydroCHLOROthiazide (HYDRODIURIL) 25 mg tablet Take 1 Tab by mouth daily. 30 Tab 6    glipiZIDE (GLUCOTROL) 10 mg tablet Take 1 Tab by mouth two (2) times a day. 60 Tab 11    metFORMIN (GLUCOPHAGE) 1,000 mg tablet Take 1,000 mg by mouth two (2) times daily (with meals).  glucose blood VI test strips (ASCENSIA AUTODISC VI, ONE TOUCH ULTRA TEST VI) strip Check fsbs bid  250.02  Contour next 100 Strip 11    glucose blood VI test strips (ASCENSIA AUTODISC VI, ONE TOUCH ULTRA TEST VI) strip Embrace test strip--check fsbs bid 250.02 50 Each 11    glucose blood VI test strips (ASCENSIA CONTOUR) strip Test twice daily. 3 Package 1    Insulin Needles, Disposable, 31 X 5/16 \" ndle Inject once daily. 1 Package 3    clotrimazole (LOTRIMIN) 1 % topical cream Apply  to affected area two (2) times a day.  15 g 1     No Known Allergies   Lab Results  Component Value Date/Time   Hemoglobin A1c 9.2 04/05/2017 04:03 AM   Hemoglobin A1c 9.4 02/15/2017 03:59 AM   Hemoglobin A1c 13.1 04/10/2015 12:52 PM   Glucose 106 04/05/2017 04:03 AM   Glucose (POC) 174 04/05/2017 11:11 AM   Glucose  05/05/2017 02:31 PM   Microalb/Creat ratio (ug/mg creat.) 10.5 01/14/2016 11:18 AM   LDL, calculated 90.4 04/05/2017 04:03 AM   Creatinine 0.98 04/05/2017 04:03 AM      Lab Results  Component Value Date/Time   Cholesterol, total 177 04/05/2017 04:03 AM   HDL Cholesterol 28 04/05/2017 04:03 AM   LDL, calculated 90.4 04/05/2017 04:03 AM   Triglyceride 293 04/05/2017 04:03 AM   CHOL/HDL Ratio 6.3 04/05/2017 04:03 AM     Lab Results  Component Value Date/Time   GFR est non-AA >60 04/05/2017 04:03 AM   GFR est AA >60 04/05/2017 04:03 AM   Creatinine 0.98 04/05/2017 04:03 AM   BUN 14 04/05/2017 04:03 AM   Sodium 143 04/05/2017 04:03 AM   Potassium 3.7 04/05/2017 04:03 AM   Chloride 108 04/05/2017 04:03 AM   CO2 23 04/05/2017 04:03 AM   Magnesium 1.7 04/04/2017 10:50 AM          ROS    Physical Exam    ASSESSMENT and PLAN  Diagnoses and all orders for this visit:    1. Uncontrolled type 2 diabetes mellitus without complication, with long-term current use of insulin (HCC)  -     AMB POC HEMOGLOBIN A1C 6.6 controlled   Continue insulin medicines and diet   Continue fsbs monitoring   Labs next visit    2. Chronic cough   flonase rx  Other orders  -     fluticasone (FLONASE) 50 mcg/actuation nasal spray; 2 Sprays by Both Nostrils route daily.     RTC 4 months  Follow-up Disposition: Not on File

## 2017-08-23 RX ORDER — TRAMADOL HYDROCHLORIDE 50 MG/1
TABLET ORAL
Qty: 30 TAB | Refills: 0 | OUTPATIENT
Start: 2017-08-23 | End: 2017-09-20 | Stop reason: SDUPTHER

## 2017-08-23 NOTE — TELEPHONE ENCOUNTER
#[phone] pt states he called this into [facility] over 48 hours ago and is asking why we didn't get this? Pt is requesting this as soon as possible.

## 2017-08-24 ENCOUNTER — TELEPHONE (OUTPATIENT)
Dept: INTERNAL MEDICINE CLINIC | Age: 67
End: 2017-08-24

## 2017-08-24 RX ORDER — TRAMADOL HYDROCHLORIDE 50 MG/1
TABLET ORAL
Qty: 30 TAB | Refills: 0 | Status: CANCELLED | OUTPATIENT
Start: 2017-08-24

## 2017-08-24 NOTE — TELEPHONE ENCOUNTER
Mark Brooms with [facility], states Rx for pt wheelchair cushion needs Prior Authorization through [facility]. Best contact number [phone].        Message received & copied from [facility]

## 2017-08-31 ENCOUNTER — TELEPHONE (OUTPATIENT)
Dept: INTERNAL MEDICINE CLINIC | Age: 67
End: 2017-08-31

## 2017-08-31 NOTE — TELEPHONE ENCOUNTER
[facility] [address] [facility] is requesting a call back to get a approval for a wheel chair cushion for pt .  Best contact number is  [phone] for Elvis Stewart or [employer] received & copied from [facility]

## 2017-08-31 NOTE — TELEPHONE ENCOUNTER
Advised Jan that Abbie documented that the PA was initiated through the [facility] [employer] portal. He is not seeing anything through their portal as of today. Since this was already initiated, I will follow up with her on 9/5.

## 2017-09-05 NOTE — TELEPHONE ENCOUNTER
Called this morning, provider relations was closed, but rep on phone states from notes that PA is not showing in system and would like for me to re send.  I will resend later today

## 2017-09-06 RX ORDER — METFORMIN HYDROCHLORIDE 1000 MG/1
1000 TABLET ORAL 2 TIMES DAILY WITH MEALS
Qty: 180 TAB | Refills: 3 | Status: SHIPPED | OUTPATIENT
Start: 2017-09-06 | End: 2017-10-16 | Stop reason: SDUPTHER

## 2017-09-06 NOTE — TELEPHONE ENCOUNTER
Last Refill:    Last Office Visit: 8/8/17    Upcoming Appointment: 12/11/17  Requested Prescriptions     Pending Prescriptions Disp Refills    metFORMIN (GLUCOPHAGE) 1,000 mg tablet 180 Tab 3     Sig: Take 1 Tab by mouth two (2) times daily (with meals).

## 2017-09-06 NOTE — TELEPHONE ENCOUNTER
Attempted to call to get prior auth done again, and was advised via recording that office was closed today will reopen on 9/7/2017 at [address] [facility] standard time.

## 2017-09-07 ENCOUNTER — TELEPHONE (OUTPATIENT)
Dept: INTERNAL MEDICINE CLINIC | Age: 67
End: 2017-09-07

## 2017-09-07 NOTE — TELEPHONE ENCOUNTER
LVM for Naomi Bella advising that I have imitated the PA x 2 on the provider portal, and have been unsucessful per their record.  Advised that I would like to initiate the PA via fax or by phone, left my direct line to call me back for facilitation

## 2017-09-07 NOTE — TELEPHONE ENCOUNTER
LVM lyly Rodríguez advising that I have imitated the PA x 2 on the provider portal, and have been unsucessful per their record.  Advised that I would like to initiate the PA via fax or by phone, left my direct line to call me back for facilitation

## 2017-09-07 NOTE — TELEPHONE ENCOUNTER
#[phone] [facility] states they have been trying to get an authorization for a wheelchair cushion since 8-24-17. Please call to give update.

## 2017-09-11 ENCOUNTER — TELEPHONE (OUTPATIENT)
Dept: INTERNAL MEDICINE CLINIC | Age: 67
End: 2017-09-11

## 2017-09-11 NOTE — TELEPHONE ENCOUNTER
Spoke with patients pharmacist after 2 patient identifiers being note and advised that metformin is 1000mg BID. Patients pharmacist expressed understanding and has no further questions at this time.

## 2017-09-20 ENCOUNTER — TELEPHONE (OUTPATIENT)
Dept: INTERNAL MEDICINE CLINIC | Age: 67
End: 2017-09-20

## 2017-09-20 RX ORDER — TRAMADOL HYDROCHLORIDE 50 MG/1
TABLET ORAL
Qty: 30 TAB | Refills: 0 | OUTPATIENT
Start: 2017-09-20 | End: 2017-10-26 | Stop reason: SDUPTHER

## 2017-09-20 NOTE — TELEPHONE ENCOUNTER
Sindi from [facility] requested a wheel chair cushion order, faxed with confirmation recieved

## 2017-09-20 NOTE — TELEPHONE ENCOUNTER
Nicol from [facility] stated a order is needed for the pt's equipment.  (P)[phone]       Message received & copied from [facility]

## 2017-09-22 RX ORDER — ISOPROPYL ALCOHOL 70 ML/100ML
10 SWAB TOPICAL 2 TIMES DAILY
Qty: 100 PAD | Refills: 3 | Status: SHIPPED | OUTPATIENT
Start: 2017-09-22 | End: 2018-03-07 | Stop reason: SDUPTHER

## 2017-09-22 RX ORDER — LANCETS
EACH MISCELLANEOUS 2 TIMES DAILY
Qty: 50 EACH | Refills: 11 | Status: SHIPPED | OUTPATIENT
Start: 2017-09-22 | End: 2020-02-27 | Stop reason: SDUPTHER

## 2017-09-22 NOTE — TELEPHONE ENCOUNTER
----- Message from Joselin Kahn sent at 9/22/2017  9:08 AM EDT -----  Regarding: Dr. Cody Stewart  / telephone  Pt is requesting a refill on his Lancets and the DB alcohol Swabs to be sent to the [facility] at [address] at phone [phone]  And pt best contact number is [phone].       Message copied/pasted from Damaris

## 2017-10-16 RX ORDER — METFORMIN HYDROCHLORIDE 1000 MG/1
1000 TABLET ORAL 2 TIMES DAILY WITH MEALS
Qty: 180 TAB | Refills: 3 | Status: SHIPPED | OUTPATIENT
Start: 2017-10-16 | End: 2018-09-04 | Stop reason: SDUPTHER

## 2017-10-26 RX ORDER — TRAMADOL HYDROCHLORIDE 50 MG/1
TABLET ORAL
Qty: 30 TAB | Refills: 0 | Status: SHIPPED | OUTPATIENT
Start: 2017-10-26 | End: 2018-01-31 | Stop reason: SDUPTHER

## 2017-11-07 RX ORDER — HYDROCHLOROTHIAZIDE 25 MG/1
TABLET ORAL
Qty: 30 TAB | Refills: 11 | Status: SHIPPED | OUTPATIENT
Start: 2017-11-07 | End: 2018-12-03 | Stop reason: SDUPTHER

## 2017-11-07 RX ORDER — AMLODIPINE BESYLATE 10 MG/1
TABLET ORAL
Qty: 30 TAB | Refills: 6 | Status: SHIPPED | OUTPATIENT
Start: 2017-11-07 | End: 2018-06-01 | Stop reason: SDUPTHER

## 2017-12-08 ENCOUNTER — PATIENT OUTREACH (OUTPATIENT)
Dept: INTERNAL MEDICINE CLINIC | Age: 67
End: 2017-12-08

## 2017-12-22 NOTE — TELEPHONE ENCOUNTER
Requested Prescriptions     Pending Prescriptions Disp Refills    glucose blood VI test strips (ASCENSIA AUTODISC VI, ONE TOUCH ULTRA TEST VI) strip 100 Strip 11     Sig: Check fsbs bid E11.65  Contour next

## 2018-01-09 ENCOUNTER — TELEPHONE (OUTPATIENT)
Dept: INTERNAL MEDICINE CLINIC | Age: 68
End: 2018-01-09

## 2018-01-09 NOTE — TELEPHONE ENCOUNTER
[employer], has stated that \"Novolin 70/30\" is no longer covered by insurance, \"Humulin 70/30\" is now preferred. Pt has been out of medication for 2 days. Requesting a Rx submitted to [facility] DR HERMILO SORIANO, [address] Best contact #: [phone] .         Message received & copied from [facility] no radiation

## 2018-01-09 NOTE — TELEPHONE ENCOUNTER
Spoke with patient after 2 patient identifiers being note and advised per Dr. Jennifer Gamez to call insurance company and ask what insulin they cover and call us back so that an order can be plaeced. Patient expressed understanding and has no further questions at this time.

## 2018-01-09 NOTE — TELEPHONE ENCOUNTER
Pt requesting callback from nurse, best contact [phone]      Message received & copied from [facility]

## 2018-01-09 NOTE — TELEPHONE ENCOUNTER
#[phone] pt would like to discuss a medication with you that went from $0 to $120 this is for the new insulin. Pt states he needs to work something out about this. Please call pt or he states he can come in to discuss. Pt mentioned a knee operation that he wants to have as soon as possible. It didn't sound like he had scheduled yet though.

## 2018-03-07 DIAGNOSIS — E11.9 CONTROLLED TYPE 2 DIABETES MELLITUS WITHOUT COMPLICATION, WITH LONG-TERM CURRENT USE OF INSULIN (HCC): ICD-10-CM

## 2018-03-07 DIAGNOSIS — Z79.4 CONTROLLED TYPE 2 DIABETES MELLITUS WITHOUT COMPLICATION, WITH LONG-TERM CURRENT USE OF INSULIN (HCC): ICD-10-CM

## 2018-03-07 RX ORDER — ISOPROPYL ALCOHOL 70 ML/100ML
10 SWAB TOPICAL 2 TIMES DAILY
Qty: 100 PAD | Refills: 3 | Status: SHIPPED | OUTPATIENT
Start: 2018-03-07 | End: 2022-06-05

## 2018-03-07 RX ORDER — INSULIN GLARGINE 100 [IU]/ML
INJECTION, SOLUTION SUBCUTANEOUS
Qty: 3 PEN | Refills: 3 | Status: SHIPPED | OUTPATIENT
Start: 2018-03-07 | End: 2018-06-05

## 2018-03-07 NOTE — TELEPHONE ENCOUNTER
Pt is requesting \"Lantus\" and \"Alchohol Swabs\" Rx to be sent to Tanya James on [phone]     Best callback [phone]         Message received & copied from [facility]

## 2018-03-08 ENCOUNTER — OFFICE VISIT (OUTPATIENT)
Dept: INTERNAL MEDICINE CLINIC | Age: 68
End: 2018-03-08

## 2018-03-08 VITALS
WEIGHT: 245 LBS | DIASTOLIC BLOOD PRESSURE: 64 MMHG | BODY MASS INDEX: 35.07 KG/M2 | SYSTOLIC BLOOD PRESSURE: 128 MMHG | HEIGHT: 70 IN | OXYGEN SATURATION: 95 % | TEMPERATURE: 97.4 F | HEART RATE: 92 BPM

## 2018-03-08 DIAGNOSIS — Z01.818 PREOPERATIVE CLEARANCE: ICD-10-CM

## 2018-03-08 DIAGNOSIS — Z12.11 COLON CANCER SCREENING: ICD-10-CM

## 2018-03-08 DIAGNOSIS — Z00.00 MEDICARE ANNUAL WELLNESS VISIT, SUBSEQUENT: ICD-10-CM

## 2018-03-08 DIAGNOSIS — Z11.59 ENCOUNTER FOR HEPATITIS C SCREENING TEST FOR LOW RISK PATIENT: ICD-10-CM

## 2018-03-08 DIAGNOSIS — Z86.73 HISTORY OF CVA (CEREBROVASCULAR ACCIDENT): ICD-10-CM

## 2018-03-08 DIAGNOSIS — E11.9 CONTROLLED TYPE 2 DIABETES MELLITUS WITHOUT COMPLICATION, WITH LONG-TERM CURRENT USE OF INSULIN (HCC): Primary | ICD-10-CM

## 2018-03-08 DIAGNOSIS — Z79.4 CONTROLLED TYPE 2 DIABETES MELLITUS WITHOUT COMPLICATION, WITH LONG-TERM CURRENT USE OF INSULIN (HCC): Primary | ICD-10-CM

## 2018-03-08 DIAGNOSIS — C61 PROSTATE CANCER (HCC): ICD-10-CM

## 2018-03-08 DIAGNOSIS — E78.00 PURE HYPERCHOLESTEROLEMIA: ICD-10-CM

## 2018-03-08 DIAGNOSIS — I10 ESSENTIAL HYPERTENSION, BENIGN: ICD-10-CM

## 2018-03-08 RX ORDER — GABAPENTIN 100 MG/1
100 CAPSULE ORAL 3 TIMES DAILY
Qty: 90 CAP | Refills: 11 | Status: SHIPPED | OUTPATIENT
Start: 2018-03-08 | End: 2018-06-05

## 2018-03-08 NOTE — MR AVS SNAPSHOT
[address] [facility] 
[phone] Patient: Tricia Cedillo. MRN:  [id number] Visit Information Date & Time Provider Department Dept. Phone Encounter #  
 3/8/2018 11:00 AM Verito Robles, [address] [place] [address],[address] [phone] [id number] Follow-up Instructions Return in about 6 months (around 9/8/2018) for dm-02 htn hld cva. Upcoming Health Maintenance Date Due Hepatitis C Screening 1950 DTaP/Tdap/Td series (1 - Tdap) 8/7/1973 FOBT Q 1 YEAR AGE 50-75 8/7/2002 ZOSTER VACCINE AGE 60> 6/7/2012 EYE EXAM RETINAL OR DILATED Q1 9/6/2014 FOOT EXAM Q1 1/14/2017 MICROALBUMIN Q1 1/14/2017 GLAUCOMA SCREENING Q2Y 8/7/2017 Bone Densitometry (Dexa) Screening 8/7/2017 Pneumococcal 65+ High/Highest Risk (2 of 2 - PPSV23) 12/8/2017 HEMOGLOBIN A1C Q6M 2/8/2018 LIPID PANEL Q1 4/5/2018 Allergies as of 3/8/2018  Review Complete On: 3/8/2018 By: Gordo Jaramillo LPN No Known Allergies Current Immunizations  Never Reviewed Name Date Influenza Vaccine 10/1/2016 Influenza Vaccine (Quad) PF 1/14/2016 Pneumococcal Conjugate (PCV-13) 10/13/2017 Not reviewed this visit You Were Diagnosed With   
  
 Codes Comments Controlled type 2 diabetes mellitus without complication, with long-term current use of insulin (HCC)    -  Primary ICD-10-CM: E11.9, Z79.4 ICD-9-CM: 250.00, V58.67 Essential hypertension, benign     ICD-10-CM: I10 
ICD-9-CM: 401.1 Pure hypercholesterolemia     ICD-10-CM: E78.00 ICD-9-CM: 272.0 Prostate cancer [facility])     ICD-10-CM: P72 ICD-9-CM: 650 History of CVA (cerebrovascular accident)     ICD-10-CM: Z86.73 
ICD-9-CM: V12.54 Encounter for hepatitis C screening test for low risk patient     ICD-10-CM: Z11.59 
ICD-9-CM: V73.89 Colon cancer screening     ICD-10-CM: Z12.11 ICD-9-CM: V76.51   
 Preoperative clearance     ICD-10-CM: K59.229 ICD-9-CM: V72.84 Vitals BP Pulse Temp Height(growth percentile) Weight(growth percentile) SpO2  
 128/64 92 97.4 °F (36.3 °C) (Oral) 5' 10\" (1.778 m) 245 lb (111.1 kg) 95% BMI Smoking Status 35.15 kg/m2 Never Smoker Vitals History BMI and BSA Data Body Mass Index Body Surface Area  
 35.15 kg/m 2 2.34 m 2 [facility] [address] [facility] [phone] Your Updated Medication List  
  
   
This list is accurate as of 3/8/18 12:25 PM.  Always use your most recent med list.  
  
  
  
  
 alcohol swabs Padm Commonly known as:  ALCOHOL PREP SWABS  
10 Each by Apply Externally route two (2) times a day. amLODIPine 10 mg tablet Commonly known as:  Virgen Parish TAKE ONE TABLET BY MOUTH ONCE DAILY  
  
 clopidogrel 75 mg Tab Commonly known as:  PLAVIX Take 1 Tab by mouth daily. clotrimazole 1 % topical cream  
Commonly known as:  Canaan Agent Apply  to affected area two (2) times a day. fluticasone 50 mcg/actuation nasal spray Commonly known as:  Myna Heller 2 Sprays by Both Nostrils route daily. gabapentin 100 mg capsule Commonly known as:  NEURONTIN Take 1 Cap by mouth two (2) times a day. glipiZIDE 10 mg tablet Commonly known as:  Arnold Hodgkin Take 1 Tab by mouth two (2) times a day. * glucose blood VI test strips strip Commonly known as:  Ascensia CONTOUR Test twice daily. * glucose blood VI test strips strip Commonly known as:  ASCENSIA AUTODISC VI, ONE TOUCH ULTRA TEST VI Embrace test strip--check fsbs bid 250.02  
  
 * glucose blood VI test strips strip Commonly known as:  ASCENSIA AUTODISC VI, ONE TOUCH ULTRA TEST VI Check fsbs bid E11.65  Contour next * glucose blood VI test strips strip Commonly known as:  ONETOUCH ULTRA TEST  
 Onetouch ultra blue test strips, check blood sugar twice daily Dx e11.9  
  
 hydroCHLOROthiazide 25 mg tablet Commonly known as:  HYDRODIURIL  
TAKE ONE TABLET BY MOUTH ONCE DAILY  
  
 insulin glargine 100 unit/mL (3 mL) Inpn Commonly known as:  LANTATIANABASAGLAR  
15 unitsd sc qhs  Indications: type 2 diabetes mellitus Insulin Needles (Disposable) 31 gauge x 5/16\" Ndle Inject once daily. insulin NPH/insulin regular 100 unit/mL (70-30) injection Commonly known as:  NOVOLIN 70/30, HUMULIN 70/30  
10 units sc bid  
  
 insulin syringe-needle U-100 Dispense ultrafine 0.5 mL syringes with 31 gauge needle Lancets Misc  
by IntraDERMal route two (2) times a day. lisinopril 40 mg tablet Commonly known as:  Cindy Sachse Take 0.5 Tabs by mouth daily. metFORMIN 1,000 mg tablet Commonly known as:  GLUCOPHAGE Take 1 Tab by mouth two (2) times daily (with meals). pravastatin 20 mg tablet Commonly known as:  PRAVACHOL  
TAKE ONE TABLET BY MOUTH IN THE EVENING  
  
 traMADol 50 mg tablet Commonly known as:  ULTRAM  
TAKE ONE TABLET BY MOUTH EVERY 6 HOURS AS NEEDED FOR PAIN  
  
 * Notice: This list has 4 medication(s) that are the same as other medications prescribed for you. Read the directions carefully, and ask your doctor or other care provider to review them with you. We Performed the Following HEMOGLOBIN A1C WITH EAG [81458 CPT(R)] HEPATITIS C AB [31792 CPT(R)] METABOLIC PANEL, COMPREHENSIVE [39184 CPT(R)] MICROALBUMIN, UR, RAND W/ MICROALB/CREAT RATIO [id number] CPT(R)] OCCULT BLOOD, IMMUNOASSAY (FIT) [id number] CPT(R)] PSA SCREENING (SCREENING) [ [facility]] REFERRAL TO CARDIOLOGY [TEN46 Custom] Follow-up Instructions Return in about 6 months (around 9/8/2018) for dm-02 htn hld cva. Referral Information  Referral ID Referred By Referred To  
  
 [id number] Anna Pierce MD   
 [address] [address] Phone: [phone] Fax: [phone] Visits Status Start Date End Date 1 New Request 3/8/18 3/8/19 If your referral has a status of pending review or denied, additional information will be sent to support the outcome of this decision. Patient Instructions Medicare Wellness Visit, Male The best way to live healthy is to have a healthy lifestyle by eating a well-balanced diet, exercising regularly, limiting alcohol and stopping smoking. Regular physical exams and screening tests are another way to keep healthy. Preventive exams provided by your health care provider can find health problems before they become diseases or illnesses. Preventive services including immunizations, screening tests, monitoring and exams can help you take care of your own health. All people over age 72 should have a pneumovax  and and a prevnar shot to prevent pneumonia. These are once in a lifetime unless you and your provider decide differently. All people over 65 should have a yearly flu shot and a tetanus vaccine every 10 years. Screening for diabetes mellitus with a blood sugar test should be done every year. Glaucoma is a disease of the eye due to increased ocular pressure that can lead to blindness and it should be done every year by an eye professional. 
 
Cardiovascular screening tests that check for elevated lipids (fatty part of blood) which can lead to heart disease and strokes should be done every 5 years. Colorectal screening that evaluates for blood or polyps in your colon should be done yearly as a stool test or every five years as a flexible sigmoidoscope or every 10 years as a colonoscopy up to age 76. Men up to age 76 may need a screening blood test for prostate cancer at certain intervals, depending on their personal and family history. This decision is between the patient and his provider. If you have been a smoker or had family history of abdominal aortic aneurysms, you and your provider may decide to schedule an ultrasound test of your aorta. Hepatitis C screening is also recommended for anyone born between [birth year] through [address]. A shingles vaccine is also recommended once in a lifetime after age 61. Your Medicare Wellness Exam is recommended annually. Here is a list of your current Health Maintenance items with a due date: 
Health Maintenance Due Topic Date Due  
 Hepatitis C Test  1950  DTaP/Tdap/Td  (1 - Tdap) 08/07/1973  Stool testing for trace blood  08/07/2002  Shingles Vaccine  06/07/2012 [facility] Eye Exam  09/06/2014 [facility] Diabetic Foot Care  01/14/2017  Albumin Urine Test  01/14/2017  Glaucoma Screening   08/07/2017  Bone Mineral Density   08/07/2017  Pneumococcal Vaccine (2 of 2 - PPSV23) 12/08/2017  Hemoglobin A1C    02/08/2018  Cholesterol Test   04/05/2018 Introducing [facility]! Evelyne Crump introduces [employer] patient portal. Now you can access parts of your medical record, email your doctor's office, and request medication refills online. 1. In your internet browser, go to [email or web address] 2. Click on the First Time User? Click Here link in the Sign In box. You will see the New Member Sign Up page. 3. Enter your [employer] Access Code exactly as it appears below. You will not need to use this code after youve completed the sign-up process. If you do not sign up before the expiration date, you must request a new code. · [employer] Access Code: [email or web address] Expires: 6/6/2018 12:25 PM 
 
4. Enter the last four digits of your Social Security Number (xxxx) and Date of Birth (mm/dd/yyyy) as indicated and click Submit. You will be taken to the next sign-up page. 5. Create a [employer] ID. This will be your [employer] login ID and cannot be changed, so think of one that is secure and easy to remember. 6. Create a [employer] password. You can change your password at any time. 7. Enter your Password Reset Question and Answer. This can be used at a later time if you forget your password. 8. Enter your e-mail address. You will receive e-mail notification when new information is available in [address] 9. Click Sign Up. You can now view and download portions of your medical record. 10. Click the Download Summary menu link to download a portable copy of your medical information. If you have questions, please visit the Frequently Asked Questions section of the [employer] website. Remember, [employer] is NOT to be used for urgent needs. For medical emergencies, dial 911. Now available from your iPhone and Android! Please provide this summary of care documentation to your next provider. Your primary care clinician is listed as Georgie DAVIS. If you have any questions after today's visit, please call [phone].

## 2018-03-08 NOTE — PATIENT INSTRUCTIONS
Medicare Wellness Visit, Male    The best way to live healthy is to have a healthy lifestyle by eating a well-balanced diet, exercising regularly, limiting alcohol and stopping smoking. Regular physical exams and screening tests are another way to keep healthy. Preventive exams provided by your health care provider can find health problems before they become diseases or illnesses. Preventive services including immunizations, screening tests, monitoring and exams can help you take care of your own health. All people over age 72 should have a pneumovax  and and a prevnar shot to prevent pneumonia. These are once in a lifetime unless you and your provider decide differently. All people over 65 should have a yearly flu shot and a tetanus vaccine every 10 years. Screening for diabetes mellitus with a blood sugar test should be done every year. Glaucoma is a disease of the eye due to increased ocular pressure that can lead to blindness and it should be done every year by an eye professional.    Cardiovascular screening tests that check for elevated lipids (fatty part of blood) which can lead to heart disease and strokes should be done every 5 years. Colorectal screening that evaluates for blood or polyps in your colon should be done yearly as a stool test or every five years as a flexible sigmoidoscope or every 10 years as a colonoscopy up to age 76. Men up to age 76 may need a screening blood test for prostate cancer at certain intervals, depending on their personal and family history. This decision is between the patient and his provider. If you have been a smoker or had family history of abdominal aortic aneurysms, you and your provider may decide to schedule an ultrasound test of your aorta. Hepatitis C screening is also recommended for anyone born between [birth year] through [address]. A shingles vaccine is also recommended once in a lifetime after age 61.     Your Medicare Wellness Exam is recommended annually.     Here is a list of your current Health Maintenance items with a due date:  Health Maintenance Due   Topic Date Due    Hepatitis C Test  1950    DTaP/Tdap/Td  (1 - Tdap) 08/07/1973    Stool testing for trace blood  08/07/2002    Shingles Vaccine  06/07/2012    Eye Exam  09/06/2014    Diabetic Foot Care  01/14/2017    Albumin Urine Test  01/14/2017    Glaucoma Screening   08/07/2017    Bone Mineral Density   08/07/2017    Pneumococcal Vaccine (2 of 2 - PPSV23) 12/08/2017    Hemoglobin A1C    02/08/2018    Cholesterol Test   04/05/2018

## 2018-03-08 NOTE — PROGRESS NOTES
HISTORY OF PRESENT ILLNESS  Samra Amaya is a 74 y.o. male. HPI     F/u DM-2 htn hld and medicare wellness----------------------------------------------------------  Last a1c  6.6  LDL 80  Saw ortho Dr Twan Meraz left TKR but needs cardiology clearance  Able to walk with cane , unable to walk up stairs , nothing strenous  fsbs at home avg 100, low 55-takes lantus 10 untis every day but holds if BS is low    S/p CVA last Feb2017--left sided weakbness  C/o increased tingling left toes    Last visit:  Has started taking humulin 70/30 10 units bid ac  amb a1c is 6.6 today   Has made diet changed--low card  No longer has neuropathic sxs in toes  Coughing up phlegm x 3 months-clear white ,   Has some numbness left arm , weakness has resolved  Left leg strenght has improved  Uses walker d/t knee OA    Patient Active Problem List    Diagnosis Date Noted    TIA (transient ischemic attack) 04/04/2017    Transient ischemic attack involving right internal carotid artery 02/15/2017    Left-sided weakness 02/14/2017    OA (osteoarthritis) of knee 08/06/2013    Prostate cancer ([facility] [address] 03/10/2011    Type II or unspecified type diabetes mellitus without mention of complication, uncontrolled 08/03/2009    Essential hypertension, benign 08/03/2009    Pure hypercholesterolemia 08/03/2009    Obesity 08/03/2009     Current Outpatient Prescriptions   Medication Sig Dispense Refill    alcohol swabs (ALCOHOL PREP SWABS) padm 10 Each by Apply Externally route two (2) times a day.  100 Pad 3    insulin glargine (LANTUS,BASAGLAR) 100 unit/mL (3 mL) inpn 15 unitsd sc qhs  Indications: type 2 diabetes mellitus 3 Pen 3    traMADol (ULTRAM) 50 mg tablet TAKE ONE TABLET BY MOUTH EVERY 6 HOURS AS NEEDED FOR PAIN 30 Tab 0    insulin NPH/insulin regular (NOVOLIN 70/30, HUMULIN 70/30) 100 unit/mL (70-30) injection 10 units sc bid 10 mL 3    glucose blood VI test strips (ONETOUCH ULTRA TEST) strip Onetouch ultra blue test strips, check blood sugar twice daily Dx e11.9 100 Strip 3    glucose blood VI test strips (ASCENSIA AUTODISC VI, ONE TOUCH ULTRA TEST VI) strip Check fsbs bid E11.65  Contour next 100 Strip 11    hydroCHLOROthiazide (HYDRODIURIL) 25 mg tablet TAKE ONE TABLET BY MOUTH ONCE DAILY 30 Tab 11    amLODIPine (NORVASC) 10 mg tablet TAKE ONE TABLET BY MOUTH ONCE DAILY 30 Tab 6    metFORMIN (GLUCOPHAGE) 1,000 mg tablet Take 1 Tab by mouth two (2) times daily (with meals). 180 Tab 3    Lancets misc by IntraDERMal route two (2) times a day. 50 Each 11    fluticasone (FLONASE) 50 mcg/actuation nasal spray 2 Sprays by Both Nostrils route daily. 1 Bottle 11    pravastatin (PRAVACHOL) 20 mg tablet TAKE ONE TABLET BY MOUTH IN THE EVENING 30 Tab 11    insulin NPH/insulin regular (NOVOLIN 70/30, HUMULIN 70/30) 100 unit/mL (70-30) injection 10 units sc bid 10 mL 11    insulin syringe-needle U-100 Dispense ultrafine 0.5 mL syringes with 31 gauge needle 100 Syringe 11    gabapentin (NEURONTIN) 100 mg capsule Take 1 Cap by mouth two (2) times a day. 60 Cap 11    clopidogrel (PLAVIX) 75 mg tab Take 1 Tab by mouth daily. 30 Tab 11    lisinopril (PRINIVIL, ZESTRIL) 40 mg tablet Take 0.5 Tabs by mouth daily. (Patient taking differently: Take 40 mg by mouth daily.) 30 Tab 6    glipiZIDE (GLUCOTROL) 10 mg tablet Take 1 Tab by mouth two (2) times a day. 60 Tab 11    clotrimazole (LOTRIMIN) 1 % topical cream Apply  to affected area two (2) times a day. 15 g 1    glucose blood VI test strips (ASCENSIA AUTODISC VI, ONE TOUCH ULTRA TEST VI) strip Embrace test strip--check fsbs bid 250.02 50 Each 11    glucose blood VI test strips (ASCENSIA CONTOUR) strip Test twice daily. 3 Package 1    Insulin Needles, Disposable, 31 X 5/16 \" ndle Inject once daily.  1 Package 3     No Known Allergies  Social History   Substance Use Topics    Smoking status: Never Smoker    Smokeless tobacco: Never Used    Alcohol use No      Comment: Stop 5 months       Lab Results  Component Value Date/Time   WBC 5.9 04/05/2017 04:03 AM   HGB 12.5 04/05/2017 04:03 AM   HCT 38.8 04/05/2017 04:03 AM   PLATELET 919 45/62/5047 04:03 AM   MCV 80.3 04/05/2017 04:03 AM     Lab Results  Component Value Date/Time   Hemoglobin A1c 9.2 (H) 04/05/2017 04:03 AM   Hemoglobin A1c 9.4 (H) 02/15/2017 03:59 AM   Hemoglobin A1c 13.1 (H) 04/10/2015 12:52 PM   Glucose 106 (H) 04/05/2017 04:03 AM   Glucose (POC) 174 (H) 04/05/2017 11:11 AM   Glucose  (A) 05/05/2017 02:31 PM   Microalb/Creat ratio (ug/mg creat.) 10.5 01/14/2016 11:18 AM   LDL, calculated 90.4 04/05/2017 04:03 AM   Creatinine 0.98 04/05/2017 04:03 AM      Lab Results  Component Value Date/Time   Cholesterol, total 177 04/05/2017 04:03 AM   HDL Cholesterol 28 04/05/2017 04:03 AM   LDL, calculated 90.4 04/05/2017 04:03 AM   Triglyceride 293 (H) 04/05/2017 04:03 AM   CHOL/HDL Ratio 6.3 (H) 04/05/2017 04:03 AM     Lab Results  Component Value Date/Time   GFR est non-AA >60 04/05/2017 04:03 AM   GFR est AA >60 04/05/2017 04:03 AM   Creatinine 0.98 04/05/2017 04:03 AM   BUN 14 04/05/2017 04:03 AM   Sodium 143 04/05/2017 04:03 AM   Potassium 3.7 04/05/2017 04:03 AM   Chloride 108 04/05/2017 04:03 AM   CO2 23 04/05/2017 04:03 AM   Magnesium 1.7 04/04/2017 10:50 AM        ROS    Physical Exam   Constitutional: He appears well-developed and well-nourished. No distress. Appears stated age   HENT:   Head: Normocephalic. Cardiovascular: Normal rate, regular rhythm and normal heart sounds. Exam reveals no gallop and no friction rub. No murmur heard. Pulmonary/Chest: Effort normal and breath sounds normal. No respiratory distress. He has no wheezes. He has no rales. He exhibits no tenderness. Abdominal: Soft. Musculoskeletal: He exhibits no edema. Neurological: He is alert. Psychiatric: He has a normal mood and affect. Nursing note and vitals reviewed.       ASSESSMENT and PLAN  Diagnoses and all orders for this visit: 1. Controlled type 2 diabetes mellitus without complication, with long-term current use of insulin (HCC)  -     MICROALBUMIN, UR, RAND W/ MICROALB/CREAT RATIO  -     METABOLIC PANEL, COMPREHENSIVE  -     HEMOGLOBIN A1C WITH EAG    2. Essential hypertension, benign  -     METABOLIC PANEL, COMPREHENSIVE    3. Pure hypercholesterolemia  -     METABOLIC PANEL, COMPREHENSIVE    4. Prostate cancer ([facility]  -     PSA SCREENING (SCREENING) ()    5. History of CVA (cerebrovascular accident)    6. Encounter for hepatitis C screening test for low risk patient  -     HEPATITIS C AB    7. Colon cancer screening  -     OCCULT BLOOD, IMMUNOASSAY (FIT)    8. Preoperative clearance  -     Sekou Card [facility]    Other orders  -     gabapentin (NEURONTIN) 100 mg capsule; Take 1 Cap by mouth three (3) times daily. Follow-up Disposition:  Return in about 6 months (around 9/8/2018) for dm-02 htn hld cva. This is the Subsequent Medicare Annual Wellness Exam, performed 12 months or more after the Initial AWV or the last Subsequent AWV    I have reviewed the patient's medical history in detail and updated the computerized patient record. History     Past Medical History:   Diagnosis Date    Allergic rhinitis     Arthritis     djd of knees    Diabetes ([facility] [address]     Hypercholesteremia     Hypertension     Obesity     Stroke ([facility]     2/14/2017      Past Surgical History:   Procedure Laterality Date    HX CHOLECYSTECTOMY      laparoscopic     Current Outpatient Prescriptions   Medication Sig Dispense Refill    alcohol swabs (ALCOHOL PREP SWABS) padm 10 Each by Apply Externally route two (2) times a day.  100 Pad 3    insulin glargine (LANTUS,BASAGLAR) 100 unit/mL (3 mL) inpn 15 unitsd sc qhs  Indications: type 2 diabetes mellitus 3 Pen 3    traMADol (ULTRAM) 50 mg tablet TAKE ONE TABLET BY MOUTH EVERY 6 HOURS AS NEEDED FOR PAIN 30 Tab 0    glucose blood VI test strips (ONETOUCH ULTRA TEST) strip Onetouch ultra blue test strips, check blood sugar twice daily Dx e11.9 100 Strip 3    glucose blood VI test strips (ASCENSIA AUTODISC VI, ONE TOUCH ULTRA TEST VI) strip Check fsbs bid E11.65  Contour next 100 Strip 11    hydroCHLOROthiazide (HYDRODIURIL) 25 mg tablet TAKE ONE TABLET BY MOUTH ONCE DAILY 30 Tab 11    amLODIPine (NORVASC) 10 mg tablet TAKE ONE TABLET BY MOUTH ONCE DAILY 30 Tab 6    metFORMIN (GLUCOPHAGE) 1,000 mg tablet Take 1 Tab by mouth two (2) times daily (with meals). 180 Tab 3    Lancets misc by IntraDERMal route two (2) times a day. 50 Each 11    fluticasone (FLONASE) 50 mcg/actuation nasal spray 2 Sprays by Both Nostrils route daily. 1 Bottle 11    pravastatin (PRAVACHOL) 20 mg tablet TAKE ONE TABLET BY MOUTH IN THE EVENING 30 Tab 11    insulin NPH/insulin regular (NOVOLIN 70/30, HUMULIN 70/30) 100 unit/mL (70-30) injection 10 units sc bid 10 mL 11    insulin syringe-needle U-100 Dispense ultrafine 0.5 mL syringes with 31 gauge needle 100 Syringe 11    gabapentin (NEURONTIN) 100 mg capsule Take 1 Cap by mouth two (2) times a day. 60 Cap 11    clopidogrel (PLAVIX) 75 mg tab Take 1 Tab by mouth daily. 30 Tab 11    lisinopril (PRINIVIL, ZESTRIL) 40 mg tablet Take 0.5 Tabs by mouth daily. (Patient taking differently: Take 40 mg by mouth daily.) 30 Tab 6    glipiZIDE (GLUCOTROL) 10 mg tablet Take 1 Tab by mouth two (2) times a day. 60 Tab 11    clotrimazole (LOTRIMIN) 1 % topical cream Apply  to affected area two (2) times a day. 15 g 1    glucose blood VI test strips (ASCENSIA AUTODISC VI, ONE TOUCH ULTRA TEST VI) strip Embrace test strip--check fsbs bid 250.02 50 Each 11    glucose blood VI test strips (ASCENSIA CONTOUR) strip Test twice daily. 3 Package 1    Insulin Needles, Disposable, 31 X 5/16 \" ndle Inject once daily.  1 Package 3     No Known Allergies  Family History   Problem Relation Age of Onset    Diabetes Mother     Hypertension Mother     Kidney Disease Mother    Mina Weeks Arthritis-osteo Father     Diabetes Father     No Known Problems Sister     No Known Problems Sister     No Known Problems Sister     No Known Problems Sister     Cancer Sister      cancer     Social History   Substance Use Topics    Smoking status: Never Smoker    Smokeless tobacco: Never Used    Alcohol use No      Comment: Stop 5 months      Patient Active Problem List   Diagnosis Code    Type II or unspecified type diabetes mellitus without mention of complication, uncontrolled E11.65    Essential hypertension, benign I10    Pure hypercholesterolemia E78.00    Obesity E66.9    Prostate cancer ([facility] [address] C61    OA (osteoarthritis) of knee M17.10    Left-sided weakness R53.1    Transient ischemic attack involving right internal carotid artery G45.1    TIA (transient ischemic attack) G45.9       Depression Risk Factor Screening:     PHQ over the last two weeks 4/7/2017   Little interest or pleasure in doing things Not at all   Feeling down, depressed or hopeless Not at all   Total Score PHQ 2 0     Alcohol Risk Factor Screening: You do not drink alcohol or very rarely. Functional Ability and Level of Safety:   Hearing Loss  Hearing is good. Activities of Daily Living  The home contains: handrails and grab bars  Patient does total self care    Fall Risk  Fall Risk Assessment, last 12 mths 4/7/2017   Able to walk? Yes   Fall in past 12 months?  No       Abuse Screen  Patient is not abused    Cognitive Screening   Evaluation of Cognitive Function:  Has your family/caregiver stated any concerns about your memory: no  Normal    Patient Care Team   Patient Care Team:  Luciano Armstrong MD as PCP - Ayse Flores RN as Ambulatory Care Navigator  Tim Espinoza MD (Urology)    Assessment/Plan   Education and counseling provided:  Are appropriate based on today's review and evaluation  End-of-Life planning (with patient's consent)  Prostate cancer screening tests (PSA, covered annually)  Colorectal cancer screening tests-FIT  Screening for glaucoma  tdap, shingles vaccine-recommended    Diagnoses and all orders for this visit:    1. Controlled type 2 diabetes mellitus without complication, with long-term current use of insulin (HCC)  -     MICROALBUMIN, UR, RAND W/ MICROALB/CREAT RATIO  -     METABOLIC PANEL, COMPREHENSIVE  -     HEMOGLOBIN A1C WITH EAG   Controlled per home readings   Increase gabapentin to 300 mg every day d/t neuropathy sxs left hand ( tinels negative)    2. Essential hypertension, benign  -     METABOLIC PANEL, COMPREHENSIVE   controlled  3. Pure hypercholesterolemia  -     METABOLIC PANEL, COMPREHENSIVE    4. Prostate cancer ([facility] [address]  -     PSA SCREENING (SCREENING) ()    5. History of CVA (cerebrovascular accident)   On plavix   Brendan, lipids a1c at goal  6. Encounter for hepatitis C screening test for low risk patient  -     HEPATITIS C AB    7. Colon cancer screening  -     OCCULT BLOOD, IMMUNOASSAY (FIT)    8.  Preoperative clearance  -     [facility] Card [facility]   Stress test prior to TKR--limited acitivity due to CVA/left weakness < 4 mets        Health Maintenance Due   Topic Date Due    Hepatitis C Screening  1950    DTaP/Tdap/Td series (1 - Tdap) 08/07/1973    FOBT Q 1 YEAR AGE 50-75  08/07/2002    ZOSTER VACCINE AGE 60>  06/07/2012    EYE EXAM RETINAL OR DILATED Q1  09/06/2014    FOOT EXAM Q1  01/14/2017    MICROALBUMIN Q1  01/14/2017    GLAUCOMA SCREENING Q2Y  08/07/2017    Bone Densitometry (Dexa) Screening  08/07/2017    Pneumococcal 65+ High/Highest Risk (2 of 2 - PPSV23) 12/08/2017    HEMOGLOBIN A1C Q6M  02/08/2018    LIPID PANEL Q1  04/05/2018

## 2018-03-09 DIAGNOSIS — C61 PROSTATE CANCER (HCC): Primary | ICD-10-CM

## 2018-03-09 LAB
ALBUMIN SERPL-MCNC: 4.1 G/DL (ref 3.6–4.8)
ALBUMIN/GLOB SERPL: 1.5 {RATIO} (ref 1.2–2.2)
ALP SERPL-CCNC: 69 IU/L (ref 39–117)
ALT SERPL-CCNC: 17 IU/L (ref 0–44)
AST SERPL-CCNC: 12 IU/L (ref 0–40)
BILIRUB SERPL-MCNC: 0.4 MG/DL (ref 0–1.2)
BUN SERPL-MCNC: 12 MG/DL (ref 8–27)
BUN/CREAT SERPL: 11 (ref 10–24)
CALCIUM SERPL-MCNC: 9.4 MG/DL (ref 8.6–10.2)
CHLORIDE SERPL-SCNC: 101 MMOL/L (ref 96–106)
CO2 SERPL-SCNC: 25 MMOL/L (ref 18–29)
CREAT SERPL-MCNC: 1.07 MG/DL (ref 0.76–1.27)
EST. AVERAGE GLUCOSE BLD GHB EST-MCNC: 157 MG/DL
GFR SERPLBLD CREATININE-BSD FMLA CKD-EPI: 72 ML/MIN/1.73
GFR SERPLBLD CREATININE-BSD FMLA CKD-EPI: 84 ML/MIN/1.73
GLOBULIN SER CALC-MCNC: 2.7 G/DL (ref 1.5–4.5)
GLUCOSE SERPL-MCNC: 63 MG/DL (ref 65–99)
HBA1C MFR BLD: 7.1 % (ref 4.8–5.6)
HCV AB S/CO SERPL IA: <0.1 S/CO RATIO (ref 0–0.9)
POTASSIUM SERPL-SCNC: 3.5 MMOL/L (ref 3.5–5.2)
PROT SERPL-MCNC: 6.8 G/DL (ref 6–8.5)
PSA SERPL-MCNC: 1.2 NG/ML (ref 0–4)
SODIUM SERPL-SCNC: 141 MMOL/L (ref 134–144)

## 2018-03-09 NOTE — PROGRESS NOTES
discsused results. Needs to f/u  Dr Camron Valiente for rising pSA s/p rad tx for prostate cancer. Pt will call to schedule the appt.

## 2018-04-02 DIAGNOSIS — Z79.891 ENCOUNTER FOR LONG-TERM OPIATE ANALGESIC USE: Primary | ICD-10-CM

## 2018-04-03 ENCOUNTER — DOCUMENTATION ONLY (OUTPATIENT)
Dept: INTERNAL MEDICINE CLINIC | Age: 68
End: 2018-04-03

## 2018-04-03 ENCOUNTER — APPOINTMENT (OUTPATIENT)
Dept: INTERNAL MEDICINE CLINIC | Age: 68
End: 2018-04-03

## 2018-04-03 ENCOUNTER — OFFICE VISIT (OUTPATIENT)
Dept: CARDIOLOGY CLINIC | Age: 68
End: 2018-04-03

## 2018-04-03 VITALS
SYSTOLIC BLOOD PRESSURE: 120 MMHG | HEIGHT: 70 IN | OXYGEN SATURATION: 98 % | BODY MASS INDEX: 33.94 KG/M2 | WEIGHT: 237.1 LBS | DIASTOLIC BLOOD PRESSURE: 70 MMHG | HEART RATE: 76 BPM | RESPIRATION RATE: 16 BRPM

## 2018-04-03 DIAGNOSIS — I63.9 CEREBROVASCULAR ACCIDENT (CVA), UNSPECIFIED MECHANISM (HCC): ICD-10-CM

## 2018-04-03 DIAGNOSIS — I10 ESSENTIAL HYPERTENSION, BENIGN: ICD-10-CM

## 2018-04-03 DIAGNOSIS — G45.1 TRANSIENT ISCHEMIC ATTACK INVOLVING RIGHT INTERNAL CAROTID ARTERY: ICD-10-CM

## 2018-04-03 DIAGNOSIS — E11.49 TYPE 2 DIABETES MELLITUS WITH OTHER NEUROLOGIC COMPLICATION, WITH LONG-TERM CURRENT USE OF INSULIN (HCC): ICD-10-CM

## 2018-04-03 DIAGNOSIS — Z79.4 TYPE 2 DIABETES MELLITUS WITH OTHER NEUROLOGIC COMPLICATION, WITH LONG-TERM CURRENT USE OF INSULIN (HCC): ICD-10-CM

## 2018-04-03 DIAGNOSIS — E78.00 PURE HYPERCHOLESTEROLEMIA: Primary | ICD-10-CM

## 2018-04-03 RX ORDER — LISINOPRIL 40 MG/1
40 TABLET ORAL DAILY
COMMUNITY
Start: 2018-02-04 | End: 2018-09-04 | Stop reason: SDUPTHER

## 2018-04-03 NOTE — PROGRESS NOTES
1. Have you been to the ER, urgent care clinic since your last visit? Hospitalized since your last visit? No    2. Have you seen or consulted any other health care providers outside of the [address] [place] [address] since your last visit? Include any pap smears or colon screening.  Yes Dr Jacek Tohmas 3/2018/ Eye exam 9-2017    Patient has no cardiac complaints he is requesting surgical clearance for knee replacement,

## 2018-04-03 NOTE — PROGRESS NOTES
Socorro Macdonald DNP, ANP-BC  Subjective/HPI:     Yi Brito is a 74 y.o. male is here for new patient consultation, pending total left knee replacement. Denies exertional chest pain or dyspnea on exertion however essentially wheelchair dependent with mobility to stand and take steps slowly. Cardiac risk factors include previous CVA, insulin-dependent diabetes, hyperlipidemia, obesity and family history. PCP Provider  Sonya Hawthorne MD  Past Medical History:   Diagnosis Date    Allergic rhinitis     Arthritis     djd of knees    Diabetes ([facility]     Hypercholesteremia     Hypertension     Obesity     Stroke ([facility]     2/14/2017      Past Surgical History:   Procedure Laterality Date    HX CHOLECYSTECTOMY      laparoscopic     No Known Allergies   Family History   Problem Relation Age of Onset    Diabetes Mother     Hypertension Mother     Kidney Disease Mother     Arthritis-osteo Father     Diabetes Father     No Known Problems Sister     No Known Problems Sister     No Known Problems Sister     No Known Problems Sister     Cancer Sister      cancer      Current Outpatient Prescriptions   Medication Sig    lisinopril (PRINIVIL, ZESTRIL) 40 mg tablet 40 mg daily.  traMADol (ULTRAM) 50 mg tablet TAKE ONE TABLET BY MOUTH EVERY 6 HOURS AS NEEDED FOR PAIN  Indications: Pain    gabapentin (NEURONTIN) 100 mg capsule Take 1 Cap by mouth three (3) times daily. (Patient taking differently: Take 100 mg by mouth two (2) times a day.)    alcohol swabs (ALCOHOL PREP SWABS) padm 10 Each by Apply Externally route two (2) times a day.     glucose blood VI test strips (ONETOUCH ULTRA TEST) strip Onetouch ultra blue test strips, check blood sugar twice daily Dx e11.9    glucose blood VI test strips (ASCENSIA AUTODISC VI, ONE TOUCH ULTRA TEST VI) strip Check fsbs bid E11.65  Contour next    hydroCHLOROthiazide (HYDRODIURIL) 25 mg tablet TAKE ONE TABLET BY MOUTH ONCE DAILY    amLODIPine (NORVASC) 10 mg tablet TAKE ONE TABLET BY MOUTH ONCE DAILY    metFORMIN (GLUCOPHAGE) 1,000 mg tablet Take 1 Tab by mouth two (2) times daily (with meals).  Lancets misc by IntraDERMal route two (2) times a day.  fluticasone (FLONASE) 50 mcg/actuation nasal spray 2 Sprays by Both Nostrils route daily.  pravastatin (PRAVACHOL) 20 mg tablet TAKE ONE TABLET BY MOUTH IN THE EVENING    insulin syringe-needle U-100 Dispense ultrafine 0.5 mL syringes with 31 gauge needle    clopidogrel (PLAVIX) 75 mg tab Take 1 Tab by mouth daily.  glipiZIDE (GLUCOTROL) 10 mg tablet Take 1 Tab by mouth two (2) times a day.  clotrimazole (LOTRIMIN) 1 % topical cream Apply  to affected area two (2) times a day.  glucose blood VI test strips (ASCENSIA AUTODISC VI, ONE TOUCH ULTRA TEST VI) strip Embrace test strip--check fsbs bid 250.02    glucose blood VI test strips (ASCENSIA CONTOUR) strip Test twice daily.  Insulin Needles, Disposable, 31 X 5/16 \" ndle Inject once daily.  insulin glargine (LANTUS,BASAGLAR) 100 unit/mL (3 mL) inpn 15 unitsd sc qhs  Indications: type 2 diabetes mellitus (Patient not taking: Reported on 4/3/2018)    insulin NPH/insulin regular (NOVOLIN 70/30, HUMULIN 70/30) 100 unit/mL (70-30) injection 10 units sc bid     No current facility-administered medications for this visit. Vitals:    04/03/18 1054   BP: 120/70   Pulse: 76   Resp: 16   SpO2: 98%   Weight: 237 lb 1.6 oz (107.5 kg)   Height: 5' 10\" (1.778 m)     Social History     Social History    Marital status:      Spouse name: N/A    Number of children: N/A    Years of education: N/A     Occupational History    Not on file.      Social History Main Topics    Smoking status: Never Smoker    Smokeless tobacco: Never Used    Alcohol use No      Comment: Stop 5 months     Drug use: Yes     Special: Prescription, OTC    Sexual activity: Yes     Other Topics Concern    Not on file     Social History Narrative       I have reviewed the nurses notes, vitals, problem list, allergy list, medical history, family, social history and medications. Review of Symptoms:    General: Pt denies excessive weight gain or loss. Has some limitations in activities of daily life secondary to mobility  HEENT: Denies blurred vision, headaches, epistaxis and difficulty swallowing. Respiratory: Denies shortness of breath, DE JESUS, wheezing or stridor. Cardiovascular: Denies precordial pain, palpitations, edema or PND  Gastrointestinal: Denies poor appetite, indigestion, abdominal pain or blood in stool  Musculoskeletal: Denies pain or swelling from muscles or joints  Neurologic: Denies tremor, paresthesias, or sensory motor disturbance  Skin: Denies rash, itching or texture change. Physical Exam:      General: Well developed, in no acute distress, cooperative and alert  HEENT: No carotid bruits, no JVD, trach is midline. Neck Supple, PEERL,  Heart:  Normal S1/S2 negative S3 or S4. Regular, no murmur, gallop or rub.   Respiratory: Clear bilaterally x 4, no wheezing or rales  Abdomen:   Soft, non-tender, no masses, bowel sounds are active.   Extremities:  No edema, normal cap refill, no cyanosis, atraumatic. Neuro: A&Ox3, speech clear, presents in wheelchair, able to stand with assistance for weighing. Skin: Skin color is normal. No rashes or lesions.  Non diaphoretic  Vascular: 2+ pulses symmetric in all extremities    Cardiographics    ECG: Sinus rhythm  Results for orders placed or performed during the hospital encounter of 02/14/17   EKG, 12 LEAD, INITIAL   Result Value Ref Range    Ventricular Rate 68 BPM    Atrial Rate 68 BPM    P-R Interval 184 ms    QRS Duration 80 ms    Q-T Interval 370 ms    QTC Calculation (Bezet) 393 ms    Calculated P Axis 34 degrees    Calculated R Axis 4 degrees    Calculated T Axis 13 degrees    Diagnosis       Normal sinus rhythm with sinus arrhythmia  Nonspecific T wave abnormality  When compared with ECG of 12-NOV-2009 13:20,  No significant change was found  Confirmed by Himanshu Nino ([id number]) on 2/15/2017 5:35:22 PM           Cardiology Labs:  Lab Results   Component Value Date/Time    Cholesterol, total 177 04/05/2017 04:03 AM    HDL Cholesterol 28 04/05/2017 04:03 AM    LDL, calculated 90.4 04/05/2017 04:03 AM    Triglyceride 293 (H) 04/05/2017 04:03 AM    CHOL/HDL Ratio 6.3 (H) 04/05/2017 04:03 AM       Lab Results   Component Value Date/Time    Sodium 141 03/08/2018 01:00 PM    Potassium 3.5 03/08/2018 01:00 PM    Chloride 101 03/08/2018 01:00 PM    CO2 25 03/08/2018 01:00 PM    Anion gap 12 04/05/2017 04:03 AM    Glucose 63 (L) 03/08/2018 01:00 PM    BUN 12 03/08/2018 01:00 PM    Creatinine 1.07 03/08/2018 01:00 PM    BUN/Creatinine ratio 11 03/08/2018 01:00 PM    GFR est AA 84 03/08/2018 01:00 PM    GFR est non-AA 72 03/08/2018 01:00 PM    Calcium 9.4 03/08/2018 01:00 PM    Bilirubin, total 0.4 03/08/2018 01:00 PM    AST (SGOT) 12 03/08/2018 01:00 PM    Alk. phosphatase 69 03/08/2018 01:00 PM    Protein, total 6.8 03/08/2018 01:00 PM    Albumin 4.1 03/08/2018 01:00 PM    Globulin 3.9 04/04/2017 10:50 AM    A-G Ratio 1.5 03/08/2018 01:00 PM    ALT (SGPT) 17 03/08/2018 01:00 PM           Assessment:     Assessment:     Diagnoses and all orders for this visit:    1. Pure hypercholesterolemia  -     AMB POC EKG ROUTINE W/ 12 LEADS, INTER & REP  -     2D ECHO COMPLETE ADULT (TTE) W OR WO CONTR; Future  -     LEXISCAN/CARDIOLITE, Clinic Performed; Future    2. Essential hypertension, benign  -     2D ECHO COMPLETE ADULT (TTE) W OR WO CONTR; Future  -     LEXISCAN/CARDIOLITE, Clinic Performed; Future    3. Transient ischemic attack involving right internal carotid artery  -     2D ECHO COMPLETE ADULT (TTE) W OR WO CONTR; Future  -     LEXISCAN/CARDIOLITE, Clinic Performed; Future    4.  Cerebrovascular accident (CVA), unspecified mechanism ([address]  -     2D ECHO COMPLETE ADULT (TTE) W OR WO CONTR; Future  -     LEXISCAN/CARDIOLITE, Clinic Performed; Future    5. Type 2 diabetes mellitus with other neurologic complication, with long-term current use of insulin (HCC)  -     2D ECHO COMPLETE ADULT (TTE) W OR WO CONTR; Future  -     LEXISCAN/CARDIOLITE, Clinic Performed; Future        ICD-10-CM ICD-9-CM    1. Pure hypercholesterolemia E78.00 272.0 lisinopril (PRINIVIL, ZESTRIL) 40 mg tablet      AMB POC EKG ROUTINE W/ 12 LEADS, INTER & REP      2D ECHO COMPLETE ADULT (TTE) W OR WO CONTR      STRESS TEST LEXISCAN/CARDIOLITE   2. Essential hypertension, benign I10 401.1 2D ECHO COMPLETE ADULT (TTE) W OR WO CONTR      STRESS TEST LEXISCAN/CARDIOLITE   3. Transient ischemic attack involving right internal carotid artery G45.1 435.8 2D ECHO COMPLETE ADULT (TTE) W OR WO CONTR      STRESS TEST LEXISCAN/CARDIOLITE   4. Cerebrovascular accident (CVA), unspecified mechanism ([address] I63.9 434.91 2D ECHO COMPLETE ADULT (TTE) W OR WO CONTR      STRESS TEST LEXISCAN/CARDIOLITE   5. Type 2 diabetes mellitus with other neurologic complication, with long-term current use of insulin (HCC) E11.49 250.60 2D ECHO COMPLETE ADULT (TTE) W OR WO CONTR    Z79.4 V58.67 STRESS TEST LEXISCAN/CARDIOLITE     Orders Placed This Encounter    AMB POC EKG ROUTINE W/ 12 LEADS, INTER & REP     Order Specific Question:   Reason for Exam:     Answer:   routine    LEXISCAN/CARDIOLITE, Clinic Performed     Standing Status:   Future     Standing Expiration Date:   10/3/2018     Order Specific Question:   Reason for Exam:     Answer:   pre op eval, cad risk, abnormal EKG    2D ECHO COMPLETE ADULT (TTE) W OR WO CONTR     Standing Status:   Future     Standing Expiration Date:   10/3/2018     Order Specific Question:   Reason for Exam:     Answer: Abn EKG     Order Specific Question:   Contrast Enhancement (Bubble Study, Definity, Optison) may be used if criteria listed in established evidence-based protocol has been identified. Answer:    Yes    lisinopril (PRINIVIL, ZESTRIL) 40 mg tablet     Si mg daily. Plan:     Patient is a 72-year-old male here for cardiac clearance for total knee replacement. Cardiac risk factors include diabetes, hypertension, hyperlipidemia obesity and family history. EKG abnormal inferior leads. Will evaluate with Lexiscan nuclear stress test and echocardiogram.  If satisfactory outcome he will be cleared from cardiology. Katia Hawthorne NP    This note was created using voice recognition software. Despite editing, there may be syntax errors. [facility]    4/3/2018         Patient seen, examined by me personally. Plan discussed as detailed. Agree with note as outlined by  NP. I confirm findings in history and physical exam. No additional findings noted. Agree with plan as outlined above.      Delroy Valdez MD

## 2018-04-03 NOTE — MR AVS SNAPSHOT
[address] [facility] 
[phone] Patient: Debra Mustafa MRN:  [id number] Visit Information Date & Time Provider Department Dept. Phone Encounter #  
 4/3/2018 10:30 AM Allie Guan, [address] [facility] [phone] [id number] Your Appointments 4/16/2018 12:30 PM  
ECHO CARDIOGRAMS 2D with [facility]  [address] [place] [address]) Appt Note: Dr. Gloria Crowe 5'10\" 237 STRESS TEST LEXISCAN/CARDIOLITE [[id number]] (Order [id number]) 2D ECHO COMPLETE ADULT (TTE) W OR WO CONTR [[id number]] (Order [id number])  
 [address] [place] [facility]  
[phone] [address]  [address] [address]  
  
    
 4/16/2018  1:30 PM  
NUCLEAR MEDICINE with NUCLEAR, [facility] [address]) Appt Note: Dr. Gloria Crowe 5'10\" 237 STRESS TEST LEXISCAN/CARDIOLITE [[id number]] (Order [id number]) 2D ECHO COMPLETE ADULT (TTE) W OR WO CONTR [[id number]] (Order [id number])  
 [address] [place] [facility]  
[phone] [address] [place] [facility] Upcoming Health Maintenance Date Due DTaP/Tdap/Td series (1 - Tdap) 8/7/1973 ZOSTER VACCINE AGE 60> 6/7/2012 EYE EXAM RETINAL OR DILATED Q1 9/6/2014 FOOT EXAM Q1 1/14/2017 MICROALBUMIN Q1 1/14/2017 GLAUCOMA SCREENING Q2Y 8/7/2017 Pneumococcal 65+ High/Highest Risk (2 of 2 - PPSV23) 12/8/2017 LIPID PANEL Q1 4/5/2018 HEMOGLOBIN A1C Q6M 9/8/2018 MEDICARE YEARLY EXAM 3/9/2019 FOBT Q 1 YEAR AGE 50-75 3/9/2019 Allergies as of 4/3/2018  Review Complete On: 4/3/2018 By: Skyla Reilly NP No Known Allergies Current Immunizations  Never Reviewed Name Date Influenza Vaccine 10/1/2016 Influenza Vaccine (Quad) PF 1/14/2016 Pneumococcal Conjugate (PCV-13) 10/13/2017 Not reviewed this visit You Were Diagnosed With   
  
 Codes Comments Pure hypercholesterolemia    -  Primary ICD-10-CM: E78.00 ICD-9-CM: 272.0 Essential hypertension, benign     ICD-10-CM: I10 
ICD-9-CM: 401.1 Transient ischemic attack involving right internal carotid artery     ICD-10-CM: G45.1 ICD-9-CM: 435.8 Cerebrovascular accident (CVA), unspecified mechanism ([facility]     ICD-10-CM: I63.9 ICD-9-CM: 434.91 Type 2 diabetes mellitus with other neurologic complication, with long-term current use of insulin (HCC)     ICD-10-CM: E11.49, Z79.4 ICD-9-CM: 250.60, V58.67 Vitals BP Pulse Resp Height(growth percentile) Weight(growth percentile) SpO2  
 120/70 76 16 5' 10\" (1.778 m) 237 lb 1.6 oz (107.5 kg) 98% BMI Smoking Status 34.02 kg/m2 Never Smoker BMI and BSA Data Body Mass Index Body Surface Area 34.02 kg/m 2 2.3 m 2 Preferred Pharmacy Pharmacy Name Phone [address] [place] [phone] Your Updated Medication List  
  
   
This list is accurate as of 4/3/18 12:00 PM.  Always use your most recent med list.  
  
  
  
  
 alcohol swabs Padm Commonly known as:  ALCOHOL PREP SWABS  
10 Each by Apply Externally route two (2) times a day. amLODIPine 10 mg tablet Commonly known as:  Job Dub TAKE ONE TABLET BY MOUTH ONCE DAILY  
  
 clopidogrel 75 mg Tab Commonly known as:  PLAVIX Take 1 Tab by mouth daily. clotrimazole 1 % topical cream  
Commonly known as:  Jose M Mighty Apply  to affected area two (2) times a day. fluticasone 50 mcg/actuation nasal spray Commonly known as:  Kalyn Kingsley 2 Sprays by Both Nostrils route daily. gabapentin 100 mg capsule Commonly known as:  NEURONTIN Take 1 Cap by mouth three (3) times daily. glipiZIDE 10 mg tablet Commonly known as:  Nir Best Take 1 Tab by mouth two (2) times a day. * glucose blood VI test strips strip Commonly known as:  Ascensia CONTOUR Test twice daily. * glucose blood VI test strips strip Commonly known as:  ASCENSIA AUTODISC VI, ONE TOUCH ULTRA TEST VI Embrace test strip--check fsbs bid 250.02  
  
 * glucose blood VI test strips strip Commonly known as:  ASCENSIA AUTODISC VI, ONE TOUCH ULTRA TEST VI Check fsbs bid E11.65  Contour next * glucose blood VI test strips strip Commonly known as:  ONETOUCH ULTRA TEST Onetouch ultra blue test strips, check blood sugar twice daily Dx e11.9  
  
 hydroCHLOROthiazide 25 mg tablet Commonly known as:  HYDRODIURIL  
TAKE ONE TABLET BY MOUTH ONCE DAILY  
  
 insulin glargine 100 unit/mL (3 mL) Inpn Commonly known as:  LANTUS,BASAGLAR  
15 unitsd sc qhs  Indications: type 2 diabetes mellitus Insulin Needles (Disposable) 31 gauge x 5/16\" Ndle Inject once daily. insulin NPH/insulin regular 100 unit/mL (70-30) injection Commonly known as:  NOVOLIN 70/30, HUMULIN 70/30  
10 units sc bid  
  
 insulin syringe-needle U-100 Dispense ultrafine 0.5 mL syringes with 31 gauge needle Lancets Misc  
by IntraDERMal route two (2) times a day. lisinopril 40 mg tablet Commonly known as:  PRINIVIL, ZESTRIL  
40 mg daily. metFORMIN 1,000 mg tablet Commonly known as:  GLUCOPHAGE Take 1 Tab by mouth two (2) times daily (with meals). pravastatin 20 mg tablet Commonly known as:  PRAVACHOL  
TAKE ONE TABLET BY MOUTH IN THE EVENING  
  
 traMADol 50 mg tablet Commonly known as:  ULTRAM  
TAKE ONE TABLET BY MOUTH EVERY 6 HOURS AS NEEDED FOR PAIN  Indications: Pain * Notice: This list has 4 medication(s) that are the same as other medications prescribed for you. Read the directions carefully, and ask your doctor or other care provider to review them with you. We Performed the Following AMB POC EKG ROUTINE W/ 12 LEADS, INTER & REP [66918 CPT(R)] To-Do List   
 04/04/2018 ECG:  STRESS TEST LEXISCAN/CARDIOLITE   
  
 04/12/2018 ECHO:  2D ECHO COMPLETE ADULT (TTE) W OR WO CONTR Introducing \[facility] [place] [facility]\"" & HEALTH SERVICES! Sandra Santiago introduces [employer] patient portal. Now you can access parts of your medical record, email your doctor's office, and request medication refills online. 1. In your internet browser, go to [email or web address] 2. Click on the First Time User? Click Here link in the Sign In box. You will see the New Member Sign Up page. 3. Enter your Medify Access Code exactly as it appears below. You will not need to use this code after youve completed the sign-up process. If you do not sign up before the expiration date, you must request a new code. · Medify Access Code: [email or web address] Expires: 6/6/2018  1:25 PM 
 
4. Enter the last four digits of your Social Security Number (xxxx) and Date of Birth (mm/dd/yyyy) as indicated and click Submit. You will be taken to the next sign-up page. 5. Create a Medify ID. This will be your Medify login ID and cannot be changed, so think of one that is secure and easy to remember. 6. Create a Medify password. You can change your password at any time. 7. Enter your Password Reset Question and Answer. This can be used at a later time if you forget your password. 8. Enter your e-mail address. You will receive e-mail notification when new information is available in [address] 9. Click Sign Up. You can now view and download portions of your medical record. 10. Click the Download Summary menu link to download a portable copy of your medical information. If you have questions, please visit the Frequently Asked Questions section of the Medify website. Remember, Medify is NOT to be used for urgent needs. For medical emergencies, dial 911. Now available from your iPhone and Android! Please provide this summary of care documentation to your next provider. Your primary care clinician is listed as Linda DAVIS. If you have any questions after today's visit, please call [phone].

## 2018-04-03 NOTE — PROGRESS NOTES
David Carl dropped from off from [facility] for Dr. Leann Slater to complete. Turnaround time and potential fee was discussed. Form was placed in Dr. Luis M Wagner box.

## 2018-04-05 LAB — DRUGS UR: NORMAL

## 2018-04-16 ENCOUNTER — CLINICAL SUPPORT (OUTPATIENT)
Dept: CARDIOLOGY CLINIC | Age: 68
End: 2018-04-16

## 2018-04-16 DIAGNOSIS — E11.49 TYPE 2 DIABETES MELLITUS WITH OTHER NEUROLOGIC COMPLICATION, WITH LONG-TERM CURRENT USE OF INSULIN (HCC): ICD-10-CM

## 2018-04-16 DIAGNOSIS — R94.31 ABNORMAL EKG: Primary | ICD-10-CM

## 2018-04-16 DIAGNOSIS — E78.00 PURE HYPERCHOLESTEROLEMIA: ICD-10-CM

## 2018-04-16 DIAGNOSIS — G45.1 TRANSIENT ISCHEMIC ATTACK INVOLVING RIGHT INTERNAL CAROTID ARTERY: ICD-10-CM

## 2018-04-16 DIAGNOSIS — I63.9 CEREBROVASCULAR ACCIDENT (CVA), UNSPECIFIED MECHANISM (HCC): ICD-10-CM

## 2018-04-16 DIAGNOSIS — I10 ESSENTIAL HYPERTENSION, BENIGN: ICD-10-CM

## 2018-04-16 DIAGNOSIS — Z79.4 TYPE 2 DIABETES MELLITUS WITH OTHER NEUROLOGIC COMPLICATION, WITH LONG-TERM CURRENT USE OF INSULIN (HCC): ICD-10-CM

## 2018-04-19 NOTE — PROGRESS NOTES
Verified patient with two identifiers. Spoke with patient regarding ECHO test results.   Faxed last office note and ECHO results to Dr. Queta Kan @ [phone]

## 2018-04-29 RX ORDER — CLOPIDOGREL BISULFATE 75 MG/1
TABLET ORAL
Qty: 30 TAB | Refills: 11 | Status: ON HOLD | OUTPATIENT
Start: 2018-04-29 | End: 2018-06-21

## 2018-06-01 RX ORDER — AMLODIPINE BESYLATE 10 MG/1
TABLET ORAL
Qty: 90 TAB | Refills: 3 | Status: SHIPPED | OUTPATIENT
Start: 2018-06-01 | End: 2019-01-07 | Stop reason: SDUPTHER

## 2018-06-01 NOTE — TELEPHONE ENCOUNTER
Pt calling refill on amlodipine [facility] [address],[phone]. Pt's best contact is [phone]. Completely out.        Message received & copied from [facility]

## 2018-06-04 ENCOUNTER — TELEPHONE (OUTPATIENT)
Dept: INTERNAL MEDICINE CLINIC | Age: 68
End: 2018-06-04

## 2018-06-05 ENCOUNTER — HOSPITAL ENCOUNTER (OUTPATIENT)
Dept: PHYSICAL THERAPY | Age: 68
Discharge: HOME | End: 2018-06-05

## 2018-06-05 ENCOUNTER — HOSPITAL ENCOUNTER (OUTPATIENT)
Dept: PREADMISSION TESTING | Age: 68
Discharge: HOME | End: 2018-06-05
Payer: MEDICARE

## 2018-06-05 VITALS
BODY MASS INDEX: 35.5 KG/M2 | TEMPERATURE: 97.9 F | HEART RATE: 66 BPM | WEIGHT: 248 LBS | HEIGHT: 70 IN | OXYGEN SATURATION: 97 % | RESPIRATION RATE: 20 BRPM | SYSTOLIC BLOOD PRESSURE: 157 MMHG | DIASTOLIC BLOOD PRESSURE: 83 MMHG

## 2018-06-05 DIAGNOSIS — E11.49 TYPE 2 DIABETES MELLITUS WITH OTHER NEUROLOGIC COMPLICATION, WITH LONG-TERM CURRENT USE OF INSULIN (HCC): ICD-10-CM

## 2018-06-05 DIAGNOSIS — I63.9 CEREBROVASCULAR ACCIDENT (CVA), UNSPECIFIED MECHANISM (HCC): ICD-10-CM

## 2018-06-05 DIAGNOSIS — R06.83 SNORING: Primary | ICD-10-CM

## 2018-06-05 DIAGNOSIS — E66.09 CLASS 2 OBESITY DUE TO EXCESS CALORIES WITH BODY MASS INDEX (BMI) OF 35.0 TO 35.9 IN ADULT, UNSPECIFIED WHETHER SERIOUS COMORBIDITY PRESENT: ICD-10-CM

## 2018-06-05 DIAGNOSIS — Z79.4 TYPE 2 DIABETES MELLITUS WITH OTHER NEUROLOGIC COMPLICATION, WITH LONG-TERM CURRENT USE OF INSULIN (HCC): ICD-10-CM

## 2018-06-05 LAB
ALBUMIN SERPL-MCNC: 3.4 G/DL (ref 3.5–5)
ALBUMIN/GLOB SERPL: 0.9 {RATIO} (ref 1.1–2.2)
ALP SERPL-CCNC: 66 U/L (ref 45–117)
ALT SERPL-CCNC: 21 U/L (ref 12–78)
ANION GAP SERPL CALC-SCNC: 8 MMOL/L (ref 5–15)
APPEARANCE UR: CLEAR
AST SERPL-CCNC: 10 U/L (ref 15–37)
BACTERIA URNS QL MICRO: NEGATIVE /HPF
BILIRUB SERPL-MCNC: 0.2 MG/DL (ref 0.2–1)
BILIRUB UR QL CFM: NEGATIVE
BUN SERPL-MCNC: 13 MG/DL (ref 6–20)
BUN/CREAT SERPL: 11 (ref 12–20)
CALCIUM SERPL-MCNC: 8.9 MG/DL (ref 8.5–10.1)
CHLORIDE SERPL-SCNC: 111 MMOL/L (ref 97–108)
CO2 SERPL-SCNC: 24 MMOL/L (ref 21–32)
COLOR UR: ABNORMAL
CREAT SERPL-MCNC: 1.19 MG/DL (ref 0.7–1.3)
EPITH CASTS URNS QL MICRO: ABNORMAL /LPF
ERYTHROCYTE [DISTWIDTH] IN BLOOD BY AUTOMATED COUNT: 14.6 % (ref 11.5–14.5)
EST. AVERAGE GLUCOSE BLD GHB EST-MCNC: 160 MG/DL
GLOBULIN SER CALC-MCNC: 3.8 G/DL (ref 2–4)
GLUCOSE SERPL-MCNC: 139 MG/DL (ref 65–100)
GLUCOSE UR STRIP.AUTO-MCNC: 100 MG/DL
HBA1C MFR BLD: 7.2 % (ref 4.2–6.3)
HCT VFR BLD AUTO: 39.2 % (ref 36.6–50.3)
HGB BLD-MCNC: 12.8 G/DL (ref 12.1–17)
HGB UR QL STRIP: NEGATIVE
HYALINE CASTS URNS QL MICRO: ABNORMAL /LPF (ref 0–5)
INR PPP: 1 (ref 0.9–1.1)
KETONES UR QL STRIP.AUTO: NEGATIVE MG/DL
LEUKOCYTE ESTERASE UR QL STRIP.AUTO: NEGATIVE
MCH RBC QN AUTO: 26.8 PG (ref 26–34)
MCHC RBC AUTO-ENTMCNC: 32.7 G/DL (ref 30–36.5)
MCV RBC AUTO: 82.2 FL (ref 80–99)
NITRITE UR QL STRIP.AUTO: NEGATIVE
NRBC # BLD: 0 K/UL (ref 0–0.01)
NRBC BLD-RTO: 0 PER 100 WBC
PH UR STRIP: 5 [PH] (ref 5–8)
PLATELET # BLD AUTO: 286 K/UL (ref 150–400)
PMV BLD AUTO: 10 FL (ref 8.9–12.9)
POTASSIUM SERPL-SCNC: 4 MMOL/L (ref 3.5–5.1)
PROT SERPL-MCNC: 7.2 G/DL (ref 6.4–8.2)
PROT UR STRIP-MCNC: NEGATIVE MG/DL
PROTHROMBIN TIME: 10.2 SEC (ref 9–11.1)
RBC # BLD AUTO: 4.77 M/UL (ref 4.1–5.7)
RBC #/AREA URNS HPF: ABNORMAL /HPF (ref 0–5)
SODIUM SERPL-SCNC: 143 MMOL/L (ref 136–145)
SP GR UR REFRACTOMETRY: 1.03 (ref 1–1.03)
UA: UC IF INDICATED,UAUC: ABNORMAL
UROBILINOGEN UR QL STRIP.AUTO: 0.2 EU/DL (ref 0.2–1)
WBC # BLD AUTO: 6.8 K/UL (ref 4.1–11.1)
WBC URNS QL MICRO: ABNORMAL /HPF (ref 0–4)

## 2018-06-05 PROCEDURE — G8978 MOBILITY CURRENT STATUS: HCPCS

## 2018-06-05 PROCEDURE — 83036 HEMOGLOBIN GLYCOSYLATED A1C: CPT | Performed by: ORTHOPAEDIC SURGERY

## 2018-06-05 PROCEDURE — G8979 MOBILITY GOAL STATUS: HCPCS

## 2018-06-05 PROCEDURE — 97110 THERAPEUTIC EXERCISES: CPT

## 2018-06-05 PROCEDURE — 97161 PT EVAL LOW COMPLEX 20 MIN: CPT

## 2018-06-05 PROCEDURE — 81001 URINALYSIS AUTO W/SCOPE: CPT | Performed by: ORTHOPAEDIC SURGERY

## 2018-06-05 PROCEDURE — 85027 COMPLETE CBC AUTOMATED: CPT | Performed by: ORTHOPAEDIC SURGERY

## 2018-06-05 PROCEDURE — G8980 MOBILITY D/C STATUS: HCPCS

## 2018-06-05 PROCEDURE — 80053 COMPREHEN METABOLIC PANEL: CPT | Performed by: ORTHOPAEDIC SURGERY

## 2018-06-05 PROCEDURE — 85610 PROTHROMBIN TIME: CPT | Performed by: ORTHOPAEDIC SURGERY

## 2018-06-05 PROCEDURE — 36415 COLL VENOUS BLD VENIPUNCTURE: CPT | Performed by: ORTHOPAEDIC SURGERY

## 2018-06-05 RX ORDER — HEPARIN SODIUM 1000 [USP'U]/ML
1000 INJECTION, SOLUTION INTRAVENOUS; SUBCUTANEOUS ONCE
Status: CANCELLED | OUTPATIENT
Start: 2018-06-20 | End: 2018-06-20

## 2018-06-05 RX ORDER — GABAPENTIN 100 MG/1
100 CAPSULE ORAL 3 TIMES DAILY
COMMUNITY
End: 2018-09-04 | Stop reason: SDUPTHER

## 2018-06-05 RX ORDER — SODIUM CHLORIDE, SODIUM LACTATE, POTASSIUM CHLORIDE, CALCIUM CHLORIDE 600; 310; 30; 20 MG/100ML; MG/100ML; MG/100ML; MG/100ML
25 INJECTION, SOLUTION INTRAVENOUS CONTINUOUS
Status: CANCELLED | OUTPATIENT
Start: 2018-06-20

## 2018-06-05 RX ORDER — PREGABALIN 25 MG/1
75 CAPSULE ORAL ONCE
Status: CANCELLED | OUTPATIENT
Start: 2018-06-20 | End: 2018-06-20

## 2018-06-05 RX ORDER — ACETAMINOPHEN 500 MG
1000 TABLET ORAL ONCE
Status: CANCELLED | OUTPATIENT
Start: 2018-06-20 | End: 2018-06-20

## 2018-06-05 RX ORDER — CEFAZOLIN SODIUM/WATER 2 G/20 ML
2 SYRINGE (ML) INTRAVENOUS ONCE
Status: CANCELLED | OUTPATIENT
Start: 2018-06-20 | End: 2018-06-20

## 2018-06-05 RX ORDER — CELECOXIB 100 MG/1
200 CAPSULE ORAL ONCE
Status: CANCELLED | OUTPATIENT
Start: 2018-06-20 | End: 2018-06-20

## 2018-06-05 NOTE — PROGRESS NOTES
[facility]  Physical Therapy Pre-surgery evaluation  [address]  [place], [address] [facility]    physical Therapy pre TKR surgery EVALUATION  Patient: Milla Mayes (39 y.o. male)  Date: 6/5/2018  Primary Diagnosis: left knee        Precautions:        ASSESSMENT :  Based on the objective data described below, the patient presents with impaired gait, balance, pain, and overall high level functional mobility due to end stage degenerative joint disease in the left knee. Pt with hx of CVA in February 2017 and residual L sided weakness. Following CVA in Feb 2017, pt discharged to SNF for 2-3 weeks of rehab and has since been living at home w/ wife. Pt mostly utilizes wheelchair for mobility however states he is able to ambulate very short distances (distance to bathroom) with SPC. Hx of MULTIPLE falls at home. Discussed anticipated disposition to home with possible discharge within a 1 to 2 day time frame post-surgery. Patient and  in agreement. Wife present today and reports that she will be assisting at discharge. GOALS: (Goals have been discussed and agreed upon with patient.)  DISCHARGE GOALS: Time Frame: 1 DAY  1. Patient will demonstrate increased strength, range of motion, and pain control via a home exercise program in order to minimize functional deficits in preparation for their upcoming surgery. This will be achieved by using education, demonstration and through the use of an informational handout including a home exercise program.  REHABILITATION POTENTIAL FOR STATED GOALS: Fair     RECOMMENDATIONS AND PLANNED INTERVENTIONS: (Benefits and precautions of physical therapy have been discussed with the patient.)  1. Home Exercise Program  TREATMENT PLAN EFFECTIVE DATES: 6/5/2018 TO 6/5/2018  FREQUENCY/DURATION: Patient to continue to perform home exercise program at least twice daily until his surgery.      SUBJECTIVE:   Patient stated I am pretty sure good at sitting Esperanza [place]     OBJECTIVE DATA SUMMARY:   HISTORY:    Past Medical History:   Diagnosis Date    Allergic rhinitis     Arthritis     djd of knees    Diabetes ([facility] [address]     Hypercholesteremia     Hypertension     Obesity     Stroke ([facility] [address]     left--ring finger/ pinky finger numb sensation     Past Surgical History:   Procedure Laterality Date    HX CHOLECYSTECTOMY      laparoscopic     Prior Level of Function/Home Situation: Pt lives at home w/ wife, 2 sons, and grandson. Hx of 2 CVAs in Feb 2017 with residual L sided weakness. Following CVA in Feb 2017, pt discharged to SNF for 2-3 weeks of rehab. Mostly utilizes wheelchair for mobility however states he is able to ambulate very short household distances. History of MULTIPLE FALLS. States he drags in L leg when ambulates. Personal factors and/or comorbidities impacting plan of care:     Patient []   does  [x]   does not state signs/symptoms of shortness of breath/dyspnea on exertion/respiratory distress. Home Situation  Home Environment: Private residence  # Steps to Enter: 1  Rails to Enter: No  One/Two Story Residence: One story  Living Alone: No  Support Systems: Spouse/Significant Other/Partner, Child(sergei) (wife, 2 sons, grandson)  Patient Expects to be Discharged to[de-identified] Private residence  Current DME Used/Available at Home: Wheelchair, Cane, straight, Glucometer  Tub or Shower Type: Tub/Shower combination    EXAMINATION/PRESENTATION/DECISION MAKING:     ADLs (Current Functional Status):    Bathing/Showering:   [] Independent  [x] Requires Assistance from Someone  [] Sponge Bath Only   Ambulation:  [] Independent  [] Walk Indoors Only  [] Walk Outdoors  [] Use Assistive Device  [x] Use Wheelchair Only     Dressing:  [x] [address] from Someone for:  [x] Sock/Shoes  [] Pants  [] Everything   Household Activities:  [x] Routine house and yard work  [] Light Housework Only  [] None       Critical Behavior:                Strength:    Strength: Grossly decreased, non-functional                    Tone & Sensation:                                  Range Of Motion:  AROM: Generally decreased, functional                       Coordination:  Coordination: Within functional limits    Functional Mobility:  Transfers:  Sit to Stand: Stand-by assistance  Stand to Sit: Stand-by assistance                       Balance:   Sitting: Intact  Standing: Impaired  Standing - Static: Fair  Standing - Dynamic : Fair  Ambulation/Gait Training:                                                        Therapeutic Exercises:   The patient was educated in, has demonstrated, and has received written instructions to complete for their home exercise program per total knee replacement protocol. Functional Measure:  Lower Extremity Functional Scale (LEFS):      Score 3/80     Percentage of impairment CH  0% CI  1-19% CJ  20-39% CK  40-59% CL  60-79% CM  80-99% CN  100%   LEFS score:  0-80 80 64-79 47-63 31-46 16-30 1-15 0     Cutt-offs: None established  TKA and NICK:   (Cortes et al, 2000)  MDC = 9 points   MCID = 9 points           G codes: In compliance with CMSs Claims Based Outcome Reporting, the following G-code set was chosen for this patient based on their primary functional limitation being treated: The outcome measure chosen to determine the severity of the functional limitation was the LEFS with a score of 3/80 which was correlated with the impairment scale. ? Mobility - Walking and Moving Around:     - CURRENT STATUS: CM - 80%-99% impaired, limited or restricted    - GOAL STATUS: CM - 80%-99% impaired, limited or restricted    - D/C STATUS:  CM - 80%-99% impaired, limited or restricted     Pain:  Pain Scale 1: Numeric (0 - 10)  Pain Intensity 1: 3  Pain Location 1: Knee     Pain Description 1: Aching; Intermittent       Activity Tolerance:   VSS   Patient []   does  [x]   does not demonstrate signs/symptoms of shortness of breath/dyspnea on exertion/respiratory distress. COMMUNICATION/EDUCATION:   The patient was educated on:  [x]         Importance of post-operative mobility to achieve their desired outcomes and restore biological function  [x]         The key post-operative time frame to address ROM to prevent additional complications    The patients plan of care was discussed with:   [x]         The patient verbalized understanding of his plan in preparation for their upcoming surgery  [x]         The patient's  was present for this session  []         The patient reports that he/she does not have a  identified at this time  []         The  verbalized understanding of the education regarding the patient's upcoming surgery  [x]         Patient/family agree to work toward stated goals and plan of care. [x]         Patient understands intent and goals of therapy, but is neutral about his/her participation. []         Patient is unable to participate in goal setting and plan of care.     Thank you for this referral.  Shena Hadley, PT, DPT   Time Calculation: 24 mins

## 2018-06-05 NOTE — ADVANCED PRACTICE NURSE
PCP requests order for referral for sleep apnea be placed. Also requests further recommendations (prior to surgery) regarding Plavix be provided by cardiology.

## 2018-06-05 NOTE — ADVANCED PRACTICE NURSE
ABHISHEK score of 5 in PAT assessment. Pt admits to snoring loudly, denies ever having been advised that he has pauses in breathing while sleeping or ever having been referred for a sleep apnea evaluation. Denies decreased cervical ROM. Denies prior complications from anesthesia. Poor dentition noted, but pt denies loose teeth. States enamel has eroded on upper teeth. Msg sent to Dr. Yuri Gan via [address] [place] [address] regarding ABHISHEK score with request for further recommendations for pt.

## 2018-06-05 NOTE — PERIOP NOTES
[facility]  Preoperative Instructions        Surgery Date 06/20/18          Time of Arrival 1030  Contact # [phone] cell    1. On the day of your surgery, please report to the Surgical Services Registration Desk and sign in at your designated time. The Surgery Center is located to the right of the Emergency Room. 2. You must have someone with you to drive you home. You should not drive a car for 24 hours following surgery. Please make arrangements for a friend or family member to stay with you for the first 24 hours after your surgery. 3. Do not have anything to eat or drink (including water, gum, mints, coffee, juice) after midnight ?06/19/18   . ? This may not apply to medications prescribed by your physician. ?(Please note below the special instructions with medications to take the morning of your procedure.)    4. We recommend you do not drink any alcoholic beverages for 24 hours before and after your surgery. 5. Contact your surgeons office for instructions on the following medications: non-steroidal anti-inflammatory drugs (i.e. Advil, Aleve), vitamins, and supplements. (Some surgeons will want you to stop these medications prior to surgery and others may allow you to take them)  **If you are currently taking Plavix, Coumadin, Aspirin and/or other blood-thinning agents, contact your surgeon for instructions. ** Your surgeon will partner with the physician prescribing these medications to determine if it is safe to stop or if you need to continue taking. Please do not stop taking these medications without instructions from your surgeon    6. Wear comfortable clothes. Wear glasses instead of contacts. Do not bring any money or jewelry. Please bring picture ID, insurance card, and any prearranged co-payment or hospital payment. Do not wear make-up, particularly mascara the morning of your surgery.   Do not wear nail polish, particularly if you are having foot /hand surgery. Wear your hair loose or down, no ponytails, buns, mariya pins or clips. All body piercings must be removed. Please shower with antibacterial soap for three consecutive days before and on the morning of surgery, but do not apply any lotions, powders or deodorants after the shower on the day of surgery. Please use a fresh towels after each shower. Please sleep in clean clothes and change bed linens the night before surgery. Please do not shave for 48 hours prior to surgery. Shaving of the face is acceptable. 7. You should understand that if you do not follow these instructions your surgery may be cancelled. If your physical condition changes (I.e. fever, cold or flu) please contact your surgeon as soon as possible. 8. It is important that you be on time. If a situation occurs where you may be late, please call [phone] (OR Holding Area). 9. If you have any questions and or problems, please call [phone] (Pre-admission Testing). 10. Your surgery time may be subject to change. You will receive a phone call the evening prior if your time changes. 11.  If having outpatient surgery, you must have someone to drive you here, stay with you during the duration of your stay, and to drive you home at time of discharge. 12.   In an effort to improve the efficiency, privacy, and safety for all of our Pre-op patients visitors are not allowed in the Holding area. Once you arrive and are registered your family/visitors will be asked to remain in the waiting room. The Pre-op staff will get you from the Surgical Waiting Area and will explain to you and your family/visitors that the Pre-op phase is beginning. The staff will answer any questions and provide instructions for tracking of the patient, by use of the existing tracking number and color-coded status board in the waiting room.   At this time the staff will also ask for your designated spokesperson information in the event that the physician or staff need to provide an update or obtain any pertinent information. The designated spokesperson will be notified if the physician needs to speak to family during the pre-operative phase. If at any time your family/visitors has questions or concerns they may approach the volunteer desk in the waiting area for assistance. Special Instructions:Use incentive spirometry/please bring incentive spirometry to the hospital    MEDICATIONS  [address] A SIP OF WATER:tramadol as needed,amlodipine,gabapentin      I understand a pre-operative phone call will be made to verify my surgery time. In the event that I am not available, I give permission for a message to be left on my answering service and/or with another person?   yes         ___________________      __________   _________    (Signature of Patient)             (Witness)                (Date and Time)

## 2018-06-05 NOTE — PERIOP NOTES
Incentive Spirometer        Using the incentive spirometer helps expand the small air sacs of your lungs, helps you breathe deeply, and helps improve your lung function. Use your incentive spirometer twice a day (10 breaths each time) prior to surgery. How to Use Your Incentive Spirometer:  1. Hold the incentive spirometer in an upright position. 2. Breathe out as usual.   3. Place the mouthpiece in your mouth and seal your lips tightly around it. 4. Take a deep breath. Breathe in slowly and as deeply as possible. Keep the blue flow rate guide between the arrows. 5. Hold your breath as long as possible. Then exhale slowly and allow the piston to fall to the bottom of the column. 6. Rest for a few seconds and repeat steps one through five at least 10 times. PAT Tidal Volume______1750____________  x_______2_________  Date__06/5/18_____________________    Bulah Ramp THE INCENTIVE SPIROMETER WITH YOU TO THE HOSPITAL ON THE DAY OF YOUR SURGERY. Opportunity given to ask and answer questions as well as to observe return demonstration.     Patient signature_____________________________    Witness____________________________

## 2018-06-06 LAB
BACTERIA SPEC CULT: NORMAL
BACTERIA SPEC CULT: NORMAL
SERVICE CMNT-IMP: NORMAL

## 2018-06-15 ENCOUNTER — HOSPITAL ENCOUNTER (OUTPATIENT)
Dept: PREADMISSION TESTING | Age: 68
Discharge: HOME | End: 2018-06-15
Payer: MEDICARE

## 2018-06-15 LAB
ABO + RH BLD: NORMAL
BLOOD GROUP ANTIBODIES SERPL: NORMAL
SPECIMEN EXP DATE BLD: NORMAL

## 2018-06-15 PROCEDURE — 36415 COLL VENOUS BLD VENIPUNCTURE: CPT | Performed by: ORTHOPAEDIC SURGERY

## 2018-06-15 PROCEDURE — 86900 BLOOD TYPING SEROLOGIC ABO: CPT | Performed by: ORTHOPAEDIC SURGERY

## 2018-06-18 NOTE — PERIOP NOTES
Pre-op call made and patient given TOA. Patient instructed may have up to 8 ounces of water up to 3:45 am and then NPO. Patient verbalized understanding.

## 2018-06-19 ENCOUNTER — ANESTHESIA EVENT (OUTPATIENT)
Dept: SURGERY | Age: 68
DRG: 470 | End: 2018-06-19
Payer: MEDICARE

## 2018-06-19 ENCOUNTER — HOSPITAL ENCOUNTER (INPATIENT)
Age: 68
LOS: 3 days | Discharge: NURSING, REHAB OR LONG-TERM CARE | DRG: 470 | End: 2018-06-22
Attending: ORTHOPAEDIC SURGERY | Admitting: ORTHOPAEDIC SURGERY
Payer: MEDICARE

## 2018-06-19 ENCOUNTER — ANESTHESIA (OUTPATIENT)
Dept: SURGERY | Age: 68
DRG: 470 | End: 2018-06-19
Payer: MEDICARE

## 2018-06-19 DIAGNOSIS — Z96.652 S/P TOTAL KNEE REPLACEMENT, LEFT: Primary | ICD-10-CM

## 2018-06-19 PROBLEM — M17.12 OSTEOARTHROSIS, LOCALIZED, PRIMARY, KNEE, LEFT: Status: ACTIVE | Noted: 2018-06-19

## 2018-06-19 LAB
GLUCOSE BLD STRIP.AUTO-MCNC: 113 MG/DL (ref 65–100)
GLUCOSE BLD STRIP.AUTO-MCNC: 138 MG/DL (ref 65–100)
GLUCOSE BLD STRIP.AUTO-MCNC: 176 MG/DL (ref 65–100)
GLUCOSE BLD STRIP.AUTO-MCNC: 180 MG/DL (ref 65–100)
SERVICE CMNT-IMP: ABNORMAL

## 2018-06-19 PROCEDURE — 74011250636 HC RX REV CODE- 250/636: Performed by: ORTHOPAEDIC SURGERY

## 2018-06-19 PROCEDURE — 65270000029 HC RM PRIVATE

## 2018-06-19 PROCEDURE — 97116 GAIT TRAINING THERAPY: CPT

## 2018-06-19 PROCEDURE — 74011250636 HC RX REV CODE- 250/636: Performed by: ANESTHESIOLOGY

## 2018-06-19 PROCEDURE — 76010000171 HC OR TIME 2 TO 2.5 HR INTENSV-TIER 1: Performed by: ORTHOPAEDIC SURGERY

## 2018-06-19 PROCEDURE — 77030000032 HC CUF TRNQT ZIMM -B: Performed by: ORTHOPAEDIC SURGERY

## 2018-06-19 PROCEDURE — 74011250636 HC RX REV CODE- 250/636

## 2018-06-19 PROCEDURE — 82962 GLUCOSE BLOOD TEST: CPT

## 2018-06-19 PROCEDURE — 77030010785: Performed by: ORTHOPAEDIC SURGERY

## 2018-06-19 PROCEDURE — 77030013708 HC HNDPC SUC IRR PULS STRY –B: Performed by: ORTHOPAEDIC SURGERY

## 2018-06-19 PROCEDURE — 77030020788: Performed by: ORTHOPAEDIC SURGERY

## 2018-06-19 PROCEDURE — 74011250636 HC RX REV CODE- 250/636: Performed by: NURSE PRACTITIONER

## 2018-06-19 PROCEDURE — 74011250637 HC RX REV CODE- 250/637: Performed by: NURSE PRACTITIONER

## 2018-06-19 PROCEDURE — C1776 JOINT DEVICE (IMPLANTABLE): HCPCS | Performed by: ORTHOPAEDIC SURGERY

## 2018-06-19 PROCEDURE — 0SRD0J9 REPLACEMENT OF LEFT KNEE JOINT WITH SYNTHETIC SUBSTITUTE, CEMENTED, OPEN APPROACH: ICD-10-PCS | Performed by: ORTHOPAEDIC SURGERY

## 2018-06-19 PROCEDURE — 77030016547 HC BLD SAW SAG1 STRY -B: Performed by: ORTHOPAEDIC SURGERY

## 2018-06-19 PROCEDURE — 77010033678 HC OXYGEN DAILY

## 2018-06-19 PROCEDURE — G8979 MOBILITY GOAL STATUS: HCPCS

## 2018-06-19 PROCEDURE — 77030032490 HC SLV COMPR SCD KNE COVD -B: Performed by: ORTHOPAEDIC SURGERY

## 2018-06-19 PROCEDURE — 77030011640 HC PAD GRND REM COVD -A: Performed by: ORTHOPAEDIC SURGERY

## 2018-06-19 PROCEDURE — 76210000006 HC OR PH I REC 0.5 TO 1 HR: Performed by: ORTHOPAEDIC SURGERY

## 2018-06-19 PROCEDURE — 77030008684 HC TU ET CUF COVD -B: Performed by: NURSE ANESTHETIST, CERTIFIED REGISTERED

## 2018-06-19 PROCEDURE — 97161 PT EVAL LOW COMPLEX 20 MIN: CPT

## 2018-06-19 PROCEDURE — 77030028907 HC WRP KNEE WO BGS SOLM -B

## 2018-06-19 PROCEDURE — 74011000250 HC RX REV CODE- 250

## 2018-06-19 PROCEDURE — 77030031139 HC SUT VCRL2 J&J -A: Performed by: ORTHOPAEDIC SURGERY

## 2018-06-19 PROCEDURE — 77030034849: Performed by: ORTHOPAEDIC SURGERY

## 2018-06-19 PROCEDURE — 74011000250 HC RX REV CODE- 250: Performed by: ORTHOPAEDIC SURGERY

## 2018-06-19 PROCEDURE — 77030012406 HC DRN WND PENRS BARD -A: Performed by: ORTHOPAEDIC SURGERY

## 2018-06-19 PROCEDURE — 77030018836 HC SOL IRR NACL ICUM -A: Performed by: ORTHOPAEDIC SURGERY

## 2018-06-19 PROCEDURE — 77030026438 HC STYL ET INTUB CARD -A: Performed by: NURSE ANESTHETIST, CERTIFIED REGISTERED

## 2018-06-19 PROCEDURE — 77030008467 HC STPLR SKN COVD -B: Performed by: ORTHOPAEDIC SURGERY

## 2018-06-19 PROCEDURE — 77030013079 HC BLNKT BAIR HGGR 3M -A: Performed by: NURSE ANESTHETIST, CERTIFIED REGISTERED

## 2018-06-19 PROCEDURE — 74011250637 HC RX REV CODE- 250/637

## 2018-06-19 PROCEDURE — 77030002912 HC SUT ETHBND J&J -A: Performed by: ORTHOPAEDIC SURGERY

## 2018-06-19 PROCEDURE — 76060000035 HC ANESTHESIA 2 TO 2.5 HR: Performed by: ORTHOPAEDIC SURGERY

## 2018-06-19 PROCEDURE — 77030018846 HC SOL IRR STRL H20 ICUM -A: Performed by: ORTHOPAEDIC SURGERY

## 2018-06-19 PROCEDURE — G8978 MOBILITY CURRENT STATUS: HCPCS

## 2018-06-19 PROCEDURE — 74011636637 HC RX REV CODE- 636/637: Performed by: ORTHOPAEDIC SURGERY

## 2018-06-19 PROCEDURE — 74011000272 HC RX REV CODE- 272: Performed by: ORTHOPAEDIC SURGERY

## 2018-06-19 PROCEDURE — C1713 ANCHOR/SCREW BN/BN,TIS/BN: HCPCS | Performed by: ORTHOPAEDIC SURGERY

## 2018-06-19 PROCEDURE — 74011250637 HC RX REV CODE- 250/637: Performed by: ORTHOPAEDIC SURGERY

## 2018-06-19 PROCEDURE — 77030019908 HC STETH ESOPH SIMS -A: Performed by: NURSE ANESTHETIST, CERTIFIED REGISTERED

## 2018-06-19 DEVICE — IMPLANTABLE DEVICE
Type: IMPLANTABLE DEVICE | Site: KNEE | Status: FUNCTIONAL
Brand: BIOMET KNEE SYSTEM

## 2018-06-19 DEVICE — KNEE CEM FEM/TIB ARC/PAT -- VANGUARD K1: Type: IMPLANTABLE DEVICE | Status: FUNCTIONAL

## 2018-06-19 DEVICE — IMPLANTABLE DEVICE
Type: IMPLANTABLE DEVICE | Site: KNEE | Status: FUNCTIONAL
Brand: VANGUARD® KNEE SYSTEM

## 2018-06-19 DEVICE — (D)CEMENT BNE HV R 40GM -- DUPE USE ITEM 353850: Type: IMPLANTABLE DEVICE | Site: KNEE | Status: FUNCTIONAL

## 2018-06-19 RX ORDER — SODIUM CHLORIDE 0.9 % (FLUSH) 0.9 %
5-10 SYRINGE (ML) INJECTION EVERY 8 HOURS
Status: DISCONTINUED | OUTPATIENT
Start: 2018-06-19 | End: 2018-06-19 | Stop reason: HOSPADM

## 2018-06-19 RX ORDER — NEOSTIGMINE METHYLSULFATE 1 MG/ML
INJECTION INTRAVENOUS AS NEEDED
Status: DISCONTINUED | OUTPATIENT
Start: 2018-06-19 | End: 2018-06-19 | Stop reason: HOSPADM

## 2018-06-19 RX ORDER — FENTANYL CITRATE 50 UG/ML
50 INJECTION, SOLUTION INTRAMUSCULAR; INTRAVENOUS AS NEEDED
Status: DISCONTINUED | OUTPATIENT
Start: 2018-06-19 | End: 2018-06-19 | Stop reason: HOSPADM

## 2018-06-19 RX ORDER — PREGABALIN 75 MG/1
75 CAPSULE ORAL ONCE
Status: DISCONTINUED | OUTPATIENT
Start: 2018-06-20 | End: 2018-06-19

## 2018-06-19 RX ORDER — PREGABALIN 75 MG/1
75 CAPSULE ORAL ONCE
Status: COMPLETED | OUTPATIENT
Start: 2018-06-19 | End: 2018-06-19

## 2018-06-19 RX ORDER — SODIUM CHLORIDE, SODIUM LACTATE, POTASSIUM CHLORIDE, CALCIUM CHLORIDE 600; 310; 30; 20 MG/100ML; MG/100ML; MG/100ML; MG/100ML
25 INJECTION, SOLUTION INTRAVENOUS CONTINUOUS
Status: DISCONTINUED | OUTPATIENT
Start: 2018-06-20 | End: 2018-06-19 | Stop reason: HOSPADM

## 2018-06-19 RX ORDER — FENTANYL CITRATE 50 UG/ML
25 INJECTION, SOLUTION INTRAMUSCULAR; INTRAVENOUS
Status: DISCONTINUED | OUTPATIENT
Start: 2018-06-19 | End: 2018-06-19 | Stop reason: HOSPADM

## 2018-06-19 RX ORDER — ROCURONIUM BROMIDE 10 MG/ML
INJECTION, SOLUTION INTRAVENOUS AS NEEDED
Status: DISCONTINUED | OUTPATIENT
Start: 2018-06-19 | End: 2018-06-19 | Stop reason: HOSPADM

## 2018-06-19 RX ORDER — SODIUM CHLORIDE 0.9 % (FLUSH) 0.9 %
5-10 SYRINGE (ML) INJECTION EVERY 8 HOURS
Status: DISCONTINUED | OUTPATIENT
Start: 2018-06-20 | End: 2018-06-22 | Stop reason: HOSPADM

## 2018-06-19 RX ORDER — SODIUM CHLORIDE 0.9 % (FLUSH) 0.9 %
5-10 SYRINGE (ML) INJECTION AS NEEDED
Status: DISCONTINUED | OUTPATIENT
Start: 2018-06-19 | End: 2018-06-22 | Stop reason: HOSPADM

## 2018-06-19 RX ORDER — MAGNESIUM SULFATE 100 %
4 CRYSTALS MISCELLANEOUS AS NEEDED
Status: DISCONTINUED | OUTPATIENT
Start: 2018-06-19 | End: 2018-06-22 | Stop reason: HOSPADM

## 2018-06-19 RX ORDER — ONDANSETRON 2 MG/ML
4 INJECTION INTRAMUSCULAR; INTRAVENOUS AS NEEDED
Status: DISCONTINUED | OUTPATIENT
Start: 2018-06-19 | End: 2018-06-19 | Stop reason: HOSPADM

## 2018-06-19 RX ORDER — MIDAZOLAM HYDROCHLORIDE 1 MG/ML
1 INJECTION, SOLUTION INTRAMUSCULAR; INTRAVENOUS AS NEEDED
Status: DISCONTINUED | OUTPATIENT
Start: 2018-06-19 | End: 2018-06-19 | Stop reason: HOSPADM

## 2018-06-19 RX ORDER — SODIUM CHLORIDE, SODIUM LACTATE, POTASSIUM CHLORIDE, CALCIUM CHLORIDE 600; 310; 30; 20 MG/100ML; MG/100ML; MG/100ML; MG/100ML
100 INJECTION, SOLUTION INTRAVENOUS CONTINUOUS
Status: DISCONTINUED | OUTPATIENT
Start: 2018-06-19 | End: 2018-06-19 | Stop reason: HOSPADM

## 2018-06-19 RX ORDER — OXYCODONE HYDROCHLORIDE 5 MG/1
10 TABLET ORAL
Status: DISCONTINUED | OUTPATIENT
Start: 2018-06-19 | End: 2018-06-22 | Stop reason: HOSPADM

## 2018-06-19 RX ORDER — PHENYLEPHRINE HCL IN 0.9% NACL 0.4MG/10ML
SYRINGE (ML) INTRAVENOUS AS NEEDED
Status: DISCONTINUED | OUTPATIENT
Start: 2018-06-19 | End: 2018-06-19 | Stop reason: HOSPADM

## 2018-06-19 RX ORDER — SODIUM CHLORIDE 9 MG/ML
125 INJECTION, SOLUTION INTRAVENOUS CONTINUOUS
Status: DISPENSED | OUTPATIENT
Start: 2018-06-19 | End: 2018-06-20

## 2018-06-19 RX ORDER — PRAVASTATIN SODIUM 10 MG/1
20 TABLET ORAL DAILY
Status: DISCONTINUED | OUTPATIENT
Start: 2018-06-19 | End: 2018-06-22 | Stop reason: HOSPADM

## 2018-06-19 RX ORDER — ACETAMINOPHEN 500 MG
1000 TABLET ORAL ONCE
Status: DISCONTINUED | OUTPATIENT
Start: 2018-06-20 | End: 2018-06-19

## 2018-06-19 RX ORDER — SODIUM CHLORIDE 0.9 % (FLUSH) 0.9 %
5-10 SYRINGE (ML) INJECTION AS NEEDED
Status: DISCONTINUED | OUTPATIENT
Start: 2018-06-19 | End: 2018-06-19 | Stop reason: HOSPADM

## 2018-06-19 RX ORDER — CEFAZOLIN SODIUM/WATER 2 G/20 ML
2 SYRINGE (ML) INTRAVENOUS ONCE
Status: COMPLETED | OUTPATIENT
Start: 2018-06-19 | End: 2018-06-19

## 2018-06-19 RX ORDER — INSULIN LISPRO 100 [IU]/ML
INJECTION, SOLUTION INTRAVENOUS; SUBCUTANEOUS
Status: DISCONTINUED | OUTPATIENT
Start: 2018-06-19 | End: 2018-06-22 | Stop reason: HOSPADM

## 2018-06-19 RX ORDER — CELECOXIB 200 MG/1
200 CAPSULE ORAL ONCE
Status: DISCONTINUED | OUTPATIENT
Start: 2018-06-20 | End: 2018-06-19

## 2018-06-19 RX ORDER — AMLODIPINE BESYLATE 5 MG/1
10 TABLET ORAL DAILY
Status: DISCONTINUED | OUTPATIENT
Start: 2018-06-19 | End: 2018-06-22 | Stop reason: HOSPADM

## 2018-06-19 RX ORDER — HEPARIN SODIUM 1000 [USP'U]/ML
1000 INJECTION, SOLUTION INTRAVENOUS; SUBCUTANEOUS ONCE
Status: DISCONTINUED | OUTPATIENT
Start: 2018-06-19 | End: 2018-06-19 | Stop reason: HOSPADM

## 2018-06-19 RX ORDER — DEXTROSE 50 % IN WATER (D50W) INTRAVENOUS SYRINGE
12.5-25 AS NEEDED
Status: DISCONTINUED | OUTPATIENT
Start: 2018-06-19 | End: 2018-06-22 | Stop reason: HOSPADM

## 2018-06-19 RX ORDER — GABAPENTIN 100 MG/1
100 CAPSULE ORAL 3 TIMES DAILY
Status: DISCONTINUED | OUTPATIENT
Start: 2018-06-19 | End: 2018-06-22 | Stop reason: HOSPADM

## 2018-06-19 RX ORDER — ACETAMINOPHEN 500 MG
1000 TABLET ORAL ONCE
Status: COMPLETED | OUTPATIENT
Start: 2018-06-19 | End: 2018-06-19

## 2018-06-19 RX ORDER — MIDAZOLAM HYDROCHLORIDE 1 MG/ML
0.5 INJECTION, SOLUTION INTRAMUSCULAR; INTRAVENOUS
Status: DISCONTINUED | OUTPATIENT
Start: 2018-06-19 | End: 2018-06-19 | Stop reason: HOSPADM

## 2018-06-19 RX ORDER — MIDAZOLAM HYDROCHLORIDE 1 MG/ML
INJECTION, SOLUTION INTRAMUSCULAR; INTRAVENOUS AS NEEDED
Status: DISCONTINUED | OUTPATIENT
Start: 2018-06-19 | End: 2018-06-19 | Stop reason: HOSPADM

## 2018-06-19 RX ORDER — ACETAMINOPHEN 325 MG/1
650 TABLET ORAL EVERY 6 HOURS
Status: DISCONTINUED | OUTPATIENT
Start: 2018-06-19 | End: 2018-06-22 | Stop reason: HOSPADM

## 2018-06-19 RX ORDER — ROPIVACAINE HYDROCHLORIDE 5 MG/ML
30 INJECTION, SOLUTION EPIDURAL; INFILTRATION; PERINEURAL AS NEEDED
Status: DISCONTINUED | OUTPATIENT
Start: 2018-06-19 | End: 2018-06-19 | Stop reason: HOSPADM

## 2018-06-19 RX ORDER — HYDROMORPHONE HYDROCHLORIDE 2 MG/ML
INJECTION, SOLUTION INTRAMUSCULAR; INTRAVENOUS; SUBCUTANEOUS AS NEEDED
Status: DISCONTINUED | OUTPATIENT
Start: 2018-06-19 | End: 2018-06-19 | Stop reason: HOSPADM

## 2018-06-19 RX ORDER — EPHEDRINE SULFATE 50 MG/ML
INJECTION, SOLUTION INTRAVENOUS AS NEEDED
Status: DISCONTINUED | OUTPATIENT
Start: 2018-06-19 | End: 2018-06-19 | Stop reason: HOSPADM

## 2018-06-19 RX ORDER — CEFAZOLIN SODIUM/WATER 2 G/20 ML
2 SYRINGE (ML) INTRAVENOUS ONCE
Status: DISCONTINUED | OUTPATIENT
Start: 2018-06-20 | End: 2018-06-19

## 2018-06-19 RX ORDER — GLIPIZIDE 5 MG/1
10 TABLET ORAL 2 TIMES DAILY
Status: DISCONTINUED | OUTPATIENT
Start: 2018-06-19 | End: 2018-06-22 | Stop reason: HOSPADM

## 2018-06-19 RX ORDER — CEFAZOLIN SODIUM/WATER 2 G/20 ML
2 SYRINGE (ML) INTRAVENOUS EVERY 8 HOURS
Status: COMPLETED | OUTPATIENT
Start: 2018-06-19 | End: 2018-06-20

## 2018-06-19 RX ORDER — METFORMIN HYDROCHLORIDE 500 MG/1
1000 TABLET ORAL 2 TIMES DAILY WITH MEALS
Status: DISCONTINUED | OUTPATIENT
Start: 2018-06-19 | End: 2018-06-22 | Stop reason: HOSPADM

## 2018-06-19 RX ORDER — HEPARIN SODIUM 1000 [USP'U]/ML
INJECTION, SOLUTION INTRAVENOUS; SUBCUTANEOUS AS NEEDED
Status: DISCONTINUED | OUTPATIENT
Start: 2018-06-19 | End: 2018-06-19 | Stop reason: HOSPADM

## 2018-06-19 RX ORDER — DIPHENHYDRAMINE HCL 12.5MG/5ML
12.5 ELIXIR ORAL
Status: DISCONTINUED | OUTPATIENT
Start: 2018-06-19 | End: 2018-06-22 | Stop reason: HOSPADM

## 2018-06-19 RX ORDER — FACIAL-BODY WIPES
10 EACH TOPICAL DAILY PRN
Status: DISCONTINUED | OUTPATIENT
Start: 2018-06-21 | End: 2018-06-22 | Stop reason: HOSPADM

## 2018-06-19 RX ORDER — MORPHINE SULFATE 10 MG/ML
2 INJECTION, SOLUTION INTRAMUSCULAR; INTRAVENOUS
Status: DISCONTINUED | OUTPATIENT
Start: 2018-06-19 | End: 2018-06-19 | Stop reason: HOSPADM

## 2018-06-19 RX ORDER — TRAMADOL HYDROCHLORIDE 50 MG/1
50 TABLET ORAL
Status: DISCONTINUED | OUTPATIENT
Start: 2018-06-19 | End: 2018-06-20

## 2018-06-19 RX ORDER — INSULIN LISPRO 100 [IU]/ML
2 INJECTION, SOLUTION INTRAVENOUS; SUBCUTANEOUS
Status: DISCONTINUED | OUTPATIENT
Start: 2018-06-19 | End: 2018-06-22 | Stop reason: HOSPADM

## 2018-06-19 RX ORDER — ONDANSETRON 2 MG/ML
4 INJECTION INTRAMUSCULAR; INTRAVENOUS
Status: DISCONTINUED | OUTPATIENT
Start: 2018-06-19 | End: 2018-06-22 | Stop reason: HOSPADM

## 2018-06-19 RX ORDER — PROPOFOL 10 MG/ML
INJECTION, EMULSION INTRAVENOUS AS NEEDED
Status: DISCONTINUED | OUTPATIENT
Start: 2018-06-19 | End: 2018-06-19 | Stop reason: HOSPADM

## 2018-06-19 RX ORDER — SUCCINYLCHOLINE CHLORIDE 20 MG/ML
INJECTION INTRAMUSCULAR; INTRAVENOUS AS NEEDED
Status: DISCONTINUED | OUTPATIENT
Start: 2018-06-19 | End: 2018-06-19 | Stop reason: HOSPADM

## 2018-06-19 RX ORDER — LIDOCAINE HYDROCHLORIDE 10 MG/ML
0.1 INJECTION, SOLUTION EPIDURAL; INFILTRATION; INTRACAUDAL; PERINEURAL AS NEEDED
Status: DISCONTINUED | OUTPATIENT
Start: 2018-06-19 | End: 2018-06-19 | Stop reason: HOSPADM

## 2018-06-19 RX ORDER — GLYCOPYRROLATE 0.2 MG/ML
INJECTION INTRAMUSCULAR; INTRAVENOUS AS NEEDED
Status: DISCONTINUED | OUTPATIENT
Start: 2018-06-19 | End: 2018-06-19 | Stop reason: HOSPADM

## 2018-06-19 RX ORDER — AMOXICILLIN 250 MG
1 CAPSULE ORAL 2 TIMES DAILY
Status: DISCONTINUED | OUTPATIENT
Start: 2018-06-19 | End: 2018-06-22 | Stop reason: HOSPADM

## 2018-06-19 RX ORDER — SODIUM CHLORIDE 9 MG/ML
25 INJECTION, SOLUTION INTRAVENOUS CONTINUOUS
Status: DISCONTINUED | OUTPATIENT
Start: 2018-06-19 | End: 2018-06-19 | Stop reason: HOSPADM

## 2018-06-19 RX ORDER — FENTANYL CITRATE 50 UG/ML
INJECTION, SOLUTION INTRAMUSCULAR; INTRAVENOUS AS NEEDED
Status: DISCONTINUED | OUTPATIENT
Start: 2018-06-19 | End: 2018-06-19 | Stop reason: HOSPADM

## 2018-06-19 RX ORDER — OXYCODONE HYDROCHLORIDE 5 MG/1
5 TABLET ORAL
Status: DISCONTINUED | OUTPATIENT
Start: 2018-06-19 | End: 2018-06-22 | Stop reason: HOSPADM

## 2018-06-19 RX ORDER — HYDROMORPHONE HYDROCHLORIDE 1 MG/ML
0.5 INJECTION, SOLUTION INTRAMUSCULAR; INTRAVENOUS; SUBCUTANEOUS
Status: DISCONTINUED | OUTPATIENT
Start: 2018-06-19 | End: 2018-06-19 | Stop reason: HOSPADM

## 2018-06-19 RX ORDER — NALOXONE HYDROCHLORIDE 0.4 MG/ML
0.4 INJECTION, SOLUTION INTRAMUSCULAR; INTRAVENOUS; SUBCUTANEOUS AS NEEDED
Status: DISCONTINUED | OUTPATIENT
Start: 2018-06-19 | End: 2018-06-22 | Stop reason: HOSPADM

## 2018-06-19 RX ORDER — DIPHENHYDRAMINE HYDROCHLORIDE 50 MG/ML
12.5 INJECTION, SOLUTION INTRAMUSCULAR; INTRAVENOUS AS NEEDED
Status: DISCONTINUED | OUTPATIENT
Start: 2018-06-19 | End: 2018-06-19 | Stop reason: HOSPADM

## 2018-06-19 RX ORDER — ASPIRIN 325 MG
325 TABLET ORAL 2 TIMES DAILY
Status: DISCONTINUED | OUTPATIENT
Start: 2018-06-19 | End: 2018-06-22 | Stop reason: HOSPADM

## 2018-06-19 RX ORDER — LIDOCAINE HYDROCHLORIDE 20 MG/ML
INJECTION, SOLUTION EPIDURAL; INFILTRATION; INTRACAUDAL; PERINEURAL AS NEEDED
Status: DISCONTINUED | OUTPATIENT
Start: 2018-06-19 | End: 2018-06-19 | Stop reason: HOSPADM

## 2018-06-19 RX ORDER — POLYETHYLENE GLYCOL 3350 17 G/17G
17 POWDER, FOR SOLUTION ORAL DAILY
Status: DISCONTINUED | OUTPATIENT
Start: 2018-06-19 | End: 2018-06-22 | Stop reason: HOSPADM

## 2018-06-19 RX ORDER — FAMOTIDINE 20 MG/1
20 TABLET, FILM COATED ORAL 2 TIMES DAILY
Status: DISCONTINUED | OUTPATIENT
Start: 2018-06-19 | End: 2018-06-22 | Stop reason: HOSPADM

## 2018-06-19 RX ORDER — CELECOXIB 200 MG/1
200 CAPSULE ORAL ONCE
Status: COMPLETED | OUTPATIENT
Start: 2018-06-19 | End: 2018-06-19

## 2018-06-19 RX ORDER — ALBUTEROL SULFATE 90 UG/1
AEROSOL, METERED RESPIRATORY (INHALATION) AS NEEDED
Status: DISCONTINUED | OUTPATIENT
Start: 2018-06-19 | End: 2018-06-19 | Stop reason: HOSPADM

## 2018-06-19 RX ORDER — ONDANSETRON 2 MG/ML
INJECTION INTRAMUSCULAR; INTRAVENOUS AS NEEDED
Status: DISCONTINUED | OUTPATIENT
Start: 2018-06-19 | End: 2018-06-19 | Stop reason: HOSPADM

## 2018-06-19 RX ORDER — INSULIN GLARGINE 100 [IU]/ML
12 INJECTION, SOLUTION SUBCUTANEOUS
Status: DISCONTINUED | OUTPATIENT
Start: 2018-06-19 | End: 2018-06-22 | Stop reason: HOSPADM

## 2018-06-19 RX ADMIN — SODIUM CHLORIDE, POTASSIUM CHLORIDE, SODIUM LACTATE AND CALCIUM CHLORIDE: 600; 310; 30; 20 INJECTION, SOLUTION INTRAVENOUS at 06:59

## 2018-06-19 RX ADMIN — MIDAZOLAM HYDROCHLORIDE 1 MG: 1 INJECTION, SOLUTION INTRAMUSCULAR; INTRAVENOUS at 07:26

## 2018-06-19 RX ADMIN — INSULIN GLARGINE 12 UNITS: 100 INJECTION, SOLUTION SUBCUTANEOUS at 22:04

## 2018-06-19 RX ADMIN — INSULIN LISPRO 2 UNITS: 100 INJECTION, SOLUTION INTRAVENOUS; SUBCUTANEOUS at 18:18

## 2018-06-19 RX ADMIN — Medication 40 MCG: at 08:17

## 2018-06-19 RX ADMIN — AMLODIPINE BESYLATE 10 MG: 5 TABLET ORAL at 14:33

## 2018-06-19 RX ADMIN — SODIUM CHLORIDE 125 ML/HR: 900 INJECTION, SOLUTION INTRAVENOUS at 09:40

## 2018-06-19 RX ADMIN — ACETAMINOPHEN 650 MG: 325 TABLET ORAL at 14:33

## 2018-06-19 RX ADMIN — INSULIN LISPRO 2 UNITS: 100 INJECTION, SOLUTION INTRAVENOUS; SUBCUTANEOUS at 18:19

## 2018-06-19 RX ADMIN — ROCURONIUM BROMIDE 15 MG: 10 INJECTION, SOLUTION INTRAVENOUS at 07:44

## 2018-06-19 RX ADMIN — ALBUTEROL SULFATE 2 PUFF: 90 AEROSOL, METERED RESPIRATORY (INHALATION) at 09:14

## 2018-06-19 RX ADMIN — Medication 80 MCG: at 07:36

## 2018-06-19 RX ADMIN — GLIPIZIDE 10 MG: 5 TABLET ORAL at 22:04

## 2018-06-19 RX ADMIN — PREGABALIN 75 MG: 75 CAPSULE ORAL at 06:58

## 2018-06-19 RX ADMIN — Medication 2 G: at 07:42

## 2018-06-19 RX ADMIN — FAMOTIDINE 20 MG: 20 TABLET, FILM COATED ORAL at 14:33

## 2018-06-19 RX ADMIN — SODIUM CHLORIDE, POTASSIUM CHLORIDE, SODIUM LACTATE AND CALCIUM CHLORIDE: 600; 310; 30; 20 INJECTION, SOLUTION INTRAVENOUS at 08:21

## 2018-06-19 RX ADMIN — SENNOSIDES AND DOCUSATE SODIUM 1 TABLET: 8.6; 5 TABLET ORAL at 14:33

## 2018-06-19 RX ADMIN — GABAPENTIN 100 MG: 100 CAPSULE ORAL at 14:33

## 2018-06-19 RX ADMIN — HEPARIN SODIUM 1000 UNITS: 1000 INJECTION, SOLUTION INTRAVENOUS; SUBCUTANEOUS at 07:43

## 2018-06-19 RX ADMIN — PROPOFOL 170 MG: 10 INJECTION, EMULSION INTRAVENOUS at 07:32

## 2018-06-19 RX ADMIN — SUCCINYLCHOLINE CHLORIDE 160 MG: 20 INJECTION INTRAMUSCULAR; INTRAVENOUS at 07:32

## 2018-06-19 RX ADMIN — OXYCODONE HYDROCHLORIDE 5 MG: 5 TABLET ORAL at 17:20

## 2018-06-19 RX ADMIN — SODIUM CHLORIDE 125 ML/HR: 900 INJECTION, SOLUTION INTRAVENOUS at 18:17

## 2018-06-19 RX ADMIN — ALBUTEROL SULFATE 3 PUFF: 90 AEROSOL, METERED RESPIRATORY (INHALATION) at 07:47

## 2018-06-19 RX ADMIN — ROCURONIUM BROMIDE 5 MG: 10 INJECTION, SOLUTION INTRAVENOUS at 07:53

## 2018-06-19 RX ADMIN — GLYCOPYRROLATE 0.4 MG: 0.2 INJECTION INTRAMUSCULAR; INTRAVENOUS at 08:44

## 2018-06-19 RX ADMIN — ACETAMINOPHEN 650 MG: 325 TABLET ORAL at 17:19

## 2018-06-19 RX ADMIN — LIDOCAINE HYDROCHLORIDE 80 MG: 20 INJECTION, SOLUTION EPIDURAL; INFILTRATION; INTRACAUDAL; PERINEURAL at 07:32

## 2018-06-19 RX ADMIN — PRAVASTATIN SODIUM 20 MG: 10 TABLET ORAL at 14:32

## 2018-06-19 RX ADMIN — Medication 40 MCG: at 07:53

## 2018-06-19 RX ADMIN — Medication 80 MCG: at 07:57

## 2018-06-19 RX ADMIN — PROPOFOL 30 MG: 10 INJECTION, EMULSION INTRAVENOUS at 07:44

## 2018-06-19 RX ADMIN — HYDROMORPHONE HYDROCHLORIDE 0.2 MG: 2 INJECTION, SOLUTION INTRAMUSCULAR; INTRAVENOUS; SUBCUTANEOUS at 08:12

## 2018-06-19 RX ADMIN — FAMOTIDINE 20 MG: 20 TABLET, FILM COATED ORAL at 17:19

## 2018-06-19 RX ADMIN — POLYETHYLENE GLYCOL 3350 17 G: 17 POWDER, FOR SOLUTION ORAL at 14:32

## 2018-06-19 RX ADMIN — OXYCODONE HYDROCHLORIDE 10 MG: 5 TABLET ORAL at 14:33

## 2018-06-19 RX ADMIN — GABAPENTIN 100 MG: 100 CAPSULE ORAL at 22:04

## 2018-06-19 RX ADMIN — Medication 80 MCG: at 08:42

## 2018-06-19 RX ADMIN — INSULIN LISPRO 2 UNITS: 100 INJECTION, SOLUTION INTRAVENOUS; SUBCUTANEOUS at 17:18

## 2018-06-19 RX ADMIN — SODIUM CHLORIDE, SODIUM LACTATE, POTASSIUM CHLORIDE, AND CALCIUM CHLORIDE 25 ML/HR: 600; 310; 30; 20 INJECTION, SOLUTION INTRAVENOUS at 06:59

## 2018-06-19 RX ADMIN — GLIPIZIDE 10 MG: 5 TABLET ORAL at 14:32

## 2018-06-19 RX ADMIN — CELECOXIB 200 MG: 200 CAPSULE ORAL at 06:58

## 2018-06-19 RX ADMIN — METFORMIN HYDROCHLORIDE 1000 MG: 500 TABLET, FILM COATED ORAL at 17:19

## 2018-06-19 RX ADMIN — FENTANYL CITRATE 50 MCG: 50 INJECTION, SOLUTION INTRAMUSCULAR; INTRAVENOUS at 07:32

## 2018-06-19 RX ADMIN — ACETAMINOPHEN 1000 MG: 500 TABLET, FILM COATED ORAL at 06:58

## 2018-06-19 RX ADMIN — NEOSTIGMINE METHYLSULFATE 3 MG: 1 INJECTION INTRAVENOUS at 08:44

## 2018-06-19 RX ADMIN — ONDANSETRON 4 MG: 2 INJECTION INTRAMUSCULAR; INTRAVENOUS at 08:10

## 2018-06-19 RX ADMIN — ASPIRIN 325 MG: 325 TABLET ORAL at 17:19

## 2018-06-19 RX ADMIN — Medication 2 G: at 17:20

## 2018-06-19 RX ADMIN — ALBUTEROL SULFATE 2 PUFF: 90 AEROSOL, METERED RESPIRATORY (INHALATION) at 07:24

## 2018-06-19 RX ADMIN — EPHEDRINE SULFATE 10 MG: 50 INJECTION, SOLUTION INTRAVENOUS at 08:23

## 2018-06-19 NOTE — OP NOTES
[facility]  OPERATIVE REPORT    Nica Shah  MR#: [id number]  : 1950  ACCOUNT #: [de-identified]   DATE OF SERVICE: 2018    PREOPERATIVE DIAGNOSIS:  Degenerative joint disease, left knee. POSTOPERATIVE DIAGNOSIS:  Degenerative joint disease, left knee. PROCEDURE PERFORMED:  Left total knee replacement. SURGEON:  Chelsey Worthington MD    ASSISTANT:  None. ANESTHESIA:  General.    ESTIMATED BLOOD LOSS:  100 mL. CLOSURE:  Staples. SPECIMENS REMOVED:  None. COMPLICATIONS:  None. IMPLANTS:  Yes. DESCRIPTION OF PROCEDURE:  The patient was given smooth induction of general anesthesia in supine position on the operating table. The left lower extremity was prepped and draped with a thigh tourniquet. An anterior midline incision was made, then a medial parapatellar retinacular incision. The patella was everted and resected to restore 20 mm of thickness. Three drill holes were made for the 37 patellar button. The knee was then flexed and a drill hole made in the distal femur for the IM guide gómez, 9 mm of distal femur were resected at 4 degrees of valgus. The remaining cuts were made to accommodate a 65 Biomet Vanguard cruciate-retaining femoral prosthesis in 6 degrees of external rotation. The proximal tibia was resected perpendicular and sized at 75. The tibial stem preparation was completed in the appropriate rotation. The bone surfaces were copiously irrigated with antibiotic solution and pulsatile lavage. The 3 components were cemented simultaneously with Palacos cement. Trial tibial bearings revealed that a 14-lipped would be ideal.  It was placed, followed by the locking clip. The knee had excellent range of motion, stability and patellar tracking after a lateral release was performed. The wound was copiously irrigated, tourniquet deflated and hemostasis obtained. The soft tissues were injected with a Marcaine solution.   The fascia was closed with #1 Ethibond and Vicryl over a medium Hemovac drain. Subcutaneous tissues were closed with 2-0 Vicryl and the skin was stapled. Sterile dressings were applied. The patient was taken to the recovery room in stable extubated condition, with a correct count and a good pulse to his foot.       MD CHARLES Roberts / JOANA  D: 06/19/2018 09:18     T: 06/19/2018 09:39  JOB #: [id number]

## 2018-06-19 NOTE — DIABETES MGMT
[facility] Ortho Education Note    Recommendations/ Comments: Pt was seen in outpt DTC pre-op as his PAT A1c was <7.5%. 1) may benefit from decrease or discontinuing Glipizide if hypoglycemia occurs. Current hospital DM medication: Glipizide 10 mg bid, Lantus 12 units starting tonight. Metformin 1000 mg bid Chart reviewed and initial evaluation complete on Ryne JEROME Andrésfloyd Matias. Patient is a 74 y.o. male with known Type 2 DM on Humulin 70/30 10 units bid per pt; Metformin 1000 mg bid and Glipizide 10 mg bid. Per pt and his wife, pt is having hypoglycemia on this regimen x2/week in the 60 mg/dl range. He is testing prior to each of his insulin doses at home. He is not skipping medications - \"may get later than desired but will not miss\". He admits that he is often not eating correctly from food choices and frequency avenues. Per his wife,  Pt is choosing to  \"eating junk\" instead of meals. She shared that she is not cooking much these days and that she will use healthy option frozen meals. A1c:   Lab Results   Component Value Date/Time    Hemoglobin A1c 7.2 (H) 06/05/2018 01:49 PM       Assessed and instructed patient on the following:   · risk of wound infection/ delayed healing   · SGBM - continue with bid testing prior to breakfast and dinner, reviewed post op BG targets and encouraged to reach out to  if >180 mg/dl per guidelines. Also advised that if they notice values increasing, add midday BG check to be able to assess trend. · Reviewed basic insulin guidelines. · Nutrition - strongly encouraged \"no junk\" - encouraged fruit/starches/yogurt/milk with meals along w/ lean protein to promote wound healing. Shared tips for simple meals. Discussed supplement options should he experience appetite issues. · Activity - as medically appropriate.    · Medications - reviewed medications with pt along w/ timing compared to meal.  Strongly encouraged to contact PCP if once eating, he continues to have hypoglycemia for medication changes. Also encouraged to discuss need for Glipizide with his Humulin. · Hypoglycemia and chronic complications. Encouraged the following: increased exercise, dietary modifications: eat 3 meals daily, regular blood sugar monitorin times daily       Provided patient with the following: [x]            Survival skills education materials               []            Insulin education materials               []            CHO counting education materials               [x]            BG guidelines for post-op patients                  [x]            Outpatient DTC contact number               []            Glucometer               []            Insulin start kit- vial/syringe               []            Insulin start kit- pen                 Recent Glucose Results:   Lab Results   Component Value Date/Time    GLUCPOC 138 (H) 2018 09:37 AM    GLUCPOC 113 (H) 2018 06:52 AM        Lab Results   Component Value Date/Time    Creatinine 1.19 2018 01:49 PM     Estimated Creatinine Clearance: 77.6 mL/min (based on Cr of 1.19). Active Orders   Diet    DIET CLEAR LIQUID      PO intake: No data found. Will continue to follow as needed. Thank you.    Germania Harris RD CDE

## 2018-06-19 NOTE — PROGRESS NOTES
Bedside shift change report given to Lalito Wolfe RN (oncoming nurse) by Ma Goldberg RN (offgoing nurse). Report included the following information SBAR, Kardex, Intake/Output, MAR, Recent Results and Med Rec Status.

## 2018-06-19 NOTE — PERIOP NOTES
TRANSFER - OUT REPORT:    Verbal report given to MOHIT Mora on Danae Grijalva  being transferred to [address] [place] [address] for routine post - op       Report consisted of patients Situation, Background, Assessment and   Recommendations(SBAR). Information from the following report(s) OR Summary, Procedure Summary, Intake/Output and MAR was reviewed with the receiving nurse. Opportunity for questions and clarification was provided.       Patient transported with:   O2 @ 2 liters  Tech

## 2018-06-19 NOTE — BRIEF OP NOTE
BRIEF OPERATIVE NOTE    Date of Procedure: 6/19/2018   Preoperative Diagnosis: LEFT KNEE OA   Postoperative Diagnosis: LEFT KNEE OA     Procedure(s):  LEFT TOTAL KNEE ARTHROPLASTY  Surgeon(s) and Role:     * Tico Armando MD - Primary         Surgical Assistant: 0    Surgical Staff:  Circ-1: Jessica Luther RN  Circ-Intern: Lawrence Martinez RN  Scrub Tech-1: Merry Bis  Surg Asst-1: Devin Cordova  Float Staff: Beverly Sami; Joe Carlos RN  Event Time In   Incision Start 5080   Incision Close      Anesthesia: General   Estimated Blood Loss: 100  Specimens: * No specimens in log *   Findings: 0   Complications: 0  Implants:   Implant Name Type Inv.  Item Serial No.  Lot No. LRB No. Used Action   CEMENT BNE HV R 40GM -- PALACOS R [id number] - SNA  CEMENT BNE HV R 40GM -- PALACOS R [id number] NA [facility] [id number] Left 1 Implanted   FEM RET CRUC INTLOK LT 65MM -- VANGUARD - SNA  FEM RET CRUC INTLOK LT 65MM -- VANGUARD NA [employer] [id number] Left 1 Implanted   AUG PAT 3-PEG ARCOM SM 37MM -- W/WIRE - SNA  AUG PAT 3-PEG ARCOM SM 37MM -- W/WIRE NA [employer] [id number] Left 1 Implanted   INSERT TIB CR LIP 14X71/75MM -- VANGUARD - SNA  INSERT TIB CR LIP 14X71/75MM -- VANGUARD NA [employer] [id number] Left 1 Implanted   TRAY TIB I-BEAM FIX 75MM COCR --  - SNA   TRAY TIB I-BEAM FIX 75MM COCR --  NA [employer] [id number] Left 1 Implanted

## 2018-06-19 NOTE — PERIOP NOTES
Handoff Report from Operating Room to PACU    Report received from Jessie White RN and Kaylan Moss CRNA regarding Peggy Prom. Surgeon(s):  Ata Green MD  And Procedure(s) (LRB):  LEFT TOTAL KNEE ARTHROPLASTY (Left)  confirmed   with drains and dressings discussed. Anesthesia type, drugs, patient history, complications, estimated blood loss, vital signs, intake and output, and last pain medication, lines, reversal medications and temperature were reviewed.

## 2018-06-19 NOTE — PROGRESS NOTES
Problem: Mobility Impaired (Adult and Pediatric)  Goal: *Acute Goals and Plan of Care (Insert Text)  Physical Therapy Goals  Initiated 6/19/2018    1. Patient will move from supine to sit and sit to supine , scoot up and down and roll side to side in bed with independence within 4 days. 2. Patient will perform sit to stand with modified independence within 4 days. 3. Patient will ambulate with modified independence for 150 feet with the least restrictive device within 4 days. 4. Patient will ascend/descend 2 stairs with one sided handrail(s) with modified independence within 4 days. 5. Patient will perform home exercise program per protocol with modified independence within 4 days. 6. Patient will demonstrate AROM 0-90 degrees in operative joint within 4 days. physical Therapy knee EVALUATION  Patient: Apryl Hernandez (58 y.o. male)  Date: 6/19/2018  Primary Diagnosis: LEFT KNEE OA   Osteoarthrosis, localized, primary, knee, left  Procedure(s) (LRB):  LEFT TOTAL KNEE ARTHROPLASTY (Left) Day of Surgery   Precautions:   WBAT    ASSESSMENT :  Based on the objective data described below, the patient presents with decreased strength, decreased AROM, decreased functional mobility, slight decrease in SBP, and unsteady gait s/p L TKA POD 0. PTA patient lives at home with his wife and sons. He ambulates short distances, reaching for the walls and uses a wheelchair for long distances. He requires some assist for ADLS. Patient with recent CVA in 2/2017 and went to rehab. Patient reports multiple falls. Currently, patient received resting in bed, with VSS on RA. Patient required min A for bed mobility. Patient required CGA-min A for sit <> stand with RW. Patient with mild dizziness in standing and 20 point drop in SBP so ambulated 20 feet with CGA and RW. Patient with decreased step length, decreased gait speed and shuffled steps.  Patient returned to supine with VSS and returned to 2L O2 at the conclusion of PT evaluation. Patient will benefit from [facility] with RW at discharge. Due to previous CVA and PLOF, patient may benefit from OT consult during acute care stay. Patient will benefit from skilled intervention to address the above impairments. Patients rehabilitation potential is considered to be Good  Factors which may influence rehabilitation potential include:   []         None noted  []         Mental ability/status  [x]         Medical condition  []         Home/family situation and support systems  []         Safety awareness  []         Pain tolerance/management  []         Other:      PLAN :  Recommendations and Planned Interventions:  [x]           Bed Mobility Training             []    Neuromuscular Re-Education  [x]           Transfer Training                   []    Orthotic/Prosthetic Training  [x]           Gait Training                         []    Modalities  [x]           Therapeutic Exercises           []    Edema Management/Control  [x]           Therapeutic Activities            [x]    Patient and Family Training/Education  []           Other (comment):    Frequency/Duration: Patient will be followed by physical therapy twice daily to address goals. Discharge Recommendations: Home Health  Further Equipment Recommendations for Discharge: rolling walker     SUBJECTIVE:   Patient stated I actually may be a little dizzy.     OBJECTIVE DATA SUMMARY:   HISTORY:    Past Medical History:   Diagnosis Date    Allergic rhinitis     Arthritis     djd of knees    Chronic pain     Diabetes ([facility] [address]     Hypercholesteremia     Hypertension     Obesity     Stroke ([facility] [address]     left--ring finger/ pinky finger numb sensation     Past Surgical History:   Procedure Laterality Date    HX CHOLECYSTECTOMY      laparoscopic     Prior Level of Function/Home Situation: patient lives at home with his wife and sons. He ambulates short distances, reaching for the walls and uses a wheelchair for long distances.  He requires some assist for ADLS. Patient with recent CVA in 2/2017 and went to rehab. Patient reports multiple falls. Personal factors and/or comorbidities impacting plan of care: falls, previous CVA     Home Situation  Home Environment: Private residence  # Steps to Enter: 1  Rails to Enter: No  One/Two Story Residence: Two story, live on 1st floor  Living Alone: No  Support Systems: Spouse/Significant Other/Partner, Family member(s)  Patient Expects to be Discharged to[de-identified] Unknown  Current DME Used/Available at Home: Wheelchair, [place], straight, Glucometer, Shower chair  Tub or Shower Type: Tub/Shower combination    EXAMINATION/PRESENTATION/DECISION MAKING:   Critical Behavior:  Neurologic State: Drowsy, Eyes open spontaneously  Orientation Level: Oriented to person, Oriented to place, Oriented to situation  Cognition: Follows commands     Hearing: Auditory  Auditory Impairment: None  Skin:    Range Of Motion:  AROM: Generally decreased, functional           PROM: Generally decreased, functional           Strength:    Strength: Generally decreased, functional                    Tone & Sensation:   Tone: Normal              Sensation: Impaired (LUE tingling from previous CVA)               Coordination:  Coordination: Within functional limits  Vision:      Functional Mobility:  Bed Mobility:  Rolling: Minimum assistance  Supine to Sit: Minimum assistance  Sit to Supine: Minimum assistance  Scooting: Minimum assistance  Transfers:  Sit to Stand: Minimum assistance  Stand to Sit: Contact guard assistance                       Balance:   Sitting: Intact; Without support  Standing: Impaired; With support  Standing - Static: Constant support; Fair  Standing - Dynamic : Fair  Ambulation/Gait Training:  Distance (ft): 20 Feet (ft)  Assistive Device: Gait belt;Walker, rolling  Ambulation - Level of Assistance: Contact guard assistance     Gait Description (WDL): Exceptions to WDL  Gait Abnormalities: Antalgic;Decreased step clearance;Trunk sway increased        Base of Support: Widened  Stance: Left decreased  Speed/Saige: Pace decreased (<100 feet/min)  Step Length: Right shortened;Left lengthened                     Stairs: Therapeutic Exercises:   Knee HEP    Functional Measure:  Barthel Index:    Bathin  Bladder: 0  Bowels: 5  Groomin  Dressin  Feeding: 10  Mobility: 0  Stairs: 0  Toilet Use: 5  Transfer (Bed to Chair and Back): 10  Total: 40       Barthel and G-code impairment scale:  Percentage of impairment CH  0% CI  1-19% CJ  20-39% CK  40-59% CL  60-79% CM  80-99% CN  100%   Barthel Score 0-100 100 99-80 79-60 59-40 20-39 1-19   0   Barthel Score 0-20 20 17-19 13-16 9-12 5-8 1-4 0      The Barthel ADL Index: Guidelines  1. The index should be used as a record of what a patient does, not as a record of what a patient could do. 2. The main aim is to establish degree of independence from any help, physical or verbal, however minor and for whatever reason. 3. The need for supervision renders the patient not independent. 4. A patient's performance should be established using the best available evidence. Asking the patient, friends/relatives and nurses are the usual sources, but direct observation and common sense are also important. However direct testing is not needed. 5. Usually the patient's performance over the preceding 24-48 hours is important, but occasionally longer periods will be relevant. 6. Middle categories imply that the patient supplies over 50 per cent of the effort. 7. Use of aids to be independent is allowed. Margot [place]., Barthel, D.W. (1891). Functional evaluation: the Barthel Index. [address] [facility] (14)2. Lula Libman der [place], J.J.M.F, Porfirio Genao., Emily Infante., [facility], [address] (). Measuring the change indisability after inpatient rehabilitation; comparison of the responsiveness of the Barthel Index and Functional [place] Measure.  Journal of Neurology, Neurosurgery, and Psychiatry, 664), [phone]. SHIRLEY Schmitz, TEJA Wheeler, & Julisa Roman M.A. (2004.) Assessment of post-stroke quality of life in cost-effectiveness studies: The usefulness of the Barthel Index and the EuroQoL-5D. Quality of Life Research, 13, 591-80         G codes: In compliance with CMSs Claims Based Outcome Reporting, the following G-code set was chosen for this patient based on their primary functional limitation being treated: The outcome measure chosen to determine the severity of the functional limitation was the Barthel with a score of 40/100 which was correlated with the impairment scale. ? Mobility - Walking and Moving Around:     - CURRENT STATUS: CK - 40%-59% impaired, limited or restricted    - GOAL STATUS: CJ - 20%-39% impaired, limited or restricted    - D/C STATUS:  ---------------To be determined---------------        Physical Therapy Evaluation Charge Determination   History Examination Presentation Decision-Making   MEDIUM  Complexity : 1-2 comorbidities / personal factors will impact the outcome/ POC  MEDIUM Complexity : 3 Standardized tests and measures addressing body structure, function, activity limitation and / or participation in recreation  MEDIUM Complexity : Evolving with changing characteristics  Other outcome measures Barthel   MEDIUM      Based on the above components, the patient evaluation is determined to be of the following complexity level: MEDIUM    Pain:  Pain Scale 1: Numeric (0 - 10)  Pain Intensity 1: 0              Activity Tolerance:   Good, SBP dropped 20 points and symptomatic. Please refer to the flowsheet for vital signs taken during this treatment.   After treatment:   []         Patient left in no apparent distress sitting up in chair  [x]         Patient left in no apparent distress in bed  [x]         Call bell left within reach  [x]         Nursing notified  []         Caregiver present  []         Bed alarm activated    COMMUNICATION/EDUCATION:   The patients plan of care was discussed with: Registered Nurse and . [x]         Fall prevention education was provided and the patient/caregiver indicated understanding. [x]         Patient/family have participated as able in goal setting and plan of care. [x]         Patient/family agree to work toward stated goals and plan of care. []         Patient understands intent and goals of therapy, but is neutral about his/her participation. []         Patient is unable to participate in goal setting and plan of care.     Thank you for this referral.  Shakila Martinez, PT, DPT   Time Calculation: 23 mins

## 2018-06-19 NOTE — PROGRESS NOTES
Ortho/ NeuroSurgery NP Note    POD# 0  s/p LEFT TOTAL KNEE ARTHROPLASTY     Pt resting in bed. No complaints. Denies pain even after therapy. VSS Afebrile. Patient has had something to eat. No nausea. Most Recent Labs:   Lab Results   Component Value Date/Time    HGB 12.8 06/05/2018 01:49 PM    Hemoglobin A1c 7.2 (H) 06/05/2018 01:49 PM       MRSA Screen Pre-op Negative  U/A Screen Pre-Op Negative    Body mass index is 35.49 kg/(m^2). Reference: BMI greater than 30 is classified as obesity and greater than 40 is classified as morbid obesity. STOP BANG Score: 5  Incentive Spirometer Pre-Op Volume: 1750    Voiding status: Lester in place. Dressing c.d.i  Drain in place. Calves soft and supple; No pain with passive stretch  Sensation and motor intact  SCDs for mechanical DVT proph    Plan:  1) PT BID starting today  2) Betty-op Antibiotics Ancef  3) Aspirin 325 mg PO BID for DVT Prophylaxis  4) Pepcid for GI Prophylaxis   5) High drain output. Leave to gravity for now. 6) Discharge plans to home with his wife and sons. Readiness for discharge:     [x] Vital Signs stable    [] Hgb stable    [] + Voiding Lester in place. [] Incision intact, drainage minimal Drain output high. Leave to gravity. [x] Tolerating PO intake     [] Cleared by PT (OT if applicable)     [] Stair training completed (if applicable)    [] Independent/Contact Gaurd ambulation with assistive device (household distance)     [] Bed mobility     [] Car transfers     [] ADLs    [x] Adequate pain control on oral medication alone    Admission status determination- elevated A1C, mobility limitations at baseline and h/o CVA.      Moe Williamson NP

## 2018-06-19 NOTE — ANESTHESIA POSTPROCEDURE EVALUATION
Post-Anesthesia Evaluation and Assessment    Patient: Mounika Sanchez MRN: [id number]  SSN: [id number]    YOB: 1950  Age: 74 y.o. Sex: male       Cardiovascular Function/Vital Signs  Visit Vitals    /73    Pulse 75    Temp 36.8 °C (98.3 °F)    Resp 19    Ht 5' 10\" (1.778 m)    Wt 112.2 kg (247 lb 5.7 oz)    SpO2 94%    BMI 35.49 kg/m2       Patient is status post general anesthesia for Procedure(s):  LEFT TOTAL KNEE ARTHROPLASTY. Nausea/Vomiting: None    Postoperative hydration reviewed and adequate. Pain:  Pain Scale 1: Numeric (0 - 10) (06/19/18 1000)  Pain Intensity 1: 0 (06/19/18 1000)   Managed    Neurological Status:   Neuro (WDL): Exceptions to WDL (06/19/18 0931)  Neuro  Neurologic State: Drowsy; Eyes open to voice (06/19/18 0931)  Orientation Level: Oriented to person (06/19/18 0931)  Cognition: Follows commands (06/19/18 0931)  LUE Motor Response: Purposeful (06/19/18 0931)  LLE Motor Response: Purposeful (06/19/18 0931)  RUE Motor Response: Purposeful (06/19/18 0931)  RLE Motor Response: Purposeful (06/19/18 0931)   At baseline    Mental Status and Level of Consciousness: Arousable    Pulmonary Status:   O2 Device: Nasal cannula (06/19/18 1000)   Adequate oxygenation and airway patent    Complications related to anesthesia: None    Post-anesthesia assessment completed.  No concerns    Signed By: Carson Manning MD     June 19, 2018

## 2018-06-19 NOTE — IP AVS SNAPSHOT
[address] 
[phone] Patient: Catherine Salomon MRN: [id number] About your hospitalization You were admitted on:  June 19, 2018 You last received care in the:  [facility] You were discharged on:  June 22, 2018 Why you were hospitalized Your primary diagnosis was:  Not on File Your diagnoses also included:  Osteoarthrosis, Localized, Primary, Knee, Left Follow-up Information Follow up With Details Comments Contact Info Kamille Loja MD Schedule an appointment as soon as possible for a visit in 2 weeks  [address] 
[phone] [facility]  This is your post acute care provider  [address] [place] [address] 
[phone] Santy Moreland MD   [address] 
[phone] Discharge Orders None A check miguel angel indicates which time of day the medication should be taken. My Medications START taking these medications Instructions Each Dose to Equal  
 Morning Noon Evening Bedtime  
 acetaminophen 325 mg tablet Commonly known as:  TYLENOL Your last dose was: Your next dose is: Take 2 Tabs by mouth every six (6) hours for 14 days. 650 mg  
    
   
   
   
  
 famotidine 20 mg tablet Commonly known as:  PEPCID Your last dose was: Your next dose is: Take 1 Tab by mouth two (2) times a day for 30 days. 20 mg  
    
   
   
   
  
 oxyCODONE IR 5 mg immediate release tablet Commonly known as:  Jenita Mars Your last dose was: Your next dose is: Take 1-2 Tabs by mouth every four (4) hours as needed. Max Daily Amount: 60 mg.  
 5-10 mg  
    
   
   
   
  
 polyethylene glycol 17 gram/dose powder Commonly known as:  Saint Leonard Gram Your last dose was: Your next dose is: Take 17 g by mouth daily for 15 days. 17 g  
    
   
   
   
  
 senna-docusate 8.6-50 mg per tablet Commonly known as:  SENNA PLUS Your last dose was: Your next dose is: Take 1 Tab by mouth two (2) times a day. 1 Tab CHANGE how you take these medications Instructions Each Dose to Equal  
 Morning Noon Evening Bedtime  
 clopidogrel 75 mg Tab Commonly known as:  PLAVIX What changed:  See the new instructions. Your last dose was: Your next dose is: TAKE ONE TABLET BY MOUTH ONCE DAILY  Resume 6/22/18 CONTINUE taking these medications Instructions Each Dose to Equal  
 Morning Noon Evening Bedtime  
 alcohol swabs Padm Commonly known as:  ALCOHOL PREP SWABS Your last dose was: Your next dose is:    
   
   
 10 Each by Apply Externally route two (2) times a day. 10 Each  
    
   
   
   
  
 amLODIPine 10 mg tablet Commonly known as:  Rosalia Vinson Your last dose was: Your next dose is: TAKE ONE TABLET BY MOUTH ONCE DAILY  
     
   
   
   
  
 clotrimazole 1 % topical cream  
Commonly known as:  Inetta Hibernia Your last dose was: Your next dose is:    
   
   
 Apply  to affected area two (2) times a day.  
     
   
   
   
  
 gabapentin 100 mg capsule Commonly known as:  NEURONTIN Your last dose was: Your next dose is: Take 100 mg by mouth three (3) times daily. 100 mg  
    
   
   
   
  
 glipiZIDE 10 mg tablet Commonly known as:  Katelyn Fuel Your last dose was: Your next dose is: Take 1 Tab by mouth two (2) times a day. 10 mg  
    
   
   
   
  
 * glucose blood VI test strips strip Commonly known as:  Ascensia CONTOUR Your last dose was: Your next dose is:    
   
   
 Test twice daily. * glucose blood VI test strips strip Commonly known as:  ASCENSIA AUTODISC VI, ONE TOUCH ULTRA TEST VI Your last dose was: Your next dose is:    
   
   
 Embrace test strip--check fsbs bid 250.02  
     
   
   
   
  
 * glucose blood VI test strips strip Commonly known as:  ASCENSIA AUTODISC VI, ONE TOUCH ULTRA TEST VI Your last dose was: Your next dose is:    
   
   
 Check fsbs bid E11.65  Contour next * glucose blood VI test strips strip Commonly known as:  ONETOUCH ULTRA TEST Your last dose was: Your next dose is:    
   
   
 Onetouch ultra blue test strips, check blood sugar twice daily Dx e11.9  
     
   
   
   
  
 hydroCHLOROthiazide 25 mg tablet Commonly known as:  HYDRODIURIL Your last dose was: Your next dose is: TAKE ONE TABLET BY MOUTH ONCE DAILY Insulin Needles (Disposable) 31 gauge x 5/16\" Ndle Your last dose was: Your next dose is:    
   
   
 Inject once daily. insulin NPH/insulin regular 100 unit/mL (70-30) injection Commonly known as:  NOVOLIN 70/30, HUMULIN 70/30 Your last dose was: Your next dose is:    
   
   
 10 units sc bid  
     
   
   
   
  
 insulin syringe-needle U-100 Your last dose was: Your next dose is:    
   
   
 Dispense ultrafine 0.5 mL syringes with 31 gauge needle Lancets Misc Your last dose was: Your next dose is:    
   
   
 by IntraDERMal route two (2) times a day. lisinopril 40 mg tablet Commonly known as:  Karlarj Belldy Your last dose was: Your next dose is:    
   
   
 40 mg daily. 40 mg  
    
   
   
   
  
 metFORMIN 1,000 mg tablet Commonly known as:  GLUCOPHAGE Your last dose was: Your next dose is: Take 1 Tab by mouth two (2) times daily (with meals). 1000 mg  
    
   
   
   
  
 pravastatin 20 mg tablet Commonly known as:  PRAVACHOL Your last dose was: Your next dose is: TAKE ONE TABLET BY MOUTH IN THE EVENING  
     
   
   
   
  
 * Notice: This list has 4 medication(s) that are the same as other medications prescribed for you. Read the directions carefully, and ask your doctor or other care provider to review them with you. STOP taking these medications   
 traMADol 50 mg tablet Commonly known as:  ULTRAM  
   
  
  
  
Where to Get Your Medications Information on where to get these meds will be given to you by the nurse or doctor. ! Ask your nurse or doctor about these medications  
  acetaminophen 325 mg tablet  
 famotidine 20 mg tablet  
 oxyCODONE IR 5 mg immediate release tablet  
 polyethylene glycol 17 gram/dose powder  
 senna-docusate 8.6-50 mg per tablet Opioid Education Prescription Opioids: What You Need to Know: 
 
Prescription opioids can be used to help relieve moderate-to-severe pain and are often prescribed following a surgery or injury, or for certain health conditions. These medications can be an important part of treatment but also come with serious risks. Opioids are strong pain medicines. Examples include hydrocodone, oxycodone, fentanyl, and morphine. Heroin is an example of an illegal opioid. It is important to work with your health care provider to make sure you are getting the safest, most effective care. WHAT ARE THE RISKS AND SIDE EFFECTS OF OPIOID USE? Prescription opioids carry serious risks of addiction and overdose, especially with prolonged use. An opioid overdose, often marked by slow breathing, can cause sudden death. The use of prescription opioids can have a number of side effects as well, even when taken as directed. · Tolerance-meaning you might need to take more of a medication for the same pain relief · Physical dependence-meaning you have symptoms of withdrawal when the medication is stopped. Withdrawal symptoms can include nausea, sweating, chills, diarrhea, stomach cramps, and muscle aches. Withdrawal can last up to several weeks, depending on which drug you took and how long you took it. · Increased sensitivity to pain · Constipation · Nausea, vomiting, and dry mouth · Sleepiness and dizziness · Confusion · Depression · Low levels of testosterone that can result in lower sex drive, energy, and strength · Itching and sweating RISKS ARE GREATER WITH:      
· History of drug misuse, substance use disorder, or overdose · Mental health conditions (such as depression or anxiety) · Sleep apnea · Older age (72 years or older) · Pregnancy Avoid alcohol while taking prescription opioids. Also, unless specifically advised by your health care provider, medications to avoid include: · Benzodiazepines (such as Xanax or Valium) · Muscle relaxants (such as Soma or Flexeril) · Hypnotics (such as Ambien or Lunesta) · Other prescription opioids KNOW YOUR OPTIONS Talk to your health care provider about ways to manage your pain that don't involve prescription opioids. Some of these options may actually work better and have fewer risks and side effects. Options may include: 
· Pain relievers such as acetaminophen, ibuprofen, and naproxen · Some medications that are also used for depression or seizures · Physical therapy and exercise · Counseling to help patients learn how to cope better with triggers of pain and stress. · Application of heat or cold compress · Massage therapy · Relaxation techniques Be Informed Make sure you know the name of your medication, how much and how often to take it, and its potential risks & side effects.  
 
IF YOU ARE PRESCRIBED OPIOIDS FOR PAIN: 
 · Never take opioids in greater amounts or more often than prescribed. Remember the goal is not to be pain-free but to manage your pain at a tolerable level. · Follow up with your primary care provider to: · Work together to create a plan on how to manage your pain. · Talk about ways to help manage your pain that don't involve prescription opioids. · Talk about any and all concerns and side effects. · Help prevent misuse and abuse. · Never sell or share prescription opioids · Help prevent misuse and abuse. · Store prescription opioids in a secure place and out of reach of others (this may include visitors, children, friends, and family). · Safely dispose of unused/unwanted prescription opioids: Find your community drug take-back program or your pharmacy mail-back program, or flush them down the toilet, following guidance from the Food and Drug Administration (www.fda.gov/Drugs/ResourcesForYou). · Visit www.cdc.gov/drugoverdose to learn about the risks of opioid abuse and overdose. · If you believe you may be struggling with addiction, tell your health care provider and ask for guidance or call [facility] at 8-912-242-XFAR. Discharge Instructions Mounika Sanchez Surgery: Total Knee Replacement Surgeon:   Pamela Jaramillo MD 
Surgery Date:  6/19/2018 ? To relieve pain: ? Use ice/gel packs. ? Put the ice pack directly over the wound, or anywhere you are hurting or swollen. ? To control pain and swelling, keep ice on regularly, especially after physical activity. ? The packs should stay cold for 3-4 hours. When it is not cold anymore, rotate with the packs in the freezer. ? Elevate your leg. This will also keep swelling down. ? Rest for at least 20 minutes between activity or exercises.  
 
? To keep track of your pain medications, write down what you take and when you take it. ? The last dose of pain medication you got in the hospital was:    
 
Medication Dose Date & Time ? Choose your medications based on the pain scale below: ? To keep your pain under control, take Tylenol every 6 hours for 14 days - even if you feel like you dont need it. ? For mild to moderate pain (4-6 on pain scale), take one pain pill every 3-4 hours as needed. ? For severe pain (7-10 on pain scale), take two pain pills every 3-4 hours as needed. ? To prevent nausea, take your pain medications with food. Pain Scale ? As your pain lessens: 
 
? Slowly start taking less pain medication. You may do this by waiting longer between doses or by taking smaller doses. ? Stop using the pain medications as soon as you no longer need it, usually in 2-3 weeks. ? Resume Plavix for prevention of blood clots ? To prevent stomach upset or bleeding: ? Do not take non-steroidal anti-inflammatory medications (Ibuprofen, Advil, Motrin, Naproxen, etc.) ? Take Pepcid 20 mg twice a day, or a similar home medication, while you are taking a blood thinner. OPSITE (Honeycomb dressing) ? Keep your clear, waterproof dressing in place for 5-7 days after your leave the hospital. 
 
? If you are still having drainage, you will need to change your dressing in 5-7 days. You will be given one extra dressing to use at home. ? If there is no more drainage from the wound, you may leave it open to the air. OPSITE DRESSING INSTRUCTIONS ? DO NOTs: 
? Do not rub your surgical wound ? Do not put lotion or oils on your wound. ? Do not take a tub bath or go swimming until your doctor says it is ok. 
 
? You may shower with this dressing over your wound. ? After showering pat the dressing dry. ? If you have staples a home health nurse will remove them in about 10 days. ? To increase and promote healing: 
? Stop Smoking (or at least cut back on 
     Smoking). ? Eat a well-balanced diet (high in protein 
     and vitamin C). ? If you have a poor appetite, drink Ensure, Glucerna, or  
      Wise Instant Breakfast for the next 30 days. ? If you are diabetic, you should control your blood  
      sugars to prevent infection and help your wound  
      to heal. 
               
 
 
 
? To prevent constipation, stay active and drink plenty of fluid. ? While using pain medications, you should also take stool softeners and laxatives, such as Pericolace 
     and Miralax. ? If you are having too many bowel Movements, then you may need 
     to stop taking the laxatives. ? You should have a bowel movement 3-4 days  
     after surgery and then at least every other day while 
     on pain medication. ? To improve your recovery, you must be active! ? Use your walker and take short walks (in your home)  
      about every 2 hours during the day. ? Try to increase how far you walk each day. ? You can put as much weight on your leg as you can tolerate while walking. WBAT   
 
? To avoid getting a stiff knee, work on getting your knee bent and straight as soon as possible. ? Home health physical therapy will come to your 
     home the day after you leave the hospital.  The 
     therapist will visit about 4 times over the next  
     couple of weeks to teach you how to get out of  
     bed, to safely walk in your home, and to do your  
     exercises. ? NO DRIVING until your surgeon tells you it is ok. 
 
? You can return to work when cleared by a physician. ? Please call your physician immediately if you have: 
 
? Constant bleeding from your wound.  
 
? Increasing redness or swelling around your wound (Some warmth, bruising and swelling is normal). ? Change in wound drainage (increase in amount, color, or bad smell). ? Change in mental status (unusual behavior ? Temperature over 101.5 degrees Fahrenheit ? Pain or redness in the calf (back of your lower leg  see picture) ? Increased swelling of the thigh, ankle, calf, or foot. ? Emergency: CALL 911 if you have: 
 
? Shortness of breath ? Chest pain when you cough or taking a deep breath ? Please call your surgeons office at [phone] for a follow up appointment. _ 
? You should call as soon as you get home or the next day after discharge. Ask to make an appointment in 2 weeks. ? If you have questions or concerns during normal business hours, you may reach Dr. Vida Rios' Team at [phone]. Deltek Announcement We are excited to announce that we are making your provider's discharge notes available to you in Deltek. You will see these notes when they are completed and signed by the physician that discharged you from your recent hospital stay. If you have any questions or concerns about any information you see in Deltek, please call the Health Information Department where you were seen or reach out to your Primary Care Provider for more information about your plan of care. Introducing [facility]! [employer] introduces Deltek patient portal. Now you can access parts of your medical record, email your doctor's office, and request medication refills online. 1. In your internet browser, go to [email or web address] 2. Click on the First Time User? Click Here link in the Sign In box. You will see the New Member Sign Up page. 3. Enter your Deltek Access Code exactly as it appears below. You will not need to use this code after youve completed the sign-up process. If you do not sign up before the expiration date, you must request a new code. · Deltek Access Code: [email or web address] Expires: 9/17/2018  5:56 AM 
 
4. Enter the last four digits of your Social Security Number (xxxx) and Date of Birth (mm/dd/yyyy) as indicated and click Submit. You will be taken to the next sign-up page. 5. Create a TabSquaret ID. This will be your [email or web address] login ID and cannot be changed, so think of one that is secure and easy to remember. 6. Create a [email or web address] password. You can change your password at any time. 7. Enter your Password Reset Question and Answer. This can be used at a later time if you forget your password. 8. Enter your e-mail address. You will receive e-mail notification when new information is available in [address] 9. Click Sign Up. You can now view and download portions of your medical record. 10. Click the Download Summary menu link to download a portable copy of your medical information. If you have questions, please visit the Frequently Asked Questions section of the [email or web address] website. Remember, [email or web address] is NOT to be used for urgent needs. For medical emergencies, dial 911. Now available from your iPhone and Android! Introducing Eb Wheeler As a [facility] patient, I wanted to make you aware of our electronic visit tool called Eb Wheeler. [facility] 24/7 allows you to connect within minutes with a medical provider 24 hours a day, seven days a week via a mobile device or tablet or logging into a secure website from your computer. You can access Eb Wheeler from anywhere in the [place]. A virtual visit might be right for you when you have a simple condition and feel like you just dont want to get out of bed, or cant get away from work for an appointment, when your regular [facility] provider is not available (evenings, weekends or holidays), or when youre out of town and need minor care.   Electronic visits cost only $49 and if the Eb Wheeler provider determines a prescription is needed to treat your condition, one can be electronically transmitted to a nearby pharmacy*. Please take a moment to enroll today if you have not already done so. The enrollment process is free and takes just a few minutes. To enroll, please download the [employer] 24/7 lala to your tablet or phone, or visit [email or web address] to enroll on your computer. And, as an [address] [place] [address] patient with a Nestio account, the results of your visits will be scanned into your electronic medical record and your primary care provider will be able to view the scanned results. We urge you to continue to see your regular [employer] provider for your ongoing medical care. And while your primary care provider may not be the one available when you seek a Panna virtual visit, the peace of mind you get from getting a real diagnosis real time can be priceless. For more information on Panna, view our Frequently Asked Questions (FAQs) at [email or web address]. Sincerely, 
 
Sonali Wharton MD 
Chief Medical Officer [employer] *:  certain medications cannot be prescribed via Panna Unresulted tests-please follow up with your PCP on these results Procedure/Test Authorizing Provider Karon Severs, MD  
 HEMOGLOBIN Latanya Rocker, MD Karon Severs, MD  
 METABOLIC PANEL, JACKIE Jaramillo MD  
 METABOLIC PANEL, JACKIE Jaramillo MD  
  
Providers Seen During Your Hospitalization Provider Specialty Primary office phone Pamela Jaramillo MD Orthopedic Surgery [phone] Your Primary Care Physician (PCP) Primary Care Physician Office Phone Office Fax Nicole Strickland [phone] You are allergic to the following No active allergies Recent Documentation Height Weight BMI Smoking Status 1.778 m 112.9 kg 35.73 kg/m2 Former Smoker Emergency Contacts Name Discharge Info Relation Home Work Mobile [address] CAREGIVER [3] Spouse [3] [phone] Patient Belongings The following personal items are in your possession at time of discharge: 
  Dental Appliances: None  Visual Aid: Glasses      Home Medications: None   Jewelry: None  Clothing: Undergarments, Shirt, Footwear, Shorts, Hat    Other Valuables: Eyeglasses (prescription glasses sent o PACU with belongings)  Personal Items Sent to Safe: na 
 
  
  
 Please provide this summary of care documentation to your next provider. Signatures-by signing, you are acknowledging that this After Visit Summary has been reviewed with you and you have received a copy. Patient Signature:  ____________________________________________________________ Date:  ____________________________________________________________  
  
[facility] Provider Signature:  ____________________________________________________________ Date:  ____________________________________________________________

## 2018-06-19 NOTE — ANESTHESIA PREPROCEDURE EVALUATION
Anesthetic History   No history of anesthetic complications            Review of Systems / Medical History  Patient summary reviewed, nursing notes reviewed and pertinent labs reviewed    Pulmonary  Within defined limits                 Neuro/Psych         TIA     Cardiovascular    Hypertension              Exercise tolerance: >4 METS     GI/Hepatic/Renal  Within defined limits              Endo/Other    Diabetes    Morbid obesity, arthritis and cancer     Other Findings            Physical Exam    Airway  Mallampati: I  TM Distance: 4 - 6 cm  Neck ROM: normal range of motion   Mouth opening: Normal     Cardiovascular  Regular rate and rhythm,  S1 and S2 normal,  no murmur, click, rub, or gallop             Dental    Dentition: Poor dentition     Pulmonary  Breath sounds clear to auscultation               Abdominal  GI exam deferred       Other Findings            Anesthetic Plan    ASA: 2  Anesthesia type: general          Induction: Intravenous  Anesthetic plan and risks discussed with: Patient

## 2018-06-20 ENCOUNTER — HOME HEALTH ADMISSION (OUTPATIENT)
Dept: HOME HEALTH SERVICES | Facility: HOME HEALTH | Age: 68
End: 2018-06-20

## 2018-06-20 LAB
ANION GAP SERPL CALC-SCNC: 9 MMOL/L (ref 5–15)
BUN SERPL-MCNC: 10 MG/DL (ref 6–20)
BUN/CREAT SERPL: 8 (ref 12–20)
CALCIUM SERPL-MCNC: 7.8 MG/DL (ref 8.5–10.1)
CHLORIDE SERPL-SCNC: 103 MMOL/L (ref 97–108)
CO2 SERPL-SCNC: 28 MMOL/L (ref 21–32)
CREAT SERPL-MCNC: 1.21 MG/DL (ref 0.7–1.3)
GLUCOSE BLD STRIP.AUTO-MCNC: 118 MG/DL (ref 65–100)
GLUCOSE BLD STRIP.AUTO-MCNC: 123 MG/DL (ref 65–100)
GLUCOSE BLD STRIP.AUTO-MCNC: 136 MG/DL (ref 65–100)
GLUCOSE BLD STRIP.AUTO-MCNC: 137 MG/DL (ref 65–100)
GLUCOSE SERPL-MCNC: 143 MG/DL (ref 65–100)
HGB BLD-MCNC: 10.5 G/DL (ref 12.1–17)
POTASSIUM SERPL-SCNC: 3.8 MMOL/L (ref 3.5–5.1)
SERVICE CMNT-IMP: ABNORMAL
SODIUM SERPL-SCNC: 140 MMOL/L (ref 136–145)

## 2018-06-20 PROCEDURE — 74011250637 HC RX REV CODE- 250/637: Performed by: ORTHOPAEDIC SURGERY

## 2018-06-20 PROCEDURE — 97110 THERAPEUTIC EXERCISES: CPT

## 2018-06-20 PROCEDURE — 80048 BASIC METABOLIC PNL TOTAL CA: CPT | Performed by: ORTHOPAEDIC SURGERY

## 2018-06-20 PROCEDURE — 97116 GAIT TRAINING THERAPY: CPT

## 2018-06-20 PROCEDURE — 74011250637 HC RX REV CODE- 250/637: Performed by: NURSE PRACTITIONER

## 2018-06-20 PROCEDURE — 97530 THERAPEUTIC ACTIVITIES: CPT

## 2018-06-20 PROCEDURE — 77010033678 HC OXYGEN DAILY

## 2018-06-20 PROCEDURE — 74011636637 HC RX REV CODE- 636/637: Performed by: ORTHOPAEDIC SURGERY

## 2018-06-20 PROCEDURE — 85018 HEMOGLOBIN: CPT | Performed by: ORTHOPAEDIC SURGERY

## 2018-06-20 PROCEDURE — 74011250636 HC RX REV CODE- 250/636: Performed by: NURSE PRACTITIONER

## 2018-06-20 PROCEDURE — 82962 GLUCOSE BLOOD TEST: CPT

## 2018-06-20 PROCEDURE — 65270000029 HC RM PRIVATE

## 2018-06-20 PROCEDURE — 36415 COLL VENOUS BLD VENIPUNCTURE: CPT | Performed by: ORTHOPAEDIC SURGERY

## 2018-06-20 RX ADMIN — INSULIN LISPRO 2 UNITS: 100 INJECTION, SOLUTION INTRAVENOUS; SUBCUTANEOUS at 17:15

## 2018-06-20 RX ADMIN — ACETAMINOPHEN 650 MG: 325 TABLET ORAL at 17:13

## 2018-06-20 RX ADMIN — Medication 10 ML: at 21:02

## 2018-06-20 RX ADMIN — AMLODIPINE BESYLATE 10 MG: 5 TABLET ORAL at 08:21

## 2018-06-20 RX ADMIN — OXYCODONE HYDROCHLORIDE 5 MG: 5 TABLET ORAL at 17:14

## 2018-06-20 RX ADMIN — ASPIRIN 325 MG: 325 TABLET ORAL at 08:20

## 2018-06-20 RX ADMIN — INSULIN LISPRO 2 UNITS: 100 INJECTION, SOLUTION INTRAVENOUS; SUBCUTANEOUS at 11:29

## 2018-06-20 RX ADMIN — GLIPIZIDE 10 MG: 5 TABLET ORAL at 21:02

## 2018-06-20 RX ADMIN — GABAPENTIN 100 MG: 100 CAPSULE ORAL at 21:02

## 2018-06-20 RX ADMIN — OXYCODONE HYDROCHLORIDE 10 MG: 5 TABLET ORAL at 09:43

## 2018-06-20 RX ADMIN — FAMOTIDINE 20 MG: 20 TABLET, FILM COATED ORAL at 17:14

## 2018-06-20 RX ADMIN — ASPIRIN 325 MG: 325 TABLET ORAL at 17:13

## 2018-06-20 RX ADMIN — ACETAMINOPHEN 650 MG: 325 TABLET ORAL at 05:50

## 2018-06-20 RX ADMIN — ACETAMINOPHEN 650 MG: 325 TABLET ORAL at 11:29

## 2018-06-20 RX ADMIN — SENNOSIDES AND DOCUSATE SODIUM 1 TABLET: 8.6; 5 TABLET ORAL at 17:14

## 2018-06-20 RX ADMIN — OXYCODONE HYDROCHLORIDE 10 MG: 5 TABLET ORAL at 13:29

## 2018-06-20 RX ADMIN — INSULIN GLARGINE 12 UNITS: 100 INJECTION, SOLUTION SUBCUTANEOUS at 21:26

## 2018-06-20 RX ADMIN — GABAPENTIN 100 MG: 100 CAPSULE ORAL at 08:20

## 2018-06-20 RX ADMIN — GABAPENTIN 100 MG: 100 CAPSULE ORAL at 16:25

## 2018-06-20 RX ADMIN — Medication 2 G: at 00:01

## 2018-06-20 RX ADMIN — METFORMIN HYDROCHLORIDE 1000 MG: 500 TABLET, FILM COATED ORAL at 08:21

## 2018-06-20 RX ADMIN — FAMOTIDINE 20 MG: 20 TABLET, FILM COATED ORAL at 08:21

## 2018-06-20 RX ADMIN — PRAVASTATIN SODIUM 20 MG: 10 TABLET ORAL at 08:20

## 2018-06-20 RX ADMIN — ACETAMINOPHEN 650 MG: 325 TABLET ORAL at 00:01

## 2018-06-20 RX ADMIN — Medication 10 ML: at 13:29

## 2018-06-20 RX ADMIN — GLIPIZIDE 10 MG: 5 TABLET ORAL at 08:21

## 2018-06-20 RX ADMIN — SENNOSIDES AND DOCUSATE SODIUM 1 TABLET: 8.6; 5 TABLET ORAL at 08:21

## 2018-06-20 RX ADMIN — INSULIN LISPRO 2 UNITS: 100 INJECTION, SOLUTION INTRAVENOUS; SUBCUTANEOUS at 08:20

## 2018-06-20 RX ADMIN — Medication 10 ML: at 05:52

## 2018-06-20 RX ADMIN — OXYCODONE HYDROCHLORIDE 10 MG: 5 TABLET ORAL at 05:50

## 2018-06-20 RX ADMIN — METFORMIN HYDROCHLORIDE 1000 MG: 500 TABLET, FILM COATED ORAL at 16:24

## 2018-06-20 RX ADMIN — POLYETHYLENE GLYCOL 3350 17 G: 17 POWDER, FOR SOLUTION ORAL at 08:19

## 2018-06-20 RX ADMIN — OXYCODONE HYDROCHLORIDE 5 MG: 5 TABLET ORAL at 20:41

## 2018-06-20 NOTE — INTERDISCIPLINARY ROUNDS
Bedside interdisciplinary rounds were held today to discuss patient plan of care and outcomes. The following members were present: Nurse Practitioner, Nurse, Clinical Care Leader, Pharmacy, Physical Therapy, and Case Management. Plan:  Continue therapy. Plan for home tomorrow.

## 2018-06-20 NOTE — PROGRESS NOTES
Bedside shift change report given to Bird Maya (oncoming nurse) by Zoila Lopez (offgoing nurse). Report included the following information SBAR, Kardex, Intake/Output, MAR and Recent Results.

## 2018-06-20 NOTE — PROGRESS NOTES
11: 07AM  Initial Assessment:  CM met with pt to complete assessment and discuss d/c planning. Pt is a 73 yo male admitted for left knee OA. At baseline, pt is independent with ADLs and IADLs, including driving. Pt lives with his wife in a 2 story home but currently resides on the first floor. Pt's wife will transport him home and will be available at home to assist him. Pt states that he does not have a RW. He would like to use [facility] for his [facility] needs. FOC on pt's chart. Referral accepted and information added to AVS.     PC from [facility] with [facility] stating that pt is managed by them. Per [facility], [facility] is one of the their network providers. Referral sent through [address] [place] [address] to [facility] and [facility] order faxed to [facility] ([phone]). F/u PC to [facility]. Fax received. Can authorize RW as well. Referral sent to [place] through [email or web address] and faxed to [facility]. 12:13PM  RW delivered to pt's room. Pt ready for d/c from CM perspective. CM available for any additional needs. Care Management Interventions  PCP Verified by CM: Yes Catrachito Quijano MD )  Mode of Transport at Discharge: Other (see comment)  Transition of Care Consult (CM Consult): [address]: Yes  Physical Therapy Consult: Yes  Current Support Network: Lives with Spouse, Own Home  Confirm Follow Up Transport: Family  Plan discussed with Pt/Family/Caregiver: Yes  Freedom of Choice Offered: Yes  Discharge Location  Discharge Placement: Home with home health      NENITA Mercado  Care Manager          Reason for Admission:   Left knee OA                  RRAT Score:     16             Do you (patient/family) have any concerns for transition/discharge? No concerns identified               Plan for utilizing home health:     PT    Likelihood of readmission?    Low             Transition of Care Plan:      Home with spouse

## 2018-06-20 NOTE — PROGRESS NOTES
Ortho / Neurosurgery NP Note    POD# 1  s/p LEFT TOTAL KNEE ARTHROPLASTY   Pt seen with Dr. Lorena Drummond    Pt resting in bed. No complaints. VSS Afebrile. Voiding status: + void    Labs  Lab Results   Component Value Date/Time    HGB 10.5 (L) 06/20/2018 03:26 AM      Lab Results   Component Value Date/Time    INR 1.0 06/05/2018 01:49 PM        Body mass index is 35.49 kg/(m^2). : A BMI > 30 is classified as obesity and > 40 is classified as morbid obesity. Dressing c.d.i  Cryotherapy in place over incision  Drain removed this morning  Calves soft and supple; No pain with passive stretch  Sensation and motor intact  SCDs for mechanical DVT proph while in bed     PLAN:  1) PT BID  2) Aspirin 325 mg PO BID for DVT Prophylaxis   3) GI Prophylaxis - Pepcid  4) Readniess for discharge:     [x] Vital Signs stable    [x] Hgb stable    [x] + Voiding    [x] Wound intact, drainage minimal    [x] Tolerating PO intake     [] Cleared by PT (OT if applicable)     [] Stair training completed (if applicable)    [] Independent / [address] [facility] (household distance)     [] Bed mobility     [] Car transfers     [] ADLs    [x] Adequate pain control on oral medication alone     Plan d/c home tomorrow.   Alice Villarreal NP  DNP, ACNP-BC, ONP-C

## 2018-06-20 NOTE — PROGRESS NOTES
Physical Therapy Goals  Initiated 6/19/2018    1. Patient will move from supine to sit and sit to supine , scoot up and down and roll side to side in bed with independence within 4 days. 2. Patient will perform sit to stand with modified independence within 4 days. 3. Patient will ambulate with modified independence for 150 feet with the least restrictive device within 4 days. 4. Patient will ascend/descend 2 stairs with one sided handrail(s) with modified independence within 4 days. 5. Patient will perform home exercise program per protocol with modified independence within 4 days. 6. Patient will demonstrate AROM 0-90 degrees in operative joint within 4 days. physical Therapy TREATMENT  Patient: Junior Godinez (29 y.o. male)  Date: 6/20/2018  Diagnosis: LEFT KNEE OA   Osteoarthrosis, localized, primary, knee, left <principal problem not specified>  Procedure(s) (LRB):  LEFT TOTAL KNEE ARTHROPLASTY (Left) 1 Day Post-Op  Precautions: WBAT LLE  Chart, physical therapy assessment, plan of care and goals were reviewed. ASSESSMENT:    Patient was received sitting in bedside chair. Patient reported that he had been sitting in the chair since this morning's PT session, even though patient was instructed on remaining in the chair for only 1 hour. Patient could recall 1/2 (heel slides) of the therapeutic exercises given this morning. Patient reports being tired upon visit to patient's room. Patient agreed to begin PT though. Patient performed sit to stand under Max/Total Assist x2, which was a change from this morning's PT session (min assist). Upon standing the patient needed verbal cues to stand upright inside the RW. Patient ambulated from the bedside chair and around to the opposite side of the bed 12 ft, with much difficulty. Patient then returned to sitting EOB, where he was Max/Total Assist for sit to supine in order to get his LEs into the bed.        Patient plans to be discharged tomorrow originally home with home health, however due to recent PT session he was Max/Total Assist for all transfers. Therefore, PT recommends Inpatient Rehab vs home health to provide 3 hours of therapy a day to continue to improve strength, functional mobility and pain. Discharge plan was discussed with patient, and he was in agreement with the possibility of Inpatient Rehab discharge. Patient will benefit from skilled therapy through Inpatient Rehab and will be able to perform 3 hours of therapy, in which he is familiar with due to previous inpatient rehab for his prior stroke. Progression toward goals:  []    Improving appropriately and progressing toward goals  []    Improving slowly and progressing toward goals  [x]    Not making progress toward goals     PLAN:  Patient continues to benefit from skilled intervention to address the above impairments. Continue treatment per established plan of care. Discharge Recommendations:  Inpatient Rehab  Further Equipment Recommendations for Discharge:  rolling walker     SUBJECTIVE:   Patient stated I am in a lot of pain.     OBJECTIVE DATA SUMMARY:   Critical Behavior:  Neurologic State: Alert, Appropriate for age  Orientation Level: Oriented X4  Cognition: Follows commands     Functional Mobility Training:           Transfers:  Sit to Stand: Maximum assistance; Total assistance;Assist x2  Stand to Sit: Moderate assistance;Assist x2                          Balance:  Sitting: Intact; With support  Standing: Impaired; With support  Standing - Static: Fair;Constant support  Standing - Dynamic : Fair  Ambulation/Gait Training:  Distance (ft): 12 Feet (ft)  Assistive Device: Gait belt;Walker, rolling  Ambulation - Level of Assistance: Modified independent        Gait Abnormalities: Antalgic;Decreased step clearance; Step to gait  Right Side Weight Bearing: Full  Left Side Weight Bearing: As tolerated     Stance: Left decreased  Speed/Saige: Pace decreased (<100 feet/min); Slow  Step Length: Left shortened;Right shortened                 Patient ambulated with very decreased step lengths and very decreased pace with a step to gait with the RW. Patient was given verbal cues to stand up tall and increase step length, which was not 100% achieved. Pain:  Pain Scale 1: Numeric (0 - 10)  Pain Intensity 1: 4  Pain Location 1: Knee  Pain Orientation 1: Left  Pain Description 1: Aching  Pain Intervention(s) 1: Cold pack;Elevation;Distraction  Activity Tolerance:   Patient could only ambulate 12 ft this afternoon to the other side of the bed with a chair following before having to take a rest break on the EOB due to patient reported fatigue and pain in Left knee. Please refer to the flowsheet for vital signs taken during this treatment. After treatment:   []    Patient left in no apparent distress sitting up in chair  [x]    Patient left in no apparent distress in bed  [x]    Call bell left within reach  [x]    Nursing notified  []    Caregiver present  []    Bed alarm activated    COMMUNICATION/COLLABORATION:   The patients plan of care was discussed with: Registered Nurse and Ortho NP    Abilio Stanley   Time Calculation: 23 mins      Regarding student involvement in patient care:  A student participated in this treatment session. Per CMS Medicare statements and APTA guidelines I certify that the following was true:  1. I was present and directly observed the entire session. 2. I made all skilled judgments and clinical decisions regarding care. 3. I am the practitioner responsible for assessment, treatment, and documentation.

## 2018-06-20 NOTE — PROGRESS NOTES
Physical Therapy Goals  Initiated 6/19/2018    1. Patient will move from supine to sit and sit to supine , scoot up and down and roll side to side in bed with independence within 4 days. 2. Patient will perform sit to stand with modified independence within 4 days. 3. Patient will ambulate with modified independence for 150 feet with the least restrictive device within 4 days. 4. Patient will ascend/descend 2 stairs with one sided handrail(s) with modified independence within 4 days. 5. Patient will perform home exercise program per protocol with modified independence within 4 days. 6. Patient will demonstrate AROM 0-90 degrees in operative joint within 4 days. physical Therapy TREATMENT  Patient: Clint Hernández (89 y.o. male)  Date: 6/20/2018  Diagnosis: LEFT KNEE OA   Osteoarthrosis, localized, primary, knee, left <principal problem not specified>  Procedure(s) (LRB):  LEFT TOTAL KNEE ARTHROPLASTY (Left) 1 Day Post-Op  Precautions: WBAT  Chart, physical therapy assessment, plan of care and goals were reviewed. ASSESSMENT:    Patient was received supine in bed with head raised 45 degrees. Patient was motivated to begin PT this AM. Patient reported that he was experiencing pain in his L knee due to performing heel slides supine in bed earlier this AM. Patient reported that PLOF he would ambulate with a SPC 15ft to his bathroom at home, but otherwise used a WC for distances longer than 15 ft. Patient reports multiple falls in the past due to left knee buckling. Patient reports support from his wife, especially pushing him in the [facility] up the 1 step to get into their house. Otherwise, the patient reports pushing the [facility] himself in the community and around the house. Patient was also able to follow commands, but seemed to demonstrate signs of mild cognitive impairment due to misunderstanding of exercise after verbally and visually demonstrated numerous times.        Patient was able to perform rolling under Mod I due decreased speed while performing the roll, and also needing to use the bedside rail to pull up from. Patient's VS were recorded and maintained throughout the PT session. Patient was [address] for supine to sit and scooting due to PT helping assist the patient up on EOB with feet on the floor. Patient was then able to perform sit to stand under [address] as well with the use of the RW in front to provide support and to assist in standing upright. Patient was then instructed on performing stepping in place inside the RW to initiate priming before ambulation. However, patient reported pain in his L knee and wanted to sit down in the chair. Patient refused to ambulate after rest break due to pain and fatigue. Patient was then instructed on 2 therapeutic exercises verbally and visually (heel slides and quad sets) to perform for 1min while remaining seated in the bedside chair every 15-20min. Patient was instructed to remain in the chair for 1 hour. Patient plans to be discharged home with home health tomorrow. Patient will continue to benefit from acute care PT for continued gait training and therapeutic exercises. Progression toward goals:  []      Improving appropriately and progressing toward goals  [x]      Improving slowly and progressing toward goals  []      Not making progress toward goals and plan of care will be adjusted     PLAN:  Patient continues to benefit from skilled intervention to address the above impairments. Continue treatment per established plan of care. Discharge Recommendations:  Home Health  Further Equipment Recommendations for Discharge:  rolling walker     SUBJECTIVE:   Patient stated I am in pain.     OBJECTIVE DATA SUMMARY:   Critical Behavior:  Neurologic State: Alert, Appropriate for age  Orientation Level: Oriented X4  Cognition: Follows commands                               Functional Mobility Training:  Bed Mobility:  Rolling: Modified independent     Sit to Supine: Minimum assistance  Scooting: Minimum assistance        Transfers:  Sit to Stand: Minimum assistance  Stand to Sit: Minimum assistance        Bed to Chair: Minimum assistance                    Balance:  Sitting: Intact; With support  Standing: Impaired; With support  Standing - Static: Fair;Constant support  Standing - Dynamic : Fair  Ambulation/Gait Training:  Distance (ft): 5 Feet (ft)  Assistive Device: Gait belt;Walker, rolling  Ambulation - Level of Assistance: Modified independent        Gait Abnormalities: Antalgic;Decreased step clearance; Step to gait  Right Side Weight Bearing: Full  Left Side Weight Bearing: As tolerated     Stance: Left decreased  Speed/Saige: Pace decreased (<100 feet/min); Slow  Step Length: Left shortened;Right shortened                 Patient ambulated 5 ft with the RW over to the chair because he requested to sit. When instructed patient to step in place inside RW, patient took small steps barely lifting off of the floor. Patient then complained of pain in the L knee and took small steps over to the chair to sit with decreased stance on the left. Therapeutic Exercises: For neuro-reeducation and edema control    SEATED EXERCISES   Sets   Reps   Active Active Assist   Passive Self ROM   Comments   Quad Sets   [x]                                        []                                        []                                        []                                        1 min   Heel Slides   [x]                                        []                                        []                                        []                                        1 min     Pain:  Pain Scale 1: Numeric (0 - 10)  Pain Intensity 1: 5  Pain Location 1: Knee  Pain Orientation 1: Left  Pain Description 1: Aching  Pain Intervention(s) 1: Medication (see MAR); Ice;Rest  Activity Tolerance:   Patient could not ambulate further than from the bed to the chair and needed a rest break due to pain in the L knee and fatigue. Please refer to the flowsheet for vital signs taken during this treatment. After treatment:   [x] Patient left in no apparent distress sitting up in chair  [] Patient left in no apparent distress in bed  [x] Call bell left within reach  [x] Nursing notified  [] Caregiver present  [] Bed alarm activated    COMMUNICATION/COLLABORATION:   The patients plan of care was discussed with: Registered Nurse    Tanvir Due   Time Calculation: 32 mins      Regarding student involvement in patient care:  A student participated in this treatment session. Per CMS Medicare statements and APTA guidelines I certify that the following was true:  1. I was present and directly observed the entire session. 2. I made all skilled judgments and clinical decisions regarding care. 3. I am the practitioner responsible for assessment, treatment, and documentation.

## 2018-06-20 NOTE — PROGRESS NOTES
Bedside and Verbal shift change report given to Barrington ANDREW (oncoming nurse) by Colin Hernandez (offgoing nurse). Report included the following information SBAR, Kardex, MAR and Recent Results.

## 2018-06-21 LAB
ANION GAP SERPL CALC-SCNC: 7 MMOL/L (ref 5–15)
BUN SERPL-MCNC: 11 MG/DL (ref 6–20)
BUN/CREAT SERPL: 13 (ref 12–20)
CALCIUM SERPL-MCNC: 8.3 MG/DL (ref 8.5–10.1)
CHLORIDE SERPL-SCNC: 103 MMOL/L (ref 97–108)
CO2 SERPL-SCNC: 27 MMOL/L (ref 21–32)
CREAT SERPL-MCNC: 0.84 MG/DL (ref 0.7–1.3)
GLUCOSE BLD STRIP.AUTO-MCNC: 137 MG/DL (ref 65–100)
GLUCOSE BLD STRIP.AUTO-MCNC: 145 MG/DL (ref 65–100)
GLUCOSE BLD STRIP.AUTO-MCNC: 158 MG/DL (ref 65–100)
GLUCOSE BLD STRIP.AUTO-MCNC: 167 MG/DL (ref 65–100)
GLUCOSE SERPL-MCNC: 120 MG/DL (ref 65–100)
HGB BLD-MCNC: 10.5 G/DL (ref 12.1–17)
POTASSIUM SERPL-SCNC: 3.7 MMOL/L (ref 3.5–5.1)
SERVICE CMNT-IMP: ABNORMAL
SODIUM SERPL-SCNC: 137 MMOL/L (ref 136–145)

## 2018-06-21 PROCEDURE — 74011250637 HC RX REV CODE- 250/637: Performed by: NURSE PRACTITIONER

## 2018-06-21 PROCEDURE — 74011250637 HC RX REV CODE- 250/637: Performed by: ORTHOPAEDIC SURGERY

## 2018-06-21 PROCEDURE — 65270000029 HC RM PRIVATE

## 2018-06-21 PROCEDURE — 97116 GAIT TRAINING THERAPY: CPT

## 2018-06-21 PROCEDURE — 85018 HEMOGLOBIN: CPT | Performed by: ORTHOPAEDIC SURGERY

## 2018-06-21 PROCEDURE — 97530 THERAPEUTIC ACTIVITIES: CPT

## 2018-06-21 PROCEDURE — 80048 BASIC METABOLIC PNL TOTAL CA: CPT | Performed by: ORTHOPAEDIC SURGERY

## 2018-06-21 PROCEDURE — 82962 GLUCOSE BLOOD TEST: CPT

## 2018-06-21 PROCEDURE — 36415 COLL VENOUS BLD VENIPUNCTURE: CPT | Performed by: ORTHOPAEDIC SURGERY

## 2018-06-21 PROCEDURE — 97110 THERAPEUTIC EXERCISES: CPT

## 2018-06-21 PROCEDURE — 74011636637 HC RX REV CODE- 636/637: Performed by: ORTHOPAEDIC SURGERY

## 2018-06-21 RX ORDER — ACETAMINOPHEN 325 MG/1
650 TABLET ORAL EVERY 6 HOURS
Qty: 112 TAB | Refills: 0 | Status: SHIPPED | OUTPATIENT
Start: 2018-06-21 | End: 2018-07-05

## 2018-06-21 RX ORDER — CLOPIDOGREL BISULFATE 75 MG/1
TABLET ORAL
Qty: 30 TAB | Refills: 11 | Status: SHIPPED | COMMUNITY
Start: 2018-06-21 | End: 2018-09-04 | Stop reason: SDUPTHER

## 2018-06-21 RX ORDER — OXYCODONE HYDROCHLORIDE 5 MG/1
5-10 TABLET ORAL
Qty: 60 TAB | Refills: 0 | Status: SHIPPED | OUTPATIENT
Start: 2018-06-21 | End: 2018-09-04

## 2018-06-21 RX ORDER — AMOXICILLIN 250 MG
1 CAPSULE ORAL 2 TIMES DAILY
Qty: 60 TAB | Refills: 0 | Status: SHIPPED | OUTPATIENT
Start: 2018-06-21 | End: 2018-08-01 | Stop reason: SDUPTHER

## 2018-06-21 RX ORDER — FAMOTIDINE 20 MG/1
20 TABLET, FILM COATED ORAL 2 TIMES DAILY
Qty: 60 TAB | Refills: 0 | Status: SHIPPED | OUTPATIENT
Start: 2018-06-21 | End: 2018-07-20

## 2018-06-21 RX ORDER — POLYETHYLENE GLYCOL 3350 17 G/17G
17 POWDER, FOR SOLUTION ORAL DAILY
Qty: 255 G | Refills: 0 | Status: SHIPPED | OUTPATIENT
Start: 2018-06-21 | End: 2018-07-06

## 2018-06-21 RX ADMIN — GABAPENTIN 100 MG: 100 CAPSULE ORAL at 09:02

## 2018-06-21 RX ADMIN — GLIPIZIDE 10 MG: 5 TABLET ORAL at 09:02

## 2018-06-21 RX ADMIN — PRAVASTATIN SODIUM 20 MG: 10 TABLET ORAL at 09:03

## 2018-06-21 RX ADMIN — GLIPIZIDE 10 MG: 5 TABLET ORAL at 20:35

## 2018-06-21 RX ADMIN — INSULIN LISPRO 2 UNITS: 100 INJECTION, SOLUTION INTRAVENOUS; SUBCUTANEOUS at 17:36

## 2018-06-21 RX ADMIN — OXYCODONE HYDROCHLORIDE 10 MG: 5 TABLET ORAL at 06:04

## 2018-06-21 RX ADMIN — ACETAMINOPHEN 650 MG: 325 TABLET ORAL at 23:56

## 2018-06-21 RX ADMIN — ACETAMINOPHEN 650 MG: 325 TABLET ORAL at 06:05

## 2018-06-21 RX ADMIN — METFORMIN HYDROCHLORIDE 1000 MG: 500 TABLET, FILM COATED ORAL at 17:37

## 2018-06-21 RX ADMIN — ASPIRIN 325 MG: 325 TABLET ORAL at 09:02

## 2018-06-21 RX ADMIN — FAMOTIDINE 20 MG: 20 TABLET, FILM COATED ORAL at 17:37

## 2018-06-21 RX ADMIN — POLYETHYLENE GLYCOL 3350 17 G: 17 POWDER, FOR SOLUTION ORAL at 09:11

## 2018-06-21 RX ADMIN — METFORMIN HYDROCHLORIDE 1000 MG: 500 TABLET, FILM COATED ORAL at 09:03

## 2018-06-21 RX ADMIN — GABAPENTIN 100 MG: 100 CAPSULE ORAL at 22:35

## 2018-06-21 RX ADMIN — AMLODIPINE BESYLATE 10 MG: 5 TABLET ORAL at 09:02

## 2018-06-21 RX ADMIN — Medication 10 ML: at 06:05

## 2018-06-21 RX ADMIN — Medication 10 ML: at 22:35

## 2018-06-21 RX ADMIN — OXYCODONE HYDROCHLORIDE 10 MG: 5 TABLET ORAL at 13:02

## 2018-06-21 RX ADMIN — Medication 10 ML: at 14:00

## 2018-06-21 RX ADMIN — INSULIN GLARGINE 12 UNITS: 100 INJECTION, SOLUTION SUBCUTANEOUS at 22:35

## 2018-06-21 RX ADMIN — SENNOSIDES AND DOCUSATE SODIUM 1 TABLET: 8.6; 5 TABLET ORAL at 17:37

## 2018-06-21 RX ADMIN — INSULIN LISPRO 2 UNITS: 100 INJECTION, SOLUTION INTRAVENOUS; SUBCUTANEOUS at 13:22

## 2018-06-21 RX ADMIN — SENNOSIDES AND DOCUSATE SODIUM 1 TABLET: 8.6; 5 TABLET ORAL at 09:03

## 2018-06-21 RX ADMIN — INSULIN LISPRO 2 UNITS: 100 INJECTION, SOLUTION INTRAVENOUS; SUBCUTANEOUS at 09:04

## 2018-06-21 RX ADMIN — ACETAMINOPHEN 650 MG: 325 TABLET ORAL at 00:27

## 2018-06-21 RX ADMIN — ASPIRIN 325 MG: 325 TABLET ORAL at 17:37

## 2018-06-21 RX ADMIN — INSULIN LISPRO 2 UNITS: 100 INJECTION, SOLUTION INTRAVENOUS; SUBCUTANEOUS at 17:35

## 2018-06-21 RX ADMIN — INSULIN LISPRO 2 UNITS: 100 INJECTION, SOLUTION INTRAVENOUS; SUBCUTANEOUS at 13:23

## 2018-06-21 RX ADMIN — FAMOTIDINE 20 MG: 20 TABLET, FILM COATED ORAL at 09:02

## 2018-06-21 RX ADMIN — ACETAMINOPHEN 650 MG: 325 TABLET ORAL at 13:02

## 2018-06-21 RX ADMIN — GABAPENTIN 100 MG: 100 CAPSULE ORAL at 17:37

## 2018-06-21 RX ADMIN — ACETAMINOPHEN 650 MG: 325 TABLET ORAL at 17:36

## 2018-06-21 RX ADMIN — OXYCODONE HYDROCHLORIDE 10 MG: 5 TABLET ORAL at 02:47

## 2018-06-21 RX ADMIN — OXYCODONE HYDROCHLORIDE 10 MG: 5 TABLET ORAL at 09:03

## 2018-06-21 NOTE — PROGRESS NOTES
SW was informed pt would need SNF at discharge by PT. SW met with pt to discuss discharge plans. Pt stated he has been to [facility] in the past, knows the staff, and feels comfortable with them there. FOC offered, pt in agreement with Duc moore.  Referral sent to [facility] for their review. SW received message from [facility], SW informed him to contact [facility] with [facility] and to begin auth on the pt.    2:36PM  Pt has been approved by Noa Simmons with [employer] [id number] has approved SNF as well. Pt has also been approved for transport to the SNF with [facility] with a auth number of [id number]. MD and NP to continue to monitor pt and plan to discharge tomorrow, [facility] vs SNF. Duc moore has been notified. SW will continue to follow and assist with additional discharge needs.     Roel Marcelo, MSW  Ext [phone]

## 2018-06-21 NOTE — DISCHARGE INSTRUCTIONS
Den Nichols  Surgery: Total Knee Replacement  Surgeon:   Rhonda Gonzalez MD  Surgery Date:  6/19/2018     To relieve pain:   Use ice/gel packs.  Put the ice pack directly over the wound, or anywhere you are hurting or swollen.  To control pain and swelling, keep ice on regularly, especially after physical activity.  The packs should stay cold for 3-4 hours. When it is not cold anymore, rotate with the packs in the freezer.  Elevate your leg. This will also keep swelling down.  Rest for at least 20 minutes between activity or exercises.  To keep track of your pain medications, write down what you take and when you take it.  The last dose of pain medication you got in the hospital was:       Medication    Dose    Date & Time         Choose your medications based on the pain scale below:     To keep your pain under control, take Tylenol every 6 hours for 14 days - even if you feel like you dont need it.  For mild to moderate pain (4-6 on pain scale), take one pain pill every 3-4 hours as needed.  For severe pain (7-10 on pain scale), take two pain pills every 3-4 hours as needed.  To prevent nausea, take your pain medications with food. Pain Scale                 As your pain lessens:     Slowly start taking less pain medication. You may do this by waiting longer between doses or by taking smaller doses.  Stop using the pain medications as soon as you no longer need it, usually in 2-3 weeks.  Resume Plavix for prevention of blood clots             To prevent stomach upset or bleeding:   Do not take non-steroidal anti-inflammatory medications (Ibuprofen, Advil, Motrin, Naproxen, etc.)    Take Pepcid 20 mg twice a day, or a similar home medication, while you are taking a blood thinner.             OPSITE (Honeycomb dressing)     Keep your clear, waterproof dressing in place for 5-7 days after your leave the hospital.     If you are still having drainage, you will need to change your dressing in 5-7 days. You will be given one extra dressing to use at home.  If there is no more drainage from the wound, you may leave it open to the air. OPSITE DRESSING INSTRUCTIONS                       DO NOTs:   Do not rub your surgical wound   Do not put lotion or oils on your wound.  Do not take a tub bath or go swimming until your doctor says it is ok.  You may shower with this dressing over your wound.  After showering pat the dressing dry.  If you have staples a home health nurse will remove them in about 10 days.  To increase and promote healing:   Stop Smoking (or at least cut back on       Smoking).  Eat a well-balanced diet (high in protein       and vitamin C).  If you have a poor appetite, drink Ensure, Glucerna, or         Addison Instant Breakfast for the next 30 days.  If you are diabetic, you should control your blood         sugars to prevent infection and help your wound         to heal.                         To prevent constipation, stay active and drink plenty of fluid.  While using pain medications, you should also take stool softeners and laxatives, such as Pericolace       and Miralax.  If you are having too many bowel       Movements, then you may need       to stop taking the laxatives.  You should have a bowel movement 3-4 days        after surgery and then at least every other day while       on pain medication.  To improve your recovery, you must be active!  Use your walker and take short walks (in your home)         about every 2 hours during the day.  Try to increase how far you walk each day.  You can put as much weight on your leg as you can tolerate while walking.   WBAT       To avoid getting a stiff knee, work on getting your knee bent and straight as soon as possible.  Home health physical therapy will come to your       home the day after you leave the hospital.  The       therapist will visit about 4 times over the next        couple of weeks to teach you how to get out of        bed, to safely walk in your home, and to do your        exercises.  NO DRIVING until your surgeon tells you it is ok.  You can return to work when cleared by a physician.  Please call your physician immediately if you have:     Constant bleeding from your wound.  Increasing redness or swelling around your wound (Some warmth, bruising and swelling is normal).  Change in wound drainage (increase in amount, color, or bad smell).  Change in mental status (unusual behavior   Temperature over 101.5 degrees Fahrenheit      Pain or redness in the calf (back of your lower leg - see picture)     Increased swelling of the thigh, ankle, calf, or foot.  Emergency: CALL 911 if you have:     Shortness of breath     Chest pain when you cough or taking a deep breath     Please call your surgeons office at [phone] for a follow up appointment. _   You should call as soon as you get home or the next day after discharge. Ask to make an appointment in 2 weeks.  If you have questions or concerns during normal business hours, you may reach Dr. Chandana Holland' Team at [phone].

## 2018-06-21 NOTE — PROGRESS NOTES
Problem: Mobility Impaired (Adult and Pediatric)  Goal: *Acute Goals and Plan of Care (Insert Text)  Physical Therapy Goals  Initiated 6/19/2018    1. Patient will move from supine to sit and sit to supine , scoot up and down and roll side to side in bed with independence within 4 days. 2. Patient will perform sit to stand with modified independence within 4 days. 3. Patient will ambulate with modified independence for 150 feet with the least restrictive device within 4 days. 4. Patient will ascend/descend 2 stairs with one sided handrail(s) with modified independence within 4 days. 5. Patient will perform home exercise program per protocol with modified independence within 4 days. 6. Patient will demonstrate AROM 0-90 degrees in operative joint within 4 days. physical Therapy TREATMENT  Patient: Ashleigh Wilson (89 y.o. male)  Date: 6/21/2018  Diagnosis: Osteoarthrosis, localized, primary, knee, left     Procedure(s) (LRB):  LEFT TOTAL KNEE ARTHROPLASTY (Left) 2 Days Post-Op     Precautions: WBAT, falls  Chart, physical therapy assessment, plan of care and goals were reviewed. ASSESSMENT: pt with slow progress, very flexed posture, still with a lot of pain, no LOB or SOB, having difficulty with bed mob(would not do well without bed rails), max vc's for weight shift and foot placement, vc's for safety and proper RW use, will need SNF rehab at d/c. Progression toward goals:  []      Improving appropriately and progressing toward goals  [x]      Improving very slowly and progressing toward goals  []      Not making progress toward goals and plan of care will be adjusted     PLAN:  Patient continues to benefit from skilled intervention to address the above impairments. Continue treatment per established plan of care.   Discharge Recommendations:  Jens Lopez  Further Equipment Recommendations for Discharge:  bedside commode and rolling walker     OBJECTIVE DATA SUMMARY:     Critical Behavior:  Neurologic State: Alert  Orientation Level: Appropriate for age, Oriented X4  Cognition: Appropriate decision making, Appropriate for age attention/concentration, Appropriate safety awareness     Functional Mobility Training:  Bed Mobility:  Rolling: Minimum assistance;Assist x1;Additional time  Supine to Sit: Minimum assistance;Assist x1;Additional time  Scooting: Minimum assistance;Assist x1;Additional time  Level of Assistance: Minimum assistance  Interventions: Tactile cues; Verbal cues     Transfers:  Sit to Stand: Contact guard assistance;Assist x1;Additional time  Stand to Sit: Contact guard assistance; Additional time  Stand Pivot Transfers: Minimal assistance  Bed to Chair: Minimum assistance;Assist x1;Additional time  Interventions: Tactile cues; Verbal cues  Level of Assistance: Minimum assistance     Balance:  Sitting: Intact; Without support  Standing: Intact; With support  Standing - Static: Good;Constant support  Standing - Dynamic : Good    Ambulation/Gait Training: unable to do much more than transfer to chair.                     Therapeutic Exercises: pt was unable to perform ther-ex this am    Pain:  Pain Scale 1: Numeric (0 - 10)  Pain Intensity 1: 7  Pain Location 1: Knee  Pain Orientation 1: Left  Pain Description 1: Aching  Pain Intervention(s) 1: Medication (see MAR)     Activity Tolerance: poor    After treatment:   [x] Patient left in no apparent distress sitting up in chair  [] Patient left in no apparent distress in bed  [x] Call bell left within reach  [x] Nursing notified  [] Caregiver present  [] Bed alarm activated     COMMUNICATION/COLLABORATION:   The patients plan of care was discussed with: Registered Nurse    Sharron Quispe PTA   Time Calculation: 20 mins

## 2018-06-21 NOTE — PROGRESS NOTES
Ortho / Neurosurgery NP Note    s/p LEFT TOTAL KNEE ARTHROPLASTY 6/19/18    Pt resting in bed. Eating breakfast.  No complaints. Denies pain at rest.      VSS Afebrile. Voiding status: + void    Labs  Lab Results   Component Value Date/Time    HGB 10.5 (L) 06/21/2018 04:50 AM      Lab Results   Component Value Date/Time    INR 1.0 06/05/2018 01:49 PM        Body mass index is 35.73 kg/(m^2). : A BMI > 30 is classified as obesity and > 40 is classified as morbid obesity. Dressing c.d.i  Cryotherapy in place over incision  Calves soft and supple; No pain with passive stretch  Sensation and motor intact  SCDs for mechanical DVT proph while in bed     PLAN:  1) PT BID- patient performed very poorly yesterday afternoon after opting to sit in the chair for the entire time between sessions. He admits that he wanted to stay up but his body did not agree. He was educated extensively about balancing activity with rest and alerting nurses to assist him back to bed. 2) Aspirin 325 mg PO BID for DVT Prophylaxis   3) GI Prophylaxis - Pepcid  4) Readniess for discharge:     [x] Vital Signs stable    [x] Hgb stable    [x] + Voiding    [x] Wound intact, drainage minimal    [x] Tolerating PO intake     [] Cleared by PT (OT if applicable)     [] Stair training completed (if applicable)    [] Independent / [facility] (household distance)     [] Bed mobility     [] Car transfers     [] ADLs    [x] Adequate pain control on oral medication alone     Home with [facility] vs IP rehab based on performance. Letty Gallo NP    Addendum:    Dr. Jil Ly in to see patient. Therapy recommending SNF. Discussed patient effort and encouraged patient to put forth as much effort as possible with goal of going home tomorrow.      Letty Gallo NP   6/21/2018  11:33 AM

## 2018-06-21 NOTE — PROGRESS NOTES
Bedside and Verbal shift change report given to Abby RUEDA (oncoming nurse) by Nj Cabrera (offgoing nurse). Report included the following information SBAR, Kardex, Intake/Output, MAR and Recent Results.

## 2018-06-21 NOTE — PROGRESS NOTES
Documented on June 21, 2018    Spiritual Care Partner Volunteer visited patient in [address] on June 20, 2018.   Documented by:  HUMERA Swenson, [facility], Chaplain OBI MOLINA [facility] Paging Service  [phone]

## 2018-06-21 NOTE — PROGRESS NOTES
Problem: Mobility Impaired (Adult and Pediatric)  Goal: *Acute Goals and Plan of Care (Insert Text)  Physical Therapy Goals  Initiated 6/19/2018    1. Patient will move from supine to sit and sit to supine , scoot up and down and roll side to side in bed with independence within 4 days. 2. Patient will perform sit to stand with modified independence within 4 days. 3. Patient will ambulate with modified independence for 150 feet with the least restrictive device within 4 days. 4. Patient will ascend/descend 2 stairs with one sided handrail(s) with modified independence within 4 days. 5. Patient will perform home exercise program per protocol with modified independence within 4 days. 6. Patient will demonstrate AROM 0-90 degrees in operative joint within 4 days. physical Therapy TREATMENT  Patient: Jorge Coronel (60 y.o. male)  Date: 6/21/2018  Diagnosis: Osteoarthrosis, localized, primary, knee, left     Procedure(s) (LRB):  LEFT TOTAL KNEE ARTHROPLASTY (Left) 2 Days Post-Op     Precautions: WBAT, FALLS!!  Chart, physical therapy assessment, plan of care and goals were reviewed. ASSESSMENT: pt with some improvement, able to amb a short distance with flexed posture, max vc's for RW management and safety, pt remains not safe for home. Progression toward goals:  []      Improving appropriately and progressing toward goals  [x]      Improving very slowly and progressing toward goals  []      Not making progress toward goals and plan of care will be adjusted     PLAN:  Patient continues to benefit from skilled intervention to address the above impairments. Continue treatment per established plan of care.   Discharge Recommendations:  Jens Lopez  Further Equipment Recommendations for Discharge:  bedside commode and rolling walker     OBJECTIVE DATA SUMMARY:     Functional Mobility Training:  Bed Mobility:  Rolling: Minimum assistance;Assist x1;Additional time  Supine to Sit: Minimum assistance;Assist x1;Additional time  Scooting: Minimum assistance;Assist x1;Additional time  Level of Assistance: Minimum assistance  Interventions: Tactile cues; Verbal cues     Transfers:  Sit to Stand: Contact guard assistance;Assist x1;Additional time  Stand to Sit: Contact guard assistance; Additional time  Stand Pivot Transfers: Minimal assistance  Bed to Chair: Minimum assistance;Assist x1;Additional time  Interventions: Tactile cues; Verbal cues  Level of Assistance: Minimum assistance     Ambulation/Gait Training:  Distance (ft): 24 Feet (ft)  Assistive Device: Gait belt;Walker, rolling  Ambulation - Level of Assistance: Minimal assistance;Assist x1;Additional time  Gait Abnormalities: Antalgic;Decreased step clearance; Step to gait  Right Side Weight Bearing: Full  Left Side Weight Bearing: As tolerated  Base of Support: Widened  Stance: Left decreased  Speed/Saige: Slow  Step Length: Left shortened;Right shortened    Therapeutic Exercises:   sitting  EXERCISE   Sets   Reps   Active Active Assist   Passive   Comments   Ankle pumps 1 5 [x] [] [] bilat   Heel raises 1 5 [x] [] [] \"   Toe tap 1 5 [x] [] [] \"   Knee ext 1 5 [x] [x] [] \"   Hip flex 1 5 [] [] [x] \"     Pain:  Pain Scale 1: Numeric (0 - 10)  Pain Intensity 1: 3  Pain Location 1: Knee  Pain Orientation 1: Left  Pain Description 1: Aching  Pain Intervention(s) 1: Medication (see MAR)     Activity Tolerance: poor    After treatment:   [x] Patient left in no apparent distress sitting up in chair  [] Patient left in no apparent distress in bed  [x] Call bell left within reach  [x] Nursing notified  [] Caregiver present  [] Bed alarm activated    COMMUNICATION/COLLABORATION:   The patients plan of care was discussed with: Registered Nurse    Arnaud Zuniga PTA   Time Calculation: 25 mins

## 2018-06-21 NOTE — INTERDISCIPLINARY ROUNDS
Bedside interdisciplinary rounds were held today to discuss patient plan of care and outcomes. The following members were present: Nurse Practitioner, Nurse, Clinical Care Leader, Pharmacy, Physical Therapy, and Case Management. Plan:  Continue therapy - progressing very slowly, recommend SNF. Auth obtained for [facility].  ASA & Pepcid.

## 2018-06-22 VITALS
WEIGHT: 249 LBS | HEIGHT: 70 IN | TEMPERATURE: 98.7 F | HEART RATE: 93 BPM | BODY MASS INDEX: 35.65 KG/M2 | SYSTOLIC BLOOD PRESSURE: 145 MMHG | OXYGEN SATURATION: 97 % | RESPIRATION RATE: 16 BRPM | DIASTOLIC BLOOD PRESSURE: 64 MMHG

## 2018-06-22 LAB
GLUCOSE BLD STRIP.AUTO-MCNC: 100 MG/DL (ref 65–100)
GLUCOSE BLD STRIP.AUTO-MCNC: 192 MG/DL (ref 65–100)
SERVICE CMNT-IMP: ABNORMAL
SERVICE CMNT-IMP: NORMAL

## 2018-06-22 PROCEDURE — 97116 GAIT TRAINING THERAPY: CPT

## 2018-06-22 PROCEDURE — 74011636637 HC RX REV CODE- 636/637: Performed by: ORTHOPAEDIC SURGERY

## 2018-06-22 PROCEDURE — 82962 GLUCOSE BLOOD TEST: CPT

## 2018-06-22 PROCEDURE — 74011250637 HC RX REV CODE- 250/637: Performed by: ORTHOPAEDIC SURGERY

## 2018-06-22 PROCEDURE — 74011250637 HC RX REV CODE- 250/637: Performed by: NURSE PRACTITIONER

## 2018-06-22 PROCEDURE — 97110 THERAPEUTIC EXERCISES: CPT

## 2018-06-22 RX ADMIN — POLYETHYLENE GLYCOL 3350 17 G: 17 POWDER, FOR SOLUTION ORAL at 08:19

## 2018-06-22 RX ADMIN — OXYCODONE HYDROCHLORIDE 5 MG: 5 TABLET ORAL at 15:18

## 2018-06-22 RX ADMIN — OXYCODONE HYDROCHLORIDE 5 MG: 5 TABLET ORAL at 08:21

## 2018-06-22 RX ADMIN — METFORMIN HYDROCHLORIDE 1000 MG: 500 TABLET, FILM COATED ORAL at 08:20

## 2018-06-22 RX ADMIN — ASPIRIN 325 MG: 325 TABLET ORAL at 08:20

## 2018-06-22 RX ADMIN — INSULIN LISPRO 2 UNITS: 100 INJECTION, SOLUTION INTRAVENOUS; SUBCUTANEOUS at 08:19

## 2018-06-22 RX ADMIN — PRAVASTATIN SODIUM 20 MG: 10 TABLET ORAL at 08:20

## 2018-06-22 RX ADMIN — FAMOTIDINE 20 MG: 20 TABLET, FILM COATED ORAL at 08:20

## 2018-06-22 RX ADMIN — SENNOSIDES AND DOCUSATE SODIUM 1 TABLET: 8.6; 5 TABLET ORAL at 08:21

## 2018-06-22 RX ADMIN — INSULIN LISPRO 2 UNITS: 100 INJECTION, SOLUTION INTRAVENOUS; SUBCUTANEOUS at 12:19

## 2018-06-22 RX ADMIN — GABAPENTIN 100 MG: 100 CAPSULE ORAL at 08:21

## 2018-06-22 RX ADMIN — GLIPIZIDE 10 MG: 5 TABLET ORAL at 08:20

## 2018-06-22 RX ADMIN — AMLODIPINE BESYLATE 10 MG: 5 TABLET ORAL at 08:21

## 2018-06-22 RX ADMIN — OXYCODONE HYDROCHLORIDE 5 MG: 5 TABLET ORAL at 12:20

## 2018-06-22 RX ADMIN — ACETAMINOPHEN 650 MG: 325 TABLET ORAL at 06:48

## 2018-06-22 RX ADMIN — ACETAMINOPHEN 650 MG: 325 TABLET ORAL at 12:20

## 2018-06-22 NOTE — PROGRESS NOTES
Ortho / Neurosurgery NP Note    s/p LEFT TOTAL KNEE ARTHROPLASTY 6/19/18    Pt resting in chair, smiling  No complaints. Denies pain at rest.      VSS Afebrile. Voiding status: + void    Labs  Lab Results   Component Value Date/Time    HGB 10.5 (L) 06/21/2018 04:50 AM      Lab Results   Component Value Date/Time    INR 1.0 06/05/2018 01:49 PM        Body mass index is 35.73 kg/(m^2). : A BMI > 30 is classified as obesity and > 40 is classified as morbid obesity. Dressing c.d.i  Cryotherapy in place over incision  Calves soft and supple;  No pain with passive stretch  Sensation and motor intact  SCDs for mechanical DVT proph while in bed     PLAN:  1) PT BID- patient again slow in PT and not safe for d/c directly home  2) Aspirin 325 mg PO BID for DVT Prophylaxis   3) GI Prophylaxis - Pepcid  4) Readniess for discharge:     [x] Vital Signs stable    [x] Hgb stable    [x] + Voiding    [x] Wound intact, drainage minimal    [x] Tolerating PO intake     [x] Cleared by PT (OT if applicable) - for d/c to SNF    [] Stair training completed (if applicable)    [] Independent / [address] (household distance)     [x] Bed mobility     [] Car transfers     [x] ADLs    [x] Adequate pain control on oral medication alone     Plan d/c to [address] today    Ilda Forman NP

## 2018-06-22 NOTE — PROGRESS NOTES
Report called to Aric Bhatti at [facility]. All questions answered. IV access removed earlier. Left knee dressing clean dry and intact, changed earlier today. Ambulance staff at bedside to pick patient up for transport. All belongings, instructions and prescriptions sent with patient.     Karol Wallace RN

## 2018-06-22 NOTE — DISCHARGE SUMMARY
Ortho Discharge Summary    Patient ID:  Inderjit Stanton  [id number]  male  74 y.o.  1950    Admit date: 6/19/2018    Discharge date: 6/22/2018    Admitting Physician: Yumiko Joyner MD     Consulting Physician(s):   Treatment Team: Attending Provider: Yumiko Joyner MD; Nurse Practitioner: Judd Ashraf NP; Care Manager: Shabana Glaser; Utilization Review: Hernan Logan; Care Manager: Jailyn Sánchez    Date of Surgery:   6/19/2018     Preoperative Diagnosis:  LEFT KNEE OA     Postoperative Diagnosis:   LEFT KNEE OA     Procedure(s):     LEFT TOTAL KNEE ARTHROPLASTY     Anesthesia Type:   General     Surgeon: Yumiko Joyner MD                            HPI:  Pt is a 74 y.o. male who has a history of LEFT KNEE OA   with pain and limitations of activities of daily living who presents at this time for a left TKA following the failure of conservative management. PMH:   Past Medical History:   Diagnosis Date    Allergic rhinitis     Arthritis     djd of knees    Chronic pain     Diabetes ([facility] [address]     Hypercholesteremia     Hypertension     Obesity     Stroke ([facility] [address]     left--ring finger/ pinky finger numb sensation       Body mass index is 35.73 kg/(m^2). : A BMI > 30 is classified as obesity and > 40 is classified as morbid obesity. Medications upon admission :   Prior to Admission Medications   Prescriptions Last Dose Informant Patient Reported? Taking? Insulin Needles, Disposable, 31 X 5/16 \" ndle   No No   Sig: Inject once daily. Lancets misc 6/18/2018 at Unknown time  No Yes   Sig: by IntraDERMal route two (2) times a day. alcohol swabs (ALCOHOL PREP SWABS) padm 6/18/2018 at Unknown time  No Yes   Sig: 10 Each by Apply Externally route two (2) times a day.    amLODIPine (NORVASC) 10 mg tablet 6/18/2018 at Unknown time  No Yes   Sig: TAKE ONE TABLET BY MOUTH ONCE DAILY   clopidogrel (PLAVIX) 75 mg tab   Yes Yes   Sig: TAKE ONE TABLET BY MOUTH ONCE DAILY    Resume 6/22/18 clotrimazole (LOTRIMIN) 1 % topical cream 2018 at Unknown time  No Yes   Sig: Apply  to affected area two (2) times a day.   gabapentin (NEURONTIN) 100 mg capsule 2018 at Unknown time  Yes Yes   Sig: Take 100 mg by mouth three (3) times daily. glipiZIDE (GLUCOTROL) 10 mg tablet 2018 at Unknown time  No Yes   Sig: Take 1 Tab by mouth two (2) times a day. glucose blood VI test strips (ASCENSIA AUTODISC VI, ONE TOUCH ULTRA TEST VI) strip 2018 at Unknown time  No Yes   Sig: Embrace test strip--check fsbs bid 250.02   glucose blood VI test strips (ASCENSIA AUTODISC VI, ONE TOUCH ULTRA TEST VI) strip 2018 at Unknown time  No Yes   Sig: Check fsbs bid E11.65  Contour next   glucose blood VI test strips (ASCENSIA CONTOUR) strip 2018 at Unknown time  No Yes   Sig: Test twice daily. glucose blood VI test strips (ONETOUCH ULTRA TEST) strip 2018 at Unknown time  No Yes   Sig: Onetouch ultra blue test strips, check blood sugar twice daily Dx e11.9   hydroCHLOROthiazide (HYDRODIURIL) 25 mg tablet 2018 at Unknown time  No Yes   Sig: TAKE ONE TABLET BY MOUTH ONCE DAILY   insulin NPH/insulin regular (NOVOLIN 70/30, HUMULIN 70/30) 100 unit/mL (70-30) injection 2018 at Unknown time  No Yes   Sig: 10 units sc bid   insulin syringe-needle U-100 2018 at Unknown time  No Yes   Sig: Dispense ultrafine 0.5 mL syringes with 31 gauge needle   lisinopril (PRINIVIL, ZESTRIL) 40 mg tablet 2018 at Unknown time  Yes Yes   Si mg daily. metFORMIN (GLUCOPHAGE) 1,000 mg tablet 2018 at Unknown time  No Yes   Sig: Take 1 Tab by mouth two (2) times daily (with meals).    pravastatin (PRAVACHOL) 20 mg tablet 2018 at Unknown time  No Yes   Sig: TAKE ONE TABLET BY MOUTH IN THE EVENING   traMADol (ULTRAM) 50 mg tablet 2018 at Unknown time  No Yes   Sig: TAKE ONE TABLET BY MOUTH EVERY 6 HOURS AS NEEDED FOR PAIN  Indications: Pain      Facility-Administered Medications: None Allergies:  No Known Allergies     Hospital Course: The patient underwent surgery. Complications:  None; patient tolerated the procedure well. Was taken to the PACU in stable condition and then transferred to the ortho floor. Perioperative Antibiotics:  Ancef     Postoperative Pain Management:  Oxycodone      DVT Prophylaxis: Aspirin 325    Postoperative transfusions:    Number of units banked PRBCs =   none     Post Op complications: none    Hemoglobin at discharge:    Lab Results   Component Value Date/Time    HGB 10.5 (L) 06/21/2018 04:50 AM    INR 1.0 06/05/2018 01:49 PM       Dressing was changed on POD # 1. Incision - clean, dry and intact. No significant erythema or swelling. Neurovascular exam found to be within normal limits. Wound appears to be healing without any evidence of infection. Pt had a HVAC drain that was removed on POD# 1. Physical Therapy started on the day following surgery and participated in bed mobility, transfers and ambulation. Gait:  Gait  Base of Support: Widened  Speed/Saige: Slow  Step Length: Left shortened, Right shortened  Stance: Left decreased  Gait Abnormalities: Decreased step clearance, Antalgic  Ambulation - Level of Assistance: Contact guard assistance  Distance (ft): 80 Feet (ft)  Assistive Device: Gait belt, Walker, rolling                   Discharged to: [facility]    Condition on Discharge:   stable    Discharge instructions:  - Anticoagulate with Aspirin 325 BID  - Take pain medications as prescribed  - Resume pre hospital diet      - Discharge activity: activity as tolerated  - Ambulate with assistive device as needed. - Weight bearing status WBAT  - Wound Care Keep wound clean and dry. See discharge instruction sheet. -DISCHARGE MEDICATION LIST     Current Discharge Medication List      START taking these medications    Details   acetaminophen (TYLENOL) 325 mg tablet Take 2 Tabs by mouth every six (6) hours for 14 days.   Qty: 112 Tab, Refills: 0      famotidine (PEPCID) 20 mg tablet Take 1 Tab by mouth two (2) times a day for 30 days. Qty: 60 Tab, Refills: 0      oxyCODONE IR (ROXICODONE) 5 mg immediate release tablet Take 1-2 Tabs by mouth every four (4) hours as needed. Max Daily Amount: 60 mg.  Qty: 60 Tab, Refills: 0    Associated Diagnoses: S/P total knee replacement, left      polyethylene glycol (MIRALAX) 17 gram/dose powder Take 17 g by mouth daily for 15 days. Qty: 255 g, Refills: 0      senna-docusate (SENNA PLUS) 8.6-50 mg per tablet Take 1 Tab by mouth two (2) times a day. Qty: 60 Tab, Refills: 0         CONTINUE these medications which have CHANGED    Details   clopidogrel (PLAVIX) 75 mg tab TAKE ONE TABLET BY MOUTH ONCE DAILY    Resume 6/22/18  Qty: 30 Tab, Refills: 11    Comments: Please consider 90 day supplies to promote better adherence         CONTINUE these medications which have NOT CHANGED    Details   gabapentin (NEURONTIN) 100 mg capsule Take 100 mg by mouth three (3) times daily. amLODIPine (NORVASC) 10 mg tablet TAKE ONE TABLET BY MOUTH ONCE DAILY  Qty: 90 Tab, Refills: 3    Comments: Please consider 90 day supplies to promote better adherence      lisinopril (PRINIVIL, ZESTRIL) 40 mg tablet 40 mg daily. Associated Diagnoses: Pure hypercholesterolemia      alcohol swabs (ALCOHOL PREP SWABS) padm 10 Each by Apply Externally route two (2) times a day.   Qty: 100 Pad, Refills: 3      !! glucose blood VI test strips (ONETOUCH ULTRA TEST) strip Onetouch ultra blue test strips, check blood sugar twice daily Dx e11.9  Qty: 100 Strip, Refills: 3      !! glucose blood VI test strips (ASCENSIA AUTODISC VI, ONE TOUCH ULTRA TEST VI) strip Check fsbs bid E11.65  Contour next  Qty: 100 Strip, Refills: 11      hydroCHLOROthiazide (HYDRODIURIL) 25 mg tablet TAKE ONE TABLET BY MOUTH ONCE DAILY  Qty: 30 Tab, Refills: 11    Comments: Please consider 90 day supplies to promote better adherence      metFORMIN (GLUCOPHAGE) 1,000 mg tablet Take 1 Tab by mouth two (2) times daily (with meals). Qty: 180 Tab, Refills: 3      Lancets misc by IntraDERMal route two (2) times a day. Qty: 50 Each, Refills: 11      pravastatin (PRAVACHOL) 20 mg tablet TAKE ONE TABLET BY MOUTH IN THE EVENING  Qty: 30 Tab, Refills: 11      insulin NPH/insulin regular (NOVOLIN 70/30, HUMULIN 70/30) 100 unit/mL (70-30) injection 10 units sc bid  Qty: 10 mL, Refills: 11      insulin syringe-needle U-100 Dispense ultrafine 0.5 mL syringes with 31 gauge needle  Qty: 100 Syringe, Refills: 11      glipiZIDE (GLUCOTROL) 10 mg tablet Take 1 Tab by mouth two (2) times a day. Qty: 60 Tab, Refills: 11      clotrimazole (LOTRIMIN) 1 % topical cream Apply  to affected area two (2) times a day. Qty: 15 g, Refills: 1      !! glucose blood VI test strips (ASCENSIA AUTODISC VI, ONE TOUCH ULTRA TEST VI) strip Embrace test strip--check fsbs bid 250.02  Qty: 50 Each, Refills: 11      !! glucose blood VI test strips (ASCENSIA CONTOUR) strip Test twice daily. Qty: 3 Package, Refills: 1      Insulin Needles, Disposable, 31 X 5/16 \" ndle Inject once daily. Qty: 1 Package, Refills: 3    Associated Diagnoses: Type II or unspecified type diabetes mellitus without mention of complication, uncontrolled       !! - Potential duplicate medications found. Please discuss with provider. STOP taking these medications       traMADol (ULTRAM) 50 mg tablet Comments:   Reason for Stopping:            per medical continuation form      -Follow up in office in 2 weeks      Signed:  Dorinda Aguilar.  Lorraine Anaya, CLAYP-BC, ONP-C  Orthopaedic Nurse Practitioner    6/22/2018  12:24 PM

## 2018-06-22 NOTE — PROGRESS NOTES
Pt will be discharged to [facility]. Call report number for the [phone] room 59A. CM scheduled transportation via [facility] for 1330. CM completed [facility] transportation forms and placed on pt bedside chart. CM provided pt with second IM letter and placed on bedside chart. There were no additional discharge needs. Care Management Interventions  PCP Verified by CM:  Yes Janet Nunez MD )  Mode of Transport at Discharge: BLS ([facility] )  Transition of Care Consult (CM Consult): SNF  Partner SNF: Yes  Discharge Durable Medical Equipment: No  Health Maintenance Reviewed: Yes  Physical Therapy Consult: Yes  Occupational Therapy Consult: Yes  Speech Therapy Consult: No  Current Support Network: Lives with Spouse, Own Home  Confirm Follow Up Transport: Family  Plan discussed with Pt/Family/Caregiver: Yes  Freedom of Choice Offered: Yes  Discharge Location  Discharge Placement: Skilled nursing facility    NENITA Castaneda, [address] [place] [address]   [phone]

## 2018-06-22 NOTE — PROGRESS NOTES
Problem: Mobility Impaired (Adult and Pediatric)  Goal: *Acute Goals and Plan of Care (Insert Text)  Physical Therapy Goals  Initiated 6/19/2018    1. Patient will move from supine to sit and sit to supine , scoot up and down and roll side to side in bed with independence within 4 days. 2. Patient will perform sit to stand with modified independence within 4 days. 3. Patient will ambulate with modified independence for 150 feet with the least restrictive device within 4 days. 4. Patient will ascend/descend 2 stairs with one sided handrail(s) with modified independence within 4 days. 5. Patient will perform home exercise program per protocol with modified independence within 4 days. 6. Patient will demonstrate AROM 0-90 degrees in operative joint within 4 days. physical Therapy TREATMENT  Patient: Sheeba Victoria (33 y.o. male)  Date: 6/22/2018  Diagnosis: Osteoarthrosis, localized, primary, knee, left    Procedure(s) (LRB):  LEFT TOTAL KNEE ARTHROPLASTY (Left) 3 Days Post-Op     Precautions: WBAT  Chart, physical therapy assessment, plan of care and goals were reviewed. ASSESSMENT: pt did much better this am, improved endurance, no LOB or SOB, did fair with bed mob and transfers and ther-ex, vc's for safety and proper RW use. Progression toward goals:  []      Improving appropriately and progressing toward goals  [x]      Improving slowly and progressing toward goals  []      Not making progress toward goals and plan of care will be adjusted     PLAN:  Patient continues to benefit from skilled intervention to address the above impairments. Continue treatment per established plan of care.   Discharge Recommendations:  Skilled Nursing Facility  Further Equipment Recommendations for Discharge:  rolling walker     OBJECTIVE DATA SUMMARY:     Critical Behavior:  Neurologic State: Alert, Appropriate for age  Orientation Level: Appropriate for age, Oriented X4  Cognition: Appropriate decision making, Follows commands    Functional Mobility Training:  Bed Mobility:  Rolling: Stand-by assistance  Supine to Sit: Stand-by assistance  Scooting: Stand-by assistance  Level of Assistance: Stand-by assistance  Interventions: Verbal cues     Transfers:  Sit to Stand: Contact guard assistance; Additional time  Stand to Sit: Contact guard assistance  Stand Pivot Transfers: Contact guard assistance  Bed to Chair: Contact guard assistance  Interventions: Tactile cues; Verbal cues  Level of Assistance: Contact guard assistance     Balance:  Sitting: Intact; Without support  Standing: Intact; With support  Standing - Static: Good;Constant support  Standing - Dynamic : Good     Ambulation/Gait Training:  Distance (ft): 80 Feet (ft)  Assistive Device: Gait belt;Walker, rolling  Ambulation - Level of Assistance: Contact guard assistance  Gait Abnormalities: Decreased step clearance; Antalgic  Right Side Weight Bearing: Full  Left Side Weight Bearing: As tolerated  Base of Support: Widened  Stance: Left decreased  Speed/Saige: Slow  Step Length: Left shortened;Right shortened     Therapeutic Exercises:   sitting  EXERCISE   Sets   Reps   Active Active Assist   Passive   Comments   Ankle pumps 1 10 [x] [] [] bilat   Heel raises 1 10 [x] [] [] \"   Toe tap 1 10 [x] [] [] \"   Knee ext 1 10 [x] [] [] \"   Hip flex 1 10 [x] [] [] \"     Pain:  Pain Scale 1: Numeric (0 - 10)  Pain Intensity 1: 2  Pain Location 1: Knee  Pain Orientation 1: Left  Pain Description 1: Aching  Pain Intervention(s) 1: Medication (see MAR)     Activity Tolerance: poor    After treatment:   [x] Patient left in no apparent distress sitting up in chair  [] Patient left in no apparent distress in bed  [x] Call bell left within reach  [x] Nursing notified  [] Caregiver present  [] Bed alarm activated    COMMUNICATION/COLLABORATION:   The patients plan of care was discussed with: Registered Nurse    Faiza Hodgson PTA   Time Calculation: 25 mins

## 2018-07-20 ENCOUNTER — APPOINTMENT (OUTPATIENT)
Dept: ULTRASOUND IMAGING | Age: 68
End: 2018-07-20
Attending: EMERGENCY MEDICINE
Payer: MEDICARE

## 2018-07-20 ENCOUNTER — TELEPHONE (OUTPATIENT)
Dept: INTERNAL MEDICINE CLINIC | Age: 68
End: 2018-07-20

## 2018-07-20 ENCOUNTER — HOSPITAL ENCOUNTER (EMERGENCY)
Age: 68
Discharge: HOME | End: 2018-07-20
Attending: EMERGENCY MEDICINE
Payer: MEDICARE

## 2018-07-20 VITALS
SYSTOLIC BLOOD PRESSURE: 140 MMHG | TEMPERATURE: 97.7 F | WEIGHT: 233.69 LBS | BODY MASS INDEX: 33.46 KG/M2 | RESPIRATION RATE: 21 BRPM | OXYGEN SATURATION: 97 % | DIASTOLIC BLOOD PRESSURE: 69 MMHG | HEIGHT: 70 IN | HEART RATE: 69 BPM

## 2018-07-20 DIAGNOSIS — R05.9 COUGH: Primary | ICD-10-CM

## 2018-07-20 DIAGNOSIS — R07.9 CHEST PAIN, UNSPECIFIED TYPE: ICD-10-CM

## 2018-07-20 LAB
ALBUMIN SERPL-MCNC: 3.6 G/DL (ref 3.5–5)
ALBUMIN/GLOB SERPL: 0.8 {RATIO} (ref 1.1–2.2)
ALP SERPL-CCNC: 92 U/L (ref 45–117)
ALT SERPL-CCNC: 23 U/L (ref 12–78)
ANION GAP SERPL CALC-SCNC: 11 MMOL/L (ref 5–15)
AST SERPL-CCNC: 13 U/L (ref 15–37)
ATRIAL RATE: 81 BPM
BASOPHILS # BLD: 0 K/UL (ref 0–0.1)
BASOPHILS NFR BLD: 0 % (ref 0–1)
BILIRUB SERPL-MCNC: 0.4 MG/DL (ref 0.2–1)
BUN SERPL-MCNC: 19 MG/DL (ref 6–20)
BUN/CREAT SERPL: 16 (ref 12–20)
CALCIUM SERPL-MCNC: 9.5 MG/DL (ref 8.5–10.1)
CALCULATED P AXIS, ECG09: 18 DEGREES
CALCULATED R AXIS, ECG10: -7 DEGREES
CALCULATED T AXIS, ECG11: 5 DEGREES
CHLORIDE SERPL-SCNC: 105 MMOL/L (ref 97–108)
CK SERPL-CCNC: 37 U/L (ref 39–308)
CO2 SERPL-SCNC: 25 MMOL/L (ref 21–32)
CREAT BLD-MCNC: 1 MG/DL (ref 0.6–1.3)
CREAT SERPL-MCNC: 1.18 MG/DL (ref 0.7–1.3)
DIAGNOSIS, 93000: NORMAL
DIFFERENTIAL METHOD BLD: ABNORMAL
EOSINOPHIL # BLD: 0.1 K/UL (ref 0–0.4)
EOSINOPHIL NFR BLD: 2 % (ref 0–7)
ERYTHROCYTE [DISTWIDTH] IN BLOOD BY AUTOMATED COUNT: 14.9 % (ref 11.5–14.5)
GLOBULIN SER CALC-MCNC: 4.4 G/DL (ref 2–4)
GLUCOSE SERPL-MCNC: 91 MG/DL (ref 65–100)
HCT VFR BLD AUTO: 38.5 % (ref 36.6–50.3)
HGB BLD-MCNC: 12.1 G/DL (ref 12.1–17)
IMM GRANULOCYTES # BLD: 0 K/UL (ref 0–0.04)
IMM GRANULOCYTES NFR BLD AUTO: 0 % (ref 0–0.5)
LYMPHOCYTES # BLD: 1.9 K/UL (ref 0.8–3.5)
LYMPHOCYTES NFR BLD: 35 % (ref 12–49)
MCH RBC QN AUTO: 25.7 PG (ref 26–34)
MCHC RBC AUTO-ENTMCNC: 31.4 G/DL (ref 30–36.5)
MCV RBC AUTO: 81.9 FL (ref 80–99)
MONOCYTES # BLD: 0.3 K/UL (ref 0–1)
MONOCYTES NFR BLD: 6 % (ref 5–13)
NEUTS SEG # BLD: 3.1 K/UL (ref 1.8–8)
NEUTS SEG NFR BLD: 56 % (ref 32–75)
NRBC # BLD: 0 K/UL (ref 0–0.01)
NRBC BLD-RTO: 0 PER 100 WBC
P-R INTERVAL, ECG05: 170 MS
PLATELET # BLD AUTO: 315 K/UL (ref 150–400)
PMV BLD AUTO: 9.6 FL (ref 8.9–12.9)
POTASSIUM SERPL-SCNC: 3.5 MMOL/L (ref 3.5–5.1)
PROT SERPL-MCNC: 8 G/DL (ref 6.4–8.2)
Q-T INTERVAL, ECG07: 356 MS
QRS DURATION, ECG06: 74 MS
QTC CALCULATION (BEZET), ECG08: 413 MS
RBC # BLD AUTO: 4.7 M/UL (ref 4.1–5.7)
SODIUM SERPL-SCNC: 141 MMOL/L (ref 136–145)
TROPONIN I SERPL-MCNC: <0.05 NG/ML
VENTRICULAR RATE, ECG03: 81 BPM
WBC # BLD AUTO: 5.5 K/UL (ref 4.1–11.1)

## 2018-07-20 PROCEDURE — 82565 ASSAY OF CREATININE: CPT

## 2018-07-20 PROCEDURE — 82550 ASSAY OF CK (CPK): CPT | Performed by: EMERGENCY MEDICINE

## 2018-07-20 PROCEDURE — 99284 EMERGENCY DEPT VISIT MOD MDM: CPT

## 2018-07-20 PROCEDURE — 93971 EXTREMITY STUDY: CPT

## 2018-07-20 PROCEDURE — 36415 COLL VENOUS BLD VENIPUNCTURE: CPT | Performed by: EMERGENCY MEDICINE

## 2018-07-20 PROCEDURE — 74011250637 HC RX REV CODE- 250/637: Performed by: EMERGENCY MEDICINE

## 2018-07-20 PROCEDURE — 84484 ASSAY OF TROPONIN QUANT: CPT | Performed by: EMERGENCY MEDICINE

## 2018-07-20 PROCEDURE — 80053 COMPREHEN METABOLIC PANEL: CPT | Performed by: EMERGENCY MEDICINE

## 2018-07-20 PROCEDURE — 93005 ELECTROCARDIOGRAM TRACING: CPT

## 2018-07-20 PROCEDURE — 85025 COMPLETE CBC W/AUTO DIFF WBC: CPT | Performed by: EMERGENCY MEDICINE

## 2018-07-20 RX ORDER — AZITHROMYCIN 250 MG/1
250 TABLET, FILM COATED ORAL DAILY
Qty: 4 TAB | Refills: 0 | Status: SHIPPED | OUTPATIENT
Start: 2018-07-20 | End: 2018-07-24

## 2018-07-20 RX ORDER — AZITHROMYCIN 250 MG/1
500 TABLET, FILM COATED ORAL
Status: COMPLETED | OUTPATIENT
Start: 2018-07-20 | End: 2018-07-20

## 2018-07-20 RX ADMIN — AZITHROMYCIN 500 MG: 250 TABLET, FILM COATED ORAL at 15:26

## 2018-07-20 NOTE — DISCHARGE INSTRUCTIONS
Cough: Care Instructions  Your Care Instructions    A cough is your body's response to something that bothers your throat or airways. Many things can cause a cough. You might cough because of a cold or the flu, bronchitis, or asthma. Smoking, postnasal drip, allergies, and stomach acid that backs up into your throat also can cause coughs. A cough is a symptom, not a disease. Most coughs stop when the cause, such as a cold, goes away. You can take a few steps at home to cough less and feel better. Follow-up care is a key part of your treatment and safety. Be sure to make and go to all appointments, and call your doctor if you are having problems. It's also a good idea to know your test results and keep a list of the medicines you take. How can you care for yourself at home? · Drink lots of water and other fluids. This helps thin the mucus and soothes a dry or sore throat. Honey or lemon juice in hot water or tea may ease a dry cough. · Take cough medicine as directed by your doctor. · Prop up your head on pillows to help you breathe and ease a dry cough. · Try cough drops to soothe a dry or sore throat. Cough drops don't stop a cough. Medicine-flavored cough drops are no better than candy-flavored drops or hard candy. · Do not smoke. Avoid secondhand smoke. If you need help quitting, talk to your doctor about stop-smoking programs and medicines. These can increase your chances of quitting for good. When should you call for help? Call 911 anytime you think you may need emergency care.  For example, call if:    · You have severe trouble breathing.    Call your doctor now or seek immediate medical care if:    · You cough up blood.     · You have new or worse trouble breathing.     · You have a new or higher fever.     · You have a new rash.    Watch closely for changes in your health, and be sure to contact your doctor if:    · You cough more deeply or more often, especially if you notice more mucus or a change in the color of your mucus.     · You have new symptoms, such as a sore throat, an earache, or sinus pain.     · You do not get better as expected. Where can you learn more? Go to http://lupe-felipe.info/. Enter D279 in the search box to learn more about \"Cough: Care Instructions. \"  Current as of: December 6, 2017  Content Version: 11.7  © 8683-8032 [employer]. Care instructions adapted under license by [employer] (which disclaims liability or warranty for this information). If you have questions about a medical condition or this instruction, always ask your healthcare professional. [address] any warranty or liability for your use of this information. Chest Pain: Care Instructions  Your Care Instructions    There are many things that can cause chest pain. Some are not serious and will get better on their own in a few days. But some kinds of chest pain need more testing and treatment. Your doctor may have recommended a follow-up visit in the next 8 to 12 hours. If you are not getting better, you may need more tests or treatment. Even though your doctor has released you, you still need to watch for any problems. The doctor carefully checked you, but sometimes problems can develop later. If you have new symptoms or if your symptoms do not get better, get medical care right away. If you have worse or different chest pain or pressure that lasts more than 5 minutes or you passed out (lost consciousness), call 911 or seek other emergency help right away. A medical visit is only one step in your treatment. Even if you feel better, you still need to do what your doctor recommends, such as going to all suggested follow-up appointments and taking medicines exactly as directed. This will help you recover and help prevent future problems. How can you care for yourself at home? · Rest until you feel better.   · Take your medicine exactly as prescribed. Call your doctor if you think you are having a problem with your medicine. · Do not drive after taking a prescription pain medicine. When should you call for help? Call 911 if:    · You passed out (lost consciousness).     · You have severe difficulty breathing.     · You have symptoms of a heart attack. These may include:  ¨ Chest pain or pressure, or a strange feeling in your chest.  ¨ Sweating. ¨ Shortness of breath. ¨ Nausea or vomiting. ¨ Pain, pressure, or a strange feeling in your back, neck, jaw, or upper belly or in one or both shoulders or arms. ¨ Lightheadedness or sudden weakness. ¨ A fast or irregular heartbeat. After you call 911, the  may tell you to chew 1 adult-strength or 2 to 4 low-dose aspirin. Wait for an ambulance. Do not try to drive yourself.    Call your doctor today if:    · You have any trouble breathing.     · Your chest pain gets worse.     · You are dizzy or lightheaded, or you feel like you may faint.     · You are not getting better as expected.     · You are having new or different chest pain. Where can you learn more? Go to http://lupe-felipe.info/. Enter A120 in the search box to learn more about \"Chest Pain: Care Instructions. \"  Current as of: November 20, 2017  Content Version: 11.7  © 3859-3274 [employer]. Care instructions adapted under license by [employer] (which disclaims liability or warranty for this information). If you have questions about a medical condition or this instruction, always ask your healthcare professional. [place] [address] any warranty or liability for your use of this information.

## 2018-07-20 NOTE — TELEPHONE ENCOUNTER
#[phone] 3 Jennifer Chin PT states when he got to pt's home this morning pt had chest pain and SOB. The pain subsided, but SOB persisted.   Son taking pt to ED

## 2018-07-27 ENCOUNTER — TELEPHONE (OUTPATIENT)
Dept: INTERNAL MEDICINE CLINIC | Age: 68
End: 2018-07-27

## 2018-07-27 NOTE — TELEPHONE ENCOUNTER
18 spoke with patient using two identifiers name and . Patient is using [employer] for his diabetic testing supplies. He will tell us when he change company.

## 2018-07-31 ENCOUNTER — TELEPHONE (OUTPATIENT)
Dept: INTERNAL MEDICINE CLINIC | Age: 68
End: 2018-07-31

## 2018-07-31 NOTE — TELEPHONE ENCOUNTER
[address] [place] [address] Ave [phone], checking on the status on pt's diabetic supplies that was faxed on 7/25 and 7/26, will refax again              Copy/paste [employer]

## 2018-07-31 NOTE — TELEPHONE ENCOUNTER
Left message for patient to call and verify that he gets his supplies from [facility].  Closing message at this time

## 2018-08-01 NOTE — TELEPHONE ENCOUNTER
PCP: Simone South MD    Last appt: 4/3/2018  Future Appointments  Date Time Provider Yonathan Pena   8/17/2018 2:45 PM Simone South MD [facility] 87       Requested Prescriptions     Pending Prescriptions Disp Refills    senna-docusate (SENNA PLUS) 8.6-50 mg per tablet 60 Tab 0     Sig: Take 1 Tab by mouth two (2) times a day.

## 2018-08-01 NOTE — TELEPHONE ENCOUNTER
Pt needs a refill on Senexon 8.65 mg call into [address] [place] [address] ,  [phone], pt can be reach at [phone].        Message received & copied from [facility]

## 2018-08-02 RX ORDER — AMOXICILLIN 250 MG
1 CAPSULE ORAL 2 TIMES DAILY
Qty: 60 TAB | Refills: 0 | Status: SHIPPED | OUTPATIENT
Start: 2018-08-02 | End: 2019-01-07 | Stop reason: SDUPTHER

## 2018-09-04 ENCOUNTER — OFFICE VISIT (OUTPATIENT)
Dept: INTERNAL MEDICINE CLINIC | Age: 68
End: 2018-09-04

## 2018-09-04 VITALS
WEIGHT: 238 LBS | HEIGHT: 70 IN | DIASTOLIC BLOOD PRESSURE: 78 MMHG | TEMPERATURE: 97.9 F | SYSTOLIC BLOOD PRESSURE: 154 MMHG | OXYGEN SATURATION: 98 % | HEART RATE: 66 BPM | BODY MASS INDEX: 34.07 KG/M2

## 2018-09-04 DIAGNOSIS — Z79.4 TYPE 2 DIABETES MELLITUS WITH OTHER NEUROLOGIC COMPLICATION, WITH LONG-TERM CURRENT USE OF INSULIN (HCC): Primary | ICD-10-CM

## 2018-09-04 DIAGNOSIS — E78.00 PURE HYPERCHOLESTEROLEMIA: ICD-10-CM

## 2018-09-04 DIAGNOSIS — I10 ESSENTIAL HYPERTENSION, BENIGN: ICD-10-CM

## 2018-09-04 DIAGNOSIS — Z23 ENCOUNTER FOR IMMUNIZATION: ICD-10-CM

## 2018-09-04 DIAGNOSIS — I63.9 CEREBROVASCULAR ACCIDENT (CVA), UNSPECIFIED MECHANISM (HCC): ICD-10-CM

## 2018-09-04 DIAGNOSIS — E11.49 TYPE 2 DIABETES MELLITUS WITH OTHER NEUROLOGIC COMPLICATION, WITH LONG-TERM CURRENT USE OF INSULIN (HCC): Primary | ICD-10-CM

## 2018-09-04 LAB — HBA1C MFR BLD HPLC: 6.4 % (ref 4.8–5.6)

## 2018-09-04 RX ORDER — CLOPIDOGREL BISULFATE 75 MG/1
TABLET ORAL
Qty: 30 TAB | Refills: 11 | Status: SHIPPED | OUTPATIENT
Start: 2018-09-04 | End: 2019-01-07 | Stop reason: SDUPTHER

## 2018-09-04 RX ORDER — METFORMIN HYDROCHLORIDE 1000 MG/1
TABLET ORAL
Qty: 180 TAB | Refills: 3 | Status: SHIPPED | OUTPATIENT
Start: 2018-09-04 | End: 2019-01-07 | Stop reason: SDUPTHER

## 2018-09-04 RX ORDER — CLOPIDOGREL BISULFATE 75 MG/1
TABLET ORAL
Qty: 30 TAB | Refills: 11 | Status: SHIPPED | OUTPATIENT
Start: 2018-09-04 | End: 2018-09-04 | Stop reason: SDUPTHER

## 2018-09-04 RX ORDER — LISINOPRIL 40 MG/1
TABLET ORAL
Qty: 90 TAB | Refills: 3 | Status: SHIPPED | OUTPATIENT
Start: 2018-09-04 | End: 2019-01-07 | Stop reason: SDUPTHER

## 2018-09-04 RX ORDER — GABAPENTIN 100 MG/1
200 CAPSULE ORAL 3 TIMES DAILY
Qty: 180 CAP | Refills: 11 | Status: SHIPPED | OUTPATIENT
Start: 2018-09-04 | End: 2019-01-07 | Stop reason: SDUPTHER

## 2018-09-04 RX ORDER — PRAVASTATIN SODIUM 20 MG/1
TABLET ORAL
Qty: 30 TAB | Refills: 11 | Status: SHIPPED | OUTPATIENT
Start: 2018-09-04 | End: 2018-09-07 | Stop reason: SDUPTHER

## 2018-09-04 NOTE — PROGRESS NOTES
Chief Complaint   Patient presents with    Follow-up     left knee surgery    Medication Evaluation

## 2018-09-04 NOTE — PROGRESS NOTES
HISTORY OF PRESENT ILLNESS  Evelina Lynne is a 79 y.o. male. HPI   F/u DM-2 htn hld and rising PSA ( s/p cryoablation 2010). Hx cva with left weakness        S/p left TKR [id number]    Referred to [facility] for PSA up to 1.2 last visit-Dr Marya Shone  a1c today 6.4  Last LDL 90    Last OV  Last a1c  6.6  LDL 90  Saw ortho Dr Manuel Barnard left TKR but needs cardiology clearance  Able to walk with cane , unable to walk up stairs , nothing strenous  fsbs at home avg 100, low 55-takes lantus 10 untis every day but holds if BS is low     S/p CVA last Feb2017--left sided weakbness  C/o increased tingling left toes     Last visit:  Has started taking humulin 70/30 10 units bid ac  amb a1c is 6.6 today   Has made diet changed--low card  No longer has neuropathic sxs in toes  Coughing up phlegm x 3 months-clear white ,   Has some numbness left arm , weakness has resolved  Left leg strenght has improved  Uses walker d/t knee OA  Patient Active Problem List    Diagnosis Date Noted    Osteoarthrosis, localized, primary, knee, left 06/19/2018    Diabetes ([address]     Stroke ([address]     TIA (transient ischemic attack) 04/04/2017    Transient ischemic attack involving right internal carotid artery 02/15/2017    Left-sided weakness 02/14/2017    OA (osteoarthritis) of knee 08/06/2013    Prostate cancer ([facility] [address] 03/10/2011    Type II or unspecified type diabetes mellitus without mention of complication, uncontrolled 08/03/2009    Essential hypertension, benign 08/03/2009    Pure hypercholesterolemia 08/03/2009    Obesity 08/03/2009     Current Outpatient Prescriptions   Medication Sig Dispense Refill    senna-docusate (SENNA PLUS) 8.6-50 mg per tablet Take 1 Tab by mouth two (2) times a day. 60 Tab 0    clopidogrel (PLAVIX) 75 mg tab TAKE ONE TABLET BY MOUTH ONCE DAILY    Resume 6/22/18 30 Tab 11    oxyCODONE IR (ROXICODONE) 5 mg immediate release tablet Take 1-2 Tabs by mouth every four (4) hours as needed.  Max Daily Amount: 60 mg. 60 Tab 0    gabapentin (NEURONTIN) 100 mg capsule Take 100 mg by mouth three (3) times daily.  amLODIPine (NORVASC) 10 mg tablet TAKE ONE TABLET BY MOUTH ONCE DAILY 90 Tab 3    lisinopril (PRINIVIL, ZESTRIL) 40 mg tablet 40 mg daily.  alcohol swabs (ALCOHOL PREP SWABS) padm 10 Each by Apply Externally route two (2) times a day. 100 Pad 3    glucose blood VI test strips (ONETOUCH ULTRA TEST) strip Onetouch ultra blue test strips, check blood sugar twice daily Dx e11.9 100 Strip 3    glucose blood VI test strips (ASCENSIA AUTODISC VI, ONE TOUCH ULTRA TEST VI) strip Check fsbs bid E11.65  Contour next 100 Strip 11    hydroCHLOROthiazide (HYDRODIURIL) 25 mg tablet TAKE ONE TABLET BY MOUTH ONCE DAILY 30 Tab 11    metFORMIN (GLUCOPHAGE) 1,000 mg tablet Take 1 Tab by mouth two (2) times daily (with meals). 180 Tab 3    Lancets misc by IntraDERMal route two (2) times a day. 50 Each 11    pravastatin (PRAVACHOL) 20 mg tablet TAKE ONE TABLET BY MOUTH IN THE EVENING 30 Tab 11    insulin NPH/insulin regular (NOVOLIN 70/30, HUMULIN 70/30) 100 unit/mL (70-30) injection 10 units sc bid 10 mL 11    insulin syringe-needle U-100 Dispense ultrafine 0.5 mL syringes with 31 gauge needle 100 Syringe 11    glipiZIDE (GLUCOTROL) 10 mg tablet Take 1 Tab by mouth two (2) times a day. 60 Tab 11    clotrimazole (LOTRIMIN) 1 % topical cream Apply  to affected area two (2) times a day. 15 g 1    glucose blood VI test strips (ASCENSIA AUTODISC VI, ONE TOUCH ULTRA TEST VI) strip Embrace test strip--check fsbs bid 250.02 50 Each 11    glucose blood VI test strips (ASCENSIA CONTOUR) strip Test twice daily. 3 Package 1    Insulin Needles, Disposable, 31 X 5/16 \" ndle Inject once daily.  1 Package 3     No Known Allergies  Social History   Substance Use Topics    Smoking status: Former Smoker     Years: 2.00     Quit date: 12/5/2017    Smokeless tobacco: Never Used    Alcohol use No      Comment: stopped 12/17-used to drink wine (7 days a week      Lab Results  Component Value Date/Time   WBC 5.5 07/20/2018 01:11 PM   HGB 12.1 07/20/2018 01:11 PM   HCT 38.5 07/20/2018 01:11 PM   PLATELET 823 03/85/0452 01:11 PM   MCV 81.9 07/20/2018 01:11 PM     Lab Results  Component Value Date/Time   Hemoglobin A1c 7.2 (H) 06/05/2018 01:49 PM   Hemoglobin A1c 7.1 (H) 03/08/2018 01:00 PM   Hemoglobin A1c 9.2 (H) 04/05/2017 04:03 AM   Glucose 91 07/20/2018 01:11 PM   Glucose (POC) 192 (H) 06/22/2018 12:11 PM   Microalb/Creat ratio (ug/mg creat.) 10.5 01/14/2016 11:18 AM   LDL, calculated 90.4 04/05/2017 04:03 AM   Creatinine (POC) 1.0 07/20/2018 01:08 PM   Creatinine 1.18 07/20/2018 01:11 PM      Lab Results  Component Value Date/Time   Cholesterol, total 177 04/05/2017 04:03 AM   HDL Cholesterol 28 04/05/2017 04:03 AM   LDL, calculated 90.4 04/05/2017 04:03 AM   Triglyceride 293 (H) 04/05/2017 04:03 AM   CHOL/HDL Ratio 6.3 (H) 04/05/2017 04:03 AM     Lab Results  Component Value Date/Time   GFR est non-AA >60 07/20/2018 01:11 PM   GFRNA, POC >60 07/20/2018 01:08 PM   GFR est AA >60 07/20/2018 01:11 PM   GFRAA, POC >60 07/20/2018 01:08 PM   Creatinine 1.18 07/20/2018 01:11 PM   Creatinine (POC) 1.0 07/20/2018 01:08 PM   BUN 19 07/20/2018 01:11 PM   Sodium 141 07/20/2018 01:11 PM   Potassium 3.5 07/20/2018 01:11 PM   Chloride 105 07/20/2018 01:11 PM   CO2 25 07/20/2018 01:11 PM   Magnesium 1.7 04/04/2017 10:50 AM        ROS    Physical Exam   Constitutional: He appears well-developed and well-nourished. No distress. Appears stated age   HENT:   Head: Normocephalic. Cardiovascular: Normal rate, regular rhythm and normal heart sounds. Exam reveals no gallop and no friction rub. No murmur heard. Pulmonary/Chest: Effort normal and breath sounds normal. No respiratory distress. He has no wheezes. He has no rales. He exhibits no tenderness. Abdominal: Soft. Musculoskeletal: He exhibits no edema. Neurological: He is alert. Psychiatric: He has a normal mood and affect. Nursing note and vitals reviewed. ASSESSMENT and PLAN  Diagnoses and all orders for this visit:    1. Type 2 diabetes mellitus with other neurologic complication, with long-term current use of insulin (HCC)  -     AMB POC HEMOGLOBIN A1C- 6.4 controlled but with low morning readings 50-60's and eating less   Lower 70/30 to 10 units qam and 5 units qpm   Pt will call if still has low readings  -     LIPID PANEL    2. Encounter for immunization  -     Influenza Vaccine Inactivated (IIV) (FLUAD), Subunit, Adjuvanted, IM ([id number])    3. Cerebrovascular accident (CVA), unspecified mechanism ([facility] [address]   Restart plavix 75 mg daily  4. Essential hypertension, benign   Reasonable control, continue medicines  5. Pure hypercholesterolemia  -     MICROALBUMIN, UR, RAND W/ MICROALB/CREAT RATIO   Restart pravastatin 20 mg qd  Other orders  -     pravastatin (PRAVACHOL) 20 mg tablet; TAKE ONE TABLET BY MOUTH IN THE EVENING  -     gabapentin (NEURONTIN) 100 mg capsule; Take 2 Caps by mouth three (3) times daily. -     clopidogrel (PLAVIX) 75 mg tab; TAKE ONE TABLET BY MOUTH ONCE DAY      Follow-up Disposition:  Return in about 4 months (around 1/4/2019) for dm-2 htn hld  .

## 2018-09-04 NOTE — MR AVS SNAPSHOT
[address] [facility] 83. 
[phone] Patient: Yaritza Frey MRN:  [id number] Visit Information Date & Time Provider Department Dept. Phone Encounter #  
 9/4/2018  3:00 PM Meche Dubon, [address] [phone] [id number] Follow-up Instructions Return in about 4 months (around 1/4/2019) for dm-2 htn hld  . Upcoming Health Maintenance Date Due DTaP/Tdap/Td series (1 - Tdap) 8/7/1973 ZOSTER VACCINE AGE 60> 6/7/2012 EYE EXAM RETINAL OR DILATED Q1 9/6/2014 FOOT EXAM Q1 1/14/2017 MICROALBUMIN Q1 1/14/2017 GLAUCOMA SCREENING Q2Y 8/7/2017 Pneumococcal 65+ High/Highest Risk (2 of 2 - PPSV23) 12/8/2017 LIPID PANEL Q1 4/5/2018 Influenza Age 5 to Adult 8/1/2018 HEMOGLOBIN A1C Q6M 12/5/2018 MEDICARE YEARLY EXAM 3/9/2019 FOBT Q 1 YEAR AGE 50-75 3/9/2019 Allergies as of 9/4/2018  Review Complete On: 6/19/2018 By: Scot Castelan RN No Known Allergies Current Immunizations  Never Reviewed Name Date Influenza Vaccine 10/1/2016 Influenza Vaccine (Quad) PF 1/14/2016 Influenza Vaccine (Tri) Adjuvanted  Incomplete Pneumococcal Conjugate (PCV-13) 10/13/2017 Not reviewed this visit You Were Diagnosed With   
  
 Codes Comments Type 2 diabetes mellitus with other neurologic complication, with long-term current use of insulin (HCC)    -  Primary ICD-10-CM: E11.49, Z79.4 ICD-9-CM: 250.60, V58.67 Encounter for immunization     ICD-10-CM: V48 ICD-9-CM: V03.89 Cerebrovascular accident (CV[facility]), unspecified [facility] ([facility]     ICD-10-CM: I63.9 ICD-9-CM: 434.91 Essential hypertension, benign     ICD-10-CM: I10 
ICD-9-CM: 401.1 Pure hypercholesterolemia     ICD-10-CM: E78.00 ICD-9-CM: 272.0 Vitals BP Pulse Temp Height(growth percentile) Weight(growth percentile) SpO2 154/78 (BP 1 Location: Right arm, BP Patient Position: Sitting) 66 97.9 °F (36.6 °C) (Oral) 5' 10\" (1.778 m) 238 lb (108 kg) 98% BMI Smoking Status 34.15 kg/m2 Former Smoker BMI and BSA Data Body Mass Index Body Surface Area  
 34.15 kg/m 2 2.31 m 2 Preferred Pharmacy Pharmacy Name Phone [address], [place] [phone] Your Updated Medication List  
  
   
This list is accurate as of 9/4/18  3:49 PM.  Always use your most recent med list.  
  
  
  
  
 alcohol swabs Padm Commonly known as:  ALCOHOL PREP SWABS  
10 Each by Apply Externally route two (2) times a day. amLODIPine 10 mg tablet Commonly known as:  Wandra Marilynn TAKE ONE TABLET BY MOUTH ONCE DAILY  
  
 clopidogrel 75 mg Tab Commonly known as:  PLAVIX TAKE ONE TABLET BY MOUTH ONCE DAILY  Resume 6/22/18  
  
 clotrimazole 1 % topical cream  
Commonly known as:  Sandrita Elsy Apply  to affected area two (2) times a day.  
  
 gabapentin 100 mg capsule Commonly known as:  NEURONTIN Take 2 Caps by mouth three (3) times daily. glipiZIDE 10 mg tablet Commonly known as:  Brooksie Fillers Take 1 Tab by mouth two (2) times a day. * glucose blood VI test strips strip Commonly known as:  Ascensia CONTOUR Test twice daily. * glucose blood VI test strips strip Commonly known as:  ASCENSIA AUTODISC VI, ONE TOUCH ULTRA TEST VI Embrace test strip--check fsbs bid 250.02  
  
 * glucose blood VI test strips strip Commonly known as:  ASCENSIA AUTODISC VI, ONE TOUCH ULTRA TEST VI Check fsbs bid E11.65  Contour next * glucose blood VI test strips strip Commonly known as:  ONETOUCH ULTRA TEST Onetouch ultra blue test strips, check blood sugar twice daily Dx e11.9  
  
 hydroCHLOROthiazide 25 mg tablet Commonly known as:  HYDRODIURIL  
TAKE ONE TABLET BY MOUTH ONCE DAILY Insulin Needles (Disposable) 31 gauge x 5/16\" Ndle Inject once daily. insulin NPH/insulin regular 100 unit/mL (70-30) injection Commonly known as:  NOVOLIN 70/30, HUMULIN 70/30  
10 units sc bid  
  
 insulin syringe-needle U-100 Dispense ultrafine 0.5 mL syringes with 31 gauge needle Lancets Misc  
by IntraDERMal route two (2) times a day. lisinopril 40 mg tablet Commonly known as:  PRINIVIL, ZESTRIL  
40 mg daily. metFORMIN 1,000 mg tablet Commonly known as:  GLUCOPHAGE Take 1 Tab by mouth two (2) times daily (with meals). pravastatin 20 mg tablet Commonly known as:  PRAVACHOL  
TAKE ONE TABLET BY MOUTH IN THE EVENING  
  
 senna-docusate 8.6-50 mg per tablet Commonly known as:  SENNA PLUS Take 1 Tab by mouth two (2) times a day. * Notice: This list has 4 medication(s) that are the same as other medications prescribed for you. Read the directions carefully, and ask your doctor or other care provider to review them with you. Prescriptions Sent to Pharmacy Refills  
 pravastatin (PRAVACHOL) 20 mg tablet 11 Sig: TAKE ONE TABLET BY MOUTH IN THE EVENING Class: Normal  
 Pharmacy: [facility] Ph #: [phone]  
 gabapentin (NEURONTIN) 100 mg capsule 11 Sig: Take 2 Caps by mouth three (3) times daily. Class: Normal  
 Pharmacy: [facility] DR HERMILO SORIANO [address] [place] [address] Ph #: [phone] Route: Oral  
  
We Performed the Following AMB POC HEMOGLOBIN A1C [96696 CPT(R)] INFLUENZA VACCINE INACTIVATED (IIV), SUBUNIT, ADJUVANTED, IM [id number] CPT(R)] Follow-up Instructions Return in about 4 months (around 1/4/2019) for dm-2 htn hld  . Introducing [facility]! [employer] introduces [employer] patient portal. Now you can access parts of your medical record, email your doctor's office, and request medication refills online. 1. In your internet browser, go to [email or web address] 2. Click on the First Time User? Click Here link in the Sign In box. You will see the New Member Sign Up page. 3. Enter your [employer] Access Code exactly as it appears below. You will not need to use this code after youve completed the sign-up process. If you do not sign up before the expiration date, you must request a new code. · [employer] Access Code: [email or web address] Expires: 9/17/2018  5:56 AM 
 
4. Enter the last four digits of your Social Security Number (xxxx) and Date of Birth (mm/dd/yyyy) as indicated and click Submit. You will be taken to the next sign-up page. 5. Create a [employer] ID. This will be your [employer] login ID and cannot be changed, so think of one that is secure and easy to remember. 6. Create a [employer] password. You can change your password at any time. 7. Enter your Password Reset Question and Answer. This can be used at a later time if you forget your password. 8. Enter your e-mail address. You will receive e-mail notification when new information is available in [address] 9. Click Sign Up. You can now view and download portions of your medical record. 10. Click the Download Summary menu link to download a portable copy of your medical information. If you have questions, please visit the Frequently Asked Questions section of the [employer] website. Remember, [employer] is NOT to be used for urgent needs. For medical emergencies, dial 911. Now available from your iPhone and Android! Please provide this summary of care documentation to your next provider. Your primary care clinician is listed as Reggie DAVIS. If you have any questions after today's visit, please call [phone].

## 2018-09-07 DIAGNOSIS — E78.5 HYPERLIPIDEMIA, UNSPECIFIED HYPERLIPIDEMIA TYPE: Primary | ICD-10-CM

## 2018-09-07 RX ORDER — PRAVASTATIN SODIUM 20 MG/1
TABLET ORAL
Qty: 90 TAB | Refills: 3 | Status: SHIPPED | OUTPATIENT
Start: 2018-09-07 | End: 2019-01-07 | Stop reason: SDUPTHER

## 2018-09-07 NOTE — TELEPHONE ENCOUNTER
Requested Prescriptions     Pending Prescriptions Disp Refills    pravastatin (PRAVACHOL) 20 mg tablet 90 Tab 3     Sig: TAKE ONE TABLET BY MOUTH IN THE EVENING      PCP: Bashir Rios MD    Last appt: 9/4/2018  Future Appointments  Date Time Provider Yonathan Pena   1/9/2019 11:30 AM Bashir Rios MD [facility] 87       Requested Prescriptions     Pending Prescriptions Disp Refills    pravastatin (PRAVACHOL) 20 mg tablet 90 Tab 3     Sig: TAKE ONE TABLET BY MOUTH IN THE EVENING

## 2018-10-24 NOTE — TELEPHONE ENCOUNTER
PCP: Luis Garvin MD    Last appt: 9/4/2018  Future Appointments   Date Time Provider Yonathan Pena   1/9/2019 11:30 AM Luis Garvin MD [facility] 87       Requested Prescriptions     Pending Prescriptions Disp Refills    glucose blood VI test strips (ASCENSIA AUTODISC VI, ONE TOUCH ULTRA TEST VI) strip 200 Strip 3     Sig: Freestyle Lite Test strips

## 2018-11-04 RX ORDER — GLIPIZIDE 10 MG/1
TABLET ORAL
Qty: 180 TAB | Refills: 3 | Status: SHIPPED | OUTPATIENT
Start: 2018-11-04 | End: 2018-11-08 | Stop reason: SDUPTHER

## 2018-11-08 RX ORDER — GLIPIZIDE 10 MG/1
TABLET ORAL
Qty: 180 TAB | Refills: 3 | Status: SHIPPED | OUTPATIENT
Start: 2018-11-08 | End: 2019-01-07 | Stop reason: SDUPTHER

## 2018-12-03 RX ORDER — HYDROCHLOROTHIAZIDE 25 MG/1
TABLET ORAL
Qty: 30 TAB | Refills: 11 | Status: SHIPPED | OUTPATIENT
Start: 2018-12-03 | End: 2019-01-07 | Stop reason: SDUPTHER

## 2019-01-07 ENCOUNTER — TELEPHONE (OUTPATIENT)
Dept: INTERNAL MEDICINE CLINIC | Age: 69
End: 2019-01-07

## 2019-01-07 DIAGNOSIS — E78.00 PURE HYPERCHOLESTEROLEMIA: ICD-10-CM

## 2019-01-07 DIAGNOSIS — E78.5 HYPERLIPIDEMIA, UNSPECIFIED HYPERLIPIDEMIA TYPE: ICD-10-CM

## 2019-01-07 RX ORDER — GABAPENTIN 100 MG/1
200 CAPSULE ORAL 3 TIMES DAILY
Qty: 540 CAP | Refills: 3 | Status: SHIPPED | OUTPATIENT
Start: 2019-01-07 | End: 2019-03-05 | Stop reason: SDUPTHER

## 2019-01-07 RX ORDER — GLIPIZIDE 10 MG/1
TABLET ORAL
Qty: 180 TAB | Refills: 3 | Status: SHIPPED | OUTPATIENT
Start: 2019-01-07 | End: 2019-09-30 | Stop reason: SDUPTHER

## 2019-01-07 RX ORDER — CLOPIDOGREL BISULFATE 75 MG/1
TABLET ORAL
Qty: 90 TAB | Refills: 3 | Status: SHIPPED | OUTPATIENT
Start: 2019-01-07 | End: 2019-09-30 | Stop reason: SDUPTHER

## 2019-01-07 RX ORDER — PRAVASTATIN SODIUM 20 MG/1
TABLET ORAL
Qty: 90 TAB | Refills: 3 | Status: SHIPPED | OUTPATIENT
Start: 2019-01-07 | End: 2019-09-30 | Stop reason: SDUPTHER

## 2019-01-07 RX ORDER — LISINOPRIL 40 MG/1
TABLET ORAL
Qty: 90 TAB | Refills: 3 | Status: SHIPPED | OUTPATIENT
Start: 2019-01-07 | End: 2019-09-30 | Stop reason: SDUPTHER

## 2019-01-07 RX ORDER — AMLODIPINE BESYLATE 10 MG/1
TABLET ORAL
Qty: 90 TAB | Refills: 3 | Status: SHIPPED | OUTPATIENT
Start: 2019-01-07 | End: 2019-09-30 | Stop reason: SDUPTHER

## 2019-01-07 RX ORDER — METFORMIN HYDROCHLORIDE 1000 MG/1
TABLET ORAL
Qty: 180 TAB | Refills: 3 | Status: SHIPPED | OUTPATIENT
Start: 2019-01-07 | End: 2019-09-30 | Stop reason: SDUPTHER

## 2019-01-07 RX ORDER — AMOXICILLIN 250 MG
1 CAPSULE ORAL 2 TIMES DAILY
Qty: 180 TAB | Refills: 3 | Status: SHIPPED | OUTPATIENT
Start: 2019-01-07 | End: 2022-06-05

## 2019-01-07 RX ORDER — CHLORPHENIRAMINE MALEATE 4 MG
TABLET ORAL 2 TIMES DAILY
Qty: 15 G | Refills: 1 | Status: SHIPPED | OUTPATIENT
Start: 2019-01-07 | End: 2021-04-09 | Stop reason: SDUPTHER

## 2019-01-07 RX ORDER — HYDROCHLOROTHIAZIDE 25 MG/1
TABLET ORAL
Qty: 90 TAB | Refills: 3 | Status: SHIPPED | OUTPATIENT
Start: 2019-01-07 | End: 2019-09-30 | Stop reason: SDUPTHER

## 2019-01-07 NOTE — TELEPHONE ENCOUNTER
----- Message from Rah Nazario sent at 1/7/2019 10:13 AM EST -----  Regarding: Dr. Moreno Dial: [phone]  Pt is requesting a callback to discuss rx.  Best contact [phone]      Message copied/pasted from [employer]

## 2019-01-14 ENCOUNTER — TELEPHONE (OUTPATIENT)
Dept: INTERNAL MEDICINE CLINIC | Age: 69
End: 2019-01-14

## 2019-01-14 NOTE — TELEPHONE ENCOUNTER
Spoke with patients pharmacy after 2 patient identifiers being note and advised per Dr. Yuri Gan that humulin is the preferred medication so please fill Humulin. Patients pharmacist expressed understanding and has no further questions at this time.

## 2019-01-14 NOTE — TELEPHONE ENCOUNTER
----- Message from Eddie Benson sent at 1/14/2019  9:54 AM EST -----  Regarding: Dr. Bush Dust: [phone]  [facility] calling for clarification for pts Insulin NPH /insualin regular for Novaline or humaline.     [phone] reference number [id number]  Fax: [phone]    Copy/paste envera

## 2019-03-05 ENCOUNTER — OFFICE VISIT (OUTPATIENT)
Dept: INTERNAL MEDICINE CLINIC | Age: 69
End: 2019-03-05

## 2019-03-05 VITALS
HEART RATE: 83 BPM | HEIGHT: 70 IN | WEIGHT: 242 LBS | DIASTOLIC BLOOD PRESSURE: 82 MMHG | OXYGEN SATURATION: 97 % | SYSTOLIC BLOOD PRESSURE: 148 MMHG | TEMPERATURE: 97.4 F | BODY MASS INDEX: 34.65 KG/M2

## 2019-03-05 DIAGNOSIS — E66.9 OBESITY (BMI 30.0-34.9): ICD-10-CM

## 2019-03-05 DIAGNOSIS — E78.00 PURE HYPERCHOLESTEROLEMIA: ICD-10-CM

## 2019-03-05 DIAGNOSIS — Z00.00 MEDICARE ANNUAL WELLNESS VISIT, SUBSEQUENT: ICD-10-CM

## 2019-03-05 DIAGNOSIS — Z79.4 CONTROLLED TYPE 2 DIABETES MELLITUS WITH COMPLICATION, WITH LONG-TERM CURRENT USE OF INSULIN (HCC): ICD-10-CM

## 2019-03-05 DIAGNOSIS — E11.9 CONTROLLED TYPE 2 DIABETES MELLITUS WITHOUT COMPLICATION, WITHOUT LONG-TERM CURRENT USE OF INSULIN (HCC): Primary | ICD-10-CM

## 2019-03-05 DIAGNOSIS — E11.8 CONTROLLED TYPE 2 DIABETES MELLITUS WITH COMPLICATION, WITH LONG-TERM CURRENT USE OF INSULIN (HCC): ICD-10-CM

## 2019-03-05 DIAGNOSIS — C61 PROSTATE CANCER (HCC): ICD-10-CM

## 2019-03-05 DIAGNOSIS — Z23 ENCOUNTER FOR IMMUNIZATION: ICD-10-CM

## 2019-03-05 DIAGNOSIS — I10 ESSENTIAL HYPERTENSION: ICD-10-CM

## 2019-03-05 RX ORDER — GABAPENTIN 300 MG/1
300 CAPSULE ORAL 3 TIMES DAILY
Qty: 720 CAP | Refills: 3 | Status: SHIPPED | OUTPATIENT
Start: 2019-03-05 | End: 2020-03-12

## 2019-03-05 RX ORDER — NAPROXEN SODIUM 220 MG
1 TABLET ORAL 2 TIMES DAILY
Qty: 100 SYRINGE | Refills: 11 | Status: SHIPPED | OUTPATIENT
Start: 2019-03-05 | End: 2020-02-27 | Stop reason: SDUPTHER

## 2019-03-05 NOTE — PROGRESS NOTES
Chief Complaint   Patient presents with    Diabetes     4 month follow up     Hypertension     4 month follow up    Labs     4 month follow up    Cholesterol Problem     4 month follow up    Medication Evaluation     Gabapentin and Insulin

## 2019-03-05 NOTE — PATIENT INSTRUCTIONS
Medicare Wellness Visit, Male    The best way to live healthy is to have a lifestyle where you eat a well-balanced diet, exercise regularly, limit alcohol use, and quit all forms of tobacco/nicotine, if applicable. Regular preventive services are another way to keep healthy. Preventive services (vaccines, screening tests, monitoring & exams) can help personalize your care plan, which helps you manage your own care. Screening tests can find health problems at the earliest stages, when they are easiest to treat. [facility] follows the current, evidence-based guidelines published by the [facility] Kiran Andry ([facility]) when recommending preventive services for our patients. Because we follow these guidelines, sometimes recommendations change over time as research supports it. (For example, a prostate screening blood test is no longer routinely recommended for men with no symptoms.)  Of course, you and your doctor may decide to screen more often for some diseases, based on your risk and co-morbidities (chronic disease you are already diagnosed with). Preventive services for you include:  - Medicare offers their members a free annual wellness visit, which is time for you and your primary care provider to discuss and plan for your preventive service needs. Take advantage of this benefit every year!  -All adults over age 72 should receive the recommended pneumonia vaccines. Current USPSTF guidelines recommend a series of two vaccines for the best pneumonia protection.   -All adults should have a flu vaccine yearly and an ECG.  All adults age 61 and older should receive a shingles vaccine once in their lifetime.    -All adults age 38-68 who are overweight should have a diabetes screening test once every three years.   -Other screening tests & preventive services for persons with diabetes include: an eye exam to screen for diabetic retinopathy, a kidney function test, a foot exam, and stricter control over your cholesterol.   -Cardiovascular screening for adults with routine risk involves an electrocardiogram (ECG) at intervals determined by the provider.   -Colorectal cancer screening should be done for adults age 54-65 with no increased risk factors for colorectal cancer. There are a number of acceptable methods of screening for this type of cancer. Each test has its own benefits and drawbacks. Discuss with your provider what is most appropriate for you during your annual wellness visit. The different tests include: colonoscopy (considered the best screening method), a fecal occult blood test, a fecal DNA test, and sigmoidoscopy.  -All adults born between [facility] should be screened once for Hepatitis C.  -An Abdominal Aortic Aneurysm (AAA) Screening is recommended for men age 73-68 who has ever smoked in their lifetime.      Here is a list of your current Health Maintenance items (your personalized list of preventive services) with a due date:  Health Maintenance Due   Topic Date Due    DTaP/Tdap/Td  (1 - Tdap) 08/07/1973    Shingles Vaccine (1 of 2) 08/07/2002    Eye Exam  09/06/2015    Diabetic Foot Care  01/14/2017    Albumin Urine Test  01/14/2017    Glaucoma Screening   08/07/2017    Cholesterol Test   04/05/2018    Pneumococcal Vaccine (2 of 2 - PPSV23) 10/13/2018    Hemoglobin A1C    03/04/2019    Stool testing for trace blood  03/09/2019

## 2019-03-05 NOTE — PROGRESS NOTES
HISTORY OF PRESENT ILLNESS  Apryl Hernandez is a 79 y.o. male. HPI     F/u DM-2 htn hld and rising PSA ( s/p cryoablation 2010 prostate cancer).  Hx cva with left weakness obesity and medicare wellness------------------------------------------------  Takes neurontin 200mg tid which helps with neuropathy in feet but requests a higher dose  Last a1c 6.4 LDL 90  Due for pneumovax 23  Sees Dr Pradip Ludwig for prostate cancer s/p cryo 2009 but with biochemical recurrence----PSa 1.2 last year  Can walk around the house with cane     Last OV  S/p left TKR [id number]     Referred to [facility] for PSA up to 1.2 last visit-Dr Pradip Ludwig  a1c today 6.4  Last LDL 90     Last OV  Last a1c  6.6  LDL 80  Saw ortho Dr Keri Matta left TKR but needs cardiology clearance  Able to walk with cane , unable to walk up stairs , nothing strenous  fsbs at home avg 100, low 55-takes lantus 10 untis every day but holds if BS is low     S/p CVA last Feb2017--left sided weakbness  C/o increased tingling left toes     Last visit:  Has started taking humulin 70/30 10 units bid ac  amb a1c is 6.6 today   Has made diet changed--low card  No longer has neuropathic sxs in toes  Coughing up phlegm x 3 months-clear white ,   Has some numbness left arm , weakness has resolved  Left leg strenght has improved  Uses walker d/t knee OA        Patient Active Problem List    Diagnosis Date Noted    Osteoarthrosis, localized, primary, knee, left 06/19/2018    Diabetes ([address]     Stroke ([address]     TIA (transient ischemic attack) 04/04/2017    Transient ischemic attack involving right internal carotid artery 02/15/2017    Left-sided weakness 02/14/2017    OA (osteoarthritis) of knee 08/06/2013    Prostate cancer ([facility] 03/10/2011    Type II or unspecified type diabetes mellitus without mention of complication, uncontrolled 08/03/2009    Essential hypertension, benign 08/03/2009    Pure hypercholesterolemia 08/03/2009    Obesity 08/03/2009     Current Outpatient Medications   Medication Sig Dispense Refill    amLODIPine (NORVASC) 10 mg tablet TAKE ONE TABLET BY MOUTH ONCE DAILY 90 Tab 3    clopidogrel (PLAVIX) 75 mg tab TAKE ONE TABLET BY MOUTH ONCE DAY 90 Tab 3    clotrimazole (LOTRIMIN) 1 % topical cream Apply  to affected area two (2) times a day. 15 g 1    gabapentin (NEURONTIN) 100 mg capsule Take 2 Caps by mouth three (3) times daily for 90 days. 540 Cap 3    glipiZIDE (GLUCOTROL) 10 mg tablet TAKE ONE TABLET BY MOUTH TWICE DAILY 180 Tab 3    hydroCHLOROthiazide (HYDRODIURIL) 25 mg tablet TAKE ONE TABLET BY MOUTH ONCE DAILY 90 Tab 3    insulin NPH/insulin regular (NOVOLIN 70/30, HUMULIN 70/30) 100 unit/mL (70-30) injection 10 units sc bid 10 mL 11    lisinopril (PRINIVIL, ZESTRIL) 40 mg tablet TAKE ONE TABLET BY MOUTH ONCE DAILY 90 Tab 3    metFORMIN (GLUCOPHAGE) 1,000 mg tablet TAKE ONE TABLET BY MOUTH TWICE DAILY WITH MEALS 180 Tab 3    pravastatin (PRAVACHOL) 20 mg tablet TAKE ONE TABLET BY MOUTH IN THE EVENING 90 Tab 3    senna-docusate (SENNA PLUS) 8.6-50 mg per tablet Take 1 Tab by mouth two (2) times a day. 180 Tab 3    glucose blood VI test strips (ASCENSIA AUTODISC VI, ONE TOUCH ULTRA TEST VI) strip Freestyle Lite Test strips 200 Strip 3    alcohol swabs (ALCOHOL PREP SWABS) padm 10 Each by Apply Externally route two (2) times a day. 100 Pad 3    glucose blood VI test strips (ONETOUCH ULTRA TEST) strip Onetouch ultra blue test strips, check blood sugar twice daily Dx e11.9 100 Strip 3    glucose blood VI test strips (ASCENSIA AUTODISC VI, ONE TOUCH ULTRA TEST VI) strip Check fsbs bid E11.65  Contour next 100 Strip 11    Lancets misc by IntraDERMal route two (2) times a day. 50 Each 11    insulin syringe-needle U-100 Dispense ultrafine 0.5 mL syringes with 31 gauge needle 100 Syringe 11    glucose blood VI test strips (ASCENSIA CONTOUR) strip Test twice daily.  3 Package 1    Insulin Needles, Disposable, 31 X 5/16 \" ndle Inject once daily. 1 Package 3     No Known Allergies        ROS    Physical Exam   Constitutional: He appears well-developed and well-nourished. No distress. Appears stated age, sittingin WC, NAD   HENT:   Head: Normocephalic. Cardiovascular: Normal rate, regular rhythm and normal heart sounds. Exam reveals no gallop and no friction rub. No murmur heard. Pulmonary/Chest: Effort normal and breath sounds normal. No respiratory distress. He has no wheezes. He has no rales. He exhibits no tenderness. Abdominal: Soft. Musculoskeletal: He exhibits no edema. Neurological: He is alert. Left sided weakness   Psychiatric: He has a normal mood and affect. Nursing note and vitals reviewed. ASSESSMENT and PLAN  Diagnoses and all orders for this visit:    1. Controlled type 2 diabetes mellitus without complication, without long-term current use of insulin ([facility] [address]    2. Essential hypertension  -     METABOLIC PANEL, COMPREHENSIVE    3. Pure hypercholesterolemia  -     METABOLIC PANEL, COMPREHENSIVE  -     LIPID PANEL    4. Obesity (BMI 30.0-34.9)    5. Prostate cancer ([facility] [address]  -     REFERRAL TO UROLOGY    6. Controlled type 2 diabetes mellitus with complication, with long-term current use of insulin (HCC)  -     HEMOGLOBIN A1C WITH EAG  -     METABOLIC PANEL, COMPREHENSIVE  -     MICROALBUMIN, UR, RAND W/ MICROALB/CREAT RATIO    7. Encounter for immunization    Other orders  -     gabapentin (NEURONTIN) 300 mg capsule; Take 1 Cap by mouth three (3) times daily. -     insulin NPH/insulin regular (NOVOLIN 70/30, HUMULIN 70/30) 100 unit/mL (70-30) injection; 10 units sc bid  -     Insulin Syringe-Needle U-100 (BD INSULIN SYRINGE ULTRA-FINE) 0.5 mL 31 gauge x 5/16\" syrg; 1 Each by Does Not Apply route two (2) times a day. Follow-up Disposition:  Return in about 4 months (around 7/5/2019) for dm-2 htn hld cva prostate cancer.    This is the Subsequent Medicare Annual Wellness Exam, performed 12 months or more after the Initial AWV or the last Subsequent AWV    I have reviewed the patient's medical history in detail and updated the computerized patient record. History     Past Medical History:   Diagnosis Date    Allergic rhinitis     Arthritis     djd of knees    Chronic pain     Diabetes ([address]     Hypercholesteremia     Hypertension     Obesity     Stroke ([facility] [address]     left--ring finger/ pinky finger numb sensation      Past Surgical History:   Procedure Laterality Date    HX CHOLECYSTECTOMY      laparoscopic     Current Outpatient Medications   Medication Sig Dispense Refill    gabapentin (NEURONTIN) 300 mg capsule Take 1 Cap by mouth three (3) times daily. 720 Cap 3    insulin NPH/insulin regular (NOVOLIN 70/30, HUMULIN 70/30) 100 unit/mL (70-30) injection 10 units sc bid 30 mL 3    Insulin Syringe-Needle U-100 (BD INSULIN SYRINGE ULTRA-FINE) 0.5 mL 31 gauge x 5/16\" syrg 1 Each by Does Not Apply route two (2) times a day. 100 Syringe 11    amLODIPine (NORVASC) 10 mg tablet TAKE ONE TABLET BY MOUTH ONCE DAILY 90 Tab 3    clopidogrel (PLAVIX) 75 mg tab TAKE ONE TABLET BY MOUTH ONCE DAY 90 Tab 3    glipiZIDE (GLUCOTROL) 10 mg tablet TAKE ONE TABLET BY MOUTH TWICE DAILY 180 Tab 3    hydroCHLOROthiazide (HYDRODIURIL) 25 mg tablet TAKE ONE TABLET BY MOUTH ONCE DAILY 90 Tab 3    lisinopril (PRINIVIL, ZESTRIL) 40 mg tablet TAKE ONE TABLET BY MOUTH ONCE DAILY 90 Tab 3    metFORMIN (GLUCOPHAGE) 1,000 mg tablet TAKE ONE TABLET BY MOUTH TWICE DAILY WITH MEALS 180 Tab 3    pravastatin (PRAVACHOL) 20 mg tablet TAKE ONE TABLET BY MOUTH IN THE EVENING 90 Tab 3    senna-docusate (SENNA PLUS) 8.6-50 mg per tablet Take 1 Tab by mouth two (2) times a day.  180 Tab 3    glucose blood VI test strips (ASCENSIA AUTODISC VI, ONE TOUCH ULTRA TEST VI) strip Freestyle Lite Test strips 200 Strip 3    glucose blood VI test strips (ONETOUCH ULTRA TEST) strip Onetouch ultra blue test strips, check blood sugar twice daily Dx e11.9 100 Strip 3    glucose blood VI test strips (ASCENSIA AUTODISC VI, ONE TOUCH ULTRA TEST VI) strip Check fsbs bid E11.65  Contour next 100 Strip 11    Lancets misc by IntraDERMal route two (2) times a day. 50 Each 11    insulin syringe-needle U-100 Dispense ultrafine 0.5 mL syringes with 31 gauge needle 100 Syringe 11    glucose blood VI test strips (ASCENSIA CONTOUR) strip Test twice daily. 3 Package 1    Insulin Needles, Disposable, 31 X 5/16 \" ndle Inject once daily. 1 Package 3    clotrimazole (LOTRIMIN) 1 % topical cream Apply  to affected area two (2) times a day. 15 g 1    alcohol swabs (ALCOHOL PREP SWABS) padm 10 Each by Apply Externally route two (2) times a day.  100 Pad 3     No Known Allergies  Family History   Problem Relation Age of Onset    Diabetes Mother     Hypertension Mother     Kidney Disease Mother     Arthritis-osteo Father     Diabetes Father     No Known Problems Sister     No Known Problems Sister     No Known Problems Sister     No Known Problems Sister     Cancer Sister         cancer     Social History     Tobacco Use    Smoking status: Former Smoker     Years: 2.00     Last attempt to quit: 2017     Years since quittin.2    Smokeless tobacco: Never Used   Substance Use Topics    Alcohol use: No     Comment: stopped -used to drink wine (7 days a week     Patient Active Problem List   Diagnosis Code    Type II or unspecified type diabetes mellitus without mention of complication, uncontrolled E11.65    Essential hypertension, benign I10    Pure hypercholesterolemia E78.00    Obesity E66.9    Prostate cancer ([facility] [address] C61    OA (osteoarthritis) of knee M17.10    Left-sided weakness R53.1    Transient ischemic attack involving right internal carotid artery G45.1    TIA (transient ischemic attack) G45.9    Diabetes ([address] E11.9    Stroke ([facility] [address] I63.9    Osteoarthrosis, localized, primary, knee, left M17.12       Depression Risk Factor Screening:     3 most recent PHQ Screens 3/5/2019   Little interest or pleasure in doing things Not at all   Feeling down, depressed, irritable, or hopeless Not at all   Total Score PHQ 2 0     Alcohol Risk Factor Screening: You do not drink alcohol or very rarely. Functional Ability and Level of Safety:   Hearing Loss  Hearing is good. Activities of Daily Living  The home contains: no safety equipment. Patient needs help with:  transportation, shopping, preparing meals, laundry and housework    Fall Risk  Fall Risk Assessment, last 12 mths 3/5/2019   Able to walk? Yes   Fall in past 12 months? No       Abuse Screen  Patient is not abused    Cognitive Screening   Evaluation of Cognitive Function:  Has your family/caregiver stated any concerns about your memory: no  Normal    Patient Care Team   Patient Care Team:  Jayce Rosenthal MD as PCP - Jasmin Kang, RN as Ambulatory Care Navigator  Cynthia Cervantes MD (Urology)  Jonnie Lilly MD as Physician (Cardiology)    Assessment/Plan   Education and counseling provided:  Are appropriate based on today's review and evaluation  End-of-Life planning (with patient's consent)-advised completion of amd forms  Prostate cancer screening tests (PSA, covered annually)-refer back to  MD  tdap and shingrix is recommended    Diagnoses and all orders for this visit:    1. Controlled type 2 diabetes mellitus without complication, without long-term current use of insulin ([address]    2. Essential hypertension  -     METABOLIC PANEL, COMPREHENSIVE   Reasonable control  3. Pure hypercholesterolemia  -     METABOLIC PANEL, COMPREHENSIVE  -     LIPID PANEL   Continue statin  4. Obesity (BMI 30.0-34.9)   I have reviewed/discussed the above normal BMI with the patient. I have recommended the following interventions: dietary management education, guidance, and counseling . Jennifer Gasca 5. Prostate cancer ([facility] [address]  -     REFERRAL TO UROLOGY    6.  Controlled type 2 diabetes mellitus with complication, with long-term current use of insulin (HCC)  -     HEMOGLOBIN A1C WITH EAG  -     METABOLIC PANEL, COMPREHENSIVE  -     MICROALBUMIN, UR, RAND W/ MICROALB/CREAT RATIO   Refill insulin relion 70/30 [facility]   Increase neurontin 300mg tid for neuropathy    7. Hx CVA    On plavix  Other orders  -     gabapentin (NEURONTIN) 300 mg capsule; Take 1 Cap by mouth three (3) times daily. -     insulin NPH/insulin regular (NOVOLIN 70/30, HUMULIN 70/30) 100 unit/mL (70-30) injection; 10 units sc bid  -     Insulin Syringe-Needle U-100 (BD INSULIN SYRINGE ULTRA-FINE) 0.5 mL 31 gauge x 5/16\" syrg; 1 Each by Does Not Apply route two (2) times a day.         Health Maintenance Due   Topic Date Due    DTaP/Tdap/Td series (1 - Tdap) 08/07/1973    Shingrix Vaccine Age 50> (1 of 2) 08/07/2002    EYE EXAM RETINAL OR DILATED  09/06/2015    FOOT EXAM Q1  01/14/2017    MICROALBUMIN Q1  01/14/2017    GLAUCOMA SCREENING Q2Y  08/07/2017    LIPID PANEL Q1  04/05/2018    Pneumococcal 65+ Low/Medium Risk (2 of 2 - PPSV23) 10/13/2018    HEMOGLOBIN A1C Q6M  03/04/2019    FOBT Q 1 YEAR AGE 50-75  03/09/2019

## 2019-03-06 LAB
ALBUMIN SERPL-MCNC: 3.8 G/DL (ref 3.6–4.8)
ALBUMIN/GLOB SERPL: 1.2 {RATIO} (ref 1.2–2.2)
ALP SERPL-CCNC: 81 IU/L (ref 39–117)
ALT SERPL-CCNC: 15 IU/L (ref 0–44)
AST SERPL-CCNC: 14 IU/L (ref 0–40)
BILIRUB SERPL-MCNC: 0.3 MG/DL (ref 0–1.2)
BUN SERPL-MCNC: 20 MG/DL (ref 8–27)
BUN/CREAT SERPL: 18 (ref 10–24)
CALCIUM SERPL-MCNC: 9.2 MG/DL (ref 8.6–10.2)
CHLORIDE SERPL-SCNC: 100 MMOL/L (ref 96–106)
CHOLEST SERPL-MCNC: 134 MG/DL (ref 100–199)
CO2 SERPL-SCNC: 23 MMOL/L (ref 20–29)
CREAT SERPL-MCNC: 1.1 MG/DL (ref 0.76–1.27)
EST. AVERAGE GLUCOSE BLD GHB EST-MCNC: 183 MG/DL
GLOBULIN SER CALC-MCNC: 3.1 G/DL (ref 1.5–4.5)
GLUCOSE SERPL-MCNC: 190 MG/DL (ref 65–99)
HBA1C MFR BLD: 8 % (ref 4.8–5.6)
HDLC SERPL-MCNC: 25 MG/DL
LDLC SERPL CALC-MCNC: 69 MG/DL (ref 0–99)
POTASSIUM SERPL-SCNC: 4 MMOL/L (ref 3.5–5.2)
PROT SERPL-MCNC: 6.9 G/DL (ref 6–8.5)
SODIUM SERPL-SCNC: 138 MMOL/L (ref 134–144)
TRIGL SERPL-MCNC: 198 MG/DL (ref 0–149)
VLDLC SERPL CALC-MCNC: 40 MG/DL (ref 5–40)

## 2019-03-06 NOTE — PROGRESS NOTES
tellpt 3 mos bs avg is 183-too high ---a1c was 6.4 (135 avg) last oV. . Stress compliance with low carb diet and all medicines including insulin.  Lytes kidney,liver , cholesterol levels are ok

## 2019-04-01 NOTE — PROGRESS NOTES
Patient was unable to be reached by phone therefore the result notes   has been sent to patient's home address in the chart.

## 2019-06-11 ENCOUNTER — TELEPHONE (OUTPATIENT)
Dept: INTERNAL MEDICINE CLINIC | Age: 69
End: 2019-06-11

## 2019-06-11 NOTE — TELEPHONE ENCOUNTER
Called, spoke to pt. Two identifiers confirmed. Contact information given to pt for his ortho doctor Dr. Joan Chiu. Pt verbalized understanding of information discussed w/ no further questions at this time.

## 2019-06-11 NOTE — TELEPHONE ENCOUNTER
#[phone]  Pt would like an appt with Dr. Aris Martinez when he gets back as he needs his right knee replaced. This is all he would give me.

## 2019-08-14 ENCOUNTER — TELEPHONE (OUTPATIENT)
Dept: INTERNAL MEDICINE CLINIC | Age: 69
End: 2019-08-14

## 2019-08-14 NOTE — TELEPHONE ENCOUNTER
Pt experiencing swelling in left foot with no additional symptoms. Next appt avail not until 10/1/19. Advised  to seek appropriate medical care if necessary. Best contact number [phone].        Message received & copied from [facility]

## 2019-08-14 NOTE — TELEPHONE ENCOUNTER
Called, spoke to pt. Two identifiers confirmed. Offered pt an appointment with another provider in the office d/t lack of availability with Dr. Brissa Rowe. Pt declined. Notified pt to call the office if he would like to be seen. Pt verbalized understanding of information discussed w/ no further questions at this time.

## 2019-09-30 DIAGNOSIS — E78.00 PURE HYPERCHOLESTEROLEMIA: ICD-10-CM

## 2019-09-30 DIAGNOSIS — E78.5 HYPERLIPIDEMIA, UNSPECIFIED HYPERLIPIDEMIA TYPE: ICD-10-CM

## 2019-09-30 RX ORDER — LISINOPRIL 40 MG/1
TABLET ORAL
Qty: 90 TAB | Refills: 3 | Status: SHIPPED | OUTPATIENT
Start: 2019-09-30 | End: 2020-09-25

## 2019-09-30 RX ORDER — AMLODIPINE BESYLATE 10 MG/1
TABLET ORAL
Qty: 90 TAB | Refills: 3 | Status: SHIPPED | OUTPATIENT
Start: 2019-09-30 | End: 2020-09-25

## 2019-09-30 RX ORDER — PRAVASTATIN SODIUM 20 MG/1
TABLET ORAL
Qty: 90 TAB | Refills: 3 | Status: SHIPPED | OUTPATIENT
Start: 2019-09-30 | End: 2020-09-25

## 2019-09-30 RX ORDER — METFORMIN HYDROCHLORIDE 1000 MG/1
TABLET ORAL
Qty: 180 TAB | Refills: 3 | Status: SHIPPED | OUTPATIENT
Start: 2019-09-30 | End: 2020-09-25

## 2019-09-30 RX ORDER — GLIPIZIDE 10 MG/1
TABLET ORAL
Qty: 180 TAB | Refills: 3 | Status: SHIPPED | OUTPATIENT
Start: 2019-09-30 | End: 2020-09-25

## 2019-09-30 RX ORDER — HYDROCHLOROTHIAZIDE 25 MG/1
TABLET ORAL
Qty: 90 TAB | Refills: 3 | Status: SHIPPED | OUTPATIENT
Start: 2019-09-30 | End: 2020-09-25

## 2019-09-30 RX ORDER — CLOPIDOGREL BISULFATE 75 MG/1
TABLET ORAL
Qty: 90 TAB | Refills: 3 | Status: SHIPPED | OUTPATIENT
Start: 2019-09-30 | End: 2020-10-20

## 2019-11-07 ENCOUNTER — OFFICE VISIT (OUTPATIENT)
Dept: INTERNAL MEDICINE CLINIC | Age: 69
End: 2019-11-07

## 2019-11-07 VITALS
BODY MASS INDEX: 38.22 KG/M2 | HEIGHT: 70 IN | RESPIRATION RATE: 16 BRPM | SYSTOLIC BLOOD PRESSURE: 140 MMHG | WEIGHT: 267 LBS | DIASTOLIC BLOOD PRESSURE: 80 MMHG | TEMPERATURE: 97.3 F | OXYGEN SATURATION: 97 % | HEART RATE: 83 BPM

## 2019-11-07 DIAGNOSIS — Z23 ENCOUNTER FOR IMMUNIZATION: Primary | ICD-10-CM

## 2019-11-07 DIAGNOSIS — R60.0 LEG EDEMA, LEFT: ICD-10-CM

## 2019-11-07 DIAGNOSIS — E11.65 UNCONTROLLED TYPE 2 DIABETES MELLITUS WITH HYPERGLYCEMIA (HCC): ICD-10-CM

## 2019-11-07 DIAGNOSIS — R26.2 DIFFICULTY WALKING: ICD-10-CM

## 2019-11-07 RX ORDER — INSULIN PUMP SYRINGE, 3 ML
EACH MISCELLANEOUS
Qty: 1 KIT | Refills: 0 | Status: SHIPPED | OUTPATIENT
Start: 2019-11-07 | End: 2020-02-27 | Stop reason: SDUPTHER

## 2019-11-07 NOTE — PROGRESS NOTES
HISTORY OF PRESENT ILLNESS  Jordan Kebede is a 81 y.o. male. HPI   C/o left foot swelling x 2-3 months and f/u cva, dm-2  Here with wife   Has been sedentary and sits all day long  Denies any pain in left foot  interested in getting a hospital bed and motorized scooter but was ambulating after left TKR last year according to his wife  Stopped taking insulin a few months ago d/t low BS but no recent fsbs since stopping insulin   Requests new glucometer or Mable system   last OV  F/u DM-2 htn hld and rising PSA ( s/p cryoablation 2010 prostate cancer).  Hx cva with left weakness obesity and medicare wellness------------------------------------------------  Takes neurontin 200mg tid which helps with neuropathy in feet but requests a higher dose  Last a1c 6.4 LDL 90  Due for pneumovax 23  Sees Dr Gabino Rowland for prostate cancer s/p cryo 2009 but with biochemical recurrence----PSa 1.2 last year  Can walk around the house with cane     Patient Active Problem List    Diagnosis Date Noted    Osteoarthrosis, localized, primary, knee, left 06/19/2018    Diabetes ([facility]     [facility]     TIA (transient ischemic attack) 04/04/2017    Transient ischemic attack involving right internal carotid artery 02/15/2017    Left-sided weakness 02/14/2017    OA (osteoarthritis) of knee 08/06/2013    Prostate cancer ([facility] [address] 03/10/2011    Type II or unspecified type diabetes mellitus without mention of complication, uncontrolled 08/03/2009    Essential hypertension, benign 08/03/2009    Pure hypercholesterolemia 08/03/2009    Obesity 08/03/2009     Current Outpatient Medications   Medication Sig Dispense Refill    hydroCHLOROthiazide (HYDRODIURIL) 25 mg tablet TAKE 1 TABLET BY MOUTH ONCE DAILY 90 Tab 3    lisinopril (PRINIVIL, ZESTRIL) 40 mg tablet TAKE 1 TABLET BY MOUTH ONCE DAILY 90 Tab 3    glipiZIDE (GLUCOTROL) 10 mg tablet TAKE 1 TABLET BY MOUTH  TWICE A  Tab 3    amLODIPine (NORVASC) 10 mg tablet TAKE 1 TABLET BY MOUTH ONCE DAILY 90 Tab 3    pravastatin (PRAVACHOL) 20 mg tablet TAKE 1 TABLET BY MOUTH IN  THE EVENING 90 Tab 3    clopidogrel (PLAVIX) 75 mg tab TAKE 1 TABLET BY MOUTH ONCE A DAY 90 Tab 3    metFORMIN (GLUCOPHAGE) 1,000 mg tablet TAKE 1 TABLET BY MOUTH  TWICE A DAY WITH MEALS 180 Tab 3    gabapentin (NEURONTIN) 300 mg capsule Take 1 Cap by mouth three (3) times daily. 720 Cap 3    senna-docusate (SENNA PLUS) 8.6-50 mg per tablet Take 1 Tab by mouth two (2) times a day. 180 Tab 3    glucose blood VI test strips (ASCENSIA AUTODISC VI, ONE TOUCH ULTRA TEST VI) strip Freestyle Lite Test strips 200 Strip 3    alcohol swabs (ALCOHOL PREP SWABS) padm 10 Each by Apply Externally route two (2) times a day. 100 Pad 3    glucose blood VI test strips (ONETOUCH ULTRA TEST) strip Onetouch ultra blue test strips, check blood sugar twice daily Dx e11.9 100 Strip 3    glucose blood VI test strips (ASCENSIA AUTODISC VI, ONE TOUCH ULTRA TEST VI) strip Check fsbs bid E11.65  Contour next 100 Strip 11    glucose blood VI test strips (ASCENSIA CONTOUR) strip Test twice daily. 3 Package 1    insulin NPH/insulin regular (NOVOLIN 70/30, HUMULIN 70/30) 100 unit/mL (70-30) injection 10 units sc bid 30 mL 3    Insulin Syringe-Needle U-100 (BD INSULIN SYRINGE ULTRA-FINE) 0.5 mL 31 gauge x 5/16\" syrg 1 Each by Does Not Apply route two (2) times a day. 100 Syringe 11    clotrimazole (LOTRIMIN) 1 % topical cream Apply  to affected area two (2) times a day. 15 g 1    Lancets misc by IntraDERMal route two (2) times a day. 50 Each 11    insulin syringe-needle U-100 Dispense ultrafine 0.5 mL syringes with 31 gauge needle 100 Syringe 11    Insulin Needles, Disposable, 31 X 5/16 \" ndle Inject once daily.  1 Package 3     No Known Allergies   Lab Results   Component Value Date/Time    GFR est non-AA 70 03/05/2019 02:18 PM    GFRNA, POC >60 07/20/2018 01:08 PM    GFR est AA 80 03/05/2019 02:18 PM    GFRAA, POC >60 07/20/2018 01:08 PM Creatinine 1.10 03/05/2019 02:18 PM    Creatinine (POC) 1.0 07/20/2018 01:08 PM    BUN 20 03/05/2019 02:18 PM    Sodium 138 03/05/2019 02:18 PM    Potassium 4.0 03/05/2019 02:18 PM    Chloride 100 03/05/2019 02:18 PM    CO2 23 03/05/2019 02:18 PM    Magnesium 1.7 04/04/2017 10:50 AM        ROS    Physical Exam   Constitutional:   Sitting in WC, nad   Cardiovascular: Exam reveals no gallop and no friction rub. No murmur heard. Pulmonary/Chest: No respiratory distress. He has no wheezes. He has no rales. He exhibits no tenderness. Musculoskeletal: He exhibits edema. 2 plus LLE edema       ASSESSMENT and PLAN  Diagnoses and all orders for this visit:    1. Encounter for immunization  -     INFLUENZA VACCINE INACTIVATED (IIV), SUBUNIT, ADJUVANTED, IM    2. Left leg edema   Venous duplex   Elevate leg   Support stockings   On hctz  3.  Difficulty walking   S/p CVA left weakness   Left TKR   Refer to PT   Will submit paperwork for hospital bed and motorized wheelchair   4. Dm02   Glucometer rx sent    Check fsbs bid    Refer to pharmacist in office re Anny Jeronimo

## 2019-11-07 NOTE — PATIENT INSTRUCTIONS
Office Policies    Phone calls/patient messages:            Please allow up to 24 hours for someone in the office to contact you about your call or message. Be mindful your provider may be out of the office or your message may require further review. We encourage you to use [employer] for your messages as this is a faster, more efficient way to communicate with our office                         Medication Refills:            Prescription medications require 48-72 business hours to process. We encourage you to use [employer] for your refills. For controlled medications: Please allow 72 business hours to process. Certain medications may require you to  a written prescription at our office. NO narcotic/controlled medications will be prescribed after 4pm Monday through Friday or on weekends              Form/Paperwork Completion:            Please note a $25 fee may incur for all paperwork for completed by our providers. We ask that you allow 7-10 business days. Pre-payment is due prior to picking up/faxing the completed form. You may also download your forms to [employer] to have your doctor print off.

## 2019-11-12 ENCOUNTER — HOSPITAL ENCOUNTER (OUTPATIENT)
Dept: PHYSICAL THERAPY | Age: 69
End: 2019-11-12

## 2019-11-14 ENCOUNTER — HOSPITAL ENCOUNTER (OUTPATIENT)
Dept: ULTRASOUND IMAGING | Age: 69
Discharge: HOME | End: 2019-11-14
Attending: INTERNAL MEDICINE
Payer: MEDICARE

## 2019-11-14 ENCOUNTER — TELEPHONE (OUTPATIENT)
Dept: INTERNAL MEDICINE CLINIC | Age: 69
End: 2019-11-14

## 2019-11-14 DIAGNOSIS — R60.0 LEG EDEMA, LEFT: ICD-10-CM

## 2019-11-14 PROCEDURE — 93970 EXTREMITY STUDY: CPT

## 2019-11-14 NOTE — TELEPHONE ENCOUNTER
Caller's first and last name: n/a   Reason for call: Pt stated that he is at the practice getting his XRAY done to the Left leg. But he needs a referral to get the right leg X-ray done as well.    Callback required yes/no and why: yes   Best contact number(s):[phone]   Details to clarify the request: n/a   [place]

## 2019-11-15 NOTE — PROGRESS NOTES
Called, spoke to pt. Two identifiers confirmed. Notified pt of lab results/recommendations per Dr. Benjamín Christensen. Pt verbalized understanding of information discussed w/ no further questions at this time.

## 2019-11-15 NOTE — TELEPHONE ENCOUNTER
Called, spoke to pt. Two identifiers confirmed. Notified pt of lab results/recommendations per Dr. Kris Olguin. Pt verbalized understanding of information discussed w/ no further questions at this time.

## 2019-11-15 NOTE — TELEPHONE ENCOUNTER
----- Message from Bobby Wong sent at 11/14/2019  4:44 PM EST -----  Regarding: Dr. Mcclure April  Patient return call    Caller's first and last name and relationship (if not the patient):  pt    Best contactnumber(s):  [phone] alternated number  [phone]  Whose call is being returned: The office    Details to clarify the request:  Pt returning a miss call and inquiring if the call was in regards to test from labs regarding blood clot.      Message copied/pasted from [facility]

## 2019-11-19 ENCOUNTER — APPOINTMENT (OUTPATIENT)
Dept: PHYSICAL THERAPY | Age: 69
End: 2019-11-19

## 2020-01-02 NOTE — DISCHARGE SUMMARY
Hospitalist Discharge Summary     Patient ID:  Rolf Juarez  [id number]  61 y.o.  1950    PCP on record: Aubree Gustafson MD    Admit date: 4/4/2017  Discharge date and time: 4/5/2017      DISCHARGE DIAGNOSIS:    Left foot numbness  History of recent stroke  Diabetes  hypertension      CONSULTATIONS:  IP CONSULT TO NEUROLOGY  IP CONSULT TO NEUROLOGY  IP CONSULT TO HOSPITALIST    Excerpted HPI from H&P of Verdell Siemens, MD:  Shavonne Perez is a 61 y.o. male who with history of hypertension and diabetes  Recent admission with stroke in 2/2017 was discharged on asa, pravachol and Antihypertensive meds  Got the therapy, residual, left hand fingers numbness, and weak   Came to the ED with complains of left foot heaviness, Since resolved. Walks with a cane  No slurring of speech, no weakness at this time, no chest pain or sob     We were asked to admit for work up and evaluation of the above problems. ______________________________________________________________________  DISCHARGE SUMMARY/HOSPITAL COURSE:  for full details see H&P, daily progress notes, labs, consult notes. TIA ( with new left foot numbness last 2 hours and resolved) with history of recent thalmic stroke      Recent stroke in feb 2017 with residual left hand numbness and weak ,Walks with a cane  CT: Chronic small vessel ischemic and/or senescent white matter change and right thalamic lacune   Risk factors: hypertension, diabetes, history of stroke     Seen by neurology plan to change asa to plavix  Echo with bubble study neg  No further recommendations    lipids panel recent with ldl 90,  A1C 9.2,      Recent admission  Carotids neg  MRI Tiny foci of nonhemorrhagic acute or subacute ischemia in the right thalamus,  without associated mass effect.   Seen PT, out patient tehrapy     Hypertension  PTA norvasc,hctz and lisinopril        Diabetes: poc an achs  glipizide         _______________________________________________________________________  Patient seen and examined by me on discharge day. Pertinent Findings:  Gen:    Not in distress  Chest: Clear lungs  CVS:   Regular rhythm. No edema  Abd:  Soft, not distended, not tender  Neuro:  Alert, and oriented  _______________________________________________________________________  DISCHARGE MEDICATIONS:   Current Discharge Medication List      START taking these medications    Details   clopidogrel (PLAVIX) 75 mg tab Take 1 Tab by mouth daily. Qty: 30 Tab, Refills: 1         CONTINUE these medications which have NOT CHANGED    Details   traMADol (ULTRAM) 50 mg tablet Take 1 Tab by mouth every six (6) hours as needed for Pain. Qty: 30 Tab, Refills: 1      amLODIPine (NORVASC) 10 mg tablet Take 1 Tab by mouth daily. Qty: 30 Tab, Refills: 6      lisinopril (PRINIVIL, ZESTRIL) 40 mg tablet Take 1 Tab by mouth daily. Qty: 30 Tab, Refills: 6      hydroCHLOROthiazide (HYDRODIURIL) 25 mg tablet Take 1 Tab by mouth daily. Qty: 30 Tab, Refills: 6      glipiZIDE (GLUCOTROL) 10 mg tablet Take 1 Tab by mouth two (2) times a day. Qty: 60 Tab, Refills: 11      pravastatin (PRAVACHOL) 10 mg tablet TAKE ONE TABLET BY MOUTH IN THE EVENING  Qty: 30 Tab, Refills: 11      metFORMIN (GLUCOPHAGE) 1,000 mg tablet Take 1,000 mg by mouth two (2) times daily (with meals). clotrimazole (LOTRIMIN) 1 % topical cream Apply  to affected area two (2) times a day. Qty: 15 g, Refills: 1      !! glucose blood VI test strips (ASCENSIA AUTODISC VI, ONE TOUCH ULTRA TEST VI) strip Check fsbs bid  250.02  Contour next  Qty: 100 Strip, Refills: 11      !! glucose blood VI test strips (ASCENSIA AUTODISC VI, ONE TOUCH ULTRA TEST VI) strip Embrace test strip--check fsbs bid 250.02  Qty: 50 Each, Refills: 11      !! glucose blood VI test strips (ASCENSIA CONTOUR) strip Test twice daily.   Qty: 3 Package, Refills: 1      Insulin Needles, Disposable, 31 X 5/16 \" ndle Inject once daily. Qty: 1 Package, Refills: 3    Associated Diagnoses: Type II or unspecified type diabetes mellitus without mention of complication, uncontrolled       !! - Potential duplicate medications found. Please discuss with provider. STOP taking these medications       aspirin (ASPIRIN) 325 mg tablet Comments:   Reason for Stopping:         sildenafil (REVATIO) 20 mg tablet Comments:   Reason for Stopping:               My Recommended Diet, Activity, Wound Care, and follow-up labs are listed in the patient's Discharge Insturctions which I have personally completed and reviewed.     _______________________________________________________________________  DISPOSITION:    Home with Family: y   Home with HH/PT/OT/RN:    SNF/LTC:    FERNANDEZ:    OTHER:        Condition at Discharge:  Stable  _______________________________________________________________________  Follow up with:   PCP : Alberto Mcknight MD  Follow-up Information     None              Total time in minutes spent coordinating this discharge (includes going over instructions, follow-up, prescriptions, and preparing report for sign off to her PCP) : 35 minutes    Signed:  Herbie Saravia MD no

## 2020-02-06 ENCOUNTER — OFFICE VISIT (OUTPATIENT)
Dept: INTERNAL MEDICINE CLINIC | Age: 70
End: 2020-02-06

## 2020-02-06 VITALS
WEIGHT: 269 LBS | HEIGHT: 70 IN | TEMPERATURE: 97.1 F | OXYGEN SATURATION: 97 % | BODY MASS INDEX: 38.51 KG/M2 | RESPIRATION RATE: 16 BRPM | HEART RATE: 82 BPM | DIASTOLIC BLOOD PRESSURE: 60 MMHG | SYSTOLIC BLOOD PRESSURE: 124 MMHG

## 2020-02-06 DIAGNOSIS — Z12.5 PROSTATE CANCER SCREENING: ICD-10-CM

## 2020-02-06 DIAGNOSIS — E11.40 TYPE 2 DIABETES MELLITUS WITH DIABETIC NEUROPATHY, UNSPECIFIED WHETHER LONG TERM INSULIN USE (HCC): ICD-10-CM

## 2020-02-06 DIAGNOSIS — E78.00 PURE HYPERCHOLESTEROLEMIA: ICD-10-CM

## 2020-02-06 DIAGNOSIS — E11.9 CONTROLLED TYPE 2 DIABETES MELLITUS WITHOUT COMPLICATION, UNSPECIFIED WHETHER LONG TERM INSULIN USE (HCC): Primary | ICD-10-CM

## 2020-02-06 DIAGNOSIS — I10 ESSENTIAL HYPERTENSION: ICD-10-CM

## 2020-02-06 DIAGNOSIS — Z12.11 COLON CANCER SCREENING: ICD-10-CM

## 2020-02-06 LAB — HBA1C MFR BLD HPLC: 10.5 % (ref 4.8–5.6)

## 2020-02-06 NOTE — PROGRESS NOTES
HISTORY OF PRESENT ILLNESS  Christine Fernandez is a 81 y.o. male. HPI   F/u DM-2 HTN HLD  Last a1c 8.0 LDL 69  a1c today 10.5 today  Taking glipizide and metformin but not insulin  Was referred to PharmD last OV but did not schedule appt--wants freestyle mable system    Left foot stays swollen -not using suippoprt stockings--duplex negative  Last OV  C/o left foot swelling x 2-3 months and f/u cva, dm-2  Here with wife   Has been sedentary and sits all day long  Denies any pain in left foot  interested in getting a hospital bed and motorized scooter but was ambulating after left TKR last year according to his wife  Stopped taking insulin a few months ago d/t low BS but no recent fsbs since stopping insulin   Requests new glucometer or Mable system   last OV  F/u DM-2 htn hld and rising PSA ( s/p cryoablation 2010 prostate cancer).  Hx cva with left weakness obesity and medicare wellness------------------------------------------------  Takes neurontin 200mg tid which helps with neuropathy in feet but requests a higher dose  Last a1c 6.4 LDL 90  Due for pneumovax 23  Sees Dr Shannan Vitale for prostate cancer s/p cryo 2009 but with biochemical recurrence----PSa 1.2 last year  Can walk around the house with cane           Patient Active Problem List           Patient Active Problem List    Diagnosis Date Noted    Type 2 diabetes mellitus with diabetic neuropathy ([facility] [address] 02/06/2020    Osteoarthrosis, localized, primary, knee, left 06/19/2018    Diabetes ([address]     Stroke ([address]     TIA (transient ischemic attack) 04/04/2017    Transient ischemic attack involving right internal carotid artery 02/15/2017    Left-sided weakness 02/14/2017    OA (osteoarthritis) of knee 08/06/2013    Prostate cancer ([address] 03/10/2011    Type II or unspecified type diabetes mellitus without mention of complication, uncontrolled 08/03/2009    Essential hypertension, benign 08/03/2009    Pure hypercholesterolemia 08/03/2009    Obesity 08/03/2009     Current Outpatient Medications   Medication Sig Dispense Refill    insulin nph-regular human rec (HUMULIN 70-30) 100 unit/mL (70-30) inpn 10 units bid 2 Adjustable Dose Pre-filled Pen Syringe 2    Blood-Glucose Meter monitoring kit Check fsbs bid 250.02 1 Kit 0    hydroCHLOROthiazide (HYDRODIURIL) 25 mg tablet TAKE 1 TABLET BY MOUTH ONCE DAILY 90 Tab 3    lisinopril (PRINIVIL, ZESTRIL) 40 mg tablet TAKE 1 TABLET BY MOUTH ONCE DAILY 90 Tab 3    glipiZIDE (GLUCOTROL) 10 mg tablet TAKE 1 TABLET BY MOUTH  TWICE A  Tab 3    amLODIPine (NORVASC) 10 mg tablet TAKE 1 TABLET BY MOUTH ONCE DAILY 90 Tab 3    pravastatin (PRAVACHOL) 20 mg tablet TAKE 1 TABLET BY MOUTH IN  THE EVENING 90 Tab 3    clopidogrel (PLAVIX) 75 mg tab TAKE 1 TABLET BY MOUTH ONCE A DAY 90 Tab 3    metFORMIN (GLUCOPHAGE) 1,000 mg tablet TAKE 1 TABLET BY MOUTH  TWICE A DAY WITH MEALS 180 Tab 3    gabapentin (NEURONTIN) 300 mg capsule Take 1 Cap by mouth three (3) times daily. 720 Cap 3    Insulin Syringe-Needle U-100 (BD INSULIN SYRINGE ULTRA-FINE) 0.5 mL 31 gauge x 5/16\" syrg 1 Each by Does Not Apply route two (2) times a day. 100 Syringe 11    senna-docusate (SENNA PLUS) 8.6-50 mg per tablet Take 1 Tab by mouth two (2) times a day. 180 Tab 3    glucose blood VI test strips (ASCENSIA AUTODISC VI, ONE TOUCH ULTRA TEST VI) strip Freestyle Lite Test strips 200 Strip 3    alcohol swabs (ALCOHOL PREP SWABS) padm 10 Each by Apply Externally route two (2) times a day. 100 Pad 3    glucose blood VI test strips (ONETOUCH ULTRA TEST) strip Onetouch ultra blue test strips, check blood sugar twice daily Dx e11.9 100 Strip 3    glucose blood VI test strips (ASCENSIA AUTODISC VI, ONE TOUCH ULTRA TEST VI) strip Check fsbs bid E11.65  Contour next 100 Strip 11    Lancets misc by IntraDERMal route two (2) times a day.  50 Each 11    insulin syringe-needle U-100 Dispense ultrafine 0.5 mL syringes with 31 gauge needle 100 Syringe 11  glucose blood VI test strips (ASCENSIA CONTOUR) strip Test twice daily. 3 Package 1    Insulin Needles, Disposable, 31 X 5/16 \" ndle Inject once daily. 1 Package 3    clotrimazole (LOTRIMIN) 1 % topical cream Apply  to affected area two (2) times a day. 15 g 1     No Known Allergies   Lab Results   Component Value Date/Time    WBC 5.5 07/20/2018 01:11 PM    HGB 12.1 07/20/2018 01:11 PM    HCT 38.5 07/20/2018 01:11 PM    PLATELET 119 85/97/8143 01:11 PM    MCV 81.9 07/20/2018 01:11 PM     Lab Results   Component Value Date/Time    Hemoglobin A1c 8.0 (H) 03/05/2019 02:18 PM    Hemoglobin A1c 7.2 (H) 06/05/2018 01:49 PM    Hemoglobin A1c 7.1 (H) 03/08/2018 01:00 PM    Glucose 190 (H) 03/05/2019 02:18 PM    Glucose (POC) 192 (H) 06/22/2018 12:11 PM    Microalb/Creat ratio (ug/mg creat.) 10.5 01/14/2016 11:18 AM    LDL, calculated 69 03/05/2019 02:18 PM    Creatinine (POC) 1.0 07/20/2018 01:08 PM    Creatinine 1.10 03/05/2019 02:18 PM      Lab Results   Component Value Date/Time    Cholesterol, total 134 03/05/2019 02:18 PM    HDL Cholesterol 25 (L) 03/05/2019 02:18 PM    LDL, calculated 69 03/05/2019 02:18 PM    Triglyceride 198 (H) 03/05/2019 02:18 PM    CHOL/HDL Ratio 6.3 (H) 04/05/2017 04:03 AM     Lab Results   Component Value Date/Time    GFR est non-AA 70 03/05/2019 02:18 PM    GFRNA, POC >60 07/20/2018 01:08 PM    GFR est AA 80 03/05/2019 02:18 PM    GFRAA, POC >60 07/20/2018 01:08 PM    Creatinine 1.10 03/05/2019 02:18 PM    Creatinine (POC) 1.0 07/20/2018 01:08 PM    BUN 20 03/05/2019 02:18 PM    Sodium 138 03/05/2019 02:18 PM    Potassium 4.0 03/05/2019 02:18 PM    Chloride 100 03/05/2019 02:18 PM    CO2 23 03/05/2019 02:18 PM    Magnesium 1.7 04/04/2017 10:50 AM        Review of Systems   Constitutional: Negative for fever. Physical Exam  Vitals signs and nursing note reviewed. Constitutional:       General: He is not in acute distress. Appearance: He is well-developed. He is obese. Comments: Appears stated age, sitting in WC, NAD   HENT:      Head: Normocephalic. Cardiovascular:      Rate and Rhythm: Normal rate and regular rhythm. Heart sounds: Normal heart sounds. Pulmonary:      Effort: Pulmonary effort is normal.      Breath sounds: Normal breath sounds. Abdominal:      Palpations: Abdomen is soft. Musculoskeletal:      Comments: 2 plus left pedal edema   Neurological:      Mental Status: He is alert. ASSESSMENT and PLAN  Diagnoses and all orders for this visit:    1. Controlled type 2 diabetes mellitus without complication, unspecified whether long term insulin use (HCC)  -     AMB POC HEMOGLOBIN A1C 82.1  -     METABOLIC PANEL, COMPREHENSIVE  -     MICROALBUMIN, UR, RAND W/ MICROALB/CREAT RATIO   Uncontrolled   Restart 70/30 insulin 10 units sc bid   Refer to PharmD for assistance and pt wants to start freestyle richard CGM  2. Essential hypertension  -     METABOLIC PANEL, COMPREHENSIVE  -     LIPID PANEL  -     CBC W/O DIFF   controlled  3. Pure hypercholesterolemia  -     METABOLIC PANEL, COMPREHENSIVE   On pravastatin  4. Colon cancer screening  -     OCCULT BLOOD IMMUNOASSAY,DIAGNOSTIC    5. Prostate cancer screening  -     PSA SCREENING (SCREENING)    6. Type 2 diabetes mellitus with diabetic neuropathy, unspecified whether long term insulin use (HCC)    Other orders  -     insulin nph-regular human rec (HUMULIN 70-30) 100 unit/mL (70-30) inpn; 10 units bid      Follow-up and Dispositions    · Return in about 4 months (around 6/6/2020) for CPE x 30 min.

## 2020-02-06 NOTE — PATIENT INSTRUCTIONS
Office Policies    Phone calls/patient messages:            Please allow up to 24 hours for someone in the office to contact you about your call or message. Be mindful your provider may be out of the office or your message may require further review. We encourage you to use [employer] for your messages as this is a faster, more efficient way to communicate with our office                         Medication Refills:            Prescription medications require 48-72 business hours to process. We encourage you to use [employer] for your refills. For controlled medications: Please allow 72 business hours to process. Certain medications may require you to  a written prescription at our office. NO narcotic/controlled medications will be prescribed after 4pm Monday through Friday or on weekends              Form/Paperwork Completion:            Please note a $25 fee may incur for all paperwork for completed by our providers. We ask that you allow 7-10 business days. Pre-payment is due prior to picking up/faxing the completed form. You may also download your forms to [employer] to have your doctor print off.

## 2020-02-07 DIAGNOSIS — D64.9 ANEMIA, UNSPECIFIED TYPE: Primary | ICD-10-CM

## 2020-02-07 LAB
ALBUMIN SERPL-MCNC: 4 G/DL (ref 3.8–4.8)
ALBUMIN/CREAT UR: 15 MG/G CREAT (ref 0–29)
ALBUMIN/GLOB SERPL: 1.4 {RATIO} (ref 1.2–2.2)
ALP SERPL-CCNC: 92 IU/L (ref 39–117)
ALT SERPL-CCNC: 12 IU/L (ref 0–44)
AST SERPL-CCNC: 12 IU/L (ref 0–40)
BILIRUB SERPL-MCNC: 0.2 MG/DL (ref 0–1.2)
BUN SERPL-MCNC: 18 MG/DL (ref 8–27)
BUN/CREAT SERPL: 14 (ref 10–24)
CALCIUM SERPL-MCNC: 9.3 MG/DL (ref 8.6–10.2)
CHLORIDE SERPL-SCNC: 97 MMOL/L (ref 96–106)
CHOLEST SERPL-MCNC: 141 MG/DL (ref 100–199)
CO2 SERPL-SCNC: 22 MMOL/L (ref 20–29)
CREAT SERPL-MCNC: 1.3 MG/DL (ref 0.76–1.27)
CREAT UR-MCNC: 79.8 MG/DL
ERYTHROCYTE [DISTWIDTH] IN BLOOD BY AUTOMATED COUNT: 15.7 % (ref 11.6–15.4)
GLOBULIN SER CALC-MCNC: 2.8 G/DL (ref 1.5–4.5)
GLUCOSE SERPL-MCNC: 343 MG/DL (ref 65–99)
HCT VFR BLD AUTO: 37 % (ref 37.5–51)
HDLC SERPL-MCNC: 25 MG/DL
HGB BLD-MCNC: 11.2 G/DL (ref 13–17.7)
LDLC SERPL CALC-MCNC: 66 MG/DL (ref 0–99)
MCH RBC QN AUTO: 24.5 PG (ref 26.6–33)
MCHC RBC AUTO-ENTMCNC: 30.3 G/DL (ref 31.5–35.7)
MCV RBC AUTO: 81 FL (ref 79–97)
MICROALBUMIN UR-MCNC: 11.8 UG/ML
PLATELET # BLD AUTO: 334 X10E3/UL (ref 150–450)
POTASSIUM SERPL-SCNC: 4.1 MMOL/L (ref 3.5–5.2)
PROT SERPL-MCNC: 6.8 G/DL (ref 6–8.5)
PSA SERPL-MCNC: 1.2 NG/ML (ref 0–4)
RBC # BLD AUTO: 4.58 X10E6/UL (ref 4.14–5.8)
SODIUM SERPL-SCNC: 138 MMOL/L (ref 134–144)
TRIGL SERPL-MCNC: 252 MG/DL (ref 0–149)
VLDLC SERPL CALC-MCNC: 50 MG/DL (ref 5–40)
WBC # BLD AUTO: 4.6 X10E3/UL (ref 3.4–10.8)

## 2020-02-07 NOTE — PROGRESS NOTES
Tell pt glucose is 343 -too high -needs to restart insulin as prescribed and meet with pharmacist as discussed  Kidney function cholo emmanuel liver wnl   He is mildly anemic now--refer to GI Dr Krishna Oliva or associate for colonoscopy [phone]

## 2020-02-10 ENCOUNTER — TELEPHONE (OUTPATIENT)
Dept: INTERNAL MEDICINE CLINIC | Age: 70
End: 2020-02-10

## 2020-02-10 NOTE — PROGRESS NOTES
Called, spoke to pt. Two identifiers confirmed. Notified pt of lab results/recommendations per Dr. Izabel Downey. Pt verbalized understanding of information discussed w/ no further questions at this time.

## 2020-02-10 NOTE — TELEPHONE ENCOUNTER
Spoke with pt's wife, Zohreh Moctezuma. Contact information given for Dr. Emile King office. Rebel verbalized understanding of information discussed w/ no further questions at this time.

## 2020-02-10 NOTE — TELEPHONE ENCOUNTER
----- Message from Julio Sahni sent at 2/10/2020  3:21 PM EST -----  Regarding: /Telephone  General Message/Vendor Calls    Caller's first and last name:      Reason for call:  Needs phone number to where he needs to set up an appointment at     [facility] required yes/no and why:  Yes, to get the phone number     Best contact number(s):  [phone]    Details to clarify the request:      Julio Sahni      Copy/paste envera

## 2020-02-18 LAB
ALBUMIN SERPL-MCNC: NORMAL G/DL
ALP SERPL-CCNC: NORMAL U/L
ALT SERPL-CCNC: NORMAL U/L
AST SERPL-CCNC: NORMAL U/L
BILIRUB SERPL-MCNC: NORMAL MG/DL
BUN SERPL-MCNC: NORMAL MG/DL
CALCIUM SERPL-MCNC: NORMAL MG/DL
CHLORIDE SERPL-SCNC: NORMAL MMOL/L
CHOLEST SERPL-MCNC: NORMAL MG/DL
CO2 SERPL-SCNC: NORMAL MMOL/L
CREAT SERPL-MCNC: NORMAL MG/DL
CREAT UR-MCNC: NORMAL MG/DL
GLUCOSE SERPL-MCNC: NORMAL MG/DL
HCT VFR BLD AUTO: NORMAL %
HDLC SERPL-MCNC: NORMAL MG/DL
HEMOCCULT STL QL IA: NEGATIVE
HGB BLD-MCNC: NORMAL G/DL
MICROALBUMIN UR-MCNC: NORMAL
NRBC BLD AUTO-RTO: NORMAL %
PLATELET # BLD AUTO: NORMAL 10*3/UL
POTASSIUM SERPL-SCNC: NORMAL MMOL/L
PROT SERPL-MCNC: NORMAL G/DL
RBC # BLD AUTO: NORMAL 10*6/UL
SODIUM SERPL-SCNC: NORMAL MMOL/L
TRIGL SERPL-MCNC: NORMAL MG/DL (ref ?–150)
WBC # BLD AUTO: NORMAL X10E3/UL

## 2020-02-27 ENCOUNTER — TELEPHONE (OUTPATIENT)
Dept: INTERNAL MEDICINE CLINIC | Age: 70
End: 2020-02-27

## 2020-02-27 ENCOUNTER — OFFICE VISIT (OUTPATIENT)
Dept: INTERNAL MEDICINE CLINIC | Age: 70
End: 2020-02-27

## 2020-02-27 DIAGNOSIS — E11.40 TYPE 2 DIABETES MELLITUS WITH DIABETIC NEUROPATHY, WITHOUT LONG-TERM CURRENT USE OF INSULIN (HCC): Primary | ICD-10-CM

## 2020-02-27 RX ORDER — LANCETS
EACH MISCELLANEOUS
Qty: 100 EACH | Refills: 11 | Status: SHIPPED | OUTPATIENT
Start: 2020-02-27 | End: 2021-06-18 | Stop reason: SDUPTHER

## 2020-02-27 RX ORDER — PEN NEEDLE, DIABETIC 30 GX3/16"
1 NEEDLE, DISPOSABLE MISCELLANEOUS 2 TIMES DAILY
Qty: 100 PEN NEEDLE | Refills: 11 | Status: SHIPPED | OUTPATIENT
Start: 2020-02-27 | End: 2021-06-18 | Stop reason: SDUPTHER

## 2020-02-27 RX ORDER — INSULIN PUMP SYRINGE, 3 ML
EACH MISCELLANEOUS
Qty: 1 KIT | Refills: 0 | Status: SHIPPED | OUTPATIENT
Start: 2020-02-27 | End: 2021-06-18 | Stop reason: SDUPTHER

## 2020-02-27 NOTE — PATIENT INSTRUCTIONS
Your Visit Summary:      your meter and test strips at the pharmacy. Check your blood sugar first thing in the morning and before supper. · Start back on your 70/30 insulin - give 12 units under the skin before breakfast and dinner. If blood sugar remains above 150 after 1 week, increase to 14 units twice daily. · Continue with glipizide 10 mg twice daily (need to give with food) and metformin 1000 mg twice daily for now. Your blood sugar goals:  - Fasting (first thing in the morning)  blood sugar: 80 - 130   - 1 to 2 hours after a meal: less than 180     When you experience symptoms of low blood sugar (example: less than 70):  - Confirm low reading by checking your blood sugar.   - Then treat with 15 grams of carbohydrates (one-half cup of juice or regular soda, or 4-5 glucose tablets).   - Wait 15 minutes to recheck blood sugar.   - Then eat a protein containing meal/snack to prevent another low blood sugar episode. (example: peanut butter + crackers)    Other recommendations:  - Schedule an annual eye exam.

## 2020-02-27 NOTE — Clinical Note
He's not taking plavix. May have been as long as June 2018. Thoughts?  Would recommend ASA 81 mg daily. -Maureen

## 2020-02-27 NOTE — TELEPHONE ENCOUNTER
----- Message from Belia Lauren sent at 2/27/2020  2:52 PM EST -----  Regarding: Dr. Ramon Combs / Wesley Bledsoe first and last name: Sirisha Schmitz - Wife  Reason for call: Patient needs to have his most recent labs form 02/06/20 faxed over to [facility] Fax: [phone]  Callback required yes/no and why: No  Best contact number(s): [phone]  Details to clarify the request:      Copy/paste envera

## 2020-02-28 NOTE — PROGRESS NOTES
Pharmacy Progress Note - Diabetes Management    S/O: Mr. Jose Manuel Younger 81 y.o. male, with a PMH of T2DM w/ neuropathy, HTN, HLD, Hx of CVA w/ left sided weakness, Hx prostate cancer s/p cryoablation 2010, OA s/p TKA, referred by Dr. Shelley Will MD for diabetes management. Last A1c was 10.5% (2/6/20), an increase from 8% (March 2019). Ambulates in a wheelchair. SHx:  Lives with wife - she is also diabetic  [address] insurance     Current regimen:  Humulin 70/30 insulin pen - 10 units BID - never picked up this medication  Glipizide 10 mg BID  Metformin 1 gm BID    On pravastatin 20 mg daily and lisinopril 40 mg daily   Not taking plavix - \"didn't know that I need to take this\"     Endorses polyuria, nocturia, and vision changes  Denies any recent episodes of hypoglycemia. Reports he stopped taking insulin \"months ago because I had a low BG\"     SMBG:  Not checking SMBG at this time. Requests for new meter.     Willing to check BG four times daily to get Mable CGM     Lifestyle:  Eats 3 meals/day  B: oatmeal + butter + sugar or 1 bowl of cereal and milk  L: hamburger, hotdog,  Or a sandwich - usually eats out  D: yesterday - chicken and mashed potato   Mary: water, coffee with sugar ,sometimes will drink soda - does not like diet equivalence    Wt Readings from Last 3 Encounters:   02/06/20 269 lb (122 kg)   11/07/19 267 lb (121.1 kg)   03/05/19 242 lb (109.8 kg)     BP Readings from Last 3 Encounters:   02/06/20 124/60   11/07/19 140/80   03/05/19 148/82     Pulse Readings from Last 3 Encounters:   02/06/20 82   11/07/19 83   03/05/19 83       Past Medical History:   Diagnosis Date    Allergic rhinitis     Arthritis     djd of knees    Chronic pain     Diabetes (HCC)     Hypercholesteremia     Hypertension     Obesity     Stroke ([facility] [address]     left--ring finger/ pinky finger numb sensation     No Known Allergies  Current Outpatient Medications   Medication Sig    insulin nph-regular human rec (HUMULIN 70-30) 100 unit/mL (70-30) inpn Inject 12 units under the skin twice daily. If blood sugar remains above 150 after 5 days, increase to 14 units twice daily  Indications: type 2 diabetes mellitus    Insulin Needles, Disposable, 31 gauge x 5/16\" ndle 1 Pen Needle by SubCUTAneous route two (2) times a day. Use to give insulin twice daily    Blood-Glucose Meter monitoring kit Use to check your blood sugar three times daily. E11.9. Use brand preferred by insurance.  lancets misc Use to check your blood sugar three times daily. E11.9. Use brand preferred by insurance.  glucose blood VI test strips (BLOOD GLUCOSE TEST) strip Use to check blood sugar up to 3 times daily. Use brand preferred by insurance.  hydroCHLOROthiazide (HYDRODIURIL) 25 mg tablet TAKE 1 TABLET BY MOUTH ONCE DAILY    lisinopril (PRINIVIL, ZESTRIL) 40 mg tablet TAKE 1 TABLET BY MOUTH ONCE DAILY    glipiZIDE (GLUCOTROL) 10 mg tablet TAKE 1 TABLET BY MOUTH  TWICE A DAY    amLODIPine (NORVASC) 10 mg tablet TAKE 1 TABLET BY MOUTH ONCE DAILY    pravastatin (PRAVACHOL) 20 mg tablet TAKE 1 TABLET BY MOUTH IN  THE EVENING    clopidogrel (PLAVIX) 75 mg tab TAKE 1 TABLET BY MOUTH ONCE A DAY    metFORMIN (GLUCOPHAGE) 1,000 mg tablet TAKE 1 TABLET BY MOUTH  TWICE A DAY WITH MEALS    gabapentin (NEURONTIN) 300 mg capsule Take 1 Cap by mouth three (3) times daily.  clotrimazole (LOTRIMIN) 1 % topical cream Apply  to affected area two (2) times a day.  senna-docusate (SENNA PLUS) 8.6-50 mg per tablet Take 1 Tab by mouth two (2) times a day.  alcohol swabs (ALCOHOL PREP SWABS) padm 10 Each by Apply Externally route two (2) times a day. No current facility-administered medications for this visit.         Lab Results   Component Value Date/Time    WBC 4.6 02/06/2020 02:32 PM    HGB 11.2 (L) 02/06/2020 02:32 PM    HCT 37.0 (L) 02/06/2020 02:32 PM    PLATELET 586 18/97/3265 02:32 PM    MCV 81 02/06/2020 02:32 PM     Lab Results Component Value Date/Time    Sodium 138 02/06/2020 02:32 PM    Potassium 4.1 02/06/2020 02:32 PM    Chloride 97 02/06/2020 02:32 PM    CO2 22 02/06/2020 02:32 PM    Anion gap 11 07/20/2018 01:11 PM    Glucose 343 (H) 02/06/2020 02:32 PM    BUN 18 02/06/2020 02:32 PM    Creatinine 1.30 (H) 02/06/2020 02:32 PM    BUN/Creatinine ratio 14 02/06/2020 02:32 PM    GFR est AA 65 02/06/2020 02:32 PM    GFR est non-AA 56 (L) 02/06/2020 02:32 PM    Calcium 9.3 02/06/2020 02:32 PM    Bilirubin, total 0.2 02/06/2020 02:32 PM    AST (SGOT) 12 02/06/2020 02:32 PM    Alk. phosphatase 92 02/06/2020 02:32 PM    Protein, total 6.8 02/06/2020 02:32 PM    Albumin 4.0 02/06/2020 02:32 PM    Globulin 4.4 (H) 07/20/2018 01:11 PM    A-G Ratio 1.4 02/06/2020 02:32 PM    ALT (SGPT) 12 02/06/2020 02:32 PM     Lab Results   Component Value Date/Time    Hemoglobin A1c 8.0 (H) 03/05/2019 02:18 PM    Hemoglobin A1c 7.2 (H) 06/05/2018 01:49 PM    Hemoglobin A1c 7.1 (H) 03/08/2018 01:00 PM     Last Point of Care HGB A1C  Hemoglobin A1c (POC)   Date Value Ref Range Status   02/06/2020 10.5 (A) 4.8 - 5.6 % Final          A/P:  Diabetes:  - Last A1c is not at his goal of <7%. Review fasting and post prandial goals. < 130 and <180.   - Will send in rx for new diabetic testing supplies today. Need to check fasting and pre-dinner BG to start. Will aim for 4 times daily checks to qualify for CGM. - Review s/sx of hypoglycemia and management steps today. - Emphasize importance of starting 70/30 insulin pen today - start at 12 units before breakfast and dinner. If BG > 150, increase to 14 units twice daily. - Continue with metformin 1 gm BID and glipizide 10 mg BID.    - Encourage patient to avoid adding sugar to meals and drinks. Pt willing to try this. CVA secondary prevention:   - Appears plavix was held temporarily for his TKA in June 2018. Will clarify. At very least, would recommend ASA 81 mg daily.     - Pt will RTC in 2 weeks. - Pt endorsed understanding of the information provided. All questions were answered at this time. Check: Vitals, Weight and Medication Adherence at the next visit. Thank you for the consult,  Anna Ferrari, PharmD, BCACP, CDE     Medication reconciliation was completed today.   Medications Discontinued During This Encounter   Medication Reason    glucose blood VI test strips (ASCENSIA AUTODISC VI, ONE TOUCH ULTRA TEST VI) strip Reorder    glucose blood VI test strips (ASCENSIA AUTODISC VI, ONE TOUCH ULTRA TEST VI) strip Reorder    glucose blood VI test strips (ASCENSIA CONTOUR) strip Reorder    glucose blood VI test strips (ONETOUCH ULTRA TEST) strip Reorder    Insulin Needles, Disposable, 31 X 8/87 \" ndle Duplicate Order    insulin nph-regular human rec (HUMULIN 70-30) 100 unit/mL (59-21) inpn Duplicate Order    insulin syringe-needle Y-347 Duplicate Order    Insulin Syringe-Needle U-100 (BD INSULIN SYRINGE ULTRA-FINE) 0.5 mL 31 gauge x 2/32\" syrg Duplicate Order    Lancets misc Reorder    Blood-Glucose Meter monitoring kit Reorder

## 2020-03-11 ENCOUNTER — TELEPHONE (OUTPATIENT)
Dept: INTERNAL MEDICINE CLINIC | Age: 70
End: 2020-03-11

## 2020-03-11 NOTE — TELEPHONE ENCOUNTER
#[phone] pt needs a call back as he needs to reschedule his 3-12-20 appt with Maureen. We were told that these would be scheduled from up front. Thanks. If this has been routed incorrectly please advise.

## 2020-03-12 DIAGNOSIS — R52 PAIN: Primary | ICD-10-CM

## 2020-03-12 RX ORDER — GABAPENTIN 300 MG/1
CAPSULE ORAL
Qty: 270 CAP | Refills: 3 | Status: SHIPPED | OUTPATIENT
Start: 2020-03-12 | End: 2020-10-20

## 2020-05-11 ENCOUNTER — TELEPHONE (OUTPATIENT)
Dept: INTERNAL MEDICINE CLINIC | Age: 70
End: 2020-05-11

## 2020-05-11 NOTE — TELEPHONE ENCOUNTER
Swetha Terrazas [employer]   Phone Number: [phone]             Patient return call     Caller's first and last name and relationship (if not the patient):Rebel Tidwell/wife       Best contact number(s):[phone]       Whose call is being returned:not sure       Details to clarify the request:       [address] [place] [employer]

## 2020-05-11 NOTE — TELEPHONE ENCOUNTER
[place], [address] [facility]             Patient return call     Caller's first and last name and relationship (if not the patient):       Best contact number(s): [phone]       Whose call is being returned: Yane Addison LPN       Details to clarify the request:       Terrance Jewell     Copy/Paste  ENVERA

## 2020-05-14 ENCOUNTER — TELEPHONE (OUTPATIENT)
Dept: INTERNAL MEDICINE CLINIC | Age: 70
End: 2020-05-14

## 2020-05-14 NOTE — TELEPHONE ENCOUNTER
[address] [facility]             Caller's first and last name: Zuleika Dorsey. Reason for call:   wants office to give a call back.  Office called him all day yesterday in his words   Callback required yes/no and why: yes   Best contact number(s):  [phone]   Details to clarify the request:       Copy/Paste  ENVERA

## 2020-05-18 ENCOUNTER — TELEPHONE (OUTPATIENT)
Dept: INTERNAL MEDICINE CLINIC | Age: 70
End: 2020-05-18

## 2020-05-18 NOTE — TELEPHONE ENCOUNTER
----- Message from Monster Marlow sent at 5/18/2020  3:57 PM EDT -----  Regarding: Krunal Lamb MD  General Message/Vendor Calls    Caller's first and last name:ANDERSON Mendel Converse [[id number]]      Reason for call: medical supply request       Callback required yes/no and why: Yes      Best contact number(s):[phone]      Details to clarify the request:pt requesting a hospital bed and a rollator walker       Monster Marlow      Copy/paste envera

## 2020-05-19 NOTE — TELEPHONE ENCOUNTER
[facility] [address] [facility]             Patient return call     Caller's first and last name and relationship (if not the patient): Genie Jacome       Best contact number(s):  [phone]       Whose call is being returned:  Dr. Sharona Felder       Details to clarify the request:  Pt states he's sorry for missing Dr. Avendaño, and is calling back.      Thanks,   Venita Gibbons     Copy/Paste  ENVERA

## 2020-05-22 ENCOUNTER — TELEPHONE (OUTPATIENT)
Dept: INTERNAL MEDICINE CLINIC | Age: 70
End: 2020-05-22

## 2020-05-22 NOTE — TELEPHONE ENCOUNTER
----- Message from Griffin Pinon sent at 5/22/2020 11:03 AM EDT -----  Regarding: Dr. Lita Mckeon: [phone]  Caller's first and last name: N/A  Reason for call: Pt stated he needs order for hospital bed. Callback required yes/no and why: Yes, to inform.   Best contact number(s): [phone]  Details to clarify the request: N/A      Copy/paste envera

## 2020-05-22 NOTE — TELEPHONE ENCOUNTER
----- Message from [employer] sent at 2020 10:33 AM EDT -----  Regarding: Dr. Hills April Message/Vendor Call    : 54    Caller's first and last name: Christina Saavedra.  [facility]    Reason for call: Pt needs a hospital bed; Stated request should come from the pcp as a prescription; or recommend a vendor for the pt to get a bed that the insurance will cover     Callback required yes/no and why: yes    Best contact number(s): [phone] or [phone]    Details to clarify the request: stated that pt needs a confirmation call that the pcp received this message

## 2020-05-26 NOTE — TELEPHONE ENCOUNTER
Called, spoke to pt. Two identifiers confirmed. VV scheduled for 5/29 @ 675 with Dr. Tong Aguilar. Pt verbalized understanding of information discussed w/ no further questions at this time.

## 2020-05-27 ENCOUNTER — TELEPHONE (OUTPATIENT)
Dept: INTERNAL MEDICINE CLINIC | Age: 70
End: 2020-05-27

## 2020-05-27 NOTE — TELEPHONE ENCOUNTER
#[phone]  Pt needs to know that date that he had his stroke. Please call with that information. Thanks.

## 2020-05-29 ENCOUNTER — VIRTUAL VISIT (OUTPATIENT)
Dept: INTERNAL MEDICINE CLINIC | Age: 70
End: 2020-05-29

## 2020-05-29 DIAGNOSIS — Z86.73 HISTORY OF CVA (CEREBROVASCULAR ACCIDENT): Primary | ICD-10-CM

## 2020-05-29 DIAGNOSIS — R29.6 FALLS FREQUENTLY: ICD-10-CM

## 2020-05-29 DIAGNOSIS — R60.0 BILATERAL LEG EDEMA: ICD-10-CM

## 2020-05-29 NOTE — PROGRESS NOTES
HISTORY OF PRESENT ILLNESS  Iza Murry is a 81 y.o. male. HPI     VV via telephone encounter x 15 minutes d/t covid 19 pandemic    An electronic signature was used to authenticate this note. 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 Has difficulty getting OOB and in bed . It would help to keep HOB elevated greater than 30 degress when he gets OOB .  He has leg edenma left>right and wears stockings but would also benefit from leg elevation at night  Last OV  F/u DM-2 HTN HLD CVA with left weakness  Last a1c 8.0 LDL 69  a1c today 10.5 today  Taking glipizide and metformin but not insulin  Was referred to PharmD last OV but did not schedule appt--wants freestyle [facility]     Left foot stays swollen -not using suippoprt stockings--duplex negative  Last OV  C/o left foot swelling x 2-3 months and f/u cva, dm-2  Here with wife   Has been sedentary and sits all day long  Denies any pain in left foot  interested in getting a hospital bed and motorized scooter but was ambulating after left TKR last year according to his wife  Stopped taking insulin a few months ago d/t low BS but no recent fsbs since stopping insulin   Requests new glucometer or [facility]    Patient Active Problem List    Diagnosis Date Noted    Type 2 diabetes mellitus with diabetic neuropathy ([address] 02/06/2020    Osteoarthrosis, localized, primary, knee, left 06/19/2018    Diabetes ([address]     Stroke ([address]     TIA (transient ischemic attack) 04/04/2017    Transient ischemic attack involving right internal carotid artery 02/15/2017    Left-sided weakness 02/14/2017    OA (osteoarthritis) of knee 08/06/2013    Prostate cancer ([address] 03/10/2011    Type II or unspecified type diabetes mellitus without mention of complication, uncontrolled 08/03/2009    Essential hypertension, benign 08/03/2009    Pure hypercholesterolemia 08/03/2009    Obesity 08/03/2009     Current Outpatient Medications   Medication Sig Dispense Refill    gabapentin (NEURONTIN) 300 mg capsule TAKE 1 CAPSULE BY MOUTH 3  TIMES DAILY 270 Cap 3    Blood-Glucose Meter monitoring kit Use to check your blood sugar three times daily. E11.9. Use brand preferred by insurance. 1 Kit 0    glucose blood VI test strips (BLOOD GLUCOSE TEST) strip Use to check blood sugar up to 3 times daily. Use brand preferred by insurance. 100 Strip 11    hydroCHLOROthiazide (HYDRODIURIL) 25 mg tablet TAKE 1 TABLET BY MOUTH ONCE DAILY 90 Tab 3    lisinopril (PRINIVIL, ZESTRIL) 40 mg tablet TAKE 1 TABLET BY MOUTH ONCE DAILY 90 Tab 3    glipiZIDE (GLUCOTROL) 10 mg tablet TAKE 1 TABLET BY MOUTH  TWICE A  Tab 3    amLODIPine (NORVASC) 10 mg tablet TAKE 1 TABLET BY MOUTH ONCE DAILY 90 Tab 3    pravastatin (PRAVACHOL) 20 mg tablet TAKE 1 TABLET BY MOUTH IN  THE EVENING 90 Tab 3    clopidogrel (PLAVIX) 75 mg tab TAKE 1 TABLET BY MOUTH ONCE A DAY 90 Tab 3    metFORMIN (GLUCOPHAGE) 1,000 mg tablet TAKE 1 TABLET BY MOUTH  TWICE A DAY WITH MEALS 180 Tab 3    alcohol swabs (ALCOHOL PREP SWABS) padm 10 Each by Apply Externally route two (2) times a day. 100 Pad 3    insulin nph-regular human rec (HUMULIN 70-30) 100 unit/mL (70-30) inpn Inject 12 units under the skin twice daily. If blood sugar remains above 150 after 5 days, increase to 14 units twice daily  Indications: type 2 diabetes mellitus 15 mL 1    Insulin Needles, Disposable, 31 gauge x 5/16\" ndle 1 Pen Needle by SubCUTAneous route two (2) times a day. Use to give insulin twice daily 100 Pen Needle 11    lancets misc Use to check your blood sugar three times daily. E11.9. Use brand preferred by insurance. 100 Each 11    clotrimazole (LOTRIMIN) 1 % topical cream Apply  to affected area two (2) times a day.  15 g 1    senna-docusate (SENNA PLUS) 8.6-50 mg per tablet Take 1 Tab by mouth two (2) times a day. 180 Tab 3     No Known Allergies   Lab Results   Component Value Date/Time    GFR est non-AA 56 (L) 02/06/2020 02:32 PM    GFRNA, POC >60 07/20/2018 01:08 PM    GFR est AA 65 02/06/2020 02:32 PM    GFRAA, POC >60 07/20/2018 01:08 PM    Creatinine 1.30 (H) 02/06/2020 02:32 PM    Creatinine (POC) 1.0 07/20/2018 01:08 PM    BUN 18 02/06/2020 02:32 PM    Sodium 138 02/06/2020 02:32 PM    Potassium 4.1 02/06/2020 02:32 PM    Chloride 97 02/06/2020 02:32 PM    CO2 22 02/06/2020 02:32 PM    Magnesium 1.7 04/04/2017 10:50 AM        ROS    Physical Exam  Constitutional:       Appearance: Normal appearance. He is obese. Neurological:      Mental Status: He is alert. Psychiatric:         Mood and Affect: Mood normal.         Behavior: Behavior normal.         Thought Content: Thought content normal.         Judgment: Judgment normal.         ASSESSMENT and PLAN  Diagnoses and all orders for this visit:    1. History of CVA (cerebrovascular accident)   Continue medical management   Submit request and documentation  for hospital bed  2. Falls frequently   Fall precautions discussed  3.  Bilateral leg edema   Elevate legs and support stockings

## 2020-05-29 NOTE — PATIENT INSTRUCTIONS
This is an established visit conducted via telemedicine. The patient has been instructed that this meets HIPAA criteria and acknowledges and agrees to this method of visitation. Jerardo Nunez [facility] 
47/[birth year] 
7:32 AM 
Chief Complaint Patient presents with  Physical  
  Face to Face Evaluation for hospital bed  Fall  
  per patient Total of 3 fall within 12 months but no injuries

## 2020-06-04 DIAGNOSIS — R60.0 BILATERAL LEG EDEMA: ICD-10-CM

## 2020-06-04 DIAGNOSIS — R29.6 FALLS FREQUENTLY: ICD-10-CM

## 2020-06-04 DIAGNOSIS — Z86.73 HISTORY OF CVA (CEREBROVASCULAR ACCIDENT): Primary | ICD-10-CM

## 2020-06-08 ENCOUNTER — TELEPHONE (OUTPATIENT)
Dept: INTERNAL MEDICINE CLINIC | Age: 70
End: 2020-06-08

## 2020-06-08 NOTE — TELEPHONE ENCOUNTER
Patient states he's calling to advise the Fax number needed for his Hospital Bed. Patient states he spoke to [facility] & fax# is [phone]. Please call patient if any further questions.  Thank you

## 2020-06-08 NOTE — TELEPHONE ENCOUNTER
Called, spoke to pt. Two identifiers confirmed. Recommended pt keep an eye on wound and use neosporin. Pt verbalized understanding of information discussed w/ no further questions at this time.

## 2020-06-08 NOTE — TELEPHONE ENCOUNTER
#[phone] pt is asking for a call as he has knocked a scab off his leg? I can not understand pt at all.   I did get that pt is diabetic and he doesn't want his leg to get infected    Please call pt

## 2020-06-08 NOTE — TELEPHONE ENCOUNTER
Patient states he needs to get you to call [phone] for his Hospital Bed. Patient states he has been approved. Please call patient if any questions.  Thank you

## 2020-06-08 NOTE — TELEPHONE ENCOUNTER
Spoke with pt again. Notified pt to call insurance company to find out what DME company is covered. Pt verbalized understanding of information discussed w/ no further questions at this time.

## 2020-06-10 ENCOUNTER — TELEPHONE (OUTPATIENT)
Dept: INTERNAL MEDICINE CLINIC | Age: 70
End: 2020-06-10

## 2020-06-10 NOTE — TELEPHONE ENCOUNTER
----- Message from [address] [place] [address] sent at 6/10/2020 11:57 AM EDT -----  Regarding: Dr. Skye Castelan telephone  Caller's first and last name: n/a  Reason for call: nurse call  Callback required yes/no and why: yes  Best contact number(s): [phone]  Details to clarify the request:  Pt would like to be contacted by the nurse as soon as possible.       Copy/paste envera

## 2020-07-20 ENCOUNTER — TELEPHONE (OUTPATIENT)
Dept: INTERNAL MEDICINE CLINIC | Age: 70
End: 2020-07-20

## 2020-07-20 DIAGNOSIS — R60.0 BILATERAL LEG EDEMA: ICD-10-CM

## 2020-07-20 DIAGNOSIS — R29.6 FALLS FREQUENTLY: ICD-10-CM

## 2020-07-20 DIAGNOSIS — R26.2 DIFFICULTY WALKING: ICD-10-CM

## 2020-07-20 DIAGNOSIS — Z86.73 HISTORY OF CVA (CEREBROVASCULAR ACCIDENT): Primary | ICD-10-CM

## 2020-07-20 NOTE — TELEPHONE ENCOUNTER
#[phone] pt states he needs a motorized wheelchair due to his legs. Pt states he may not be able to have surgery up to a year. Can't use rollator as he has arthr is in his arms. Dr. Halima Lugo would have to put the order in for pt. Pt states insurance said he qualifies for this chair.

## 2020-07-21 NOTE — TELEPHONE ENCOUNTER
Notified pt order for motorized wc will be faxed to his insurance company (same as hospital bed order)  Pt verbalized understanding of information discussed w/ no further questions at this time.

## 2020-08-03 ENCOUNTER — TELEPHONE (OUTPATIENT)
Dept: INTERNAL MEDICINE CLINIC | Age: 70
End: 2020-08-03

## 2020-08-03 NOTE — TELEPHONE ENCOUNTER
Called, spoke to pt. Two identifiers confirmed. Notified pt to call back with fax number to insurance DME. Pt verbalized understanding of information discussed w/ no further questions at this time.

## 2020-08-03 NOTE — TELEPHONE ENCOUNTER
Pt requesting a hoveround. Notified pt after reading required documentation, pt will need another VV d/t last visit being over 45 days ago. VV scheduled for 8/5 @ 1045 with Dr. Alfred Yi. Pt verbalized understanding of information discussed w/ no further questions at this time.

## 2020-08-03 NOTE — TELEPHONE ENCOUNTER
#[phone]  Pt states he needs to speak with you as he has information you need for the motorized wheelchair. Also needs a chair that lifts him up to get out of the chair. Please call to help pt he ask.

## 2020-08-03 NOTE — TELEPHONE ENCOUNTER
Patient states he needs a call back to be advised if there are any updates on his Motorized Wheelchair order that's documented was faxed to insurance company on 7/21/20. Please call to advise.  Thank you

## 2020-08-05 ENCOUNTER — OFFICE VISIT (OUTPATIENT)
Dept: INTERNAL MEDICINE CLINIC | Age: 70
End: 2020-08-05
Payer: MEDICARE

## 2020-08-05 DIAGNOSIS — Z76.89 ENCOUNTER FOR POWER MOBILITY DEVICE ASSESSMENT: Primary | ICD-10-CM

## 2020-08-05 PROCEDURE — 1100F PTFALLS ASSESS-DOCD GE2>/YR: CPT | Performed by: INTERNAL MEDICINE

## 2020-08-05 PROCEDURE — 3288F FALL RISK ASSESSMENT DOCD: CPT | Performed by: INTERNAL MEDICINE

## 2020-08-05 PROCEDURE — 3017F COLORECTAL CA SCREEN DOC REV: CPT | Performed by: INTERNAL MEDICINE

## 2020-08-05 PROCEDURE — G8432 DEP SCR NOT DOC, RNG: HCPCS | Performed by: INTERNAL MEDICINE

## 2020-08-05 PROCEDURE — G8756 NO BP MEASURE DOC: HCPCS | Performed by: INTERNAL MEDICINE

## 2020-08-05 PROCEDURE — 99213 OFFICE O/P EST LOW 20 MIN: CPT | Performed by: INTERNAL MEDICINE

## 2020-08-05 PROCEDURE — G8427 DOCREV CUR MEDS BY ELIG CLIN: HCPCS | Performed by: INTERNAL MEDICINE

## 2020-08-05 NOTE — PROGRESS NOTES
HISTORY OF PRESENT ILLNESS  Carey Klinefelter is a 81 y.o. male. HPI   Carey Klinefelter is a 81 y.o. male being evaluated by a Virtual Visit (video visit) encounter to address concerns as mentioned above. A caregiver was present when appropriate. Due to this being a TeleHealth encounter (During FEJVM-17 public health emergency), evaluation of the following organ systems was limited: Vitals/Constitutional/EENT/Resp/CV/GI//MS/Neuro/Skin/Heme-Lymph-Imm. Pursuant to the emergency declaration under the [address] [place] [address], [address] [place] [address] authority and the [employer] Act, this Virtual Visit was conducted with patient's (and/or legal guardian's) consent, to reduce the risk of exposure to COVID-19 and provide necessary medical care. Services were provided through a video synchronous discussion virtually to substitute for in-person encounter. --Stefanie Ansari MD on 8/4/2020 at 9:48 PM    An electronic signature was used to authenticate this note. VV for mobility exam for motorized WC  ptusign a standard WC and a walker but no longer able to push himself in [facility] d/t left shoulder pain from rotator cuff strain and not able to safely use walker due to left shoulder and right knee pain. 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 Has difficulty getting OOB and in bed . It would help to keep HOB elevated greater than 30 degress when he gets OOB .  He has leg edenma left>right and wears stockings but would also benefit from leg elevation at night  Last OV  F/u DM-2 HTN HLD CVA with left weakness  Last a1c 8.0 LDL 69  a1c today 10.5 today  Taking glipizide and metformin but not insulin  Was referred to PharmD last OV but did not schedule appt--wants freestyle [facility]     Left foot stays swollen -not using suippoprt stockings--duplex negative    Patient Active Problem List    Diagnosis Date Noted    Type 2 diabetes mellitus with diabetic neuropathy ([facility] 02/06/2020    Osteoarthrosis, localized, primary, knee, left 06/19/2018    Diabetes ([facility]     Stroke ([facility]     TIA (transient ischemic attack) 04/04/2017    Transient ischemic attack involving right internal carotid artery 02/15/2017    Left-sided weakness 02/14/2017    OA (osteoarthritis) of knee 08/06/2013    Prostate cancer ([facility] 03/10/2011    Type II or unspecified type diabetes mellitus without mention of complication, uncontrolled 08/03/2009    Essential hypertension, benign 08/03/2009    Pure hypercholesterolemia 08/03/2009    Obesity 08/03/2009     Current Outpatient Medications   Medication Sig Dispense Refill    gabapentin (NEURONTIN) 300 mg capsule TAKE 1 CAPSULE BY MOUTH 3  TIMES DAILY 270 Cap 3    insulin nph-regular human rec (HUMULIN 70-30) 100 unit/mL (70-30) inpn Inject 12 units under the skin twice daily. If blood sugar remains above 150 after 5 days, increase to 14 units twice daily  Indications: type 2 diabetes mellitus 15 mL 1    Insulin Needles, Disposable, 31 gauge x 5/16\" ndle 1 Pen Needle by SubCUTAneous route two (2) times a day. Use to give insulin twice daily 100 Pen Needle 11    Blood-Glucose Meter monitoring kit Use to check your blood sugar three times daily. E11.9. Use brand preferred by insurance. 1 Kit 0    lancets misc Use to check your blood sugar three times daily. E11.9. Use brand preferred by insurance. 100 Each 11    glucose blood VI test strips (BLOOD GLUCOSE TEST) strip Use to check blood sugar up to 3 times daily. Use brand preferred by insurance.  100 Strip 11    hydroCHLOROthiazide (HYDRODIURIL) 25 mg tablet TAKE 1 TABLET BY MOUTH ONCE DAILY 90 Tab 3    lisinopril (PRINIVIL, ZESTRIL) 40 mg tablet TAKE 1 TABLET BY MOUTH ONCE DAILY 90 Tab 3    glipiZIDE (GLUCOTROL) 10 mg tablet TAKE 1 TABLET BY MOUTH  TWICE A  Tab 3    amLODIPine (NORVASC) 10 mg tablet TAKE 1 TABLET BY MOUTH ONCE DAILY 90 Tab 3    pravastatin (PRAVACHOL) 20 mg tablet TAKE 1 TABLET BY MOUTH IN  THE EVENING 90 Tab 3    clopidogrel (PLAVIX) 75 mg tab TAKE 1 TABLET BY MOUTH ONCE A DAY 90 Tab 3    metFORMIN (GLUCOPHAGE) 1,000 mg tablet TAKE 1 TABLET BY MOUTH  TWICE A DAY WITH MEALS 180 Tab 3    clotrimazole (LOTRIMIN) 1 % topical cream Apply  to affected area two (2) times a day. 15 g 1    senna-docusate (SENNA PLUS) 8.6-50 mg per tablet Take 1 Tab by mouth two (2) times a day. 180 Tab 3    alcohol swabs (ALCOHOL PREP SWABS) padm 10 Each by Apply Externally route two (2) times a day. 100 Pad 3     No Known Allergies        ROS    Physical Exam  Constitutional:       Appearance: Normal appearance. He is obese. Neck:      Musculoskeletal: Normal range of motion. Musculoskeletal:      Comments: Able to abduct left shoudler but with pain   Neurological:      Mental Status: He is alert. Comments: 4plus/5 strength in left arm and left leg. Drags left leg when walking         ASSESSMENT and PLAN  Diagnoses and all orders for this visit:    1. Encounter for power mobility device assessment    Medical dx that impairs his mobility---[place] 2017 with left weakness, left shoulder rotator cuff injury, s/p left TKR.  Right knee arthritis  PMD is necessary for him to safely get to bathroom,bedroom and kitchen to toilet, cook and sleep  Cannot use a walker due to 4 falls this year  Cannot use a wheelchair any longer due to left shoulder pain with inability to propel himself  Cannot use a scooter due to decreased posterual stability      A motorinzed wheelchair will allow patient to perform ADLs safely at home and allow him to take a bus to places  Patient is able to safely operate and power wheelchair mentally and physically  He is motivated and willing to use the power wheelchair at home.

## 2020-08-05 NOTE — PATIENT INSTRUCTIONS
This is an established visit conducted via telemedicine. The patient has been instructed that this meets HIPAA criteria and acknowledges and agrees to this method of visitation. Elizabeth Vickers, EMMA  77/13/33  55:60 AM  Chief Complaint   Patient presents with    Fall     per patient he have fallen 4 times this year. No injuries related    Form Completion     No paper work provided just need documentation by MD. per patient.

## 2020-08-07 DIAGNOSIS — R60.0 BILATERAL LEG EDEMA: ICD-10-CM

## 2020-08-07 DIAGNOSIS — Z76.89 ENCOUNTER FOR POWER MOBILITY DEVICE ASSESSMENT: Primary | ICD-10-CM

## 2020-08-07 DIAGNOSIS — R26.2 DIFFICULTY WALKING: ICD-10-CM

## 2020-08-07 DIAGNOSIS — Z86.73 HISTORY OF CVA (CEREBROVASCULAR ACCIDENT): ICD-10-CM

## 2020-08-07 DIAGNOSIS — R29.6 FALLS FREQUENTLY: ICD-10-CM

## 2020-08-21 ENCOUNTER — TELEPHONE (OUTPATIENT)
Dept: INTERNAL MEDICINE CLINIC | Age: 70
End: 2020-08-21

## 2020-08-21 NOTE — LETTER
2020 
  
Mr. Mendel Kent : 1950 
[id number] [address] [facility] 7 [id number] 
  
  
  
  
HISTORY OF PRESENT ILLNESS Ramandeep Anaya a 81 y. o. male. ROMEO Huynh Jr is a 81 y. o. male being evaluated by a Virtual Visit (video visit) encounter to address concerns as mentioned above.  A caregiver was present when appropriate. Due to this being a TeleHealth encounter (During ERO- public [facility] emergency), evaluation of the following organ systems was limited: Vitals/Constitutional/EENT/Resp/CV/GI//MS/Neuro/Skin/Heme-Lymph-Imm.  Pursuant to the emergency declaration under the [address] [place] [address], [facility] waiver authority and the [employer] Act, this Virtual Visit was conducted with patient's (and/or legal guardian's) consent, to reduce the risk of exposure to COVID-19 and provide necessary medical care.  
  
Services were provided through a video synchronous discussion virtually to substitute for in-person encounter. 
  
--Alisha Saeed MD on 2020 at 9:48 PM 
  
An electronic signature was used to authenticate this note. VV for mobility exam for motorized WC 
ptusign a standard WC and a walker but no longer able to push himself in [facility] d/t left shoulder pain from rotator cuff strain and not able to safely use walker due to left shoulder and right knee pain. 
  
Last OV  
FTF visit for hospital bed S/p right thalamic cva 17 with left sided weakness S/p left TKR 2018 Saw GI NP for anemia recently-EGD/Colon recommended 
  
Has had 3 falls in past 1 year due to cva with left weakness and alos has severe right knee OA . Has difficulty getting OOB and in bed . It would help to keep HOB elevated greater than 30 degress when he gets OOB . He has leg edenma left>right and wears stockings but would also benefit from leg elevation at night Last OV 
F/u DM-2 HTN HLD CVA with left weakness Last a1c 8.0 LDL 69 
a1c today 10.5 today Taking glipizide and metformin but not insulin Was referred to PharmD last OV but did not schedule appt--wants [facility]estyle [facility] 
  
Left foot stays swollen -not using suippoprt stockings--duplex negative   
       
Patient Active Problem List  
  Diagnosis Date Noted  Type 2 diabetes mellitus with diabetic neuropathy ([facility] 02/06/2020  Osteoarthrosis, localized, primary, knee, left 06/19/2018  Diabetes ([facility]    
 Stroke [facility])    
 TIA (transient ischemic attack) 04/04/2017  Transient ischemic attack involving right internal carotid artery 02/15/2017  Left-sided weakness 02/14/2017  OA (osteoarthritis) of knee 08/06/2013  Prostate cancer ([facility] 03/10/2011  Type II or unspecified type diabetes mellitus without mention of complication, uncontrolled 08/03/2009  Essential hypertension, benign 08/03/2009  Pure hypercholesterolemia 08/03/2009  Obesity 08/03/2009  
  
           
Current Outpatient Medications Medication Sig Dispense Refill  gabapentin (NEURONTIN) 300 mg capsule TAKE 1 CAPSULE BY MOUTH 3  TIMES DAILY 270 Cap 3  
 insulin nph-regular human rec (HUMULIN 70-30) 100 unit/mL (70-30) inpn Inject 12 units under the skin twice daily. If blood sugar remains above 150 after 5 days, increase to 14 units twice daily  Indications: type 2 diabetes mellitus 15 mL 1  
 Insulin Needles, Disposable, 31 gauge x 5/16\" ndle 1 Pen Needle by SubCUTAneous route two (2) times a day. Use to give insulin twice daily 100 Pen Needle 11  Blood-Glucose Meter monitoring kit Use to check your blood sugar three times daily. E11.9. Use brand preferred by insurance. 1 Kit 0  
 lancets misc Use to check your blood sugar three times daily. E11.9. Use brand preferred by insurance. 100 Each 11  
 glucose blood VI test strips (BLOOD GLUCOSE TEST) strip Use to check blood sugar up to 3 times daily. Use brand preferred by insurance.  100 Strip 11  
  hydroCHLOROthiazide (HYDRODIURIL) 25 mg tablet TAKE 1 TABLET BY MOUTH ONCE DAILY 90 Tab 3  
 lisinopril (PRINIVIL, ZESTRIL) 40 mg tablet TAKE 1 TABLET BY MOUTH ONCE DAILY 90 Tab 3  
 glipiZIDE (GLUCOTROL) 10 mg tablet TAKE 1 TABLET BY MOUTH  TWICE A  Tab 3  
 amLODIPine (NORVASC) 10 mg tablet TAKE 1 TABLET BY MOUTH ONCE DAILY 90 Tab 3  pravastatin (PRAVACHOL) 20 mg tablet TAKE 1 TABLET BY MOUTH IN  THE EVENING 90 Tab 3  clopidogrel (PLAVIX) 75 mg tab TAKE 1 TABLET BY MOUTH ONCE A DAY 90 Tab 3  
 metFORMIN (GLUCOPHAGE) 1,000 mg tablet TAKE 1 TABLET BY MOUTH  TWICE A DAY WITH MEALS 180 Tab 3  clotrimazole (LOTRIMIN) 1 % topical cream Apply  to affected area two (2) times a day. 15 g 1  
 senna-docusate (SENNA PLUS) 8.6-50 mg per tablet Take 1 Tab by mouth two (2) times a day. 180 Tab 3  
 alcohol swabs (ALCOHOL PREP SWABS) padm 10 Each by Apply Externally route two (2) times a day. 100 Pad 3  
  
No Known Allergies  
  
  
ROS 
  
Physical Exam 
Constitutional:   
   Appearance: Normal appearance. He is obese. Neck:  
   Musculoskeletal: Normal range of motion. Musculoskeletal:  
   Comments: Able to abduct left shoudler but with pain  
Neurological:  
   Mental Status: He is alert.  
   Comments: 4plus/5 strength in left arm and left leg. Drags left leg when walking   
  
  
  
  
  
  
  
  
  
  
  
  
  
  
  
  
  
  
  
ASSESSMENT and PLAN Diagnoses and all orders for this visit: 
  
1. Encounter for power mobility device assessment 
  
Medical dx that impairs his mobility---[place] 2017 with left weakness, left shoulder rotator cuff injury, s/p left TKR. Right knee arthritis PMD is necessary for him to safely get to bathroom,bedroom and kitchen to toilet, cook and sleep. Pt no longer able to ambulate with assistance of current mobility equipment. Pt unable to go any distance with current mobility equipment. Cannot use a walker due to 4 falls this year Cannot use a wheelchair any longer due to left shoulder pain with inability to propel himself Cannot use a scooter due to decreased posterual stability Pt can safely transfer to and from power chair with the assistance of his wife and/or family member in under 5 minutes. Pt has very limited use of right upper and lower extremity d/t severe arthritis and effects from prior CVA. 
  
  
A motorinzed wheelchair will allow patient to perform ADLs safely at home and allow him to take a bus to places Patient is able to safely operate and power wheelchair mentally and physically He is motivated and willing to use the power wheelchair at home. Right side upper extremity strength measurement: 2/5 Ride side lower extremity strength measurement: 2/5

## 2020-08-21 NOTE — TELEPHONE ENCOUNTER
Spoke with patient . Patient wanted to know the status of his wheelchair. Patient was told paperwork is pending.

## 2020-08-21 NOTE — TELEPHONE ENCOUNTER
----- Message from Regine Padron sent at 8/21/2020  8:54 AM EDT -----  Regarding: Dr. Ciara Quezada: [phone]  Caller's first and last name: Pt  Reason for call: Status on wheel chair   Callback required yes/no and why: yes  Best contact number(s): [phone]  Details to clarify the request: n/a

## 2020-09-18 ENCOUNTER — TELEPHONE (OUTPATIENT)
Dept: INTERNAL MEDICINE CLINIC | Age: 70
End: 2020-09-18

## 2020-09-18 NOTE — TELEPHONE ENCOUNTER
#[phone] pt states it has been three weeks and he has heard nothing about his wheelchair. Please call pt to give him a status of this order.

## 2020-09-18 NOTE — TELEPHONE ENCOUNTER
#[phone] Antonio Burciaga with [employer] needs to know if you received the order delivery fax today?    Please call using ref #[id number]

## 2020-09-18 NOTE — TELEPHONE ENCOUNTER
Notified pt we are having a hard time getting medicare to cover [employer]. Notified pt we will try once more. Pt verbalized understanding of information discussed w/ no further questions at this time.

## 2020-09-21 ENCOUNTER — TELEPHONE (OUTPATIENT)
Dept: INTERNAL MEDICINE CLINIC | Age: 70
End: 2020-09-21

## 2020-09-21 NOTE — TELEPHONE ENCOUNTER
----- Message from [employer] sent at 9/21/2020  9:42 AM EDT -----  Regarding: Dr. Paramjit Grayson Message/Vendor Calls    Caller's first and last name: Roxana Overton      Reason for call: patient would like to be referred to have a knee replacement for his Rt Knee pain.       Callback required yes/no and why: yes      Best contact number(s): [phone]      Details to clarify the request: patient would like to be referred to have a knee replacement due to his Rt Knee pain      Luanne Eugene

## 2020-09-24 DIAGNOSIS — E78.5 HYPERLIPIDEMIA, UNSPECIFIED HYPERLIPIDEMIA TYPE: ICD-10-CM

## 2020-09-24 DIAGNOSIS — E78.00 PURE HYPERCHOLESTEROLEMIA: ICD-10-CM

## 2020-09-25 RX ORDER — METFORMIN HYDROCHLORIDE 1000 MG/1
TABLET ORAL
Qty: 180 TAB | Refills: 3 | Status: SHIPPED | OUTPATIENT
Start: 2020-09-25 | End: 2021-08-19

## 2020-09-25 RX ORDER — GLIPIZIDE 10 MG/1
TABLET ORAL
Qty: 180 TAB | Refills: 3 | Status: SHIPPED | OUTPATIENT
Start: 2020-09-25 | End: 2021-08-19

## 2020-09-25 RX ORDER — PRAVASTATIN SODIUM 20 MG/1
TABLET ORAL
Qty: 90 TAB | Refills: 3 | Status: SHIPPED | OUTPATIENT
Start: 2020-09-25 | End: 2021-08-19

## 2020-09-25 RX ORDER — LISINOPRIL 40 MG/1
TABLET ORAL
Qty: 90 TAB | Refills: 3 | Status: SHIPPED | OUTPATIENT
Start: 2020-09-25 | End: 2021-07-16

## 2020-09-25 RX ORDER — AMLODIPINE BESYLATE 10 MG/1
TABLET ORAL
Qty: 90 TAB | Refills: 3 | Status: SHIPPED | OUTPATIENT
Start: 2020-09-25 | End: 2021-08-19

## 2020-09-25 RX ORDER — HYDROCHLOROTHIAZIDE 25 MG/1
TABLET ORAL
Qty: 90 TAB | Refills: 3 | Status: SHIPPED | OUTPATIENT
Start: 2020-09-25 | End: 2021-07-16

## 2020-10-02 ENCOUNTER — TELEPHONE (OUTPATIENT)
Dept: INTERNAL MEDICINE CLINIC | Age: 70
End: 2020-10-02

## 2020-10-02 NOTE — TELEPHONE ENCOUNTER
----- Message from Jes Leary sent at 10/2/2020 10:04 AM EDT -----  Regarding: Dr. Annmarie Laughlin Message/Vendor Calls    Caller's first and last name:Patient      Reason for call: Patient would like to phone number for [facility]       Callback required yes/no and why: yes      Best contact number(s): [phone]      Details to clarify the request:      Jes Leary

## 2020-10-15 ENCOUNTER — TELEPHONE (OUTPATIENT)
Dept: PRIMARY CARE CLINIC | Age: 70
End: 2020-10-15

## 2020-10-15 NOTE — TELEPHONE ENCOUNTER
Renu Kellogg, [address] [address] Office Pool               General Message/Vendor Calls     Caller's first and last name: Minorla Berlin       Reason for call: Requesting a call back as he needs assistance in obtaining a referral. Malena Alejandra wasn't sure of the physician's name but believed it was a dermatologist for a type of leg wrap that helped with swelling.   Phone # is [phone] required yes/no and why: yes       Best contact number(s): [phone]       Details to clarify the request: [facility] [address]

## 2020-10-19 DIAGNOSIS — R52 PAIN: ICD-10-CM

## 2020-10-20 RX ORDER — GABAPENTIN 300 MG/1
CAPSULE ORAL
Qty: 270 CAP | Refills: 3 | Status: SHIPPED | OUTPATIENT
Start: 2020-10-20 | End: 2021-03-31

## 2020-10-20 RX ORDER — CLOPIDOGREL BISULFATE 75 MG/1
TABLET ORAL
Qty: 90 TAB | Refills: 3 | Status: SHIPPED | OUTPATIENT
Start: 2020-10-20 | End: 2021-09-13

## 2021-03-30 DIAGNOSIS — R52 PAIN: ICD-10-CM

## 2021-03-31 RX ORDER — GABAPENTIN 300 MG/1
CAPSULE ORAL
Qty: 270 CAP | Refills: 3 | Status: SHIPPED | OUTPATIENT
Start: 2021-03-31 | End: 2021-09-13

## 2021-04-09 ENCOUNTER — VIRTUAL VISIT (OUTPATIENT)
Dept: INTERNAL MEDICINE CLINIC | Age: 71
End: 2021-04-09
Payer: MEDICARE

## 2021-04-09 DIAGNOSIS — E11.40 TYPE 2 DIABETES MELLITUS WITH DIABETIC NEUROPATHY, WITHOUT LONG-TERM CURRENT USE OF INSULIN (HCC): ICD-10-CM

## 2021-04-09 DIAGNOSIS — Z86.73 HISTORY OF CVA (CEREBROVASCULAR ACCIDENT): Primary | ICD-10-CM

## 2021-04-09 DIAGNOSIS — I10 ESSENTIAL HYPERTENSION: ICD-10-CM

## 2021-04-09 DIAGNOSIS — E78.00 PURE HYPERCHOLESTEROLEMIA: ICD-10-CM

## 2021-04-09 DIAGNOSIS — C61 PROSTATE CANCER (HCC): ICD-10-CM

## 2021-04-09 PROCEDURE — 99443 PR PHYS/QHP TELEPHONE EVALUATION 21-30 MIN: CPT | Performed by: INTERNAL MEDICINE

## 2021-04-09 RX ORDER — CHLORPHENIRAMINE MALEATE 4 MG
TABLET ORAL 2 TIMES DAILY
Qty: 15 G | Refills: 1 | Status: SHIPPED | OUTPATIENT
Start: 2021-04-09 | End: 2022-06-05

## 2021-04-09 NOTE — PROGRESS NOTES
HISTORY OF PRESENT ILLNESS  Joan Quigley is a 78 y.o. male. HPI   Joan Quigley, who was evaluated through a synchronous (real-time) audio only encounter, and/or his healthcare decision maker, is aware that it is a billable service, with coverage as determined by his insurance carrier. He provided verbal consent to proceed: Yes, and patient identification was verified. This visit was conducted pursuant to the emergency declaration under the [address] [place] [address], [address] [place] [address] and the [employer] Act. A caregiver was present when appropriate. Ability to conduct physical exam was limited. The patient was located in a state where the provider was credentialed to provide care. --Riley Cesar MD on 4/9/2021 at 10:02 AM      VV via telephone encounter x 21    F/u DM-2 HTN HLD CVA with left weakness 2-14-17, s/p left TKR 2018, prostate cancer s/p cryo 2009 with recurrence-Dr Melvin Lemus  Was able to get a motorized WC    Had first covid vaccine    fsbs-none  Had a fall at home recently -no injury--request a porttable railing to be able to get out of the house  No bp readings  Has not had fu with Dr Melvin Lemus in last 1-2 years      Last OV  VV for mobility exam for motorized WC  ptusign a standard WC and a walker but no longer able to push himself in [facility] d/t left shoulder pain from rotator cuff strain and not able to safely use walker due to left shoulder and right knee pain.     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 Has difficulty getting OOB and in bed . It would help to keep HOB elevated greater than 30 degress when he gets OOB .  He has leg edenma left>right and wears stockings but would also benefit from leg elevation at night  Last OV    Last a1c 8.0 LDL 69  a1c today 10.5 today  Taking glipizide and metformin but not insulin  Was referred to PharmD last OV but did not schedule appt--[facility]     Left foot stays swollen -not using suippoprt stockings--duplex negative           Patient Active Problem List    Diagnosis Date Noted    Type 2 diabetes mellitus with diabetic neuropathy ([facility] 02/06/2020    Osteoarthrosis, localized, primary, knee, left 06/19/2018    Diabetes ([facility]     [facility]     TIA (transient ischemic attack) 04/04/2017    Transient ischemic attack involving right internal carotid artery 02/15/2017    Left-sided weakness 02/14/2017    OA (osteoarthritis) of knee 08/06/2013    Prostate cancer ([facility] 03/10/2011    Type II or unspecified type diabetes mellitus without mention of complication, uncontrolled 08/03/2009    Essential hypertension, benign 08/03/2009    Pure hypercholesterolemia 08/03/2009    Obesity 08/03/2009     Current Outpatient Medications   Medication Sig Dispense Refill    gabapentin (NEURONTIN) 300 mg capsule TAKE 1 CAPSULE BY MOUTH 3  TIMES DAILY 270 Cap 3    clopidogreL (PLAVIX) 75 mg tab TAKE 1 TABLET BY MOUTH ONCE A DAY 90 Tab 3    pravastatin (PRAVACHOL) 20 mg tablet TAKE 1 TABLET BY MOUTH IN  THE EVENING 90 Tab 3    glipiZIDE (GLUCOTROL) 10 mg tablet TAKE 1 TABLET BY MOUTH  TWICE A  Tab 3    hydroCHLOROthiazide (HYDRODIURIL) 25 mg tablet TAKE 1 TABLET BY MOUTH ONCE DAILY 90 Tab 3    metFORMIN (GLUCOPHAGE) 1,000 mg tablet TAKE 1 TABLET BY MOUTH  TWICE A DAY WITH MEALS 180 Tab 3    lisinopriL (PRINIVIL, ZESTRIL) 40 mg tablet TAKE 1 TABLET BY MOUTH ONCE DAILY 90 Tab 3    amLODIPine (NORVASC) 10 mg tablet TAKE 1 TABLET BY MOUTH ONCE DAILY 90 Tab 3    Insulin Needles, Disposable, 31 gauge x 5/16\" ndle 1 Pen Needle by SubCUTAneous route two (2) times a day.  Use to give insulin twice daily 100 Pen Needle 11    senna-docusate (SENNA PLUS) 8.6-50 mg per tablet Take 1 Tab by mouth two (2) times a day. 180 Tab 3    insulin nph-regular human rec (HUMULIN 70-30) 100 unit/mL (70-30) inpn Inject 12 units under the skin twice daily. If blood sugar remains above 150 after 5 days, increase to 14 units twice daily  Indications: type 2 diabetes mellitus 15 mL 1    Blood-Glucose Meter monitoring kit Use to check your blood sugar three times daily. E11.9. Use brand preferred by insurance. 1 Kit 0    lancets misc Use to check your blood sugar three times daily. E11.9. Use brand preferred by insurance. 100 Each 11    glucose blood VI test strips (BLOOD GLUCOSE TEST) strip Use to check blood sugar up to 3 times daily. Use brand preferred by insurance. 100 Strip 11    clotrimazole (LOTRIMIN) 1 % topical cream Apply  to affected area two (2) times a day. 15 g 1    alcohol swabs (ALCOHOL PREP SWABS) padm 10 Each by Apply Externally route two (2) times a day.  100 Pad 3     No Known Allergies   Lab Results   Component Value Date/Time    WBC 4.6 02/06/2020 02:32 PM    HGB 11.2 (L) 02/06/2020 02:32 PM    HCT 37.0 (L) 02/06/2020 02:32 PM    PLATELET 095 56/15/9776 02:32 PM    MCV 81 02/06/2020 02:32 PM     Lab Results   Component Value Date/Time    Hemoglobin A1c 8.0 (H) 03/05/2019 02:18 PM    Hemoglobin A1c 7.2 (H) 06/05/2018 01:49 PM    Hemoglobin A1c 7.1 (H) 03/08/2018 01:00 PM    Glucose 343 (H) 02/06/2020 02:32 PM    Glucose (POC) 192 (H) 06/22/2018 12:11 PM    Microalb/Creat ratio (ug/mg creat.) 15 02/06/2020 02:32 PM    LDL, calculated 66 02/06/2020 02:32 PM    Creatinine (POC) 1.0 07/20/2018 01:08 PM    Creatinine 1.30 (H) 02/06/2020 02:32 PM      Lab Results   Component Value Date/Time    Cholesterol, total 141 02/06/2020 02:32 PM    HDL Cholesterol 25 (L) 02/06/2020 02:32 PM    LDL, calculated 66 02/06/2020 02:32 PM    Triglyceride 252 (H) 02/06/2020 02:32 PM    CHOL/HDL Ratio 6.3 (H) 04/05/2017 04:03 AM     Lab Results   Component Value Date/Time    GFR est non-AA 56 (L) 02/06/2020 02:32 PM    GFRNA, POC >60 07/20/2018 01:08 PM    GFR est AA 65 02/06/2020 02:32 PM    GFRAA, POC >60 07/20/2018 01:08 PM    Creatinine 1.30 (H) 02/06/2020 02:32 PM    Creatinine (POC) 1.0 07/20/2018 01:08 PM    BUN 18 02/06/2020 02:32 PM    Sodium 138 02/06/2020 02:32 PM    Potassium 4.1 02/06/2020 02:32 PM    Chloride 97 02/06/2020 02:32 PM    CO2 22 02/06/2020 02:32 PM    Magnesium 1.7 04/04/2017 10:50 AM        ROS    Physical Exam    ASSESSMENT and PLAN  Diagnoses and all orders for this visit:    1. History of CVA (cerebrovascular accident)   On plavix   Pt will try to get more info re portable railing   Advised annual dilated eye exam  2. Type 2 diabetes mellitus with diabetic neuropathy, without long-term current use of insulin ([facility]   Labs ordered   Needs fsbs monitoring  3. Essential hypertension   bp moinitroing   labs  4. Pure hypercholesterolemia   Lipids ordered  5. Prostate cancer [facility])   Refer to Dr Noel Valderrama    6.  Preventive   Will complete covid vaccines    rtc 4 months CPE in person

## 2021-04-09 NOTE — TELEPHONE ENCOUNTER
Future Appointments:  No future appointments. Last Appointment With Me:  4/9/2021     Requested Prescriptions     Pending Prescriptions Disp Refills    clotrimazole (LOTRIMIN) 1 % topical cream 15 g 1     Sig: Apply  to affected area two (2) times a day.

## 2021-04-09 NOTE — PATIENT INSTRUCTIONS
This is an established visit conducted via telemedicine. The patient has been instructed that this meets HIPAA criteria and acknowledges and agrees to this method of visitation.  
 
Peyman Shepherd [place] 
46/09/13 
9:89 AM

## 2021-06-02 ENCOUNTER — TELEPHONE (OUTPATIENT)
Dept: INTERNAL MEDICINE CLINIC | Age: 71
End: 2021-06-02

## 2021-06-02 DIAGNOSIS — C61 PROSTATE CANCER (HCC): Primary | ICD-10-CM

## 2021-06-02 NOTE — TELEPHONE ENCOUNTER
Patient's wife is requesting a new order for a hospice eval for patient with [facility]. Caitlin Fuentes with [facility] can be contacted at [phone] when the order has been placed.

## 2021-06-10 NOTE — TELEPHONE ENCOUNTER
Referral enter for hospice eval and faxed to [facility] with [facility] at this time. No further actions required at this time.

## 2021-06-10 NOTE — TELEPHONE ENCOUNTER
Rogelio Simpson with [facility] is requesting an update on patient's referral. Once completed, referral can be faxed to [phone]. Please contact Rogelio Simpson at [phone] with the fax confirmation once sent.

## 2021-06-14 ENCOUNTER — APPOINTMENT (OUTPATIENT)
Dept: GENERAL RADIOLOGY | Age: 71
End: 2021-06-14
Attending: EMERGENCY MEDICINE
Payer: MEDICARE

## 2021-06-14 ENCOUNTER — HOSPITAL ENCOUNTER (EMERGENCY)
Age: 71
Discharge: HOME | End: 2021-06-14
Attending: EMERGENCY MEDICINE
Payer: MEDICARE

## 2021-06-14 VITALS
OXYGEN SATURATION: 99 % | DIASTOLIC BLOOD PRESSURE: 77 MMHG | HEART RATE: 85 BPM | HEIGHT: 70 IN | WEIGHT: 260 LBS | RESPIRATION RATE: 16 BRPM | BODY MASS INDEX: 37.22 KG/M2 | SYSTOLIC BLOOD PRESSURE: 134 MMHG | TEMPERATURE: 99.1 F

## 2021-06-14 DIAGNOSIS — R60.0 BILATERAL LEG EDEMA: ICD-10-CM

## 2021-06-14 DIAGNOSIS — M54.50 ACUTE MIDLINE LOW BACK PAIN WITHOUT SCIATICA: Primary | ICD-10-CM

## 2021-06-14 DIAGNOSIS — I87.2 CHRONIC VENOUS STASIS DERMATITIS OF BOTH LOWER EXTREMITIES: ICD-10-CM

## 2021-06-14 LAB
ALBUMIN SERPL-MCNC: 3.2 G/DL (ref 3.5–5)
ALBUMIN/GLOB SERPL: 0.7 {RATIO} (ref 1.1–2.2)
ALP SERPL-CCNC: 107 U/L (ref 45–117)
ALT SERPL-CCNC: 12 U/L (ref 12–78)
ANION GAP SERPL CALC-SCNC: 6 MMOL/L (ref 5–15)
APPEARANCE UR: CLEAR
AST SERPL-CCNC: 13 U/L (ref 15–37)
BACTERIA URNS QL MICRO: NEGATIVE /HPF
BASOPHILS # BLD: 0 K/UL (ref 0–0.1)
BASOPHILS NFR BLD: 0 % (ref 0–1)
BILIRUB SERPL-MCNC: 0.3 MG/DL (ref 0.2–1)
BILIRUB UR QL CFM: NEGATIVE
BUN SERPL-MCNC: 13 MG/DL (ref 6–20)
BUN/CREAT SERPL: 9 (ref 12–20)
CALCIUM SERPL-MCNC: 8.9 MG/DL (ref 8.5–10.1)
CHLORIDE SERPL-SCNC: 105 MMOL/L (ref 97–108)
CO2 SERPL-SCNC: 26 MMOL/L (ref 21–32)
COLOR UR: ABNORMAL
CREAT SERPL-MCNC: 1.49 MG/DL (ref 0.7–1.3)
DIFFERENTIAL METHOD BLD: ABNORMAL
EOSINOPHIL # BLD: 0.1 K/UL (ref 0–0.4)
EOSINOPHIL NFR BLD: 2 % (ref 0–7)
EPITH CASTS URNS QL MICRO: ABNORMAL /LPF
ERYTHROCYTE [DISTWIDTH] IN BLOOD BY AUTOMATED COUNT: 19.5 % (ref 11.5–14.5)
GLOBULIN SER CALC-MCNC: 4.3 G/DL (ref 2–4)
GLUCOSE SERPL-MCNC: 270 MG/DL (ref 65–100)
GLUCOSE UR STRIP.AUTO-MCNC: >1000 MG/DL
HCT VFR BLD AUTO: 36 % (ref 36.6–50.3)
HGB BLD-MCNC: 10.6 G/DL (ref 12.1–17)
HGB UR QL STRIP: NEGATIVE
HYALINE CASTS URNS QL MICRO: ABNORMAL /LPF (ref 0–5)
IMM GRANULOCYTES # BLD AUTO: 0 K/UL (ref 0–0.04)
IMM GRANULOCYTES NFR BLD AUTO: 0 % (ref 0–0.5)
KETONES UR QL STRIP.AUTO: ABNORMAL MG/DL
LEUKOCYTE ESTERASE UR QL STRIP.AUTO: NEGATIVE
LYMPHOCYTES # BLD: 1.7 K/UL (ref 0.8–3.5)
LYMPHOCYTES NFR BLD: 29 % (ref 12–49)
MAGNESIUM SERPL-MCNC: 1.8 MG/DL (ref 1.6–2.4)
MCH RBC QN AUTO: 21.3 PG (ref 26–34)
MCHC RBC AUTO-ENTMCNC: 29.4 G/DL (ref 30–36.5)
MCV RBC AUTO: 72.3 FL (ref 80–99)
MONOCYTES # BLD: 0.4 K/UL (ref 0–1)
MONOCYTES NFR BLD: 7 % (ref 5–13)
MUCOUS THREADS URNS QL MICRO: ABNORMAL /LPF
NEUTS SEG # BLD: 3.6 K/UL (ref 1.8–8)
NEUTS SEG NFR BLD: 62 % (ref 32–75)
NITRITE UR QL STRIP.AUTO: NEGATIVE
NRBC # BLD: 0 K/UL (ref 0–0.01)
NRBC BLD-RTO: 0 PER 100 WBC
PH UR STRIP: 5 [PH] (ref 5–8)
PLATELET # BLD AUTO: 318 K/UL (ref 150–400)
PMV BLD AUTO: 10.1 FL (ref 8.9–12.9)
POTASSIUM SERPL-SCNC: 4.1 MMOL/L (ref 3.5–5.1)
PROT SERPL-MCNC: 7.5 G/DL (ref 6.4–8.2)
PROT UR STRIP-MCNC: ABNORMAL MG/DL
RBC # BLD AUTO: 4.98 M/UL (ref 4.1–5.7)
RBC #/AREA URNS HPF: ABNORMAL /HPF (ref 0–5)
SODIUM SERPL-SCNC: 137 MMOL/L (ref 136–145)
SP GR UR REFRACTOMETRY: >1.03 (ref 1–1.03)
TROPONIN I SERPL-MCNC: <0.05 NG/ML
TSH SERPL DL<=0.05 MIU/L-ACNC: 0.84 UIU/ML (ref 0.36–3.74)
UA: UC IF INDICATED,UAUC: ABNORMAL
UROBILINOGEN UR QL STRIP.AUTO: 1 EU/DL (ref 0.2–1)
WBC # BLD AUTO: 5.8 K/UL (ref 4.1–11.1)
WBC URNS QL MICRO: ABNORMAL /HPF (ref 0–4)

## 2021-06-14 PROCEDURE — 71045 X-RAY EXAM CHEST 1 VIEW: CPT

## 2021-06-14 PROCEDURE — 36415 COLL VENOUS BLD VENIPUNCTURE: CPT

## 2021-06-14 PROCEDURE — 84443 ASSAY THYROID STIM HORMONE: CPT

## 2021-06-14 PROCEDURE — 72100 X-RAY EXAM L-S SPINE 2/3 VWS: CPT

## 2021-06-14 PROCEDURE — 81001 URINALYSIS AUTO W/SCOPE: CPT

## 2021-06-14 PROCEDURE — 83735 ASSAY OF MAGNESIUM: CPT

## 2021-06-14 PROCEDURE — 80053 COMPREHEN METABOLIC PANEL: CPT

## 2021-06-14 PROCEDURE — 85025 COMPLETE CBC W/AUTO DIFF WBC: CPT

## 2021-06-14 PROCEDURE — 74011250637 HC RX REV CODE- 250/637: Performed by: EMERGENCY MEDICINE

## 2021-06-14 PROCEDURE — 99285 EMERGENCY DEPT VISIT HI MDM: CPT

## 2021-06-14 PROCEDURE — 84484 ASSAY OF TROPONIN QUANT: CPT

## 2021-06-14 RX ORDER — METHOCARBAMOL 750 MG/1
750 TABLET, FILM COATED ORAL
Qty: 20 TABLET | Refills: 0 | Status: SHIPPED | OUTPATIENT
Start: 2021-06-14 | End: 2022-06-05

## 2021-06-14 RX ORDER — FUROSEMIDE 20 MG/1
20 TABLET ORAL DAILY
Qty: 15 TABLET | Refills: 0 | Status: SHIPPED | OUTPATIENT
Start: 2021-06-14 | End: 2022-06-05

## 2021-06-14 RX ORDER — METHOCARBAMOL 750 MG/1
750 TABLET, FILM COATED ORAL
Status: COMPLETED | OUTPATIENT
Start: 2021-06-14 | End: 2021-06-14

## 2021-06-14 RX ADMIN — METHOCARBAMOL TABLETS 750 MG: 750 TABLET, COATED ORAL at 08:49

## 2021-06-14 NOTE — ED NOTES
His home has a water leak so he stays at a hotel; he got up this morning with back pain lower back and slid to the floor because the bed is too high  \"I don't want to go back over there\"   His son is at the house until the work gets done.

## 2021-06-14 NOTE — PROGRESS NOTES
Spoke with patient and patient son  On the phone Patient son can  patient in the next 30-45 minutes for discharge. Charge nurse made aware.     Indiana Staton BSN, RN, BSW   RN Care Manager

## 2021-06-15 NOTE — ED PROVIDER NOTES
EMERGENCY DEPARTMENT HISTORY AND PHYSICAL EXAM      Date: 6/14/2021  Patient Name: Rand Sue    History of Presenting Illness     Chief Complaint   Patient presents with    Back Pain     pt slid out of bed this morning and was unable to get up. He called EMS. pt complains of low back pain and rt knee pain unrelated to sliding out of bed this morning. Pt woke up with the back pain this morning. His knee has been bothering him for approx one year. History Provided By: Patient    HPI: Rand Sue, 78 y.o. male presents to the ED with cc of low back pain. Pt states chronic knee pain. This morning he slid out of the bed and he was unable to get up off the floor, which prompted the EMS call. Pt c/o low back pain 8/10 lower back not midline, worse with movement. Pain improved with laying still. The pain does not radiate into the legs. He denies loss of sensation distally or loss of strength. He denies any fever chills. There has been no CP or SOA. He denies abd pain, n/v/d. He chronic leg edema L>R, there has been no noted redness or warmth. There are no other complaints, changes, or physical findings at this time. PCP: Edilma Lino MD    No current facility-administered medications on file prior to encounter. Current Outpatient Medications on File Prior to Encounter   Medication Sig Dispense Refill    clotrimazole (LOTRIMIN) 1 % topical cream Apply  to affected area two (2) times a day.  15 g 1    gabapentin (NEURONTIN) 300 mg capsule TAKE 1 CAPSULE BY MOUTH 3  TIMES DAILY 270 Cap 3    clopidogreL (PLAVIX) 75 mg tab TAKE 1 TABLET BY MOUTH ONCE A DAY 90 Tab 3    pravastatin (PRAVACHOL) 20 mg tablet TAKE 1 TABLET BY MOUTH IN  THE EVENING 90 Tab 3    glipiZIDE (GLUCOTROL) 10 mg tablet TAKE 1 TABLET BY MOUTH  TWICE A  Tab 3    hydroCHLOROthiazide (HYDRODIURIL) 25 mg tablet TAKE 1 TABLET BY MOUTH ONCE DAILY 90 Tab 3    metFORMIN (GLUCOPHAGE) 1,000 mg tablet TAKE 1 TABLET BY MOUTH  TWICE A DAY WITH MEALS 180 Tab 3    lisinopriL (PRINIVIL, ZESTRIL) 40 mg tablet TAKE 1 TABLET BY MOUTH ONCE DAILY 90 Tab 3    amLODIPine (NORVASC) 10 mg tablet TAKE 1 TABLET BY MOUTH ONCE DAILY 90 Tab 3    insulin nph-regular human rec (HUMULIN 70-30) 100 unit/mL (70-30) inpn Inject 12 units under the skin twice daily. If blood sugar remains above 150 after 5 days, increase to 14 units twice daily  Indications: type 2 diabetes mellitus 15 mL 1    Insulin Needles, Disposable, 31 gauge x 5/16\" ndle 1 Pen Needle by SubCUTAneous route two (2) times a day. Use to give insulin twice daily 100 Pen Needle 11    Blood-Glucose Meter monitoring kit Use to check your blood sugar three times daily. E11.9. Use brand preferred by insurance. 1 Kit 0    lancets misc Use to check your blood sugar three times daily. E11.9. Use brand preferred by insurance. 100 Each 11    glucose blood VI test strips (BLOOD GLUCOSE TEST) strip Use to check blood sugar up to 3 times daily. Use brand preferred by insurance. 100 Strip 11    senna-docusate (SENNA PLUS) 8.6-50 mg per tablet Take 1 Tab by mouth two (2) times a day. 180 Tab 3    alcohol swabs (ALCOHOL PREP SWABS) padm 10 Each by Apply Externally route two (2) times a day.  100 Pad 3       Past History     Past Medical History:  Past Medical History:   Diagnosis Date    Allergic rhinitis     Arthritis     djd of knees    Chronic pain     Diabetes ([address]     Hypercholesteremia     Hypertension     Obesity     Stroke ([address]     left--ring finger/ pinky finger numb sensation       Past Surgical History:  Past Surgical History:   Procedure Laterality Date    HX CHOLECYSTECTOMY      laparoscopic       Family History:  Family History   Problem Relation Age of Onset    Diabetes Mother     Hypertension Mother     Kidney Disease Mother     Arthritis-osteo Father     Diabetes Father     No Known Problems Sister     No Known Problems Sister     No Known Problems Sister  No Known Problems Sister     Cancer Sister         cancer       Social History:  Social History     Tobacco Use    Smoking status: Former Smoker     Years: 2.00     Quit date: 12/5/2017     Years since quitting: 3.5    Smokeless tobacco: Never Used   Substance Use Topics    Alcohol use: No     Comment: stopped 12/17-used to drink wine (7 days a week    Drug use: Yes     Types: Prescription, OTC       Allergies:  No Known Allergies      Review of Systems   Review of Systems   Constitutional: Negative. Negative for appetite change, chills, fatigue and fever. HENT: Negative. Negative for congestion, rhinorrhea, sinus pressure and sore throat. Eyes: Negative. Respiratory: Negative. Negative for cough, choking, chest tightness, shortness of breath and wheezing. Cardiovascular: Positive for leg swelling. Negative for chest pain and palpitations. Gastrointestinal: Negative for abdominal pain, constipation, diarrhea, nausea and vomiting. Endocrine: Negative. Genitourinary: Negative. Negative for difficulty urinating, dysuria, flank pain and urgency. Musculoskeletal: Positive for back pain (low back ). Skin: Negative. Neurological: Negative. Negative for dizziness, speech difficulty, weakness, light-headedness, numbness and headaches. Hematological:        Hx of chronic anemia     Psychiatric/Behavioral: Negative. All other systems reviewed and are negative. Physical Exam   Physical Exam  Vitals and nursing note reviewed. Constitutional:       General: He is not in acute distress. Appearance: He is well-developed. He is not diaphoretic. Comments: Morbidly obese     HENT:      Head: Normocephalic and atraumatic. Mouth/Throat:      Mouth: Mucous membranes are moist.      Pharynx: No oropharyngeal exudate. Eyes:      Extraocular Movements: Extraocular movements intact.       Conjunctiva/sclera: Conjunctivae normal.      Pupils: Pupils are equal, round, and reactive to light. Neck:      Vascular: No JVD. Trachea: No tracheal deviation. Cardiovascular:      Rate and Rhythm: Normal rate and regular rhythm. Heart sounds: Normal heart sounds. No murmur heard. Pulmonary:      Effort: Pulmonary effort is normal. No respiratory distress. Breath sounds: Normal breath sounds. No stridor. No wheezing or rales. Abdominal:      General: There is no distension. Palpations: Abdomen is soft. Tenderness: There is no abdominal tenderness. There is no guarding or rebound. Musculoskeletal:         General: No tenderness. Normal range of motion. Cervical back: Normal range of motion and neck supple. Right lower leg: Edema present. Left lower leg: Edema present. Comments: Edema L>R, chronic venous stasis dermatitis changes, pulses present, no midline L-spine ttp   Skin:     General: Skin is warm and dry. Capillary Refill: Capillary refill takes less than 2 seconds. Neurological:      Mental Status: He is alert and oriented to person, place, and time. Cranial Nerves: No cranial nerve deficit.       Comments: No gross motor or sensory deficits    Psychiatric:         Mood and Affect: Mood normal.         Behavior: Behavior normal.         Diagnostic Study Results     Labs -     Recent Results (from the past 12 hour(s))   CBC WITH AUTOMATED DIFF    Collection Time: 06/14/21 10:26 AM   Result Value Ref Range    WBC 5.8 4.1 - 11.1 K/uL    RBC 4.98 4.10 - 5.70 M/uL    HGB 10.6 (L) 12.1 - 17.0 g/dL    HCT 36.0 (L) 36.6 - 50.3 %    MCV 72.3 (L) 80.0 - 99.0 FL    MCH 21.3 (L) 26.0 - 34.0 PG    MCHC 29.4 (L) 30.0 - 36.5 g/dL    RDW 19.5 (H) 11.5 - 14.5 %    PLATELET 268 843 - 685 K/uL    MPV 10.1 8.9 - 12.9 FL    NRBC 0.0 0  WBC    ABSOLUTE NRBC 0.00 0.00 - 0.01 K/uL    NEUTROPHILS 62 32 - 75 %    LYMPHOCYTES 29 12 - 49 %    MONOCYTES 7 5 - 13 %    EOSINOPHILS 2 0 - 7 %    BASOPHILS 0 0 - 1 %    IMMATURE GRANULOCYTES 0 0.0 - 0.5 %    ABS. NEUTROPHILS 3.6 1.8 - 8.0 K/UL    ABS. LYMPHOCYTES 1.7 0.8 - 3.5 K/UL    ABS. MONOCYTES 0.4 0.0 - 1.0 K/UL    ABS. EOSINOPHILS 0.1 0.0 - 0.4 K/UL    ABS. BASOPHILS 0.0 0.0 - 0.1 K/UL    ABS. IMM. GRANS. 0.0 0.00 - 0.04 K/UL    DF AUTOMATED     METABOLIC PANEL, COMPREHENSIVE    Collection Time: 06/14/21 10:26 AM   Result Value Ref Range    Sodium 137 136 - 145 mmol/L    Potassium 4.1 3.5 - 5.1 mmol/L    Chloride 105 97 - 108 mmol/L    CO2 26 21 - 32 mmol/L    Anion gap 6 5 - 15 mmol/L    Glucose 270 (H) 65 - 100 mg/dL    BUN 13 6 - 20 MG/DL    Creatinine 1.49 (H) 0.70 - 1.30 MG/DL    BUN/Creatinine ratio 9 (L) 12 - 20      GFR est AA 57 (L) >60 ml/min/1.73m2    GFR est non-AA 47 (L) >60 ml/min/1.73m2    Calcium 8.9 8.5 - 10.1 MG/DL    Bilirubin, total 0.3 0.2 - 1.0 MG/DL    ALT (SGPT) 12 12 - 78 U/L    AST (SGOT) 13 (L) 15 - 37 U/L    Alk.  phosphatase 107 45 - 117 U/L    Protein, total 7.5 6.4 - 8.2 g/dL    Albumin 3.2 (L) 3.5 - 5.0 g/dL    Globulin 4.3 (H) 2.0 - 4.0 g/dL    A-G Ratio 0.7 (L) 1.1 - 2.2     MAGNESIUM    Collection Time: 06/14/21 10:26 AM   Result Value Ref Range    Magnesium 1.8 1.6 - 2.4 mg/dL   TSH 3RD GENERATION    Collection Time: 06/14/21 10:26 AM   Result Value Ref Range    TSH 0.84 0.36 - 3.74 uIU/mL   TROPONIN I    Collection Time: 06/14/21 10:26 AM   Result Value Ref Range    Troponin-I, Qt. <0.05 <0.05 ng/mL   URINALYSIS W/ REFLEX CULTURE    Collection Time: 06/14/21 11:36 AM    Specimen: Miscellaneous sample    Urine specimen   Result Value Ref Range    Color YELLOW/STRAW      Appearance CLEAR CLEAR      Specific gravity >1.030 (H) 1.003 - 1.030    pH (UA) 5.0 5.0 - 8.0      Protein TRACE (A) NEG mg/dL    Glucose >1,000 (A) NEG mg/dL    Ketone TRACE (A) NEG mg/dL    Blood Negative NEG      Urobilinogen 1.0 0.2 - 1.0 EU/dL    Nitrites Negative NEG      Leukocyte Esterase Negative NEG      WBC 0-4 0 - 4 /hpf    RBC 0-5 0 - 5 /hpf    Epithelial cells MODERATE (A) FEW /lpf Bacteria Negative NEG /hpf    UA:UC IF INDICATED CULTURE NOT INDICATED BY UA RESULT CNI      Mucus TRACE (A) NEG /lpf    Hyaline cast 0-2 0 - 5 /lpf   BILIRUBIN, CONFIRM    Collection Time: 06/14/21 11:36 AM   Result Value Ref Range    Bilirubin UA, confirm Negative NEG         Radiologic Studies -   XR CHEST PORT   Final Result   No acute changes. XR SPINE LUMB 2 OR 3 V   Final Result   No acute abnormality                    CT Results  (Last 48 hours)    None        CXR Results  (Last 48 hours)               06/14/21 1043  XR CHEST PORT Final result    Impression:  No acute changes. Narrative:  Clinical indication: Weakness. A portable AP upright view of the chest obtained, comparison February 14, 2017. The inspiration is shallow. The heart size is normal. There is no acute   infiltrate. Medical Decision Making   I am the first provider for this patient. I reviewed the vital signs, available nursing notes, past medical history, past surgical history, family history and social history. Vital Signs-Reviewed the patient's vital signs. Patient Vitals for the past 12 hrs:   BP SpO2   06/14/21 1500 134/77 99 %   06/14/21 1430 (!) 145/83 95 %   06/14/21 1400 128/63 96 %   06/14/21 1200 132/77 96 %   06/14/21 1130 126/87 (!) 87 %       Records Reviewed: Nursing Notes, Old Medical Records, Ambulance Run Sheet, Previous Radiology Studies and Previous Laboratory Studies, Pt uses a wheelchair for getting around, las virtual visit with Dr. Sonia Baca 4/9/2021, hx of prior thalamic CVA in 2/17, leg edema then L>R which is c/w exam today, at that time noted elevation and compression, previous US neg for DVT    Provider Notes (Medical Decision Making):   DDx- Low wojciech strain , DJD, DDD< compression fx, chronic leg edema, electrolyte abnormality       ED Course:   Initial assessment performed.  The patients presenting problems have been discussed, and they are in agreement with the care plan formulated and outlined with them. I have encouraged them to ask questions as they arise throughout their visit. Pt initially evaluated for his low back pain. Xray neg for compression fx, pt feeling better after Robaxin. Pt's wife currently admitted for rehab. Son came into the ED, told nurse that his father's house is currently having the floors renovated and his father is staying in [address]. This morning the bed sits high which resulted in him sliding to the floor. He feels like his father needs to be in the hospital. It was discussed with him by nursing staff that we can evaluate him from a medical stand point, but not being able to stay in [address], did not necessarily mean he needs to be admitted to the hospital. Son states he has been concerned about the amount of swelling in the lower legs. Pt has chronic leg edema with chronic venous stasis changes. Labs and Ua sent, no sig abnormality that would require admission. Discussed that he needs to keep legs up when not walking and consider compression stockings. Will prescribe Lasix however I discussed with him onlt want him to take for a few days to see if there is improvement and can use on a prn basis. He will also need to follow up with is PCP. Disposition:  DC home     DISCHARGE PLAN:  1. Discharge Medication List as of 6/14/2021 10:16 AM      START taking these medications    Details   methocarbamoL (Robaxin-750) 750 mg tablet Take 1 Tablet by mouth four (4) times daily as needed for Muscle Spasm(s). , Normal, Disp-20 Tablet, R-0         CONTINUE these medications which have NOT CHANGED    Details   clotrimazole (LOTRIMIN) 1 % topical cream Apply  to affected area two (2) times a day., Normal, Disp-15 g, R-1      gabapentin (NEURONTIN) 300 mg capsule TAKE 1 CAPSULE BY MOUTH 3  TIMES DAILY, Normal, Disp-270 Cap, R-3Requesting 1 year supply      clopidogreL (PLAVIX) 75 mg tab TAKE 1 TABLET BY MOUTH ONCE A DAY, Normal, Disp-90 Tab, R-3Requesting 1 year supply      pravastatin (PRAVACHOL) 20 mg tablet TAKE 1 TABLET BY MOUTH IN  THE EVENING, Normal, Disp-90 Tab, R-3Requesting 1 year supply      glipiZIDE (GLUCOTROL) 10 mg tablet TAKE 1 TABLET BY MOUTH  TWICE A DAY, Normal, Disp-180 Tab, R-3Requesting 1 year supply      hydroCHLOROthiazide (HYDRODIURIL) 25 mg tablet TAKE 1 TABLET BY MOUTH ONCE DAILY, Normal, Disp-90 Tab, R-3Requesting 1 year supply      metFORMIN (GLUCOPHAGE) 1,000 mg tablet TAKE 1 TABLET BY MOUTH  TWICE A DAY WITH MEALS, Normal, Disp-180 Tab, R-3Requesting 1 year supply      lisinopriL (PRINIVIL, ZESTRIL) 40 mg tablet TAKE 1 TABLET BY MOUTH ONCE DAILY, Normal, Disp-90 Tab, R-3Requesting 1 year supply      amLODIPine (NORVASC) 10 mg tablet TAKE 1 TABLET BY MOUTH ONCE DAILY, Normal, Disp-90 Tab, R-3Requesting 1 year supply      insulin nph-regular human rec (HUMULIN 70-30) 100 unit/mL (70-30) inpn Inject 12 units under the skin twice daily. If blood sugar remains above 150 after 5 days, increase to 14 units twice daily  Indications: type 2 diabetes mellitus, Normal, Disp-15 mL, R-1      Insulin Needles, Disposable, 31 gauge x 5/16\" ndle 1 Pen Needle by SubCUTAneous route two (2) times a day. Use to give insulin twice daily, Normal, Disp-100 Pen Needle, R-11      Blood-Glucose Meter monitoring kit Use to check your blood sugar three times daily. E11.9. Use brand preferred by insurance., Normal, Disp-1 Kit, R-0      lancets misc Use to check your blood sugar three times daily. E11.9. Use brand preferred by insurance., Normal, Disp-100 Each, R-11, [place]      glucose blood VI test strips (BLOOD GLUCOSE TEST) strip Use to check blood sugar up to 3 times daily.  Use brand preferred by insurance., Normal, Disp-100 Strip, R-11      senna-docusate (SENNA PLUS) 8.6-50 mg per tablet Take 1 Tab by mouth two (2) times a day., Normal, Disp-180 Tab, R-3      alcohol swabs (ALCOHOL PREP SWABS) padm 10 Each by Apply Externally route two (2) times a day., Normal, Disp-100 Pad, R-3           2. Follow-up Information     Follow up With Specialties Details Why Contact Info    Adryan Fan MD Internal Medicine  As needed [address] [phone]          3. Return to ED if worse     Diagnosis     Clinical Impression:   1. Acute midline low back pain without sciatica    2. Bilateral leg edema    3. Chronic venous stasis dermatitis of both lower extremities        Attestations:    Ashley Bettencourt, DO    Please note that this dictation was completed with Keywee, the computer voice recognition software. Quite often unanticipated grammatical, syntax, homophones, and other interpretive errors are inadvertently transcribed by the computer software. Please disregard these errors. Please excuse any errors that have escaped final proofreading. Thank you.

## 2021-06-16 ENCOUNTER — TELEPHONE (OUTPATIENT)
Dept: INTERNAL MEDICINE CLINIC | Age: 71
End: 2021-06-16

## 2021-06-16 NOTE — TELEPHONE ENCOUNTER
----- Message from [facility] sent at 6/16/2021 10:38 AM EDT -----  Regarding: Dr. Ramsey Burch Message/Vendor Calls    Caller's first and last name: Rani Bergman      Reason for call: No demographics were sent with the hospice order from Dr. Navarro Jacques, Fax: [phone]      Callback required yes/no and why: Yes      Best contact number(s): [phone]      Message from [facility]

## 2021-06-17 NOTE — TELEPHONE ENCOUNTER
#[phone] Dania Momin with [facility] states they can not do anything until they get pt's demographic sheet. Can this be faxed yet today please.   Thanks      Fax #[phone]

## 2021-06-18 ENCOUNTER — OFFICE VISIT (OUTPATIENT)
Dept: INTERNAL MEDICINE CLINIC | Age: 71
End: 2021-06-18
Payer: MEDICARE

## 2021-06-18 VITALS
WEIGHT: 259 LBS | SYSTOLIC BLOOD PRESSURE: 140 MMHG | BODY MASS INDEX: 37.08 KG/M2 | TEMPERATURE: 97.8 F | HEIGHT: 70 IN | RESPIRATION RATE: 20 BRPM | DIASTOLIC BLOOD PRESSURE: 80 MMHG | HEART RATE: 96 BPM | OXYGEN SATURATION: 96 %

## 2021-06-18 DIAGNOSIS — E11.65 UNCONTROLLED TYPE 2 DIABETES MELLITUS WITH HYPERGLYCEMIA (HCC): ICD-10-CM

## 2021-06-18 DIAGNOSIS — R60.0 BILATERAL LEG EDEMA: Primary | ICD-10-CM

## 2021-06-18 LAB — HBA1C MFR BLD HPLC: 13.9 % (ref 4.8–5.6)

## 2021-06-18 PROCEDURE — 3046F HEMOGLOBIN A1C LEVEL >9.0%: CPT | Performed by: INTERNAL MEDICINE

## 2021-06-18 PROCEDURE — G8753 SYS BP > OR = 140: HCPCS | Performed by: INTERNAL MEDICINE

## 2021-06-18 PROCEDURE — G8417 CALC BMI ABV UP PARAM F/U: HCPCS | Performed by: INTERNAL MEDICINE

## 2021-06-18 PROCEDURE — G8754 DIAS BP LESS 90: HCPCS | Performed by: INTERNAL MEDICINE

## 2021-06-18 PROCEDURE — 3017F COLORECTAL CA SCREEN DOC REV: CPT | Performed by: INTERNAL MEDICINE

## 2021-06-18 PROCEDURE — 1100F PTFALLS ASSESS-DOCD GE2>/YR: CPT | Performed by: INTERNAL MEDICINE

## 2021-06-18 PROCEDURE — 99213 OFFICE O/P EST LOW 20 MIN: CPT | Performed by: INTERNAL MEDICINE

## 2021-06-18 PROCEDURE — 83036 HEMOGLOBIN GLYCOSYLATED A1C: CPT | Performed by: INTERNAL MEDICINE

## 2021-06-18 PROCEDURE — 3288F FALL RISK ASSESSMENT DOCD: CPT | Performed by: INTERNAL MEDICINE

## 2021-06-18 PROCEDURE — 2022F DILAT RTA XM EVC RTNOPTHY: CPT | Performed by: INTERNAL MEDICINE

## 2021-06-18 PROCEDURE — G8536 NO DOC ELDER MAL SCRN: HCPCS | Performed by: INTERNAL MEDICINE

## 2021-06-18 PROCEDURE — G8432 DEP SCR NOT DOC, RNG: HCPCS | Performed by: INTERNAL MEDICINE

## 2021-06-18 PROCEDURE — G8427 DOCREV CUR MEDS BY ELIG CLIN: HCPCS | Performed by: INTERNAL MEDICINE

## 2021-06-18 RX ORDER — LANCETS
EACH MISCELLANEOUS
Qty: 100 EACH | Refills: 11 | Status: SHIPPED | OUTPATIENT
Start: 2021-06-18 | End: 2022-06-05

## 2021-06-18 RX ORDER — FUROSEMIDE 20 MG/1
20 TABLET ORAL DAILY
Qty: 90 TABLET | Refills: 0 | Status: SHIPPED | OUTPATIENT
Start: 2021-06-18 | End: 2021-07-16

## 2021-06-18 RX ORDER — PEN NEEDLE, DIABETIC 30 GX3/16"
1 NEEDLE, DISPOSABLE MISCELLANEOUS 2 TIMES DAILY
Qty: 100 PEN NEEDLE | Refills: 11 | Status: SHIPPED | OUTPATIENT
Start: 2021-06-18 | End: 2021-12-13 | Stop reason: SDUPTHER

## 2021-06-18 RX ORDER — INSULIN PUMP SYRINGE, 3 ML
EACH MISCELLANEOUS
Qty: 1 KIT | Refills: 0 | Status: SHIPPED | OUTPATIENT
Start: 2021-06-18 | End: 2021-12-30 | Stop reason: SDUPTHER

## 2021-06-18 NOTE — PROGRESS NOTES
HISTORY OF PRESENT ILLNESS  Seth Quintana is a 78 y.o. male. 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, s/p left TKR 2018, prostate cancer s/p cryo 2009 with recurrence-Dr Henry Landrum  ER fu for back pain which has been occurring prior to sliding from his bed to floor  Has had leg and pedal edema for weeks to months  Back apican imrpoved with muscle relaxer  Has been taking lasix 20 mg every day for last few days sine ER visit-not much improved  No cp or sob  cxr clear in ED 6/14  Cr 1.47 glucose 270-not taking insulin due to hypoglycemia in the past per pt    Pt lives at home with son  Wife in hospice at a facility    Last OV       Was able to get a motorized WC     Had first covid vaccine     fsbs-none  Had a fall at home recently -no injury--request a porttable railing to be able to get out of the house  No bp readings  Has not had fu with Dr Herny Landrum in last 1-2 years          Patient Active Problem List    Diagnosis Date Noted    Type 2 diabetes mellitus with diabetic neuropathy ([address] 02/06/2020    Osteoarthrosis, localized, primary, knee, left 06/19/2018    Diabetes ([address]     Stroke ([address]     TIA (transient ischemic attack) 04/04/2017    Transient ischemic attack involving right internal carotid artery 02/15/2017    Left-sided weakness 02/14/2017    OA (osteoarthritis) of knee 08/06/2013    Prostate cancer ([facility] 03/10/2011    Type II or unspecified type diabetes mellitus without mention of complication, uncontrolled 08/03/2009    Essential hypertension, benign 08/03/2009    Pure hypercholesterolemia 08/03/2009    Obesity 08/03/2009     Current Outpatient Medications   Medication Sig Dispense Refill    furosemide (LASIX) 20 mg tablet Take 1 Tablet by mouth daily. 90 Tablet 0    insulin nph-regular human rec (HUMULIN 70-30) 100 unit/mL (70-30) inpn Inject 12 units under the skin twice daily.  If blood sugar remains above 150 after 5 days, increase to 14 units twice daily  Indications: type 2 diabetes mellitus 15 mL 5    Insulin Needles, Disposable, 31 gauge x 5/16\" ndle 1 Pen Needle by SubCUTAneous route two (2) times a day. Use to give insulin twice daily 100 Pen Needle 11    lancets misc Use to check your blood sugar three times daily. E11.9. Use brand preferred by insurance. 100 Each 11    Blood-Glucose Meter monitoring kit Use to check your blood sugar three times daily. E11.9. Use brand preferred by insurance. 1 Kit 0    glucose blood VI test strips (ASCENSIA AUTODISC VI, ONE TOUCH ULTRA TEST VI) strip Check fsb bid 250.02 100 Strip 5    methocarbamoL (Robaxin-750) 750 mg tablet Take 1 Tablet by mouth four (4) times daily as needed for Muscle Spasm(s). 20 Tablet 0    furosemide (Lasix) 20 mg tablet Take 1 Tablet by mouth daily. 15 Tablet 0    clotrimazole (LOTRIMIN) 1 % topical cream Apply  to affected area two (2) times a day. 15 g 1    gabapentin (NEURONTIN) 300 mg capsule TAKE 1 CAPSULE BY MOUTH 3  TIMES DAILY 270 Cap 3    clopidogreL (PLAVIX) 75 mg tab TAKE 1 TABLET BY MOUTH ONCE A DAY 90 Tab 3    pravastatin (PRAVACHOL) 20 mg tablet TAKE 1 TABLET BY MOUTH IN  THE EVENING 90 Tab 3    glipiZIDE (GLUCOTROL) 10 mg tablet TAKE 1 TABLET BY MOUTH  TWICE A  Tab 3    metFORMIN (GLUCOPHAGE) 1,000 mg tablet TAKE 1 TABLET BY MOUTH  TWICE A DAY WITH MEALS 180 Tab 3    lisinopriL (PRINIVIL, ZESTRIL) 40 mg tablet TAKE 1 TABLET BY MOUTH ONCE DAILY 90 Tab 3    amLODIPine (NORVASC) 10 mg tablet TAKE 1 TABLET BY MOUTH ONCE DAILY 90 Tab 3    senna-docusate (SENNA PLUS) 8.6-50 mg per tablet Take 1 Tab by mouth two (2) times a day. 180 Tab 3    alcohol swabs (ALCOHOL PREP SWABS) padm 10 Each by Apply Externally route two (2) times a day.  100 Pad 3    hydroCHLOROthiazide (HYDRODIURIL) 25 mg tablet TAKE 1 TABLET BY MOUTH ONCE DAILY (Patient not taking: Reported on 6/18/2021) 90 Tab 3    glucose blood VI test strips (BLOOD GLUCOSE TEST) strip Use to check blood sugar up to 3 times daily. Use brand preferred by insurance. (Patient not taking: Reported on 6/18/2021) 100 Strip 11     No Known Allergies   Lab Results   Component Value Date/Time    WBC 5.8 06/14/2021 10:26 AM    HGB 10.6 (L) 06/14/2021 10:26 AM    HCT 36.0 (L) 06/14/2021 10:26 AM    PLATELET 274 36/25/3743 10:26 AM    MCV 72.3 (L) 06/14/2021 10:26 AM     Lab Results   Component Value Date/Time    Hemoglobin A1c 8.0 (H) 03/05/2019 02:18 PM    Hemoglobin A1c 7.2 (H) 06/05/2018 01:49 PM    Hemoglobin A1c 7.1 (H) 03/08/2018 01:00 PM    Glucose 270 (H) 06/14/2021 10:26 AM    Glucose (POC) 192 (H) 06/22/2018 12:11 PM    Microalb/Creat ratio (ug/mg creat.) 15 02/06/2020 02:32 PM    LDL, calculated 66 02/06/2020 02:32 PM    Creatinine (POC) 1.0 07/20/2018 01:08 PM    Creatinine 1.49 (H) 06/14/2021 10:26 AM      Lab Results   Component Value Date/Time    Cholesterol, total 141 02/06/2020 02:32 PM    HDL Cholesterol 25 (L) 02/06/2020 02:32 PM    LDL, calculated 66 02/06/2020 02:32 PM    Triglyceride 252 (H) 02/06/2020 02:32 PM    CHOL/HDL Ratio 6.3 (H) 04/05/2017 04:03 AM     Lab Results   Component Value Date/Time    GFR est non-AA 47 (L) 06/14/2021 10:26 AM    GFRNA, POC >60 07/20/2018 01:08 PM    GFR est AA 57 (L) 06/14/2021 10:26 AM    GFRAA, POC >60 07/20/2018 01:08 PM    Creatinine 1.49 (H) 06/14/2021 10:26 AM    Creatinine (POC) 1.0 07/20/2018 01:08 PM    BUN 13 06/14/2021 10:26 AM    Sodium 137 06/14/2021 10:26 AM    Potassium 4.1 06/14/2021 10:26 AM    Chloride 105 06/14/2021 10:26 AM    CO2 26 06/14/2021 10:26 AM    Magnesium 1.8 06/14/2021 10:26 AM     Lab Results   Component Value Date/Time    TSH 0.84 06/14/2021 10:26 AM         ROS    Physical Exam  Vitals and nursing note reviewed. Constitutional:       General: He is not in acute distress. Appearance: He is well-developed. He is obese. Comments: Appears stated age, disheveled appearance, smells of urine   HENT:      Head: Normocephalic.    Cardiovascular:      Rate and Rhythm: Normal rate and regular rhythm. Heart sounds: Normal heart sounds. Pulmonary:      Effort: Pulmonary effort is normal.      Breath sounds: Normal breath sounds. Abdominal:      Palpations: Abdomen is soft. Musculoskeletal:         General: Swelling present. Right lower leg: Edema present. Left lower leg: Edema present. Comments: 2 plus b/l LE edema   Neurological:      Mental Status: He is alert. ASSESSMENT and PLAN  Diagnoses and all orders for this visit:    1. Bilateral leg edema  -     AMB SUPPLY ORDER-jobst stockings rx   Refill lasix 20 mg every day   Leg elevation   Labs next OV    2. Uncontrolled type 2 diabetes mellitus with hyperglycemia (HCC)  -     AMB POC HEMOGLOBIN A1C 13.9   Restart insulin 70/30 12 units bid   fsbs bid   rx for supplies    Other orders  -     furosemide (LASIX) 20 mg tablet; Take 1 Tablet by mouth daily. -     insulin nph-regular human rec (HUMULIN 70-30) 100 unit/mL (70-30) inpn; Inject 12 units under the skin twice daily. If blood sugar remains above 150 after 5 days, increase to 14 units twice daily  Indications: type 2 diabetes mellitus  -     Insulin Needles, Disposable, 31 gauge x 5/16\" ndle; 1 Pen Needle by SubCUTAneous route two (2) times a day. Use to give insulin twice daily  -     lancets misc; Use to check your blood sugar three times daily. E11.9. Use brand preferred by insurance. -     Blood-Glucose Meter monitoring kit; Use to check your blood sugar three times daily. E11.9. Use brand preferred by insurance. -     glucose blood VI test strips (ASCENSIA AUTODISC VI, ONE TOUCH ULTRA TEST VI) strip; Check fsb bid 250.02      Follow-up and Dispositions    · Return in about 4 months (around 10/18/2021) for dm-2 leg edema ckd ---30 minutes.

## 2021-06-18 NOTE — PATIENT INSTRUCTIONS
Office Policies    Phone calls/patient messages:            Please allow up to 24 hours for someone in the office to contact you about your call or message. Be mindful your provider may be out of the office or your message may require further review. We encourage you to use [employer] for your messages as this is a faster, more efficient way to communicate with our office                         Medication Refills:            Prescription medications require 48-72 business hours to process. We encourage you to use [employer] for your refills. For controlled medications: Please allow 72 business hours to process. Certain medications may require you to  a written prescription at our office. NO narcotic/controlled medications will be prescribed after 4pm Monday through Friday or on weekends              Form/Paperwork Completion:            Please note a $25 fee may incur for all paperwork for completed by our providers. We ask that you allow 7-10 business days. Pre-payment is due prior to picking up/faxing the completed form. You may also download your forms to [employer] to have your doctor print off.

## 2021-06-22 ENCOUNTER — TELEPHONE (OUTPATIENT)
Dept: INTERNAL MEDICINE CLINIC | Age: 71
End: 2021-06-22

## 2021-06-22 DIAGNOSIS — R60.1 GENERALIZED EDEMA: Primary | ICD-10-CM

## 2021-06-22 NOTE — TELEPHONE ENCOUNTER
----- Message from [place] Sen sent at 6/22/2021  2:28 PM EDT -----  Regarding: Dr. Webster Message/Vendor Calls    Caller's first and last name: Pt       Reason for call: Needs a prescription called into [address] [place] [address] so patient can get fitted for some socks phone number [phone]. Callback required yes/no and why: yes, to discuss       Best contact number(s): [phone]      Details to clarify the request: Call patient when this is done so he can schedule an appointment.        Message from [facility]

## 2021-06-23 DIAGNOSIS — R60.0 BILATERAL LEG EDEMA: Primary | ICD-10-CM

## 2021-06-23 NOTE — TELEPHONE ENCOUNTER
Faxed over orders to [facility] for patient to get compression stockings.   Fax number [phone]

## 2021-06-29 NOTE — TELEPHONE ENCOUNTER
Script resent to pharmacy to reflect compression type. Pt notified this has been completed.  Signed new prescription for Knee high 20-30mmhg ordered per [facility] by Edlima Lino MD.

## 2021-06-29 NOTE — TELEPHONE ENCOUNTER
----- Message from Kiera Gonzales sent at 6/29/2021  9:35 AM EDT -----  Regarding: Dr. Prashanth Terry Message/Vendor Calls    Caller's first and last name: pt      Reason for call: [facility] needs strength and size (knee high or thigh length). [facility] P: [phone] Fax [phone]        Callback required yes/no and why: yes      Best contact number(s): [phone]      Details to clarify the request: Informed pt of turn around time, pt states he'll be waiting at pharmacy. Has been trying to get these for sometime now and legs are swollen.     Kiera Gonzales  Copy/paste May Arevalo

## 2021-07-01 NOTE — TELEPHONE ENCOUNTER
Spoke with [facility] pharmacist [facility] concerning patient RX for glucose meter and the kit that go along with it . Hany Birmingham 's records indicated that someone has  the glucose meter and everything that goes with it. I was unable to speak with patient to confirm if he have the meter. Patient's mailbox is full on his mobile phone.

## 2021-07-01 NOTE — TELEPHONE ENCOUNTER
Pt states he was prescribed  Test strips, lancets and a meter for glucose monitoring. (these went to [address] on 6/18)    Pt states he received everything but the glucometer    Pt states was given strips for a one touch ultra , however did not get a meter. Pt requests we call insurance to sort: [phone] if pharmacy needs to be called to clarify if insurance issue or if a meter needs to just be specified on the script. Pt requests a callback to discuss and update on status.             Pt confirmed pharm to use:  [address] [facility] [place]  [phone]

## 2021-07-06 ENCOUNTER — TELEPHONE (OUTPATIENT)
Dept: INTERNAL MEDICINE CLINIC | Age: 71
End: 2021-07-06

## 2021-07-06 NOTE — TELEPHONE ENCOUNTER
----- Message from Finley Closs sent at 7/6/2021 10:13 AM EDT -----  Regarding: Dr Fredy Merino Message/Vendor Calls    Caller's first and last name: Del Robles      Reason for call: [employer] wheelchair repairs and therapeutic socks measurements      Callback required yes/no and why:yes. If necessary      Best contact number(s):[phone]      Details to clarify the request: Pt is requesting for measurements (length) for fitting of therapeutic socks to be called to the [address] [place] [address] at . [phone]. Pt is also requesting to contact [employer] concerning the repair of his wheelchair.       Finley Closs

## 2021-07-12 ENCOUNTER — TELEPHONE (OUTPATIENT)
Dept: INTERNAL MEDICINE CLINIC | Age: 71
End: 2021-07-12

## 2021-07-12 NOTE — TELEPHONE ENCOUNTER
----- Message from Brittany Saha sent at 7/12/2021 10:43 AM EDT -----  Regarding: Dr. Buzz Vee Message/Vendor Calls    Caller's first and last name:wife      Reason for call:Speak to [facility] [employer] required yes/no and why:yes      Best contact number(s):[phone]      Details to clarify the request:Regarding paperwork for [employer], Pt went to [facility] and they found a massive blood clot in chest and wants to know if they have the records.       Message from [facility]

## 2021-07-13 ENCOUNTER — TELEPHONE (OUTPATIENT)
Dept: INTERNAL MEDICINE CLINIC | Age: 71
End: 2021-07-13

## 2021-07-13 NOTE — TELEPHONE ENCOUNTER
We have received the [facility] written order for patient's request for repair of his motorized wheelchair. Faxed the order 7/13/21 to [employer]. I received fax confirmation.

## 2021-07-13 NOTE — TELEPHONE ENCOUNTER
Patient is being discharged from [facility] on 7/14 for pulmonary embolism. Care Coordinator would like patient to have FLY appt within 5-7 days. Any available date/time?

## 2021-07-13 NOTE — TELEPHONE ENCOUNTER
----- Message from Irwin Yi sent at 7/13/2021 12:14 PM EDT -----  Regarding: FW: Dr Donnie Ruiz    ----- Message -----  From: Kt Albright: 7/13/2021  12:07 PM EDT  To: [address] [place] [address]  Subject: Dr Donnie Ruiz                                 Appointment not available    Caller's first and last name and relationship to patient (if not the patient): [address] [place] [address]      Best contact ZVIQJQ:[phone] or [phone](TR )      Preferred date and time:w/in 5-7 days      Scheduled appointment date and time:N/A      Reason for appointment: Simon Caballero f/u for [facility]      Details to clarify the request:Ms Talita Choudhury is requesting a call back to schedule pt for  [facility] f/u for [facility] and advised pt will be d/c on 7/14/21      [facility]

## 2021-07-14 NOTE — TELEPHONE ENCOUNTER
Spoke with Care Coordinator, Tashi Toledo. Patient was scheduled for FLY appt this Friday at 06:27 pm. Tashi Toledo states that she would be faxing over patient's discharge information at this time. No further actions required at this time.

## 2021-07-16 ENCOUNTER — OFFICE VISIT (OUTPATIENT)
Dept: INTERNAL MEDICINE CLINIC | Age: 71
End: 2021-07-16
Payer: MEDICARE

## 2021-07-16 VITALS
OXYGEN SATURATION: 98 % | DIASTOLIC BLOOD PRESSURE: 60 MMHG | SYSTOLIC BLOOD PRESSURE: 110 MMHG | TEMPERATURE: 99.1 F | RESPIRATION RATE: 16 BRPM | HEART RATE: 88 BPM

## 2021-07-16 DIAGNOSIS — E11.9 TYPE 2 DIABETES MELLITUS WITHOUT COMPLICATION, WITH LONG-TERM CURRENT USE OF INSULIN (HCC): ICD-10-CM

## 2021-07-16 DIAGNOSIS — D50.9 IRON DEFICIENCY ANEMIA, UNSPECIFIED IRON DEFICIENCY ANEMIA TYPE: Primary | ICD-10-CM

## 2021-07-16 DIAGNOSIS — I10 ESSENTIAL HYPERTENSION: ICD-10-CM

## 2021-07-16 DIAGNOSIS — I26.92 ACUTE SADDLE PULMONARY EMBOLISM, UNSPECIFIED WHETHER ACUTE COR PULMONALE PRESENT (HCC): ICD-10-CM

## 2021-07-16 DIAGNOSIS — E66.01 SEVERE OBESITY (BMI 35.0-35.9 WITH COMORBIDITY) (HCC): ICD-10-CM

## 2021-07-16 DIAGNOSIS — Z79.4 TYPE 2 DIABETES MELLITUS WITHOUT COMPLICATION, WITH LONG-TERM CURRENT USE OF INSULIN (HCC): ICD-10-CM

## 2021-07-16 DIAGNOSIS — Z86.73 HX OF ARTERIAL ISCHEMIC STROKE: ICD-10-CM

## 2021-07-16 PROCEDURE — G8754 DIAS BP LESS 90: HCPCS | Performed by: INTERNAL MEDICINE

## 2021-07-16 PROCEDURE — G8432 DEP SCR NOT DOC, RNG: HCPCS | Performed by: INTERNAL MEDICINE

## 2021-07-16 PROCEDURE — 3046F HEMOGLOBIN A1C LEVEL >9.0%: CPT | Performed by: INTERNAL MEDICINE

## 2021-07-16 PROCEDURE — 3288F FALL RISK ASSESSMENT DOCD: CPT | Performed by: INTERNAL MEDICINE

## 2021-07-16 PROCEDURE — 3017F COLORECTAL CA SCREEN DOC REV: CPT | Performed by: INTERNAL MEDICINE

## 2021-07-16 PROCEDURE — G8417 CALC BMI ABV UP PARAM F/U: HCPCS | Performed by: INTERNAL MEDICINE

## 2021-07-16 PROCEDURE — G8536 NO DOC ELDER MAL SCRN: HCPCS | Performed by: INTERNAL MEDICINE

## 2021-07-16 PROCEDURE — 99214 OFFICE O/P EST MOD 30 MIN: CPT | Performed by: INTERNAL MEDICINE

## 2021-07-16 PROCEDURE — 1100F PTFALLS ASSESS-DOCD GE2>/YR: CPT | Performed by: INTERNAL MEDICINE

## 2021-07-16 PROCEDURE — G8752 SYS BP LESS 140: HCPCS | Performed by: INTERNAL MEDICINE

## 2021-07-16 PROCEDURE — 2022F DILAT RTA XM EVC RTNOPTHY: CPT | Performed by: INTERNAL MEDICINE

## 2021-07-16 PROCEDURE — G8427 DOCREV CUR MEDS BY ELIG CLIN: HCPCS | Performed by: INTERNAL MEDICINE

## 2021-07-16 RX ORDER — FERROUS SULFATE TAB 325 MG (65 MG ELEMENTAL FE) 325 (65 FE) MG
TAB ORAL
COMMUNITY
Start: 2021-07-13 | End: 2021-07-16 | Stop reason: SDUPTHER

## 2021-07-16 RX ORDER — HYDROCHLOROTHIAZIDE 25 MG/1
TABLET ORAL
Qty: 90 TABLET | Refills: 3 | Status: SHIPPED | OUTPATIENT
Start: 2021-07-16 | End: 2021-12-30 | Stop reason: SDUPTHER

## 2021-07-16 RX ORDER — LISINOPRIL 10 MG/1
10 TABLET ORAL DAILY
Qty: 90 TABLET | Refills: 3 | Status: SHIPPED | OUTPATIENT
Start: 2021-07-16 | End: 2021-12-16 | Stop reason: SDUPTHER

## 2021-07-16 RX ORDER — APIXABAN 5 MG/1
TABLET, FILM COATED ORAL
COMMUNITY
Start: 2021-07-13 | End: 2021-08-11 | Stop reason: ALTCHOICE

## 2021-07-16 RX ORDER — FERROUS SULFATE TAB 325 MG (65 MG ELEMENTAL FE) 325 (65 FE) MG
325 TAB ORAL
Qty: 90 TABLET | Refills: 1 | Status: SHIPPED | OUTPATIENT
Start: 2021-07-16 | End: 2022-06-05

## 2021-07-16 NOTE — PROGRESS NOTES
HISTORY OF PRESENT ILLNESS  Izabella Javed is a 78 y.o. male. HPI     Here for hospital fu  Admitted [facility] for cp and sob due to saddle PE 7/10-7/13  ?  DVT left leg  On eliquis now   No cp or sob and no 02 needed  Was anemic againand started on iron 325 mg every other day--hb9.7  Cr 1.05  fsbs 145 today    Was heme neg last year but anemic-saw NP Dr Jahaira Juan but pt did not schedule the EGD/colon  No bloody stool    Wants to get [place] PT-weakness from CVA unchanged but at risk for falls and bleed on eliquis      Last OV  F/u DM-2 HTN HLD CVA with left weakness 2-14-17, s/p left TKR 2018, prostate cancer s/p cryo 2009 with recurrence-Dr Dawna Galeazzi  ER fu for back pain which has been occurring prior to sliding from his bed to floor  Has had leg and pedal edema for weeks to months  Back apin imrpoved with muscle relaxer  Has been taking lasix 20 mg every day for last few days sine ER visit-not much improved  No cp or sob  cxr clear in ED 6/14  Cr 1.47 glucose 270-not taking insulin due to hypoglycemia in the past per pt     Pt lives at home with son  Wife in hospice at a facility       Patient Active Problem List    Diagnosis Date Noted    Type 2 diabetes mellitus with diabetic neuropathy ([address] 02/06/2020    Osteoarthrosis, localized, primary, knee, left 06/19/2018    Diabetes ([address]     Stroke ([address]     TIA (transient ischemic attack) 04/04/2017    Transient ischemic attack involving right internal carotid artery 02/15/2017    Left-sided weakness 02/14/2017    OA (osteoarthritis) of knee 08/06/2013    Prostate cancer ([address] 03/10/2011    Type II or unspecified type diabetes mellitus without mention of complication, uncontrolled 08/03/2009    Essential hypertension, benign 08/03/2009    Pure hypercholesterolemia 08/03/2009    Obesity 08/03/2009     Current Outpatient Medications   Medication Sig Dispense Refill    Eliquis 5 mg tablet TAKE 2 TABLETS BY MOUTH TWICE A DAY FOR 7 DAYS, THEN REDUCE TO 1 TABLET TWICE A DAY      FeroSuL 325 mg (65 mg iron) tablet Take 1 Tablet by mouth Daily (before breakfast). 90 Tablet 1    apixaban (ELIQUIS) 5 mg tablet Take 1 Tablet by mouth two (2) times a day. 60 Tablet 5    lisinopriL (PRINIVIL, ZESTRIL) 10 mg tablet Take 1 Tablet by mouth daily. 90 Tablet 3    hydroCHLOROthiazide (HYDRODIURIL) 25 mg tablet One every day 90 Tablet 3    Compression Socks, Large misc 1 Each by Does Not Apply route daily. 2 Each 99    Comp. Stocking,Thigh,Long,Large misc Knee high 20-30mmhg closed toe 2 Each 12    insulin nph-regular human rec (HUMULIN 70-30) 100 unit/mL (70-30) inpn Inject 12 units under the skin twice daily. If blood sugar remains above 150 after 5 days, increase to 14 units twice daily  Indications: type 2 diabetes mellitus (Patient taking differently: Inject 14 units under the skin twice daily. If blood sugar remains above 150 after 5 days, increase to 14 units twice daily  Indications: type 2 diabetes mellitus) 15 mL 5    Insulin Needles, Disposable, 31 gauge x 5/16\" ndle 1 Pen Needle by SubCUTAneous route two (2) times a day. Use to give insulin twice daily 100 Pen Needle 11    lancets misc Use to check your blood sugar three times daily. E11.9. Use brand preferred by insurance. 100 Each 11    Blood-Glucose Meter monitoring kit Use to check your blood sugar three times daily. E11.9. Use brand preferred by insurance. 1 Kit 0    glucose blood VI test strips (ASCENSIA AUTODISC VI, ONE TOUCH ULTRA TEST VI) strip Check fsb bid 250.02 100 Strip 5    furosemide (Lasix) 20 mg tablet Take 1 Tablet by mouth daily.  15 Tablet 0    gabapentin (NEURONTIN) 300 mg capsule TAKE 1 CAPSULE BY MOUTH 3  TIMES DAILY 270 Cap 3    pravastatin (PRAVACHOL) 20 mg tablet TAKE 1 TABLET BY MOUTH IN  THE EVENING 90 Tab 3    glipiZIDE (GLUCOTROL) 10 mg tablet TAKE 1 TABLET BY MOUTH  TWICE A  Tab 3    metFORMIN (GLUCOPHAGE) 1,000 mg tablet TAKE 1 TABLET BY MOUTH  TWICE A DAY WITH MEALS 180 Tab 3    amLODIPine (NORVASC) 10 mg tablet TAKE 1 TABLET BY MOUTH ONCE DAILY 90 Tab 3    glucose blood VI test strips (BLOOD GLUCOSE TEST) strip Use to check blood sugar up to 3 times daily. Use brand preferred by insurance. 100 Strip 11    senna-docusate (SENNA PLUS) 8.6-50 mg per tablet Take 1 Tab by mouth two (2) times a day. 180 Tab 3    alcohol swabs (ALCOHOL PREP SWABS) padm 10 Each by Apply Externally route two (2) times a day. 100 Pad 3    methocarbamoL (Robaxin-750) 750 mg tablet Take 1 Tablet by mouth four (4) times daily as needed for Muscle Spasm(s). (Patient not taking: Reported on 7/16/2021) 20 Tablet 0    clotrimazole (LOTRIMIN) 1 % topical cream Apply  to affected area two (2) times a day.  15 g 1    clopidogreL (PLAVIX) 75 mg tab TAKE 1 TABLET BY MOUTH ONCE A DAY (Patient not taking: Reported on 7/16/2021) 90 Tab 3     No Known Allergies   Lab Results   Component Value Date/Time    WBC 5.8 06/14/2021 10:26 AM    HGB 10.6 (L) 06/14/2021 10:26 AM    HCT 36.0 (L) 06/14/2021 10:26 AM    PLATELET 101 62/53/5796 10:26 AM    MCV 72.3 (L) 06/14/2021 10:26 AM     Lab Results   Component Value Date/Time    Hemoglobin A1c 8.0 (H) 03/05/2019 02:18 PM    Hemoglobin A1c 7.2 (H) 06/05/2018 01:49 PM    Hemoglobin A1c 7.1 (H) 03/08/2018 01:00 PM    Glucose 270 (H) 06/14/2021 10:26 AM    Glucose (POC) 192 (H) 06/22/2018 12:11 PM    Microalb/Creat ratio (ug/mg creat.) 15 02/06/2020 02:32 PM    LDL, calculated 66 02/06/2020 02:32 PM    Creatinine (POC) 1.0 07/20/2018 01:08 PM    Creatinine 1.49 (H) 06/14/2021 10:26 AM      Lab Results   Component Value Date/Time    Cholesterol, total 141 02/06/2020 02:32 PM    HDL Cholesterol 25 (L) 02/06/2020 02:32 PM    LDL, calculated 66 02/06/2020 02:32 PM    Triglyceride 252 (H) 02/06/2020 02:32 PM    CHOL/HDL Ratio 6.3 (H) 04/05/2017 04:03 AM     Lab Results   Component Value Date/Time    GFR est non-AA 47 (L) 06/14/2021 10:26 AM    GFRNA, POC >60 07/20/2018 01:08 PM    GFR est AA 57 (L) 06/14/2021 10:26 AM    GFRAA, POC >60 07/20/2018 01:08 PM    Creatinine 1.49 (H) 06/14/2021 10:26 AM    Creatinine (POC) 1.0 07/20/2018 01:08 PM    BUN 13 06/14/2021 10:26 AM    Sodium 137 06/14/2021 10:26 AM    Potassium 4.1 06/14/2021 10:26 AM    Chloride 105 06/14/2021 10:26 AM    CO2 26 06/14/2021 10:26 AM    Magnesium 1.8 06/14/2021 10:26 AM     Lab Results   Component Value Date/Time    Glucose 270 (H) 06/14/2021 10:26 AM    Glucose (POC) 192 (H) 06/22/2018 12:11 PM         ROS    Physical Exam  Constitutional:       Appearance: Normal appearance. He is obese. Comments: Sitting in WC NAD   HENT:      Mouth/Throat:      Mouth: Mucous membranes are moist.   Cardiovascular:      Rate and Rhythm: Normal rate and regular rhythm. Pulses: Normal pulses. Pulmonary:      Effort: Pulmonary effort is normal.   Musculoskeletal:      Comments: Mild LE edema b/l   Skin:     General: Skin is warm. Neurological:      Mental Status: He is alert. Comments: Left sided weakness4/5         ASSESSMENT and PLAN  Diagnoses and all orders for this visit:    1. Iron deficiency anemia, unspecified iron deficiency anemia type  -     HGB & HCT; Future  -     REFERRAL TO GASTROENTEROLOGY-needs EGD/COLON    2. Acute saddle pulmonary embolism, unspecified whether acute cor pulmonale present (HCC)  -     REFERRAL TO HOME HEALTH   sats 98% RA   Plan on elquis x 6 months at least   Will have remote monitoring with [facility] x 30 days  3. Type 2 diabetes mellitus without complication, with long-term current use of insulin (HCC)   Glucose 145   Stressed compliance with insulin 14units bid, OHA and diet  4. Essential hypertension   Controlled   ? takrichard hctz norvasc -refilled   Lower dose of lisinopril 10 mg qd  5. Severe obesity (BMI 35.0-35.9 with comorbidity) ([facility]    6. Hx of arterial ischemic stroke  -     REFERRAL TO HOME HEALTH    Other orders  -     FeroSuL 325 mg (65 mg iron) tablet;  Take 1 Tablet by mouth Daily (before breakfast). -     apixaban (ELIQUIS) 5 mg tablet; Take 1 Tablet by mouth two (2) times a day. -     lisinopriL (PRINIVIL, ZESTRIL) 10 mg tablet; Take 1 Tablet by mouth daily. -     hydroCHLOROthiazide (HYDRODIURIL) 25 mg tablet; One every day      Follow-up and Dispositions    · Return in about 2 months (around 9/16/2021) for PE ANemia DM-2 HTN hx CVA x30 min.

## 2021-07-16 NOTE — PATIENT INSTRUCTIONS
Office Policies    Phone calls/patient messages:            Please allow up to 24 hours for someone in the office to contact you about your call or message. Be mindful your provider may be out of the office or your message may require further review. We encourage you to use [employer] for your messages as this is a faster, more efficient way to communicate with our office                         Medication Refills:            Prescription medications require 48-72 business hours to process. We encourage you to use [employer] for your refills. For controlled medications: Please allow 72 business hours to process. Certain medications may require you to  a written prescription at our office. NO narcotic/controlled medications will be prescribed after 4pm Monday through Friday or on weekends              Form/Paperwork Completion:            Please note a $25 fee may incur for all paperwork for completed by our providers. We ask that you allow 7-10 business days. Pre-payment is due prior to picking up/faxing the completed form. You may also download your forms to [employer] to have your doctor print off.

## 2021-07-20 ENCOUNTER — TELEPHONE (OUTPATIENT)
Dept: INTERNAL MEDICINE CLINIC | Age: 71
End: 2021-07-20

## 2021-07-20 NOTE — TELEPHONE ENCOUNTER
----- Message from Merry Escalona sent at 7/20/2021 12:53 PM EDT -----  Regarding: Dr. Charo Maher Message/Vendor Calls    Caller's first and last name:pt      Reason for call:Hoveround Order      Callback required yes/no and why:yes      Best contact number(s):[phone]      Details to clarify the request: Pt would like to speak to someone regarding his Hoveround      Merry Hendrixgurwinder

## 2021-07-22 NOTE — TELEPHONE ENCOUNTER
Pt states that he needs call back.   He is following up regarding hoveround    And has question about physical therapy    [phone]

## 2021-07-23 NOTE — TELEPHONE ENCOUNTER
Called patient. ID verified with Name and . Patient states that he spoke to [facility] and they informed patient that they are waiting on provider to fax paperwork back to them so they can come out to patient for repairs. Patient also states that he has not heard anything in regards to PT at this time.  Informed patient that an order for home health was entered by provider on

## 2021-07-28 NOTE — TELEPHONE ENCOUNTER
Writer call Hover[employer] in regards to paperwork. Paperwork faxed over to provider at this time. Awaiting signature to fax back at this time.

## 2021-07-28 NOTE — TELEPHONE ENCOUNTER
Pt states that he can't get his wheelchair repaired as they are waiting on paperwork yet from Dr. Jesus Alberto Portre. Pt states you are to call the repair department at #[phone]    Please call pt back to give him status of repair.

## 2021-07-28 NOTE — TELEPHONE ENCOUNTER
Called patient. ID verified with Name and . Spoke with patient, informed patient that forms had been received from [facility]. Informed patient that provider would sign paperwork today and fax back to [facility] for services. No further action required at this time.

## 2021-08-11 ENCOUNTER — TELEPHONE (OUTPATIENT)
Dept: INTERNAL MEDICINE CLINIC | Age: 71
End: 2021-08-11

## 2021-08-11 NOTE — TELEPHONE ENCOUNTER
Pharmacy Progress Note - Telephone Encounter    S/O: Mr. Jody Talamantes 73 y.o. male, referred by Dr. Gt Philip MD, was contacted via an outbound telephone call to discuss his Eliquis access today. Verified patients identifiers (name & ) per HIPAA policy. Hx of LE DVT in July. [facility] hospitalization from 7/3-21. Was discharged home on Eliquis. Reports he completed Eliquis 10 mg BID x 7 days titration. Now taking Eliquis 5 mg BID. Received 1 month supply after he left [facility]. Denies any missed doses. [facility] informed him Eliquis would be $47/month. Pt reports he cannot afford this copay. Inquires about generic option    Past Medical History:   Diagnosis Date    Allergic rhinitis     Arthritis     djd of knees    Chronic pain     Diabetes ([facility] [address]     Hypercholesteremia     Hypertension     Obesity     Stroke ([facility] [address]     left--ring finger/ pinky finger numb sensation     A/P:  - Given previous stroke hx and recent DVT, emphasized importance of taking Eliquis daily.   - Discussed there is no generic alternative to Eliquis. Warfarin therapy would require routine INR monitoring. Recommend for patient to continue with current Eliquis 5 mg BID  - Can assist w/ some Eliquis sample. Based on patient's reported income, he would qualify for [employer] PAP program. Review application criteria & requirements. Pt will  application & sample from office this week. - Patient endorses understanding to the provided information. All questions answered at this time. Thank you for the consult,  Anna Fernandez, PharmD, BCACP, CDCES      Orders Placed This Encounter    apixaban (ELIQUIS) 5 mg tablet     Sig: Take 1 Tablet by mouth two (2) times a day. Dispense:  28 Tablet     Refill:  0     Medications Discontinued During This Encounter   Medication Reason    Eliquis 5 mg tablet Therapy Completed       For Pharmacy Admin Tracking Only     CPA in place:  Yes   Recommendation Provided To: Patient/Caregiver: 1 via Telephone   Intervention Detail: Discontinued Rx: 1, reason: Therapy Complete and Patient Access Assistance/Sample Provided   Time Spent (min): 20

## 2021-08-11 NOTE — TELEPHONE ENCOUNTER
----- Message from Malena Hayward sent at 8/11/2021  3:10 PM EDT -----  Regarding: Dr. Negrete Marine: [phone]  General Message/Vendor Calls    Caller's first and last name: Pt.       Reason for call: Pharmacy is charging pt $47 for \"Eliquis\" 5 mg, pt does not want to pay. wants to speak to pcp or nurse, would like pcp to switch Rx to generic. Callback required yes/no and why: Yes, call back from nurse. Best contact number(s): [phone]      Details to clarify the request: N/a.       Message from [facility]

## 2021-08-12 ENCOUNTER — TELEPHONE (OUTPATIENT)
Dept: INTERNAL MEDICINE CLINIC | Age: 71
End: 2021-08-12

## 2021-08-12 NOTE — TELEPHONE ENCOUNTER
----- Message from Alysia Solis sent at 8/12/2021 10:29 AM EDT -----  Regarding: Dr. Saleh Patch: [phone]  General Message/Vendor Calls    Caller's first and last name:  Arnold Salazar w/ DAVID      Reason for call: Question/communication regarding a medication. Callback required yes/no and why: Yes.       Best contact number(s):[phone]      Details to clarify the request: N/a      Alysia Solis

## 2021-08-12 NOTE — TELEPHONE ENCOUNTER
Spoke with Romulo Curry from [facility] at this time. Romulo Curry states that she received a call from the patient today (8/12/2021) stating that he did not know if he could continue with Eliquis due to the costs. Patient informed Romulo Curry that he may have to borrow money from someone to receive medication at this time. Romulo Curry reached out to PCP to let us know. Informed Romulo Curry that our PharmD has spoken to the patient in regards to obtaining samples and an application for PAP. Romulo Curry states that patient did not inform her of this information. Romulo Curry states that she would be reaching out to patient in regards to information received at this time.

## 2021-08-18 DIAGNOSIS — E78.5 HYPERLIPIDEMIA, UNSPECIFIED HYPERLIPIDEMIA TYPE: ICD-10-CM

## 2021-08-19 RX ORDER — GLIPIZIDE 10 MG/1
TABLET ORAL
Qty: 180 TABLET | Refills: 3 | Status: SHIPPED | OUTPATIENT
Start: 2021-08-19 | End: 2022-03-31 | Stop reason: SDUPTHER

## 2021-08-19 RX ORDER — AMLODIPINE BESYLATE 10 MG/1
TABLET ORAL
Qty: 90 TABLET | Refills: 3 | Status: SHIPPED | OUTPATIENT
Start: 2021-08-19 | End: 2022-05-21 | Stop reason: SDUPTHER

## 2021-08-19 RX ORDER — PRAVASTATIN SODIUM 20 MG/1
TABLET ORAL
Qty: 90 TABLET | Refills: 3 | Status: SHIPPED | OUTPATIENT
Start: 2021-08-19 | End: 2022-06-05

## 2021-08-19 RX ORDER — METFORMIN HYDROCHLORIDE 1000 MG/1
TABLET ORAL
Qty: 180 TABLET | Refills: 3 | Status: SHIPPED | OUTPATIENT
Start: 2021-08-19 | End: 2022-03-31 | Stop reason: SDUPTHER

## 2021-08-24 NOTE — TELEPHONE ENCOUNTER
Called patient. ID verified with Name and . Spoke with patient in regards to Eliquis. Patient states that his son paid for patient to get medication from pharmacy and samples are no longer needed at this time.

## 2021-08-31 ENCOUNTER — OFFICE VISIT (OUTPATIENT)
Dept: INTERNAL MEDICINE CLINIC | Age: 71
End: 2021-08-31
Payer: MEDICARE

## 2021-08-31 VITALS
DIASTOLIC BLOOD PRESSURE: 79 MMHG | BODY MASS INDEX: 36.48 KG/M2 | HEART RATE: 67 BPM | OXYGEN SATURATION: 99 % | WEIGHT: 254.8 LBS | TEMPERATURE: 98.8 F | RESPIRATION RATE: 20 BRPM | HEIGHT: 70 IN | SYSTOLIC BLOOD PRESSURE: 146 MMHG

## 2021-08-31 DIAGNOSIS — Z12.5 PROSTATE CANCER SCREENING: ICD-10-CM

## 2021-08-31 DIAGNOSIS — I26.92 SADDLE EMBOLUS OF PULMONARY ARTERY, UNSPECIFIED CHRONICITY, UNSPECIFIED WHETHER ACUTE COR PULMONALE PRESENT (HCC): ICD-10-CM

## 2021-08-31 DIAGNOSIS — D50.9 IRON DEFICIENCY ANEMIA, UNSPECIFIED IRON DEFICIENCY ANEMIA TYPE: ICD-10-CM

## 2021-08-31 DIAGNOSIS — R60.0 LEG EDEMA, LEFT: ICD-10-CM

## 2021-08-31 DIAGNOSIS — Z79.4 TYPE 2 DIABETES MELLITUS WITH OTHER SPECIFIED COMPLICATION, WITH LONG-TERM CURRENT USE OF INSULIN (HCC): ICD-10-CM

## 2021-08-31 DIAGNOSIS — E11.9 CONTROLLED TYPE 2 DIABETES MELLITUS WITHOUT COMPLICATION, WITH LONG-TERM CURRENT USE OF INSULIN (HCC): ICD-10-CM

## 2021-08-31 DIAGNOSIS — Z79.4 CONTROLLED TYPE 2 DIABETES MELLITUS WITHOUT COMPLICATION, WITH LONG-TERM CURRENT USE OF INSULIN (HCC): ICD-10-CM

## 2021-08-31 DIAGNOSIS — Z12.11 COLON CANCER SCREENING: ICD-10-CM

## 2021-08-31 DIAGNOSIS — E11.69 TYPE 2 DIABETES MELLITUS WITH OTHER SPECIFIED COMPLICATION, WITH LONG-TERM CURRENT USE OF INSULIN (HCC): ICD-10-CM

## 2021-08-31 DIAGNOSIS — R53.1 WEAKNESS GENERALIZED: Primary | ICD-10-CM

## 2021-08-31 DIAGNOSIS — Z23 NEEDS FLU SHOT: ICD-10-CM

## 2021-08-31 PROCEDURE — G8427 DOCREV CUR MEDS BY ELIG CLIN: HCPCS | Performed by: INTERNAL MEDICINE

## 2021-08-31 PROCEDURE — G8417 CALC BMI ABV UP PARAM F/U: HCPCS | Performed by: INTERNAL MEDICINE

## 2021-08-31 PROCEDURE — G8536 NO DOC ELDER MAL SCRN: HCPCS | Performed by: INTERNAL MEDICINE

## 2021-08-31 PROCEDURE — G8754 DIAS BP LESS 90: HCPCS | Performed by: INTERNAL MEDICINE

## 2021-08-31 PROCEDURE — G0008 ADMIN INFLUENZA VIRUS VAC: HCPCS | Performed by: INTERNAL MEDICINE

## 2021-08-31 PROCEDURE — 1101F PT FALLS ASSESS-DOCD LE1/YR: CPT | Performed by: INTERNAL MEDICINE

## 2021-08-31 PROCEDURE — G8510 SCR DEP NEG, NO PLAN REQD: HCPCS | Performed by: INTERNAL MEDICINE

## 2021-08-31 PROCEDURE — 90694 VACC AIIV4 NO PRSRV 0.5ML IM: CPT | Performed by: INTERNAL MEDICINE

## 2021-08-31 PROCEDURE — 3046F HEMOGLOBIN A1C LEVEL >9.0%: CPT | Performed by: INTERNAL MEDICINE

## 2021-08-31 PROCEDURE — 2022F DILAT RTA XM EVC RTNOPTHY: CPT | Performed by: INTERNAL MEDICINE

## 2021-08-31 PROCEDURE — G8753 SYS BP > OR = 140: HCPCS | Performed by: INTERNAL MEDICINE

## 2021-08-31 PROCEDURE — 3017F COLORECTAL CA SCREEN DOC REV: CPT | Performed by: INTERNAL MEDICINE

## 2021-08-31 PROCEDURE — 99214 OFFICE O/P EST MOD 30 MIN: CPT | Performed by: INTERNAL MEDICINE

## 2021-08-31 NOTE — PATIENT INSTRUCTIONS
Office Policies    Phone calls/patient messages:            Please allow up to 24 hours for someone in the office to contact you about your call or message. Be mindful your provider may be out of the office or your message may require further review. We encourage you to use [employer] for your messages as this is a faster, more efficient way to communicate with our office                         Medication Refills:            Prescription medications require 48-72 business hours to process. We encourage you to use [employer] for your refills. For controlled medications: Please allow 72 business hours to process. Certain medications may require you to  a written prescription at our office. NO narcotic/controlled medications will be prescribed after 4pm Monday through Friday or on weekends              Form/Paperwork Completion:            Please note a $25 fee may incur for all paperwork for completed by our providers. We ask that you allow 7-10 business days. Pre-payment is due prior to picking up/faxing the completed form. You may also download your forms to [employer] to have your doctor print off.
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
## 2021-09-01 LAB
ERYTHROCYTE [DISTWIDTH] IN BLOOD BY AUTOMATED COUNT: 20 % (ref 11.5–14.5)
EST. AVERAGE GLUCOSE BLD GHB EST-MCNC: 212 MG/DL
HBA1C MFR BLD: 9 % (ref 4–5.6)
HCT VFR BLD AUTO: 38.4 % (ref 36.6–50.3)
HGB BLD-MCNC: 11.2 G/DL (ref 12.1–17)
MCH RBC QN AUTO: 22.4 PG (ref 26–34)
MCHC RBC AUTO-ENTMCNC: 29.2 G/DL (ref 30–36.5)
MCV RBC AUTO: 76.8 FL (ref 80–99)
NRBC # BLD: 0 K/UL (ref 0–0.01)
NRBC BLD-RTO: 0 PER 100 WBC
PLATELET # BLD AUTO: 330 K/UL (ref 150–400)
PMV BLD AUTO: 9.8 FL (ref 8.9–12.9)
PSA SERPL-MCNC: 1.8 NG/ML (ref 0.01–4)
RBC # BLD AUTO: 5 M/UL (ref 4.1–5.7)
WBC # BLD AUTO: 5.1 K/UL (ref 4.1–11.1)

## 2021-09-02 DIAGNOSIS — N18.30 STAGE 3 CHRONIC KIDNEY DISEASE, UNSPECIFIED WHETHER STAGE 3A OR 3B CKD (HCC): Primary | ICD-10-CM

## 2021-09-02 NOTE — PROGRESS NOTES
Discussed labs--increaes 70/30 insulin to 18 unust bid and refer to nephrolgoist fro CKD 3-pt will call to schedule appt

## 2021-09-09 ENCOUNTER — TELEPHONE (OUTPATIENT)
Dept: INTERNAL MEDICINE CLINIC | Age: 71
End: 2021-09-09

## 2021-09-09 NOTE — TELEPHONE ENCOUNTER
----- Message from [facility] sent at 9/9/2021 10:57 AM EDT -----  Regarding: /Telephone    Caller's first and last name: Delia Soliman. from [facility]     Reason for call: Need to sign off Detail Written order that was faxed     Callback required yes/no and why: yes     Best contact number(s): [phone]     Details to clarify the reque reference order #[id number]  Fax # [phone]  Fax sent 08/10 pt needs mobile chair repaired       Message from [facility]

## 2021-09-12 DIAGNOSIS — R52 PAIN: ICD-10-CM

## 2021-09-13 RX ORDER — CLOPIDOGREL BISULFATE 75 MG/1
TABLET ORAL
Qty: 90 TABLET | Refills: 3 | Status: SHIPPED | OUTPATIENT
Start: 2021-09-13 | End: 2022-06-05

## 2021-09-13 RX ORDER — GABAPENTIN 300 MG/1
CAPSULE ORAL
Qty: 270 CAPSULE | Refills: 3 | Status: SHIPPED | OUTPATIENT
Start: 2021-09-13 | End: 2022-06-05

## 2021-09-13 NOTE — TELEPHONE ENCOUNTER
Writer spoke with rep from [facility]. Informed rep that paperwork had not been received at this time. Provided rep with fax number [phone] at this time. Rep states that paperwork would be refaxed at this time. Awaiting fax at this time.

## 2021-09-15 ENCOUNTER — TELEPHONE (OUTPATIENT)
Dept: INTERNAL MEDICINE CLINIC | Age: 71
End: 2021-09-15

## 2021-09-15 NOTE — TELEPHONE ENCOUNTER
We received paper work from [employer] for patient scooter repair order. Dr. Sofi Katz signed form and I faxed order on 9/15/21 [facility]

## 2021-09-20 ENCOUNTER — TELEPHONE (OUTPATIENT)
Dept: INTERNAL MEDICINE CLINIC | Age: 71
End: 2021-09-20

## 2021-09-20 NOTE — TELEPHONE ENCOUNTER
Writer called Nephrology office to schedule new patient appt. Writer forwarded to  who was unavailable at this time. Patient's information faxed over to nephrology office requesting clinical staff to contact patient to schedule appt at this time.

## 2021-09-20 NOTE — TELEPHONE ENCOUNTER
----- Message from Bernadette Long sent at 9/20/2021 12:23 PM EDT -----  Regarding: /telephone  Contact: [phone]  General Message/Vendor Calls    Caller's first and last name: n/a      Reason for call: Specialist hasn't answered phone.  Needs to talk to doctor for new referral or schedule appt       Callback required yes/no and why: Yes to confirm      Best contact number(s): [phone]      Message from [facility]

## 2021-09-24 ENCOUNTER — TELEPHONE (OUTPATIENT)
Dept: INTERNAL MEDICINE CLINIC | Age: 71
End: 2021-09-24

## 2021-09-24 DIAGNOSIS — R53.81 DEBILITY: Primary | ICD-10-CM

## 2021-09-24 NOTE — TELEPHONE ENCOUNTER
Pt is requesting order for a privacy curtain (3 feet by 3 feet) and commode that can be used in case he cannot get to the bathroom as he states he had to move downstairs.         [phone]

## 2021-09-27 ENCOUNTER — TELEPHONE (OUTPATIENT)
Dept: INTERNAL MEDICINE CLINIC | Age: 71
End: 2021-09-27

## 2021-09-27 NOTE — TELEPHONE ENCOUNTER
[phone]      Pt states recent blood clot in lungs. Pt states should he get the covid booster shot or should he wait 6 months? Asks what does dr Aiden Perea recommend.

## 2021-09-28 NOTE — TELEPHONE ENCOUNTER
Called and spoke with patient. Informed patient that he should get the booster per DR Abad Alert. No other questions at this time.

## 2021-10-11 ENCOUNTER — DOCUMENTATION ONLY (OUTPATIENT)
Dept: INTERNAL MEDICINE CLINIC | Age: 71
End: 2021-10-11

## 2021-11-18 ENCOUNTER — TELEPHONE (OUTPATIENT)
Dept: INTERNAL MEDICINE CLINIC | Age: 71
End: 2021-11-18

## 2021-11-18 NOTE — TELEPHONE ENCOUNTER
----- Message from Ciera Chisholm sent at 11/17/2021  3:27 PM EST -----  Subject: Message to Provider    QUESTIONS  Information for Provider? Patient looking to receive call back from PCP   Keshav Norton or PCP Jodelle Harada in office in regards to medical history   ---------------------------------------------------------------------------  --------------  Erin ENGEL  What is the best way for the office to contact you? OK to leave message on   voicemail  Preferred Call Back Phone Number?  [phone]  ---------------------------------------------------------------------------  --------------  SCRIPT ANSWERS  undefined

## 2021-11-30 ENCOUNTER — TELEPHONE (OUTPATIENT)
Dept: INTERNAL MEDICINE CLINIC | Age: 71
End: 2021-11-30

## 2021-11-30 NOTE — TELEPHONE ENCOUNTER
Called, spoke with Pt. Two pt identifiers confirmed. Pt asked is there a form Dr. Sugar Allen needs to fill out. Pt stated he think it is. Pt informed he to find out and where to get the form so Dr. Sugar Allen can fill it out. Pt stated ok. Pt verbalized understanding of information discussed w/ no further questions at this time.

## 2021-11-30 NOTE — TELEPHONE ENCOUNTER
----- Message from April Clay sent at 11/30/2021  8:59 AM EST -----  Subject: Message to Provider    QUESTIONS  Information for Provider? Pt is in need of a letter for [address] [place] [address]   about his disability to get assistance with his light bill  ---------------------------------------------------------------------------  --------------  B[employer]on [employer]  What is the best way for the office to contact you? OK to leave message on   voicemail  Preferred Call Back Phone Number? [phone]  ---------------------------------------------------------------------------  --------------  SCRIPT ANSWERS  Relationship to Patient?  Self

## 2021-12-13 RX ORDER — PEN NEEDLE, DIABETIC 30 GX3/16"
1 NEEDLE, DISPOSABLE MISCELLANEOUS 2 TIMES DAILY
Qty: 50 PEN NEEDLE | Refills: 11 | Status: SHIPPED | OUTPATIENT
Start: 2021-12-13 | End: 2022-06-05

## 2021-12-13 NOTE — TELEPHONE ENCOUNTER
----- Message from [facility] sent at 12/11/2021 10:43 AM EST -----  Subject: Medication Problem    QUESTIONS  Name of Medication? Insulin Needles, Disposable, 31 gauge x 5/16\" ndle  Patient-reported dosage and instructions? 5/16  What question or problem do you have with the medication? patient needs   these refilled. needs the 50 for 9 dollars doesn't have enough money to   get more  Preferred Pharmacy? [address] phone number (if available)? [phone]  Additional Information for Provider?   ---------------------------------------------------------------------------  --------------  [address]  What is the best way for the office to contact you? OK to leave message on   voicemail  Preferred Call Back Phone Number? [phone]  ---------------------------------------------------------------------------  --------------  SCRIPT ANSWERS  Relationship to Patient?  Self

## 2021-12-15 NOTE — TELEPHONE ENCOUNTER
CLINICAL PHARMACY: ADHERENCE REVIEW  Identified care gap per Jazmine; fills at [facility]: ACE/ARB adherence    Last Visit: 8/31/21    Patient also appears to be prescribed: DM, statin    Patient not found in Outcomes MTM    ASSESSMENT  ACE/ARB ADHERENCE    Per Insurance Records through 12/5/21 (PETER [place] = 83%; Potential Fail Date: 12/18/21):   Lisinopril 40mg daily last filled on 6/24/21 for 90 day supply. Next refill due: 10/10/21    Per chart:  - Lisinopril 40mg daily marked as \"not taking\" at 7/16/21 PCP OV, then reordered/lowered to lisinopril 10mg daily - rx for #90, 3 tabs to [address]    BP Readings from Last 3 Encounters:   08/31/21 (!) 146/79   07/16/21 110/60   06/18/21 (!) 140/80     CrCl cannot be calculated (Patient's most recent lab result is older than the maximum 180 days allowed. ). DIABETES ADHERENCE    Per Insurance Records through 12/5/21: not reported (insulin)    Lab Results   Component Value Date/Time    Hemoglobin A1c 9.0 (H) 08/31/2021 03:47 PM    Hemoglobin A1c (POC) 13.9 (A) 06/18/2021 12:42 PM    Hemoglobin A1c (POC) 10.5 (A) 02/06/2020 01:52 PM   *to repeat/update A1c with 12/30/21 appt    STATIN ADHERENCE    Per Insurance Records through 12/5/21 ([place] = 100%; Potential Fail Date: passed 2021):   Pravastatin 20mg daily last filled on 9/17/21 for 90 day supply. Next refill due: 1/8/22    Lab Results   Component Value Date/Time    Cholesterol, total 141 02/06/2020 02:32 PM    HDL Cholesterol 25 (L) 02/06/2020 02:32 PM    LDL, calculated 66 02/06/2020 02:32 PM    VLDL, calculated 50 (H) 02/06/2020 02:32 PM    Triglyceride 252 (H) 02/06/2020 02:32 PM    CHOL/HDL Ratio 6.3 (H) 04/05/2017 04:03 AM     ALT (SGPT)   Date Value Ref Range Status   06/14/2021 12 12 - 78 U/L Final     AST (SGOT)   Date Value Ref Range Status   06/14/2021 13 (L) 15 - 37 U/L Final     The ASCVD Risk score (Rolf Brewer, et al., 2013) failed to calculate for the following reasons:     The patient has a prior MI or stroke diagnosis     PLAN  The following are interventions that have been identified:  - Patient overdue refilling lisinopril and active on home medication list   · Dose lowered from 40mg to 10mg - need rx to [facility]? (40mg rx was being filled at [facility]; appears 10mg rx sent to [facility])    Attempting to reach patient to review.  Left message asking for return call.     Future Appointments   Date Time Provider Yonathan Pena   12/30/2021  2:15 PM Vianey Dalton MD [facility] AMB       Jak Green, PharmD, [address] [facility], toll free: [phone], option 2

## 2021-12-16 RX ORDER — LISINOPRIL 10 MG/1
10 TABLET ORAL DAILY
Qty: 90 TABLET | Refills: 3 | Status: SHIPPED | OUTPATIENT
Start: 2021-12-16 | End: 2022-03-31 | Stop reason: SDUPTHER

## 2021-12-16 NOTE — TELEPHONE ENCOUNTER
Unknown MD Xenia, unable to reach patient to confirm; past due to refill and appears to get most maintenance rxs to [facility] - can we reorder to [facility] so patient has rx available? (when dose was changed, rx'd to [address] [place] [address])  Rx pended for your signature/modification as appropriate    LOV: 8/31/21  Next: 12/30/21    Thank you,  Hilaria Mera, PharmD, [facility], toll free: [phone], option 2

## 2021-12-17 NOTE — TELEPHONE ENCOUNTER
Noted PCP approved lisinopril 10mg daily rx to [facility], thank you! Spoke to patient - states he takes the lisinopril daily. Was able to get/find his bottle while on phone and reports is 40mg tablet. Cannot locate date on bottle, but states is [facility] rx bottle and he gets his regular/daily rxs only from [facility]. Reports he got a notice yesterday from [facility] that he's getting a medication delivered by . Patient does not report if he has any tablets remaining in the lisinopril 40mg bottle - unclear if he was taking or ran out; reviewed dose changed to 10mg daily with July appt and encouraged BP monitoring. Spoke to [facility] - confirm the lisinopril 10mg rx was received and already shipped out yesterday.  Also states patient is set up with automatic refill and they do not have active rx for lisinopril 40mg.    =========================================================   For Pharmacy [id number] [place] [address] in place: No   Recommendation Provided To: Provider: 1 via Note to Provider  and Patient/Caregiver: 1 via Telephone   Intervention Detail: Adherence Monitorin and Refill(s) Provided   Gap Closed?: Yes   Intervention Accepted By: Provider: 1 and Patient/Caregiver: 1   Time Spent (min): 15

## 2021-12-30 ENCOUNTER — VIRTUAL VISIT (OUTPATIENT)
Dept: INTERNAL MEDICINE CLINIC | Age: 71
End: 2021-12-30
Payer: MEDICARE

## 2021-12-30 DIAGNOSIS — Z63.4 BEREAVEMENT: Primary | ICD-10-CM

## 2021-12-30 DIAGNOSIS — I10 HYPERTENSION, UNSPECIFIED TYPE: ICD-10-CM

## 2021-12-30 DIAGNOSIS — Z79.4 TYPE 2 DIABETES MELLITUS WITH OTHER SPECIFIED COMPLICATION, WITH LONG-TERM CURRENT USE OF INSULIN (HCC): ICD-10-CM

## 2021-12-30 DIAGNOSIS — Z86.718 HX OF THROMBOEMBOLISM: ICD-10-CM

## 2021-12-30 DIAGNOSIS — E11.69 TYPE 2 DIABETES MELLITUS WITH OTHER SPECIFIED COMPLICATION, WITH LONG-TERM CURRENT USE OF INSULIN (HCC): ICD-10-CM

## 2021-12-30 PROBLEM — F33.0 MAJOR DEPRESSIVE DISORDER, RECURRENT, MILD (HCC): Status: ACTIVE | Noted: 2021-12-30

## 2021-12-30 PROCEDURE — 99442 PR PHYS/QHP TELEPHONE EVALUATION 11-20 MIN: CPT | Performed by: INTERNAL MEDICINE

## 2021-12-30 RX ORDER — ESCITALOPRAM OXALATE 5 MG/1
5 TABLET ORAL DAILY
Qty: 30 TABLET | Refills: 3 | Status: SHIPPED | OUTPATIENT
Start: 2021-12-30 | End: 2021-12-30 | Stop reason: SDUPTHER

## 2021-12-30 RX ORDER — ESCITALOPRAM OXALATE 5 MG/1
5 TABLET ORAL DAILY
Qty: 90 TABLET | Refills: 1 | Status: SHIPPED | OUTPATIENT
Start: 2021-12-30

## 2021-12-30 RX ORDER — HYDROCHLOROTHIAZIDE 25 MG/1
TABLET ORAL
Qty: 90 TABLET | Refills: 3 | Status: SHIPPED | OUTPATIENT
Start: 2021-12-30 | End: 2021-12-30 | Stop reason: SDUPTHER

## 2021-12-30 RX ORDER — INSULIN PUMP SYRINGE, 3 ML
EACH MISCELLANEOUS
Qty: 1 KIT | Refills: 0 | Status: SHIPPED | OUTPATIENT
Start: 2021-12-30 | End: 2022-06-05

## 2021-12-30 RX ORDER — HYDROCHLOROTHIAZIDE 25 MG/1
TABLET ORAL
Qty: 90 TABLET | Refills: 3 | Status: SHIPPED | OUTPATIENT
Start: 2021-12-30 | End: 2022-06-05

## 2021-12-30 SDOH — SOCIAL STABILITY - SOCIAL INSECURITY: DISSAPEARANCE AND DEATH OF FAMILY MEMBER: Z63.4

## 2021-12-30 NOTE — PATIENT INSTRUCTIONS
This is an established visit conducted via telemedicine. The patient has been instructed that this meets HIPAA criteria and acknowledges and agrees to this method of visitation.     Pamela Hooper  94/27/30  3:30 PM

## 2021-12-30 NOTE — PROGRESS NOTES
HISTORY OF PRESENT ILLNESS  Asia Mehta is a 73 y.o. male. HPI   Asia Mehta, who was evaluated through a synchronous (real-time) audio only encounter, and/or his healthcare decision maker, is aware that it is a billable service, with coverage as determined by his insurance carrier. He provided verbal consent to proceed: Yes, and patient identification was verified. This visit was conducted pursuant to the emergency declaration under the [address] [place] [address] and the [employer] General Act. A caregiver was present when appropriate. Ability to conduct physical exam was limited. The patient was located in a state where the provider was credentialed to provide care.      --Izabella Menezes MD on 12/30/2021 at 2:24 PM     TC x 13 minutes    Pt is grieivng loss of his wife last month --she passed away at [facility]  Feels sad request medication  His son stays with him  Ran out of fluid med and glucometer  Still on eliquis for PE --approaching 6 months of tx    Last OV   2 month fu saddle PE DM-2 HTN anemia  Hx CVA with left weakness     Last a1c 13.9 in June  On oral iron every other day --Hb 9.7 last month at [facility]  Referred to GI MD for anemia again---     Went to [facility] but needs rx for stockings  On eliquis for saddle PE in July 2021           Did not get [place] set up per pt for PT -wants to get PT to help with ambulation-weak on left side since CVA 2017     fsbs 243  On insulin 14 units bid 70/30  Not on a strict diet per pt-too many sweets and desserts       Patient Active Problem List    Diagnosis Date Noted    Type 2 diabetes mellitus with diabetic neuropathy ([address] 02/06/2020    Osteoarthrosis, localized, primary, knee, left 06/19/2018    Diabetes ([address]     Stroke ([address]     TIA (transient ischemic attack) 04/04/2017    Transient ischemic attack involving right internal carotid artery 02/15/2017    Left-sided weakness 02/14/2017    OA (osteoarthritis) of knee 08/06/2013    Prostate cancer ([facility] 03/10/2011    Type II or unspecified type diabetes mellitus without mention of complication, uncontrolled 08/03/2009    Essential hypertension, benign 08/03/2009    Pure hypercholesterolemia 08/03/2009    Obesity 08/03/2009     Current Outpatient Medications   Medication Sig Dispense Refill    lisinopriL (PRINIVIL, ZESTRIL) 10 mg tablet Take 1 Tablet by mouth daily. 90 Tablet 3    Insulin Needles, Disposable, 31 gauge x 5/16\" ndle 1 Pen Needle by SubCUTAneous route two (2) times a day. Use to give insulin twice daily 50 Pen Needle 11    gabapentin (NEURONTIN) 300 mg capsule TAKE 1 CAPSULE BY MOUTH 3  TIMES DAILY 270 Capsule 3    clopidogreL (PLAVIX) 75 mg tab TAKE 1 TABLET BY MOUTH ONCE DAILY 90 Tablet 3    pravastatin (PRAVACHOL) 20 mg tablet TAKE 1 TABLET BY MOUTH IN  THE EVENING 90 Tablet 3    metFORMIN (GLUCOPHAGE) 1,000 mg tablet TAKE 1 TABLET BY MOUTH  TWICE DAILY WITH MEALS 180 Tablet 3    glipiZIDE (GLUCOTROL) 10 mg tablet TAKE 1 TABLET BY MOUTH  TWICE DAILY 180 Tablet 3    amLODIPine (NORVASC) 10 mg tablet TAKE 1 TABLET BY MOUTH ONCE DAILY 90 Tablet 3    apixaban (ELIQUIS) 5 mg tablet Take 1 Tablet by mouth two (2) times a day. 60 Tablet 5    insulin nph-regular human rec (HUMULIN 70-30) 100 unit/mL (70-30) inpn Inject 12 units under the skin twice daily. If blood sugar remains above 150 after 5 days, increase to 14 units twice daily  Indications: type 2 diabetes mellitus (Patient taking differently: Inject 18 units under the skin twice daily. If blood sugar remains above 150 after 5 days, increase to 14 units twice daily  Indications: type 2 diabetes mellitus) 15 mL 5    lancets misc Use to check your blood sugar three times daily. E11.9. Use brand preferred by insurance. 100 Each 11    Blood-Glucose Meter monitoring kit Use to check your blood sugar three times daily. E11.9. Use brand preferred by insurance. 1 Kit 0    glucose blood VI test strips (ASCENSIA AUTODISC VI, ONE TOUCH ULTRA TEST VI) strip Check fsb bid 250.02 100 Strip 5    glucose blood VI test strips (BLOOD GLUCOSE TEST) strip Use to check blood sugar up to 3 times daily. Use brand preferred by insurance. 100 Strip 11    senna-docusate (SENNA PLUS) 8.6-50 mg per tablet Take 1 Tab by mouth two (2) times a day. 180 Tab 3    alcohol swabs (ALCOHOL PREP SWABS) padm 10 Each by Apply Externally route two (2) times a day. 100 Pad 3    apixaban (ELIQUIS) 5 mg tablet Take 1 Tablet by mouth two (2) times a day. (Patient not taking: Reported on 8/31/2021) 28 Tablet 0    FeroSuL 325 mg (65 mg iron) tablet Take 1 Tablet by mouth Daily (before breakfast). (Patient not taking: Reported on 12/30/2021) 90 Tablet 1    hydroCHLOROthiazide (HYDRODIURIL) 25 mg tablet One every day (Patient not taking: Reported on 8/31/2021) 90 Tablet 3    Compression Socks, Large misc 1 Each by Does Not Apply route daily. (Patient not taking: Reported on 8/31/2021) 2 Each 99    Comp. Stocking,Thigh,Long,Large misc Knee high 20-30mmhg closed toe (Patient not taking: Reported on 8/31/2021) 2 Each 12    methocarbamoL (Robaxin-750) 750 mg tablet Take 1 Tablet by mouth four (4) times daily as needed for Muscle Spasm(s). (Patient not taking: Reported on 7/16/2021) 20 Tablet 0    furosemide (Lasix) 20 mg tablet Take 1 Tablet by mouth daily. (Patient not taking: Reported on 8/31/2021) 15 Tablet 0    clotrimazole (LOTRIMIN) 1 % topical cream Apply  to affected area two (2) times a day.  (Patient not taking: Reported on 8/31/2021) 15 g 1     No Known Allergies   Lab Results   Component Value Date/Time    WBC 5.1 08/31/2021 03:47 PM    HGB 11.2 (L) 08/31/2021 03:47 PM    HCT 38.4 08/31/2021 03:47 PM    PLATELET 969 69/18/6080 03:47 PM    MCV 76.8 (L) 08/31/2021 03:47 PM     Lab Results   Component Value Date/Time    GFR est non-AA 47 (L) 06/14/2021 10:26 AM GFRNA, POC >60 07/20/2018 01:08 PM    GFR est AA 57 (L) 06/14/2021 10:26 AM    GFRAA, POC >60 07/20/2018 01:08 PM    Creatinine 1.49 (H) 06/14/2021 10:26 AM    Creatinine (POC) 1.0 07/20/2018 01:08 PM    BUN 13 06/14/2021 10:26 AM    Sodium 137 06/14/2021 10:26 AM    Potassium 4.1 06/14/2021 10:26 AM    Chloride 105 06/14/2021 10:26 AM    CO2 26 06/14/2021 10:26 AM    Magnesium 1.8 06/14/2021 10:26 AM        ROS    Physical Exam    ASSESSMENT and PLAN  Diagnoses and all orders for this visit:    1. Bereavement  -     escitalopram oxalate (LEXAPRO) 5 mg tablet; Take 1 Tablet by mouth daily. 2. Type 2 diabetes mellitus with other specified complication, with long-term current use of insulin (HCC)  -     Blood-Glucose Meter monitoring kit; Check fsbs bid 240.02  -     glucose blood VI test strips (ASCENSIA AUTODISC VI, ONE TOUCH ULTRA TEST VI) strip; Check fsbs bid 250,02  -     Blood-Glucose Meter monitoring kit; Use to check your blood sugar three times daily. E11.9. Use brand preferred by insurance. -     glucose blood VI test strips (ASCENSIA AUTODISC VI, ONE TOUCH ULTRA TEST VI) strip; Check fsb bid 250.02    3. Hypertension, unspecified type  -     hydroCHLOROthiazide (HYDRODIURIL) 25 mg tablet; One every day   bp monitoring  4.  Hx of thromboembolism   Can dc eliquis in 2 weeeks--discussed      rtc 4 months CPE/wellness

## 2022-01-19 ENCOUNTER — TELEPHONE (OUTPATIENT)
Dept: INTERNAL MEDICINE CLINIC | Age: 72
End: 2022-01-19

## 2022-01-19 RX ORDER — AMOXICILLIN 500 MG/1
500 CAPSULE ORAL 3 TIMES DAILY
Qty: 21 CAPSULE | Refills: 0 | Status: SHIPPED | OUTPATIENT
Start: 2022-01-19 | End: 2022-06-05

## 2022-01-19 NOTE — TELEPHONE ENCOUNTER
Called patient. ID verified with Name and . Spoke with patient in regards to message received. Patient is requesting antibiotic treatment at this time in regards to the symptoms he is experiencing at this time.

## 2022-01-19 NOTE — TELEPHONE ENCOUNTER
----- Message from Caroline Rm sent at 1/19/2022 12:46 PM EST -----  Subject: Message to Provider    QUESTIONS  Information for Provider? has ahead cold. headache, sinus, and cough. doesnt want to make an appt wants to speak to someone. please give pt a   call.   ---------------------------------------------------------------------------  --------------  CALL BACK INFO  What is the best way for the office to contact you? OK to leave message on   voicemail  Preferred Call Back Phone Number? [phone]  ---------------------------------------------------------------------------  --------------  SCRIPT ANSWERS  Relationship to Patient?  Self

## 2022-01-20 NOTE — TELEPHONE ENCOUNTER
Called patient. ID verified with Name and . Spoke with patient in regards to information received. Informed patient that order for antibiotic was sent to pharmacy at this time. Patient states that he understands the information received and has no further questions or concerns at this time.

## 2022-02-09 ENCOUNTER — TELEPHONE (OUTPATIENT)
Dept: INTERNAL MEDICINE CLINIC | Age: 72
End: 2022-02-09

## 2022-02-09 NOTE — TELEPHONE ENCOUNTER
----- Message from [facility] [place] sent at 2/9/2022  2:44 PM EST -----  Subject: Message to Provider    QUESTIONS  Information for Provider? Patient would like a phone call back about a   different prescription for insulin, patients new insurance will not cover   what he is on now.  ---------------------------------------------------------------------------  --------------  [address]  What is the best way for the office to contact you? OK to leave message on   voicemail  Preferred Call Back Phone Number? [phone]  ---------------------------------------------------------------------------  --------------  SCRIPT ANSWERS  Relationship to Patient?  Self

## 2022-02-09 NOTE — TELEPHONE ENCOUNTER
Spoke with patient using 2 identifiers. Patient was informed to try [facility] to get his insulin. Patient is planning to get his insulin at [address] [facility]

## 2022-03-04 ENCOUNTER — TRANSCRIBE ORDER (OUTPATIENT)
Dept: SCHEDULING | Age: 72
End: 2022-03-04

## 2022-03-04 DIAGNOSIS — N18.31 CHRONIC KIDNEY DISEASE, STAGE 3A (HCC): Primary | ICD-10-CM

## 2022-03-11 ENCOUNTER — TELEPHONE (OUTPATIENT)
Dept: INTERNAL MEDICINE CLINIC | Age: 72
End: 2022-03-11

## 2022-03-11 NOTE — TELEPHONE ENCOUNTER
----- Message from Zander Awan sent at 3/11/2022 10:19 AM EST -----  Subject: Message to Provider    QUESTIONS  Information for Provider? pt called to see who called him a few days ago. Pt thinks it might be regarding to blood work   ---------------------------------------------------------------------------  --------------  Phan ENGEL  What is the best way for the office to contact you? OK to leave message on   voicemail  Preferred Call Back Phone Number? [phone]  ---------------------------------------------------------------------------  --------------  SCRIPT ANSWERS  Relationship to Patient?  Self

## 2022-03-11 NOTE — TELEPHONE ENCOUNTER
I have attempted without success to contact this patient by phone. Unable to reach patient at this time. Personal VM set up, patient's VM box is full and unable to receive messages at this time. WRITER RETURNING CALL TO PATIENT TO INFORM THAT THERE IS NO DOCUMENTATION IN REGARDS TO OFFICE/CLINICAL STAFF CONTACTING PATIENT. IF PATIENT DOES NOT NEED FURTHER ASSISTANCE, PLEASE DISREGARD CALL.

## 2022-03-18 PROBLEM — G45.1 TRANSIENT ISCHEMIC ATTACK INVOLVING RIGHT INTERNAL CAROTID ARTERY: Status: ACTIVE | Noted: 2017-02-15

## 2022-03-19 PROBLEM — E11.40 TYPE 2 DIABETES MELLITUS WITH DIABETIC NEUROPATHY (HCC): Status: ACTIVE | Noted: 2020-02-06

## 2022-03-19 PROBLEM — R53.1 LEFT-SIDED WEAKNESS: Status: ACTIVE | Noted: 2017-02-14

## 2022-03-19 PROBLEM — F33.0 MAJOR DEPRESSIVE DISORDER, RECURRENT, MILD (HCC): Status: ACTIVE | Noted: 2021-12-30

## 2022-03-19 PROBLEM — G45.9 TIA (TRANSIENT ISCHEMIC ATTACK): Status: ACTIVE | Noted: 2017-04-04

## 2022-03-19 PROBLEM — M17.12 OSTEOARTHROSIS, LOCALIZED, PRIMARY, KNEE, LEFT: Status: ACTIVE | Noted: 2018-06-19

## 2022-03-28 ENCOUNTER — TELEPHONE (OUTPATIENT)
Dept: INTERNAL MEDICINE CLINIC | Age: 72
End: 2022-03-28

## 2022-03-28 NOTE — TELEPHONE ENCOUNTER
----- Message from Morro Scottie sent at 3/28/2022 11:15 AM EDT -----  Subject: Message to Provider    QUESTIONS  Information for Provider? Patient needs the Dr to call the Pharmacy to   give a pre-authorization for his metformin so he can get it filled. Please   call? [phone]  ---------------------------------------------------------------------------  --------------  CALL BACK INFO  What is the best way for the office to contact you? OK to leave message on   voicemail  Preferred Call Back Phone Number? [phone]  ---------------------------------------------------------------------------  --------------  SCRIPT ANSWERS  Relationship to Patient?  Self

## 2022-03-31 RX ORDER — METFORMIN HYDROCHLORIDE 1000 MG/1
TABLET ORAL
Qty: 180 TABLET | Refills: 3 | Status: SHIPPED | OUTPATIENT
Start: 2022-03-31 | End: 2022-04-01 | Stop reason: SDUPTHER

## 2022-03-31 RX ORDER — LISINOPRIL 10 MG/1
10 TABLET ORAL DAILY
Qty: 90 TABLET | Refills: 3 | Status: SHIPPED | OUTPATIENT
Start: 2022-03-31 | End: 2022-06-13

## 2022-03-31 RX ORDER — GLIPIZIDE 10 MG/1
TABLET ORAL
Qty: 180 TABLET | Refills: 3 | Status: SHIPPED | OUTPATIENT
Start: 2022-03-31

## 2022-03-31 NOTE — TELEPHONE ENCOUNTER
Future Appointments:  Future Appointments   Date Time Provider Yonathan Munizi   4/25/2022 10:30 AM [facility]   4/29/2022 11:30 AM Elvira Walker MD [facility] BS AMB        Last Appointment With Me:  12/30/2021     Requested Prescriptions     Pending Prescriptions Disp Refills    glipiZIDE (GLUCOTROL) 10 mg tablet 180 Tablet 3     Sig: TAKE 1 TABLET BY MOUTH  TWICE DAILY    metFORMIN (GLUCOPHAGE) 1,000 mg tablet 180 Tablet 3     Sig: TAKE 1 TABLET BY MOUTH  TWICE DAILY WITH MEALS    lisinopriL (PRINIVIL, ZESTRIL) 10 mg tablet 90 Tablet 3     Sig: Take 1 Tablet by mouth daily.

## 2022-04-01 RX ORDER — METFORMIN HYDROCHLORIDE 1000 MG/1
TABLET ORAL
Qty: 180 TABLET | Refills: 3 | Status: SHIPPED | OUTPATIENT
Start: 2022-04-01

## 2022-04-01 NOTE — TELEPHONE ENCOUNTER
----- Message from Blanca Tran sent at 3/31/2022  5:14 PM EDT -----  Subject: Medication Problem    QUESTIONS  Name of Medication? metFORMIN (GLUCOPHAGE) 1,000 mg tablet  Patient-reported dosage and instructions? two per day   What question or problem do you have with the medication? Insurance want a   call for PCP  pt is unsure why fax number [phone]  Preferred Pharmacy? [facility], [address] [address] phone number (if available)? [phone]  Additional Information for Provider?   ---------------------------------------------------------------------------  --------------  [address]  What is the best way for the office to contact you? Do not leave any   message, patient will call back for answer  Preferred Call Back Phone Number? [phone]  ---------------------------------------------------------------------------  --------------  SCRIPT ANSWERS  Relationship to Patient?  Self

## 2022-04-01 NOTE — TELEPHONE ENCOUNTER
Future Appointments:  Future Appointments   Date Time Provider Yonathan Pena   4/25/2022 10:30 AM [facility]   4/29/2022 11:30 AM Mari Sears MD [facility] BS AMB        Last Appointment With Me:  12/30/2021     Requested Prescriptions     Pending Prescriptions Disp Refills    metFORMIN (GLUCOPHAGE) 1,000 mg tablet 180 Tablet 3     Sig: TAKE 1 TABLET BY MOUTH  TWICE DAILY WITH MEALS

## 2022-04-25 ENCOUNTER — TELEPHONE (OUTPATIENT)
Dept: INTERNAL MEDICINE CLINIC | Age: 72
End: 2022-04-25

## 2022-04-25 NOTE — TELEPHONE ENCOUNTER
Called, spoke to pt. Two pt identifiers confirmed. Patient states he did not need labs . Patient states he need an order for a ultrasound. Pt verbalized understanding of information discussed w/ no further questions at this time.
Noted... closing encounter
Pt came through the triage line, pt is at [place] [address] for labs and pt wanted us to get the labs from the nephrologist that Dr. Dotty Arevalo referred. Gave pt the # to Dr. Philip Strickland office as that was the referral was for. Sending message to the lead nurse as an [id number] [place].
No

## 2022-04-27 ENCOUNTER — HOSPITAL ENCOUNTER (OUTPATIENT)
Dept: ULTRASOUND IMAGING | Age: 72
Discharge: HOME | End: 2022-04-27
Attending: INTERNAL MEDICINE
Payer: MEDICARE

## 2022-04-27 DIAGNOSIS — N18.31 CHRONIC KIDNEY DISEASE, STAGE 3A (HCC): ICD-10-CM

## 2022-04-27 PROCEDURE — 76770 US EXAM ABDO BACK WALL COMP: CPT

## 2022-05-12 ENCOUNTER — TELEPHONE (OUTPATIENT)
Dept: INTERNAL MEDICINE CLINIC | Age: 72
End: 2022-05-12

## 2022-05-12 DIAGNOSIS — R53.1 LEFT-SIDED WEAKNESS: ICD-10-CM

## 2022-05-12 DIAGNOSIS — Z86.73 HISTORY OF CVA (CEREBROVASCULAR ACCIDENT): Primary | ICD-10-CM

## 2022-05-12 DIAGNOSIS — R53.1 WEAKNESS GENERALIZED: ICD-10-CM

## 2022-05-12 NOTE — TELEPHONE ENCOUNTER
----- Message from Kevan Vasquez sent at 5/12/2022  1:26 PM EDT -----  Subject: Message to Provider    QUESTIONS  Information for Provider? Patient is wanting an order for a Paradiseemilia Kirkpatrick; he is trying to walk around. Please call with information  ---------------------------------------------------------------------------  --------------  CALL BACK INFO  What is the best way for the office to contact you? OK to leave message on   voicemail  Preferred Call Back Phone Number? [phone]  ---------------------------------------------------------------------------  --------------  SCRIPT ANSWERS  Relationship to Patient?  Self

## 2022-05-16 NOTE — TELEPHONE ENCOUNTER
Called patient. ID verified with Name and . Spoke with patient in regards to message received. Informed patient that order for rollator was placed at this time. Writer asked patient if order needs to be sent to any particular DME at this time. Patient requested that order be mailed to him at this time. Order printed and placed in mail. No further actions required at this time.

## 2022-05-21 ENCOUNTER — TELEPHONE (OUTPATIENT)
Dept: INTERNAL MEDICINE CLINIC | Age: 72
End: 2022-05-21

## 2022-05-21 DIAGNOSIS — I10 HYPERTENSION, UNSPECIFIED TYPE: Primary | ICD-10-CM

## 2022-05-21 RX ORDER — AMLODIPINE BESYLATE 10 MG/1
10 TABLET ORAL DAILY
Qty: 30 TABLET | Refills: 0 | Status: ON HOLD | OUTPATIENT
Start: 2022-05-21 | End: 2022-06-06

## 2022-05-21 RX ORDER — AMLODIPINE BESYLATE 10 MG/1
10 TABLET ORAL DAILY
Qty: 90 TABLET | Refills: 0 | Status: SHIPPED | OUTPATIENT
Start: 2022-05-21 | End: 2022-08-18

## 2022-05-21 NOTE — TELEPHONE ENCOUNTER
Patient called he is out of amlodipine and uses mail order  I sent a short supply of amlodipine to  local pharmacy and refilled 90 day supply to [facility]

## 2022-06-02 ENCOUNTER — TELEPHONE (OUTPATIENT)
Dept: INTERNAL MEDICINE CLINIC | Age: 72
End: 2022-06-02

## 2022-06-02 NOTE — TELEPHONE ENCOUNTER
----- Message from [address] sent at 6/2/2022  9:03 AM EDT -----  Subject: Message to Provider    QUESTIONS  Information for Provider? Patient would like updated on his rollator   walker order. ---------------------------------------------------------------------------  --------------  Unknown Dura INFO  What is the best way for the office to contact you? OK to leave message on   voicemail  Preferred Call Back Phone Number? [phone]  ---------------------------------------------------------------------------  --------------  SCRIPT ANSWERS  Relationship to Patient?  Self

## 2022-06-02 NOTE — TELEPHONE ENCOUNTER
Called patient. ID verified with Name and . Spoke with patient in regards to message received. Writer informed patient that rollator order was placed on 22. Order submitted to [facility] via [place] at this time.

## 2022-06-05 ENCOUNTER — HOSPITAL ENCOUNTER (INPATIENT)
Age: 72
LOS: 8 days | Discharge: NURSING, REHAB OR LONG-TERM CARE | DRG: 871 | End: 2022-06-13
Attending: EMERGENCY MEDICINE | Admitting: STUDENT IN AN ORGANIZED HEALTH CARE EDUCATION/TRAINING PROGRAM
Payer: MEDICARE

## 2022-06-05 ENCOUNTER — APPOINTMENT (OUTPATIENT)
Dept: CT IMAGING | Age: 72
DRG: 871 | End: 2022-06-05
Attending: EMERGENCY MEDICINE
Payer: MEDICARE

## 2022-06-05 ENCOUNTER — APPOINTMENT (OUTPATIENT)
Dept: GENERAL RADIOLOGY | Age: 72
DRG: 871 | End: 2022-06-05
Attending: EMERGENCY MEDICINE
Payer: MEDICARE

## 2022-06-05 ENCOUNTER — APPOINTMENT (OUTPATIENT)
Dept: CT IMAGING | Age: 72
DRG: 871 | End: 2022-06-05
Attending: STUDENT IN AN ORGANIZED HEALTH CARE EDUCATION/TRAINING PROGRAM
Payer: MEDICARE

## 2022-06-05 DIAGNOSIS — R65.20 SEVERE SEPSIS (HCC): Primary | ICD-10-CM

## 2022-06-05 DIAGNOSIS — A41.9 SEVERE SEPSIS (HCC): Primary | ICD-10-CM

## 2022-06-05 DIAGNOSIS — R79.89 ELEVATED LACTIC ACID LEVEL: ICD-10-CM

## 2022-06-05 DIAGNOSIS — R50.9 FEVER, UNSPECIFIED FEVER CAUSE: ICD-10-CM

## 2022-06-05 DIAGNOSIS — R73.9 HYPERGLYCEMIA: ICD-10-CM

## 2022-06-05 LAB
ALBUMIN SERPL-MCNC: 3.6 G/DL (ref 3.5–5)
ALBUMIN/GLOB SERPL: 0.9 {RATIO} (ref 1.1–2.2)
ALP SERPL-CCNC: 116 U/L (ref 45–117)
ALT SERPL-CCNC: 19 U/L (ref 12–78)
ANION GAP SERPL CALC-SCNC: 9 MMOL/L (ref 5–15)
APPEARANCE UR: CLEAR
AST SERPL-CCNC: 10 U/L (ref 15–37)
BACTERIA URNS QL MICRO: NEGATIVE /HPF
BASOPHILS # BLD: 0 K/UL (ref 0–0.1)
BASOPHILS NFR BLD: 0 % (ref 0–1)
BILIRUB SERPL-MCNC: 0.5 MG/DL (ref 0.2–1)
BILIRUB UR QL: NEGATIVE
BUN SERPL-MCNC: 10 MG/DL (ref 6–20)
BUN/CREAT SERPL: 7 (ref 12–20)
CALCIUM SERPL-MCNC: 9.1 MG/DL (ref 8.5–10.1)
CHLORIDE SERPL-SCNC: 101 MMOL/L (ref 97–108)
CO2 SERPL-SCNC: 27 MMOL/L (ref 21–32)
COLOR UR: ABNORMAL
CREAT SERPL-MCNC: 1.43 MG/DL (ref 0.7–1.3)
DIFFERENTIAL METHOD BLD: ABNORMAL
EOSINOPHIL # BLD: 0 K/UL (ref 0–0.4)
EOSINOPHIL NFR BLD: 0 % (ref 0–7)
EPITH CASTS URNS QL MICRO: ABNORMAL /LPF
ERYTHROCYTE [DISTWIDTH] IN BLOOD BY AUTOMATED COUNT: 15.7 % (ref 11.5–14.5)
FLUAV RNA SPEC QL NAA+PROBE: NOT DETECTED
FLUBV RNA SPEC QL NAA+PROBE: NOT DETECTED
GLOBULIN SER CALC-MCNC: 4.2 G/DL (ref 2–4)
GLUCOSE BLD STRIP.AUTO-MCNC: 311 MG/DL (ref 65–117)
GLUCOSE BLD STRIP.AUTO-MCNC: 469 MG/DL (ref 65–117)
GLUCOSE SERPL-MCNC: 422 MG/DL (ref 65–100)
GLUCOSE UR STRIP.AUTO-MCNC: >1000 MG/DL
HCT VFR BLD AUTO: 43.3 % (ref 36.6–50.3)
HGB BLD-MCNC: 12.9 G/DL (ref 12.1–17)
HGB UR QL STRIP: ABNORMAL
IMM GRANULOCYTES # BLD AUTO: 0 K/UL (ref 0–0.04)
IMM GRANULOCYTES NFR BLD AUTO: 0 % (ref 0–0.5)
KETONES UR QL STRIP.AUTO: 15 MG/DL
LACTATE SERPL-SCNC: 2.9 MMOL/L (ref 0.4–2)
LACTATE SERPL-SCNC: 4.2 MMOL/L (ref 0.4–2)
LEUKOCYTE ESTERASE UR QL STRIP.AUTO: NEGATIVE
LYMPHOCYTES # BLD: 0.9 K/UL (ref 0.8–3.5)
LYMPHOCYTES NFR BLD: 8 % (ref 12–49)
MCH RBC QN AUTO: 23.2 PG (ref 26–34)
MCHC RBC AUTO-ENTMCNC: 29.8 G/DL (ref 30–36.5)
MCV RBC AUTO: 77.7 FL (ref 80–99)
MONOCYTES # BLD: 0.3 K/UL (ref 0–1)
MONOCYTES NFR BLD: 3 % (ref 5–13)
NEUTS SEG # BLD: 10.2 K/UL (ref 1.8–8)
NEUTS SEG NFR BLD: 89 % (ref 32–75)
NITRITE UR QL STRIP.AUTO: NEGATIVE
NRBC # BLD: 0 K/UL (ref 0–0.01)
NRBC BLD-RTO: 0 PER 100 WBC
PH UR STRIP: 6 [PH] (ref 5–8)
PLATELET # BLD AUTO: 287 K/UL (ref 150–400)
PMV BLD AUTO: 10.3 FL (ref 8.9–12.9)
POTASSIUM SERPL-SCNC: 4.1 MMOL/L (ref 3.5–5.1)
PROT SERPL-MCNC: 7.8 G/DL (ref 6.4–8.2)
PROT UR STRIP-MCNC: 30 MG/DL
RBC # BLD AUTO: 5.57 M/UL (ref 4.1–5.7)
RBC #/AREA URNS HPF: ABNORMAL /HPF (ref 0–5)
SARS-COV-2, COV2: NOT DETECTED
SERVICE CMNT-IMP: ABNORMAL
SERVICE CMNT-IMP: ABNORMAL
SODIUM SERPL-SCNC: 137 MMOL/L (ref 136–145)
SP GR UR REFRACTOMETRY: 1.03 (ref 1–1.03)
UA: UC IF INDICATED,UAUC: ABNORMAL
UROBILINOGEN UR QL STRIP.AUTO: 1 EU/DL (ref 0.2–1)
WBC # BLD AUTO: 11.5 K/UL (ref 4.1–11.1)
WBC URNS QL MICRO: ABNORMAL /HPF (ref 0–4)

## 2022-06-05 PROCEDURE — 81001 URINALYSIS AUTO W/SCOPE: CPT

## 2022-06-05 PROCEDURE — 82962 GLUCOSE BLOOD TEST: CPT

## 2022-06-05 PROCEDURE — 71270 CT THORAX DX C-/C+: CPT

## 2022-06-05 PROCEDURE — 51701 INSERT BLADDER CATHETER: CPT

## 2022-06-05 PROCEDURE — 74011000250 HC RX REV CODE- 250: Performed by: STUDENT IN AN ORGANIZED HEALTH CARE EDUCATION/TRAINING PROGRAM

## 2022-06-05 PROCEDURE — 96374 THER/PROPH/DIAG INJ IV PUSH: CPT

## 2022-06-05 PROCEDURE — 87636 SARSCOV2 & INF A&B AMP PRB: CPT

## 2022-06-05 PROCEDURE — 87040 BLOOD CULTURE FOR BACTERIA: CPT

## 2022-06-05 PROCEDURE — 70450 CT HEAD/BRAIN W/O DYE: CPT

## 2022-06-05 PROCEDURE — 83605 ASSAY OF LACTIC ACID: CPT

## 2022-06-05 PROCEDURE — 71045 X-RAY EXAM CHEST 1 VIEW: CPT

## 2022-06-05 PROCEDURE — 80053 COMPREHEN METABOLIC PANEL: CPT

## 2022-06-05 PROCEDURE — 74011250637 HC RX REV CODE- 250/637: Performed by: STUDENT IN AN ORGANIZED HEALTH CARE EDUCATION/TRAINING PROGRAM

## 2022-06-05 PROCEDURE — 74011000258 HC RX REV CODE- 258: Performed by: EMERGENCY MEDICINE

## 2022-06-05 PROCEDURE — 93005 ELECTROCARDIOGRAM TRACING: CPT

## 2022-06-05 PROCEDURE — 74011250637 HC RX REV CODE- 250/637: Performed by: EMERGENCY MEDICINE

## 2022-06-05 PROCEDURE — 36415 COLL VENOUS BLD VENIPUNCTURE: CPT

## 2022-06-05 PROCEDURE — 85025 COMPLETE CBC W/AUTO DIFF WBC: CPT

## 2022-06-05 PROCEDURE — 74011636637 HC RX REV CODE- 636/637: Performed by: EMERGENCY MEDICINE

## 2022-06-05 PROCEDURE — 96361 HYDRATE IV INFUSION ADD-ON: CPT

## 2022-06-05 PROCEDURE — 74011000636 HC RX REV CODE- 636: Performed by: EMERGENCY MEDICINE

## 2022-06-05 PROCEDURE — 74011250636 HC RX REV CODE- 250/636: Performed by: EMERGENCY MEDICINE

## 2022-06-05 PROCEDURE — 96360 HYDRATION IV INFUSION INIT: CPT

## 2022-06-05 PROCEDURE — 74011250636 HC RX REV CODE- 250/636: Performed by: STUDENT IN AN ORGANIZED HEALTH CARE EDUCATION/TRAINING PROGRAM

## 2022-06-05 PROCEDURE — 99285 EMERGENCY DEPT VISIT HI MDM: CPT

## 2022-06-05 PROCEDURE — 74011000258 HC RX REV CODE- 258: Performed by: STUDENT IN AN ORGANIZED HEALTH CARE EDUCATION/TRAINING PROGRAM

## 2022-06-05 PROCEDURE — 65270000032 HC RM SEMIPRIVATE

## 2022-06-05 RX ORDER — ACETAMINOPHEN 325 MG/1
650 TABLET ORAL
Status: DISCONTINUED | OUTPATIENT
Start: 2022-06-05 | End: 2022-06-05

## 2022-06-05 RX ORDER — SODIUM CHLORIDE 0.9 % (FLUSH) 0.9 %
5-10 SYRINGE (ML) INJECTION AS NEEDED
Status: DISCONTINUED | OUTPATIENT
Start: 2022-06-05 | End: 2022-06-06 | Stop reason: SDUPTHER

## 2022-06-05 RX ORDER — ACETAMINOPHEN 500 MG
1000 TABLET ORAL ONCE
Status: COMPLETED | OUTPATIENT
Start: 2022-06-05 | End: 2022-06-05

## 2022-06-05 RX ORDER — POLYETHYLENE GLYCOL 3350 17 G/17G
17 POWDER, FOR SOLUTION ORAL DAILY PRN
Status: DISCONTINUED | OUTPATIENT
Start: 2022-06-05 | End: 2022-06-13 | Stop reason: HOSPADM

## 2022-06-05 RX ORDER — ONDANSETRON 2 MG/ML
4 INJECTION INTRAMUSCULAR; INTRAVENOUS
Status: DISCONTINUED | OUTPATIENT
Start: 2022-06-05 | End: 2022-06-13 | Stop reason: HOSPADM

## 2022-06-05 RX ORDER — AMLODIPINE BESYLATE 5 MG/1
10 TABLET ORAL DAILY
Status: DISCONTINUED | OUTPATIENT
Start: 2022-06-05 | End: 2022-06-13 | Stop reason: HOSPADM

## 2022-06-05 RX ORDER — ACETAMINOPHEN 650 MG/1
650 SUPPOSITORY RECTAL
Status: DISCONTINUED | OUTPATIENT
Start: 2022-06-05 | End: 2022-06-05

## 2022-06-05 RX ORDER — ACETAMINOPHEN 650 MG/1
650 SUPPOSITORY RECTAL
Status: DISCONTINUED | OUTPATIENT
Start: 2022-06-05 | End: 2022-06-13 | Stop reason: HOSPADM

## 2022-06-05 RX ORDER — ENOXAPARIN SODIUM 100 MG/ML
30 INJECTION SUBCUTANEOUS EVERY 12 HOURS
Status: DISCONTINUED | OUTPATIENT
Start: 2022-06-05 | End: 2022-06-13 | Stop reason: HOSPADM

## 2022-06-05 RX ORDER — NYSTATIN 100000 [USP'U]/G
POWDER TOPICAL 2 TIMES DAILY
Status: DISCONTINUED | OUTPATIENT
Start: 2022-06-05 | End: 2022-06-13 | Stop reason: HOSPADM

## 2022-06-05 RX ORDER — LABETALOL HCL 20 MG/4 ML
20 SYRINGE (ML) INTRAVENOUS
Status: DISCONTINUED | OUTPATIENT
Start: 2022-06-05 | End: 2022-06-13 | Stop reason: HOSPADM

## 2022-06-05 RX ORDER — ACETAMINOPHEN 325 MG/1
650 TABLET ORAL
Status: DISCONTINUED | OUTPATIENT
Start: 2022-06-05 | End: 2022-06-13 | Stop reason: HOSPADM

## 2022-06-05 RX ORDER — SODIUM CHLORIDE 0.9 % (FLUSH) 0.9 %
5-40 SYRINGE (ML) INJECTION EVERY 8 HOURS
Status: DISCONTINUED | OUTPATIENT
Start: 2022-06-05 | End: 2022-06-13 | Stop reason: HOSPADM

## 2022-06-05 RX ORDER — LEVOFLOXACIN 5 MG/ML
750 INJECTION, SOLUTION INTRAVENOUS EVERY 24 HOURS
Status: DISCONTINUED | OUTPATIENT
Start: 2022-06-05 | End: 2022-06-05

## 2022-06-05 RX ORDER — SODIUM CHLORIDE 0.9 % (FLUSH) 0.9 %
5-40 SYRINGE (ML) INJECTION AS NEEDED
Status: DISCONTINUED | OUTPATIENT
Start: 2022-06-05 | End: 2022-06-13 | Stop reason: HOSPADM

## 2022-06-05 RX ORDER — ONDANSETRON 4 MG/1
4 TABLET, ORALLY DISINTEGRATING ORAL
Status: DISCONTINUED | OUTPATIENT
Start: 2022-06-05 | End: 2022-06-13 | Stop reason: HOSPADM

## 2022-06-05 RX ADMIN — Medication 10 UNITS: at 15:42

## 2022-06-05 RX ADMIN — ACETAMINOPHEN 650 MG: 650 SUPPOSITORY RECTAL at 21:51

## 2022-06-05 RX ADMIN — IOPAMIDOL 100 ML: 755 INJECTION, SOLUTION INTRAVENOUS at 16:06

## 2022-06-05 RX ADMIN — VANCOMYCIN HYDROCHLORIDE 2500 MG: 1 INJECTION, POWDER, LYOPHILIZED, FOR SOLUTION INTRAVENOUS at 20:41

## 2022-06-05 RX ADMIN — LEVOFLOXACIN 750 MG: 5 INJECTION, SOLUTION INTRAVENOUS at 17:03

## 2022-06-05 RX ADMIN — PIPERACILLIN AND TAZOBACTAM 4.5 G: 4; .5 INJECTION, POWDER, FOR SOLUTION INTRAVENOUS at 16:26

## 2022-06-05 RX ADMIN — ENOXAPARIN SODIUM 30 MG: 100 INJECTION SUBCUTANEOUS at 20:46

## 2022-06-05 RX ADMIN — SODIUM CHLORIDE 190 ML: 9 INJECTION, SOLUTION INTRAVENOUS at 16:26

## 2022-06-05 RX ADMIN — AMPICILLIN SODIUM 2 G: 2 INJECTION, POWDER, FOR SOLUTION INTRAVENOUS at 20:00

## 2022-06-05 RX ADMIN — LEVETIRACETAM 2000 MG: 100 INJECTION INTRAVENOUS at 21:53

## 2022-06-05 RX ADMIN — SODIUM CHLORIDE 1000 ML: 9 INJECTION, SOLUTION INTRAVENOUS at 14:26

## 2022-06-05 RX ADMIN — SODIUM CHLORIDE, PRESERVATIVE FREE 10 ML: 5 INJECTION INTRAVENOUS at 18:49

## 2022-06-05 RX ADMIN — CEFEPIME HYDROCHLORIDE 2 G: 2 INJECTION, POWDER, FOR SOLUTION INTRAMUSCULAR; INTRAVENOUS at 18:48

## 2022-06-05 RX ADMIN — ONDANSETRON 4 MG: 2 INJECTION INTRAMUSCULAR; INTRAVENOUS at 18:19

## 2022-06-05 RX ADMIN — SODIUM CHLORIDE 1000 ML: 9 INJECTION, SOLUTION INTRAVENOUS at 14:35

## 2022-06-05 RX ADMIN — ACYCLOVIR SODIUM 750 MG: 1000 INJECTION, SOLUTION INTRAVENOUS at 23:06

## 2022-06-05 RX ADMIN — SODIUM CHLORIDE, PRESERVATIVE FREE 10 ML: 5 INJECTION INTRAVENOUS at 23:14

## 2022-06-05 RX ADMIN — ACETAMINOPHEN 1000 MG: 500 TABLET ORAL at 14:27

## 2022-06-05 RX ADMIN — NYSTATIN: 100000 POWDER TOPICAL at 23:05

## 2022-06-05 NOTE — ED NOTES
Patient brought by EMS for hyperglycemia. Per their report, his glucose read HI. On arrival, patient noted to have some rigors and an oral temp of 102. 3. Patient's left side is flaccid from a previous stroke. Per the EMS providers, patient is not well cared for at home. Patient arrived with urine on his clothes and dried vomit on his shirt. Patient is oriented to his name and \"hospital\". He follows commands with his right side.

## 2022-06-05 NOTE — PROGRESS NOTES
[facility] Dosing Services: Vancomycin Dosing Progress Note    Consult for antibiotic dosing of vancomycin by Dr. Tiffany Edward  Indication: Sepsis   Day of Therapy: 1    Plan:  Vancomycin therapy:   Start with loading dose of vancomycin 2500 mg IV (25 mg/kg, max 2500 mg)   Follow with maintenance dose of vancomycin 750 mg IV every 12 hours at 0700 on 6/6/22   Dose calculated to approximate a   Target AUC/DARVIN of 400-60   Plan:  Daily creatinine ordered. Initiate vancomycin for predicted AUC of 479 per [employer] [facility]. Date Dose & Interval Measured (mcg/mL) Extrapolated (mcg/mL)   ? ? ? ?   ? ? ? ?   ? ? ? ? Other Antimicrobial  (not dosed by pharmacist)   Cefepime 2 g IV every 8 hours   Cultures     6/5/22: Blood Cx: Pending   Serum Creatinine     Lab Results   Component Value Date/Time    Creatinine 1.43 (H) 06/05/2022 01:47 PM    Creatinine (POC) 1.0 07/20/2018 01:08 PM       Creatinine Clearance Estimated Creatinine Clearance: 60.6 mL/min (A) (based on SCr of 1.43 mg/dL (H)). Temp   (!) 102.2 °F (39 °C)    WBC   Lab Results   Component Value Date/Time    WBC 11.5 (H) 06/05/2022 01:47 PM         Pharmacy to follow daily and will make changes to dose and/or frequency based on clinical status.     Thank you,     Jeanette Dsos, PharmD   Contact: [phone]

## 2022-06-05 NOTE — ACP (ADVANCE CARE PLANNING)
Advance Care Planning (ACP) Provider Note - Comprehensive     Date of ACP Conversation: 06/05/22  Persons included in Conversation:  Lyla roman and Romina Vieira  Length of ACP Conversation in minutes:  25 minutes      Diagnosis  Severe sepsis   Authorized Decision Maker (if patient is incapable of making informed decisions): This person is:  Next of Kin by law (only applies in absence of a Healthcare Power of  or Legal Guardian)  Bill Parks [phone]  Romina Vieira [phone]        General ACP for ALL Patients with Decision Making Capacity:   Importance of advance care planning, including choosing a healthcare agent to communicate patient's healthcare decisions if patient lost the ability to make decisions, such as after a sudden illness or accident  Understanding of the healthcare agent role was assessed and information provided  Exploration of values, goals, and preferences if recovery is not expected, even with continued medical treatment in the event of: Imminent death  Severe, permanent brain injury  \"In these circumstances, what matters most to you? \"  Care focused more on quantity (length) of life. Opportunity offered to explore how cultural, Jewish, spiritual, or personal beliefs would affect decisions for future care       For Serious or Chronic Illness:  Understanding of medical condition    Understanding of CPR, goals and expected outcomes, benefits and burdens discussed. Information on CPR success rates provided (e.g. for CPR in hospital, survival to d/c at two weeks is 22%, for chronically ill or elderly/frail survival is less than 3%); Individual asked to communicate understanding of information in his/her own words.   Explored fears and concerns regarding CPR or possible outcomes    Interventions Provided:  Recommended completion of Advance Directive form after review of ACP materials and conversation with prospective healthcare agent

## 2022-06-05 NOTE — PROGRESS NOTES
FLY     Patient came in the ER --  See full H & P for detail  73 y.o. male with past medical history significant for hypertension, hyperlipidemia, CVA, diabetes, and osteoarthritis who presents via EMS to the ED with cc of elevated blood glucose. Per EMS, they were initially called for lift assist.  Patient states he slid out of bed and family was unable to get him back into the bed. He denies any injury  MD Talked to Son- notice some changes with patient mentation-    Critical labs   Being admitted to the unit. Work-up in progress     Patient has Medicare Advantage Plan  Listed. First IMM issued by [employer]   CM to issue the Second IMM. His PCP is Dr. Niki Vance- last send in office December 2021 but has had multiple calls with the office Nurse -for issues related to Labs, Medications and DME ( Rollator) . Review for details.      CM assessment in progress - to follow-up tomorrow with patient and family     [place] 36  [phone]

## 2022-06-05 NOTE — PROGRESS NOTES
1807-patient is difficult to arouse, lethargic, tachycardic, hypertensive and has a temp of 102. Notified MD Lia Reynaga to come see patient at bedside. 1810- MD maria at bedside assessing patient. Patient difficult to arouse. NIH difficult to complete d/t LOC. NIH 27.     1816- code stroke called    1819- given prn zofran IV for dry heaving. 5040- patient taken to CT scan. 3083- spoke to MD Lia Reynaga regarding elevated bps wait to treat until patient finishes CT and seen by neurologist.     2280- tele neurology assessed pt believes patient may be having seizures and will treat patient for seizures     1850- call pharmacy for [facility]- discussed fever with MD Lia Reynaga will order Tylenol suppository    1854- anesthesia consult for for LP. Difficultly placing IV ED stuck patient 6x and MOHIT alanis attempted to placed a new IV.

## 2022-06-05 NOTE — Clinical Note
Status[de-identified] INPATIENT [101]   Type of Bed: Telemetry [19]   Cardiac Monitoring Required?: Yes   Inpatient Hospitalization Certified Necessary for the Following Reasons: 3.  Patient receiving treatment that can only be provided in an inpatient setting (further clarification in H&P documentation)   Admitting Diagnosis: Severe sepsis [facility]) [[id number]]   Admitting Physician: Eric Jaeger [[id number]]   Attending Physician: Ren Thomas   Estimated Length of Stay: 3-4 Midnights   Discharge Plan[de-identified] [facility]

## 2022-06-05 NOTE — PROGRESS NOTES
1730) TRANSFER - IN REPORT:    Verbal report received from 7007 Alexus Graham RN (name) on Eligio Agent  being received from ED (unit) for routine progression of care      Report consisted of patients Situation, Background, Assessment and   Recommendations(SBAR). Information from the following report(s) SBAR, Kardex, ED Summary, Intake/Output, MAR, Recent Results and Cardiac Rhythm Sinus Tach was reviewed with the receiving nurse. Opportunity for questions and clarification was provided. Assessment completed upon patients arrival to unit and care assumed. 1800) Pt arrived on unit from ED with Julissa Fong, RN,. Per Julissa Fong, Pt seems more unersponsive at this time and altered from her assessment. Pt unable to respond to orientation questions. Unable to follow commands, incomprehensive speech and grunting. BG obtained and 311. Vital signs obtained and BP elevated, HR tachy and temp still elevated. IV on left arm infiltrated and removed. Zofran given. 1815) NIH completed and now 27 at this time. MD called to bedside to assess alteration from baseline in ED.     1818) MD at the bedside. Code stroke called. Pt taken down to CT with this Raymundo Renata tech and Mitul Lake RN. Pt on continuous monitor. Teleneurlolgist taken down with pt.     8679) Pt back on unit. Vital signs obtained    1835) Teleneurologist on the monitor at the bedside. Tele neurologist stated he does not think it is a stroke but thinking seizures. Pt still unable to follow commands, answers yes to questions. Eyes deviate right but responding to light. Vital signs more stable. [phone] (Nursing supervisor) at the bedside to attempt IV start and unsuccessful. Amlodipine held r/t failed dysphagia screening. Cefepime hung and infusing.      1930)

## 2022-06-05 NOTE — PROGRESS NOTES
Need moving help with pt called er and asked them to bring pt so we can have assistants moving pt  to ct table for exam

## 2022-06-05 NOTE — ED NOTES
TRANSFER - OUT REPORT:    Verbal report given to La Allen RN (name) on Denton Santana  being transferred to [facility] [address] (unit) for routine progression of care       Report consisted of patients Situation, Background, Assessment and   Recommendations(SBAR). Information from the following report(s) SBAR, ED Summary, [facility] and Recent Results was reviewed with the receiving nurse. Lines:   Peripheral IV 06/05/22 Distal;Right Basilic (Active)       Peripheral IV 06/05/22 Left Antecubital (Active)        Opportunity for questions and clarification was provided.       Patient transported with:   Monitor  Registered Nurse

## 2022-06-05 NOTE — PROGRESS NOTES
Ember Leonardo 90 called  [facility] Neurologist contacted  3924 patient taken to CT on monitor with RN (tele neuro machine to CT with patient)

## 2022-06-05 NOTE — PROGRESS NOTES
[facility] Medication Reconciliation - IN PROGRESS    Information obtained from: Adeel Denny, [address]: Yes    Comments/recommendations:  Unable to perform medication reconciliation with patient at this time due to patient condition at this time. PTA medication list updated from [facility] and Adeel Denny. Compliance could not be assessed. Please interpret PTA list with caution. Per RxQuery data and [facility] dispense history, last dispense date of apixaban was 12/8/21 - 30 days supply, suggesting medication noncompliance. It is unclear if patient is receiving additional Humulin as prescription or over-the-counter from [facility]. Last dispense date 2/10/22, and prescription directions confirmed with [facility]. Per RxQuery data, patient uses [facility] in addition to [facility]. Pharmacy will call Faviola and Dr. Ethan Domingo office tomorrow during open business hours to confirm additional PTA medications. Medication changes (since last review): Added  None   Removed  Apixaban- last dispense date 12/8/21  No record found for the following medications  Clopidogrel  Clotrimazole   FeroSul   Furosemide  Gabapentin  Hydrochlorothiazide  Methocarbamol  Pravastatin  Senna-Docusate  Adjusted  None     The [place] Prescription Monitoring Program () was accessed to determine fill history of any controlled medications. No record found in 2022.    [facility] data reflects medications filled and processed through the patient's insurance, however                this data does NOT capture whether the medication was picked up or is currently being taken by the patient. Patient allergies: Allergies as of 06/05/2022    (No Known Allergies)     Prior to Admission Medications   Prescriptions Last Dose Informant Patient Reported? Taking?    amLODIPine (NORVASC) 10 mg tablet   No No   Sig: Take 1 Tablet by mouth daily. amLODIPine (NORVASC) 10 mg tablet   No No   Sig: Take 1 Tablet by mouth daily. escitalopram oxalate (LEXAPRO) 5 mg tablet   No No   Sig: Take 1 Tablet by mouth daily. glipiZIDE (GLUCOTROL) 10 mg tablet   No No   Sig: TAKE 1 TABLET BY MOUTH  TWICE DAILY   insulin nph-regular human rec (HUMULIN 70-30) 100 unit/mL (70-30) inpn   No No   Sig: Inject 12 units under the skin twice daily. If blood sugar remains above 150 after 5 days, increase to 14 units twice daily  Indications: type 2 diabetes mellitus   lisinopriL (PRINIVIL, ZESTRIL) 10 mg tablet   No No   Sig: Take 1 Tablet by mouth daily.    metFORMIN (GLUCOPHAGE) 1,000 mg tablet   No No   Sig: TAKE 1 TABLET BY MOUTH  TWICE DAILY WITH MEALS      Facility-Administered Medications: None     Thank you,     Amanda Sow, PharmD   Contact: [phone]

## 2022-06-05 NOTE — H&P
Hospitalist Admission Note    NAME: Gilberto Adames   :  1950   MRN:  [id number]   Room Number: TASIA/TASIA  @ [facility]     Date/Time:  2022 5:40 PM    Patient PCP: Asia Joseph MD  ______________________________________________________________________  Please note that this dictation was completed with myDocket, the computer voice recognition software. Quite often unanticipated grammatical, syntax, homophones, and other interpretive errors are inadvertently transcribed by the computer software. Please disregard these errors. Please excuse any errors that have escaped final proofreading. Given the patient's current clinical presentation, I have a high level of concern for decompensation if discharged from the emergency department. Complex decision making was performed, which includes reviewing the patient's available past medical records, laboratory results, and x-ray films. My assessment of this patient's clinical condition and my plan of care is as follows. Assessment / Plan: Active Problems:    Severe sepsis ([address] (2022)      Severe sepsis POA  Elevated lactic acid POA  On admission temperature 102.3 F, heart rate 112 bpm, respiratory rate 22/minute, lactic acid 4.1. Chest x-ray interpreted independently did not reveal any consolidation. Urinalysis negative for infection. COVID PCR negative influenza PCR negative. Received sepsis bolus in the ER. -Broad-spectrum antibiotic coverage with vancomycin and cefepime  -Await results of CT chest abdomen pelvis  -Await blood culture results.  -Continue maintenance fluid      Right sided gaze deviation   Acute encephalopathy POA (due to sepsis, need to rule out CNS infection. CT Head interpreted independently 2022 4PM- no evidence of mass, infarct, hemorrhage noted. ).    History of CVA  Residual left sided hemiparesis POA  Acute change in mental status, patient somnolent, inconsistently following commands, right sided gaze deviation noted at 1815. Code S called. - CT Head without contrast Code STAT  - Teleneurologist contacted  - NPO  - Nursing swallow assessment  - PT/OT/SLP  - ECHO  - MRI Brain without contrast, MRA Brain and Neck        CKD III POA   Creatinine at baseline 1.3-1.4.   -Strict I's and O's, avoid nephrotoxic meds, renally dose meds. Type 2 diabetes with hyperglycemia POA  Lab Results   Component Value Date/Time    Hemoglobin A1c 9.0 (H) 08/31/2021 03:47 PM    Hemoglobin A1c (POC) 13.9 (A) 06/18/2021 12:42 PM     - repeat A1c  - Lispro correctional scale, FSG AC HS  - Consistent carb diet, hypoglycemia protocol. Uncontrolled Hypertension POA   HLD POA  - resume amlodipine  - Labetalol PRN for SBP > 180 mm Hg,DBP > 100 mm Hg       Body mass index is 34.9 kg/m². Code Status: full   Surrogate Decision Maker:  Bill Alonso [phone]  [facility] [phone]    DVT Prophylaxis: Lovenox  GI Prophylaxis: not indicated  Baseline: does not walk much, mostly wheelchair, sometimes ambulates with walker          Subjective:   CHIEF COMPLAINT: fall    HISTORY OF PRESENT ILLNESS:     Wilner Vasquez is a 73 y.o.  male with PMH of diabetes, hypertension, hyperlipidemia, stroke, chronic pain who presents to ED with c/o fall, confusion. Patient is unable to provide meaningful history due to altered mental status. Yesterday, patient was noted to be at baseline, last seen normal at 11 PM.   Patient's son Falguni Alonso heard him fall and when he went to check on him, he noted patient to be   incoherent, confused and mumbling prompting him to call EMS. At baseline, does not walk much, mostly wheelchair, sometimes ambulates with walker  Independent in ADLs. But needs help with meals. Both sons live with him and help him with ADLs.      Patient is somnolent, not following commands, right sided gaze deviation on my exam    We were asked to admit for work up and evaluation of the above problems. Past Medical History:   Diagnosis Date    Allergic rhinitis     Arthritis     djd of knees    Chronic pain     Diabetes ([address]     Hypercholesteremia     Hypertension     Obesity     Stroke ([address]     left--ring finger/ pinky finger numb sensation        Past Surgical History:   Procedure Laterality Date    HX CHOLECYSTECTOMY      laparoscopic       Social History     Tobacco Use    Smoking status: Former Smoker     Years: 2.00     Quit date: 2017     Years since quittin.5    Smokeless tobacco: Never Used   Substance Use Topics    Alcohol use: No     Comment: stopped -used to drink wine (7 days a week        Family History   Problem Relation Age of Onset    Diabetes Mother     Hypertension Mother     Kidney Disease Mother     OSTEOARTHRITIS Father     Diabetes Father     No Known Problems Sister     No Known Problems Sister     No Known Problems Sister     No Known Problems Sister     Cancer Sister         cancer     No Known Allergies     Prior to Admission medications    Medication Sig Start Date End Date Taking? Authorizing Provider   metFORMIN (GLUCOPHAGE) 1,000 mg tablet TAKE 1 TABLET BY MOUTH  TWICE DAILY WITH MEALS 22   Chelsea Haile MD   glipiZIDE (GLUCOTROL) 10 mg tablet TAKE 1 TABLET BY MOUTH  TWICE DAILY 3/31/22   Chelsea Haile MD   lisinopriL (PRINIVIL, ZESTRIL) 10 mg tablet Take 1 Tablet by mouth daily. 3/31/22   Chelsea Haile MD   amLODIPine (NORVASC) 10 mg tablet Take 1 Tablet by mouth daily. 22   Laurie Subramanian MD   amLODIPine (NORVASC) 10 mg tablet Take 1 Tablet by mouth daily. 22   Laurie Subramanian MD   escitalopram oxalate (LEXAPRO) 5 mg tablet Take 1 Tablet by mouth daily. 21   Chelsea Haile MD   insulin nph-regular human rec (HUMULIN 70-30) 100 unit/mL (70-30) inpn Inject 12 units under the skin twice daily.  If blood sugar remains above 150 after 5 days, increase to 14 units twice daily  Indications: type 2 diabetes mellitus 6/18/21   Ignacio Avila MD       REVIEW OF SYSTEMS:     I am not able to complete the review of systems because: The patient is intubated and sedated   x The patient has altered mental status due to his acute medical problems    The patient has baseline aphasia from prior stroke(s)    The patient has baseline dementia and is not reliable historian    The patient is in acute medical distress and unable to provide information           Total of 12 systems reviewed as follows:       POSITIVE= underlined text  Negative = text not underlined  General:  fever, chills, sweats, generalized weakness, weight loss/gain,      loss of appetite   Eyes:    blurred vision, eye pain, loss of vision, double vision  ENT:    rhinorrhea, pharyngitis   Respiratory:   cough, sputum production, SOB, DE JESUS, wheezing, pleuritic pain   Cardiology:   chest pain, palpitations, orthopnea, PND, edema, syncope   Gastrointestinal:  abdominal pain , N/V, diarrhea, dysphagia, constipation, bleeding   Genitourinary:  frequency, urgency, dysuria, hematuria, incontinence   Muskuloskeletal :  arthralgia, myalgia, back pain  Hematology:  easy bruising, nose or gum bleeding, lymphadenopathy   Dermatological: rash, ulceration, pruritis, color change / jaundice  Endocrine:   hot flashes or polydipsia   Neurological:  headache, dizziness, confusion, focal weakness, paresthesia,     Speech difficulties, memory loss, gait difficulty  Psychological: Feelings of anxiety, depression, agitation    Objective:   VITALS:    Visit Vitals  BP (!) 168/80   Pulse (!) 107   Temp (!) 102.2 °F (39 °C)   Resp 24   Wt 110.3 kg (243 lb 4 oz)   SpO2 96%   BMI 34.90 kg/m²       PHYSICAL EXAM:    General:    Somnolent, opens eye to stimuli, not following commands, shivering, appears stated age.      HEENT: Atraumatic, anicteric sclerae, pink conjunctivae     No oral ulcers, mucosa moist, throat clear, dentition poor  Neck:  Supple, symmetrical,  thyroid: non tender  Lungs: Clear to auscultation bilaterally. No Wheezing or Rhonchi. No rales. Chest wall:  No tenderness  No Accessory muscle use. Heart:   Regular  rhythm,  No  murmur   No edema  Abdomen:   Soft, non-tender. Not distended. Bowel sounds normal  Extremities: No cyanosis. No clubbing,      Skin turgor normal, Capillary refill normal, Radial dial pulse 2+  Skin:     Not pale. Not Jaundiced  Erythematous rash in groin  Psych:  Good insight. Not depressed. Not anxious or agitated. Neurologic: EOMs intact. No facial asymmetry. No aphasia or slurred speech. Symmetrical strength, Sensation grossly intact. Alert and oriented X 4.     ______________________________________________________________________    Care Plan discussed with:  Patient/Family, Nurse,  and Consultant Neurolgist    Expected  Disposition:  SNF/LTC  ________________________________________________________________________  TOTAL TIME:  72 Minutes    Critical Care Provided     Minutes non procedure based      Comments    x Reviewed previous records   >50% of visit spent in counseling and coordination of care x Discussion with patient and/or family and questions answered       ________________________________________________________________________  Signed: Isis Villeda MD    Procedures: see electronic medical records for all procedures/Xrays and details which were not copied into this note but were reviewed prior to creation of Plan.     LAB DATA REVIEWED:    Recent Results (from the past 24 hour(s))   URINALYSIS W/ REFLEX CULTURE    Collection Time: 06/05/22  1:47 PM    Specimen: Urine   Result Value Ref Range    Color YELLOW/STRAW      Appearance CLEAR CLEAR      Specific gravity 1.030 1.003 - 1.030      pH (UA) 6.0 5.0 - 8.0      Protein 30 (A) NEG mg/dL    Glucose >1,000 (A) NEG mg/dL    Ketone 15 (A) NEG mg/dL    Bilirubin Negative NEG      Blood SMALL (A) NEG      Urobilinogen 1.0 0.2 - 1.0 EU/dL    Nitrites Negative NEG      Leukocyte Esterase Negative NEG      WBC 0-4 0 - 4 /hpf    RBC 0-5 0 - 5 /hpf    Epithelial cells FEW FEW /lpf    Bacteria Negative NEG /hpf    UA:UC IF INDICATED CULTURE NOT INDICATED BY UA RESULT CNI     METABOLIC PANEL, COMPREHENSIVE    Collection Time: 06/05/22  1:47 PM   Result Value Ref Range    Sodium 137 136 - 145 mmol/L    Potassium 4.1 3.5 - 5.1 mmol/L    Chloride 101 97 - 108 mmol/L    CO2 27 21 - 32 mmol/L    Anion gap 9 5 - 15 mmol/L    Glucose 422 (H) 65 - 100 mg/dL    BUN 10 6 - 20 MG/DL    Creatinine 1.43 (H) 0.70 - 1.30 MG/DL    BUN/Creatinine ratio 7 (L) 12 - 20      GFR est AA 59 (L) >60 ml/min/1.73m2    GFR est non-AA 49 (L) >60 ml/min/1.73m2    Calcium 9.1 8.5 - 10.1 MG/DL    Bilirubin, total 0.5 0.2 - 1.0 MG/DL    ALT (SGPT) 19 12 - 78 U/L    AST (SGOT) 10 (L) 15 - 37 U/L    Alk. phosphatase 116 45 - 117 U/L    Protein, total 7.8 6.4 - 8.2 g/dL    Albumin 3.6 3.5 - 5.0 g/dL    Globulin 4.2 (H) 2.0 - 4.0 g/dL    A-G Ratio 0.9 (L) 1.1 - 2.2     CBC WITH AUTOMATED DIFF    Collection Time: 06/05/22  1:47 PM   Result Value Ref Range    WBC 11.5 (H) 4.1 - 11.1 K/uL    RBC 5.57 4.10 - 5.70 M/uL    HGB 12.9 12.1 - 17.0 g/dL    HCT 43.3 36.6 - 50.3 %    MCV 77.7 (L) 80.0 - 99.0 FL    MCH 23.2 (L) 26.0 - 34.0 PG    MCHC 29.8 (L) 30.0 - 36.5 g/dL    RDW 15.7 (H) 11.5 - 14.5 %    PLATELET 434 687 - 863 K/uL    MPV 10.3 8.9 - 12.9 FL    NRBC 0.0 0  WBC    ABSOLUTE NRBC 0.00 0.00 - 0.01 K/uL    NEUTROPHILS 89 (H) 32 - 75 %    LYMPHOCYTES 8 (L) 12 - 49 %    MONOCYTES 3 (L) 5 - 13 %    EOSINOPHILS 0 0 - 7 %    BASOPHILS 0 0 - 1 %    IMMATURE GRANULOCYTES 0 0.0 - 0.5 %    ABS. NEUTROPHILS 10.2 (H) 1.8 - 8.0 K/UL    ABS. LYMPHOCYTES 0.9 0.8 - 3.5 K/UL    ABS. MONOCYTES 0.3 0.0 - 1.0 K/UL    ABS. EOSINOPHILS 0.0 0.0 - 0.4 K/UL    ABS. BASOPHILS 0.0 0.0 - 0.1 K/UL    ABS. IMM.  GRANS. 0.0 0.00 - 0.04 K/UL    DF AUTOMATED     LACTIC ACID    Collection Time: 06/05/22  1:47 PM   Result Value Ref Range    Lactic acid 4.2 (HH) 0.4 - 2.0 MMOL/L   COVID-19 WITH INFLUENZA A/B    Collection Time: 06/05/22  1:47 PM   Result Value Ref Range    SARS-CoV-2 by PCR Not detected NOTD      Influenza A by PCR Not detected      Influenza B by PCR Not detected     GLUCOSE, POC    Collection Time: 06/05/22  2:32 PM   Result Value Ref Range    Glucose (POC) 469 (H) 65 - 117 mg/dL    Performed by Sonia Delgado RN    EKG, 12 LEAD, INITIAL    Collection Time: 06/05/22  2:39 PM   Result Value Ref Range    Ventricular Rate 114 BPM    Atrial Rate 114 BPM    P-R Interval 200 ms    QRS Duration 72 ms    Q-T Interval 320 ms    QTC Calculation (Bezet) 441 ms    Calculated P Axis 31 degrees    Calculated R Axis 10 degrees    Calculated T Axis 55 degrees    Diagnosis       Sinus tachycardia  When compared with ECG of 20-JUL-2018 12:48,  premature ventricular complexes are no longer present  Non-specific change in ST segment in Inferior leads  Nonspecific T wave abnormality no longer evident in Inferior leads     LACTIC ACID    Collection Time: 06/05/22  3:51 PM   Result Value Ref Range    Lactic acid 2.9 (HH) 0.4 - 2.0 MMOL/L

## 2022-06-05 NOTE — PROGRESS NOTES
[facility] Dosing Services: Antimicrobial Stewardship    Piperacillin-Tazobactam was automatically dose-adjusted per P&T Committee Protocol       Previous Regimen Piperacillin- Tazobactam 3.375 g IV every 8 hours    Serum Creatinine Lab Results   Component Value Date/Time    Creatinine 1.43 (H) 06/05/2022 01:47 PM    Creatinine (POC) 1.0 07/20/2018 01:08 PM       Creatinine Clearance Estimated Creatinine Clearance: 60.6 mL/min (A) (based on SCr of 1.43 mg/dL (H)). Dosage changed to:  Piperacillin- Tazobactam 4.5 g IV once over 30 minutes followed in 6 hours by Piperacillin- Tazobactam 3.375 g IV every 8 hours. Additional notes:    Pharmacy to continue to monitor patient daily. Will make dosage adjustments based upon changing renal function.     Thank you,     Yun Felipe, PharmD   Contact: [phone]

## 2022-06-05 NOTE — ED PROVIDER NOTES
EMERGENCY DEPARTMENT HISTORY AND PHYSICAL EXAM      Date: 6/5/2022  Patient Name: Waqar Blake    History of Presenting Illness     Chief Complaint   Patient presents with    High Blood Sugar     History Provided By: Patient and EMS    HPI: Waqar Blake, 73 y.o. male with past medical history significant for hypertension, hyperlipidemia, CVA, diabetes, and osteoarthritis who presents via EMS to the ED with cc of elevated blood glucose. Per EMS, they were initially called for lift assist.  Patient states he slid out of bed and family was unable to get him back into the bed. He denies any injury. He does endorse nausea and vomiting that started today but denies any cough, fevers, or shortness of breath. He does complain of chills. History is otherwise limited. PMHx: Hypertension, hyperlipidemia, CVA, diabetes, and osteoarthritis  Social Hx: Former smoker, lives at home with family    PCP: Germán Hayes MD    History and review of systems limited secondary to acuity. No current facility-administered medications on file prior to encounter. Current Outpatient Medications on File Prior to Encounter   Medication Sig Dispense Refill    metFORMIN (GLUCOPHAGE) 1,000 mg tablet TAKE 1 TABLET BY MOUTH  TWICE DAILY WITH MEALS 180 Tablet 3    glipiZIDE (GLUCOTROL) 10 mg tablet TAKE 1 TABLET BY MOUTH  TWICE DAILY 180 Tablet 3    lisinopriL (PRINIVIL, ZESTRIL) 10 mg tablet Take 1 Tablet by mouth daily. 90 Tablet 3    amLODIPine (NORVASC) 10 mg tablet Take 1 Tablet by mouth daily. 30 Tablet 0    amLODIPine (NORVASC) 10 mg tablet Take 1 Tablet by mouth daily. 90 Tablet 0    amoxicillin (AMOXIL) 500 mg capsule Take 1 Capsule by mouth three (3) times daily.  21 Capsule 0    Blood-Glucose Meter monitoring kit Check fsbs bid 240.02 1 Kit 0    glucose blood VI test strips (ASCENSIA AUTODISC VI, ONE TOUCH ULTRA TEST VI) strip Check fsbs bid 250,02 50 Strip 5    Blood-Glucose Meter monitoring kit Use to check your blood sugar three times daily. E11.9. Use brand preferred by insurance. 1 Kit 0    glucose blood VI test strips (ASCENSIA AUTODISC VI, ONE TOUCH ULTRA TEST VI) strip Check fsb bid 250.02 100 Strip 5    hydroCHLOROthiazide (HYDRODIURIL) 25 mg tablet One every day 90 Tablet 3    escitalopram oxalate (LEXAPRO) 5 mg tablet Take 1 Tablet by mouth daily. 90 Tablet 1    Insulin Needles, Disposable, 31 gauge x 5/16\" ndle 1 Pen Needle by SubCUTAneous route two (2) times a day. Use to give insulin twice daily 50 Pen Needle 11    gabapentin (NEURONTIN) 300 mg capsule TAKE 1 CAPSULE BY MOUTH 3  TIMES DAILY 270 Capsule 3    clopidogreL (PLAVIX) 75 mg tab TAKE 1 TABLET BY MOUTH ONCE DAILY 90 Tablet 3    pravastatin (PRAVACHOL) 20 mg tablet TAKE 1 TABLET BY MOUTH IN  THE EVENING 90 Tablet 3    apixaban (ELIQUIS) 5 mg tablet Take 1 Tablet by mouth two (2) times a day. (Patient not taking: Reported on 8/31/2021) 28 Tablet 0    FeroSuL 325 mg (65 mg iron) tablet Take 1 Tablet by mouth Daily (before breakfast). (Patient not taking: Reported on 12/30/2021) 90 Tablet 1    apixaban (ELIQUIS) 5 mg tablet Take 1 Tablet by mouth two (2) times a day. 60 Tablet 5    Compression Socks, Large misc 1 Each by Does Not Apply route daily. (Patient not taking: Reported on 8/31/2021) 2 Each 99    Comp. Stocking,Thigh,Long,Large misc Knee high 20-30mmhg closed toe (Patient not taking: Reported on 8/31/2021) 2 Each 12    insulin nph-regular human rec (HUMULIN 70-30) 100 unit/mL (70-30) inpn Inject 12 units under the skin twice daily. If blood sugar remains above 150 after 5 days, increase to 14 units twice daily  Indications: type 2 diabetes mellitus (Patient taking differently: Inject 18 units under the skin twice daily. If blood sugar remains above 150 after 5 days, increase to 14 units twice daily  Indications: type 2 diabetes mellitus) 15 mL 5    lancets misc Use to check your blood sugar three times daily. E11.9.  Use brand preferred by insurance. 100 Each 11    methocarbamoL (Robaxin-750) 750 mg tablet Take 1 Tablet by mouth four (4) times daily as needed for Muscle Spasm(s). (Patient not taking: Reported on 2021) 20 Tablet 0    furosemide (Lasix) 20 mg tablet Take 1 Tablet by mouth daily. (Patient not taking: Reported on 2021) 15 Tablet 0    clotrimazole (LOTRIMIN) 1 % topical cream Apply  to affected area two (2) times a day. (Patient not taking: Reported on 2021) 15 g 1    glucose blood VI test strips (BLOOD GLUCOSE TEST) strip Use to check blood sugar up to 3 times daily. Use brand preferred by insurance. 100 Strip 11    senna-docusate (SENNA PLUS) 8.6-50 mg per tablet Take 1 Tab by mouth two (2) times a day. 180 Tab 3    alcohol swabs (ALCOHOL PREP SWABS) padm 10 Each by Apply Externally route two (2) times a day.  100 Pad 3     Past History     Past Medical History:  Past Medical History:   Diagnosis Date    Allergic rhinitis     Arthritis     djd of knees    Chronic pain     Diabetes ([address]     Hypercholesteremia     Hypertension     Obesity     Stroke ([address]     left--ring finger/ pinky finger numb sensation     Past Surgical History:  Past Surgical History:   Procedure Laterality Date    HX CHOLECYSTECTOMY      laparoscopic     Family History:  Family History   Problem Relation Age of Onset    Diabetes Mother     Hypertension Mother     Kidney Disease Mother     OSTEOARTHRITIS Father     Diabetes Father     No Known Problems Sister     No Known Problems Sister     No Known Problems Sister     No Known Problems Sister     Cancer Sister         cancer     Social History:  Social History     Tobacco Use    Smoking status: Former Smoker     Years: 2.00     Quit date: 2017     Years since quittin.5    Smokeless tobacco: Never Used   Vaping Use    Vaping Use: Never used   Substance Use Topics    Alcohol use: No     Comment: stopped -used to drink wine (7 days a week    Drug use: Yes     Types: Prescription, OTC     Allergies:  No Known Allergies  Review of Systems   Review of Systems   Unable to perform ROS: Acuity of condition   Gastrointestinal: Positive for nausea and vomiting. Physical Exam   Physical Exam  Vitals and nursing note reviewed. Constitutional:       Appearance: Normal appearance. He is well-developed. He is morbidly obese. Comments: Clothes and bed sheets saturated in urine   HENT:      Head: Normocephalic and atraumatic. Cardiovascular:      Rate and Rhythm: Regular rhythm. Tachycardia present. Pulses: Normal pulses. Heart sounds: Normal heart sounds. Pulmonary:      Effort: Pulmonary effort is normal. Tachypnea present. No respiratory distress. Breath sounds: Normal breath sounds. Chest:      Chest wall: No tenderness. Abdominal:      General: Bowel sounds are normal.      Palpations: Abdomen is soft. Tenderness: There is no abdominal tenderness. There is no guarding or rebound. Musculoskeletal:      Cervical back: Full passive range of motion without pain, normal range of motion and neck supple. Skin:     General: Skin is warm and dry. Findings: No erythema or rash. Neurological:      Mental Status: He is alert. Cranial Nerves: Cranial nerves are intact.       Comments: Oriented to self and location, disoriented to year, left upper and lower extremity weakness       Diagnostic Study Results   Labs -     Recent Results (from the past 12 hour(s))   URINALYSIS W/ REFLEX CULTURE    Collection Time: 06/05/22  1:47 PM    Specimen: Urine   Result Value Ref Range    Color YELLOW/STRAW      Appearance CLEAR CLEAR      Specific gravity 1.030 1.003 - 1.030      pH (UA) 6.0 5.0 - 8.0      Protein 30 (A) NEG mg/dL    Glucose >1,000 (A) NEG mg/dL    Ketone 15 (A) NEG mg/dL    Bilirubin Negative NEG      Blood SMALL (A) NEG      Urobilinogen 1.0 0.2 - 1.0 EU/dL    Nitrites Negative NEG      Leukocyte Esterase Negative NEG WBC 0-4 0 - 4 /hpf    RBC 0-5 0 - 5 /hpf    Epithelial cells FEW FEW /lpf    Bacteria Negative NEG /hpf    UA:UC IF INDICATED CULTURE NOT INDICATED BY UA RESULT CNI     METABOLIC PANEL, COMPREHENSIVE    Collection Time: 06/05/22  1:47 PM   Result Value Ref Range    Sodium 137 136 - 145 mmol/L    Potassium 4.1 3.5 - 5.1 mmol/L    Chloride 101 97 - 108 mmol/L    CO2 27 21 - 32 mmol/L    Anion gap 9 5 - 15 mmol/L    Glucose 422 (H) 65 - 100 mg/dL    BUN 10 6 - 20 MG/DL    Creatinine 1.43 (H) 0.70 - 1.30 MG/DL    BUN/Creatinine ratio 7 (L) 12 - 20      GFR est AA 59 (L) >60 ml/min/1.73m2    GFR est non-AA 49 (L) >60 ml/min/1.73m2    Calcium 9.1 8.5 - 10.1 MG/DL    Bilirubin, total 0.5 0.2 - 1.0 MG/DL    ALT (SGPT) 19 12 - 78 U/L    AST (SGOT) 10 (L) 15 - 37 U/L    Alk. phosphatase 116 45 - 117 U/L    Protein, total 7.8 6.4 - 8.2 g/dL    Albumin 3.6 3.5 - 5.0 g/dL    Globulin 4.2 (H) 2.0 - 4.0 g/dL    A-G Ratio 0.9 (L) 1.1 - 2.2     CBC WITH AUTOMATED DIFF    Collection Time: 06/05/22  1:47 PM   Result Value Ref Range    WBC 11.5 (H) 4.1 - 11.1 K/uL    RBC 5.57 4.10 - 5.70 M/uL    HGB 12.9 12.1 - 17.0 g/dL    HCT 43.3 36.6 - 50.3 %    MCV 77.7 (L) 80.0 - 99.0 FL    MCH 23.2 (L) 26.0 - 34.0 PG    MCHC 29.8 (L) 30.0 - 36.5 g/dL    RDW 15.7 (H) 11.5 - 14.5 %    PLATELET 389 517 - 243 K/uL    MPV 10.3 8.9 - 12.9 FL    NRBC 0.0 0  WBC    ABSOLUTE NRBC 0.00 0.00 - 0.01 K/uL    NEUTROPHILS 89 (H) 32 - 75 %    LYMPHOCYTES 8 (L) 12 - 49 %    MONOCYTES 3 (L) 5 - 13 %    EOSINOPHILS 0 0 - 7 %    BASOPHILS 0 0 - 1 %    IMMATURE GRANULOCYTES 0 0.0 - 0.5 %    ABS. NEUTROPHILS 10.2 (H) 1.8 - 8.0 K/UL    ABS. LYMPHOCYTES 0.9 0.8 - 3.5 K/UL    ABS. MONOCYTES 0.3 0.0 - 1.0 K/UL    ABS. EOSINOPHILS 0.0 0.0 - 0.4 K/UL    ABS. BASOPHILS 0.0 0.0 - 0.1 K/UL    ABS. IMM.  GRANS. 0.0 0.00 - 0.04 K/UL    DF AUTOMATED     LACTIC ACID    Collection Time: 06/05/22  1:47 PM   Result Value Ref Range    Lactic acid 4.2 (HH) 0.4 - 2.0 MMOL/L COVID-19 WITH INFLUENZA A/B    Collection Time: 06/05/22  1:47 PM   Result Value Ref Range    SARS-CoV-2 by PCR Not detected NOTD      Influenza A by PCR Not detected      Influenza B by PCR Not detected     GLUCOSE, POC    Collection Time: 06/05/22  2:32 PM   Result Value Ref Range    Glucose (POC) 469 (H) 65 - 117 mg/dL    Performed by Sonia Delgado RN        Radiologic Studies -   XR CHEST PORT   Final Result   No evidence of acute cardiopulmonary process. CT HEAD WO CONT    (Results Pending)   CT CHEST ABD PELV W WO CONT    (Results Pending)     XR CHEST PORT    Result Date: 6/5/2022  No evidence of acute cardiopulmonary process. Medical Decision Making   I am the first provider for this patient. I reviewed the vital signs, available nursing notes, past medical history, past surgical history, family history and social history. Vital Signs-Reviewed the patient's vital signs. Patient Vitals for the past 24 hrs:   Temp Pulse Resp BP SpO2   06/05/22 1530 -- (!) 113 24 (!) 186/70 98 %   06/05/22 1512 (!) 101.9 °F (38.8 °C) -- -- -- --   06/05/22 1500 -- (!) 117 23 (!) 190/87 97 %   06/05/22 1448 -- (!) 112 24 (!) 172/101 98 %   06/05/22 1445 -- (!) 114 25 (!) 216/109 97 %   06/05/22 1330 (!) 102.3 °F (39.1 °C) (!) 112 22 (!) 186/112 93 %     Pulse Oximetry Analysis - 93% on RA (borderline)    Cardiac Monitor:   Rate: 112 bpm  Rhythm: Sinus Tachycardia     ED EKG interpretation: 14:39  Rhythm: sinus tachycardia; and regular . Rate (approx.): 114; Axis: normal; P wave: normal; QRS interval: normal ; ST/T wave: normal; Other findings: normal. This EKG was interpreted by Yannick Paredes MD,ED Provider. Records Reviewed: Nursing Notes, Old Medical Records, Previous Radiology Studies and Previous Laboratory Studies    Provider Notes (Medical Decision Making):   82-year-old male presents via EMS with elevated blood glucose and confusion. He is febrile, tachycardic, and tachypneic.   Differential includes sepsis, DKA, hyperglycemia, dehydration, COVID, pneumonia, UTI, and low suspicion for GI pathology. Patient is slightly confused, unclear if this is his baseline as no family is here. Will not initiate stroke protocol but will perform NIH and dysphagia screening. ED Course:   Initial assessment performed. The patients presenting problems have been discussed, and they are in agreement with the care plan formulated and outlined with them. I have encouraged them to ask questions as they arise throughout their visit. Progress Note  1:41 PM  Patient meets sepsis criteria. Will initiate sepsis protocols. Progress Note  2:34 PM  I have received a call from the lab. Patient has a critical lactate of 4.2. Progress Note  3:22 PM  I have spoken with patient's son, Kentrell Lugo, who states that he is normally conversant, is able to check his email and use a cell phone. Today his presentation was significantly different. Informed Kentrell Lugo that he would likely need to be admitted to the hospital given his acute presentation. He asks that we keep him updated at [phone] or his brother Maura Pham at [phone]. Progress Note  3:26 PM  I have re-evaluated pt and his UA is unremarkable for a source of his infection. His COVID and flu swabs are negative as is his chest x-ray. His white count is slightly elevated with a left shift. Given his severe sepsis, will initiate empiric antibiotics with Levaquin and Zosyn and discuss with the hospitalist for admission. 4:08 PM  Martir Munguia MD spoke with Dr. Jory Francois, Consult for Hospitalist. Discussed HPI and PE, available diagnostic tests and clinical findings. She is in agreement with care plans as outlined. She agrees to admit the patient.     CRITICAL CARE NOTE :    4:09 PM    IMPENDING DETERIORATION -CNS and Metabolic    ASSOCIATED RISK FACTORS - Dehydration and CNS Decompensation    MANAGEMENT- Bedside Assessment    INTERPRETATION -  Xrays, CT Scan and ECG    INTERVENTIONS - Metobolic interventions    CASE REVIEW - Hospitalist/Intensivist, Nursing and Family    TREATMENT RESPONSE -Improved    PERFORMED BY - Self    NOTES   :    I have spent 50 minutes of critical care time involved in lab review, consultations with specialist, family decision- making, bedside attention and documentation. During this entire length of time I was immediately available to the patient. Critical Care: The reason for providing this level of medical care for this critically ill patient was due to a critical illness that impaired one or more vital organ systems such that there was a high probability of imminent or life threatening deterioration in the patients condition. This care involved high complexity decision making to assess, manipulate, and support vital system functions. Jose Schmitz MD    Progress Note:   Updated pt on all returned results and findings. Discussed the importance of proper follow up as referred below along with return precautions. Pt in agreement with the care plan and expresses agreement with and understanding of all items discussed. Disposition:  ADMIT NOTE:  4:09 PM  The patient is being admitted to the hospital by Dr. Tyson Mendoza. The results of their tests and reasons for their admission have been discussed with the patient and/or available family. They convey agreement and understanding for the need to be admitted and for their admission diagnosis. PLAN:  1. Admit    Diagnosis     Clinical Impression:   1. Severe sepsis ([address]    2. Hyperglycemia    3. Elevated lactic acid level    4. Fever, unspecified fever cause            Please note that this dictation was completed with Dragon, computer voice recognition software. Quite often unanticipated grammatical, syntax, homophones, and other interpretive errors are inadvertently transcribed by the computer software. Please disregard these errors.   Additionally, please excuse any errors that have escaped final proofreading.

## 2022-06-05 NOTE — ED TRIAGE NOTES
Patient received from EMS after they were called to his house because he fell off the bed. EMS assessed him and checked his blood sugar that read\"hi\" twice. Upon arrival, patient also noted to have fever of 102.3 PO. His speech is slightly garbled from previous stoke.

## 2022-06-06 ENCOUNTER — APPOINTMENT (OUTPATIENT)
Dept: NON INVASIVE DIAGNOSTICS | Age: 72
DRG: 871 | End: 2022-06-06
Attending: STUDENT IN AN ORGANIZED HEALTH CARE EDUCATION/TRAINING PROGRAM
Payer: MEDICARE

## 2022-06-06 LAB
ALBUMIN SERPL-MCNC: 2.6 G/DL (ref 3.5–5)
ALBUMIN/GLOB SERPL: 0.7 {RATIO} (ref 1.1–2.2)
ALP SERPL-CCNC: 80 U/L (ref 45–117)
ALT SERPL-CCNC: 16 U/L (ref 12–78)
AMPHET UR QL SCN: NEGATIVE
ANION GAP SERPL CALC-SCNC: 9 MMOL/L (ref 5–15)
APPEARANCE CSF: ABNORMAL
AST SERPL-CCNC: 21 U/L (ref 15–37)
BARBITURATES UR QL SCN: NEGATIVE
BASOPHILS # BLD: 0 K/UL (ref 0–0.1)
BASOPHILS NFR BLD: 0 % (ref 0–1)
BENZODIAZ UR QL: NEGATIVE
BILIRUB DIRECT SERPL-MCNC: 0.1 MG/DL (ref 0–0.2)
BILIRUB SERPL-MCNC: 0.6 MG/DL (ref 0.2–1)
BUN SERPL-MCNC: 6 MG/DL (ref 6–20)
BUN/CREAT SERPL: 6 (ref 12–20)
CALCIUM SERPL-MCNC: 7.4 MG/DL (ref 8.5–10.1)
CANNABINOIDS UR QL SCN: NEGATIVE
CHLORIDE SERPL-SCNC: 108 MMOL/L (ref 97–108)
CHOLEST SERPL-MCNC: 142 MG/DL
CO2 SERPL-SCNC: 24 MMOL/L (ref 21–32)
COCAINE UR QL SCN: NEGATIVE
COLOR CSF: ABNORMAL
COLOR SPUN CSF: COLORLESS
CREAT SERPL-MCNC: 0.98 MG/DL (ref 0.7–1.3)
CRYPTOCOCCUS NEOFORMANS/GATTII, CRNEOG: NOT DETECTED
CYTOMEGALOVIRUS, CYMEG: NOT DETECTED
DIFFERENTIAL METHOD BLD: ABNORMAL
DRUG SCRN COMMENT,DRGCM: NORMAL
ENTEROVIRUS, ENTVIR: NOT DETECTED
EOSINOPHIL # BLD: 0 K/UL (ref 0–0.4)
EOSINOPHIL NFR BLD: 0 % (ref 0–7)
ERYTHROCYTE [DISTWIDTH] IN BLOOD BY AUTOMATED COUNT: 15.9 % (ref 11.5–14.5)
ESCHERICHIA COLI K1, ECK1: NOT DETECTED
EST. AVERAGE GLUCOSE BLD GHB EST-MCNC: 312 MG/DL
GLOBULIN SER CALC-MCNC: 3.6 G/DL (ref 2–4)
GLUCOSE BLD STRIP.AUTO-MCNC: 191 MG/DL (ref 65–117)
GLUCOSE BLD STRIP.AUTO-MCNC: 241 MG/DL (ref 65–117)
GLUCOSE CSF-MCNC: 133 MG/DL (ref 40–70)
GLUCOSE SERPL-MCNC: 228 MG/DL (ref 65–100)
HAEMOPHILUS INFLUENZAE, HAEFLU: NOT DETECTED
HBA1C MFR BLD: 12.5 % (ref 4–5.6)
HCT VFR BLD AUTO: 36 % (ref 36.6–50.3)
HDLC SERPL-MCNC: 28 MG/DL
HDLC SERPL: 5.1 {RATIO} (ref 0–5)
HERPES SIMPLEX VIRUS 2, HSIMV2: NOT DETECTED
HGB BLD-MCNC: 10.8 G/DL (ref 12.1–17)
HSV1 DNA CSF QL NAA+PROBE: NOT DETECTED
HUMAN HERPESVIRUS 6, HUHV6: NOT DETECTED
HUMAN PARECHOVIRUS, HUPARV: NOT DETECTED
IMM GRANULOCYTES # BLD AUTO: 0.1 K/UL (ref 0–0.04)
IMM GRANULOCYTES NFR BLD AUTO: 1 % (ref 0–0.5)
LACTATE SERPL-SCNC: 1.1 MMOL/L (ref 0.4–2)
LDLC SERPL CALC-MCNC: 83.8 MG/DL (ref 0–100)
LISTERIA MONOCYTOGENES, LISMON: NOT DETECTED
LYMPHOCYTES # BLD: 1.8 K/UL (ref 0.8–3.5)
LYMPHOCYTES NFR BLD: 22 % (ref 12–49)
LYMPHOCYTES NFR CSF MANUAL: 1 % (ref 28–96)
MAGNESIUM SERPL-MCNC: 1.2 MG/DL (ref 1.6–2.4)
MCH RBC QN AUTO: 23.4 PG (ref 26–34)
MCHC RBC AUTO-ENTMCNC: 30 G/DL (ref 30–36.5)
MCV RBC AUTO: 77.9 FL (ref 80–99)
METHADONE UR QL: NEGATIVE
MONOCYTES # BLD: 0.9 K/UL (ref 0–1)
MONOCYTES NFR BLD: 11 % (ref 5–13)
MONOCYTES NFR CSF MANUAL: 1 % (ref 16–56)
NEISSERIA MENINGITIDIS, NEIMEN: NOT DETECTED
NEUTROPHILS NFR CSF MANUAL: 98 % (ref 0–7)
NEUTS SEG # BLD: 5.7 K/UL (ref 1.8–8)
NEUTS SEG NFR BLD: 66 % (ref 32–75)
NRBC # BLD: 0 K/UL (ref 0–0.01)
NRBC BLD-RTO: 0 PER 100 WBC
OPIATES UR QL: NEGATIVE
PCP UR QL: NEGATIVE
PHOSPHATE SERPL-MCNC: 2.3 MG/DL (ref 2.6–4.7)
PLATELET # BLD AUTO: 223 K/UL (ref 150–400)
PMV BLD AUTO: 11 FL (ref 8.9–12.9)
POTASSIUM SERPL-SCNC: 3.3 MMOL/L (ref 3.5–5.1)
PROT CSF-MCNC: 69 MG/DL (ref 15–45)
PROT SERPL-MCNC: 6.2 G/DL (ref 6.4–8.2)
RBC # BLD AUTO: 4.62 M/UL (ref 4.1–5.7)
RBC # CSF: 5000 /CU MM
SERVICE CMNT-IMP: ABNORMAL
SERVICE CMNT-IMP: ABNORMAL
SODIUM SERPL-SCNC: 141 MMOL/L (ref 136–145)
STREPTOCOCCUS AGALACTIAE, SAGA: NOT DETECTED
STREPTOCOCCUS PNEUMONIAE, STRPNE: NOT DETECTED
TRIGL SERPL-MCNC: 151 MG/DL (ref ?–150)
TUBE # CSF: 1
TUBE # CSF: 1
TUBE # CSF: 3
VARICELLA ZOSTER VIRUS, VAZOVI: NOT DETECTED
VLDLC SERPL CALC-MCNC: 30.2 MG/DL
WBC # BLD AUTO: 8.4 K/UL (ref 4.1–11.1)
WBC # CSF: 38 /CU MM (ref 0–5)

## 2022-06-06 PROCEDURE — 74011250637 HC RX REV CODE- 250/637: Performed by: STUDENT IN AN ORGANIZED HEALTH CARE EDUCATION/TRAINING PROGRAM

## 2022-06-06 PROCEDURE — 82945 GLUCOSE OTHER FLUID: CPT

## 2022-06-06 PROCEDURE — 83036 HEMOGLOBIN GLYCOSYLATED A1C: CPT

## 2022-06-06 PROCEDURE — 74011000258 HC RX REV CODE- 258: Performed by: STUDENT IN AN ORGANIZED HEALTH CARE EDUCATION/TRAINING PROGRAM

## 2022-06-06 PROCEDURE — 80048 BASIC METABOLIC PNL TOTAL CA: CPT

## 2022-06-06 PROCEDURE — 80061 LIPID PANEL: CPT

## 2022-06-06 PROCEDURE — 2709999900 HC NON-CHARGEABLE SUPPLY

## 2022-06-06 PROCEDURE — 009U3ZX DRAINAGE OF SPINAL CANAL, PERCUTANEOUS APPROACH, DIAGNOSTIC: ICD-10-PCS | Performed by: ANESTHESIOLOGY

## 2022-06-06 PROCEDURE — 74011000250 HC RX REV CODE- 250: Performed by: STUDENT IN AN ORGANIZED HEALTH CARE EDUCATION/TRAINING PROGRAM

## 2022-06-06 PROCEDURE — 93306 TTE W/DOPPLER COMPLETE: CPT

## 2022-06-06 PROCEDURE — 87483 CNS DNA AMP PROBE TYPE 12-25: CPT

## 2022-06-06 PROCEDURE — 87015 SPECIMEN INFECT AGNT CONCNTJ: CPT

## 2022-06-06 PROCEDURE — 80076 HEPATIC FUNCTION PANEL: CPT

## 2022-06-06 PROCEDURE — 84157 ASSAY OF PROTEIN OTHER: CPT

## 2022-06-06 PROCEDURE — 87205 SMEAR GRAM STAIN: CPT

## 2022-06-06 PROCEDURE — 83735 ASSAY OF MAGNESIUM: CPT

## 2022-06-06 PROCEDURE — 36415 COLL VENOUS BLD VENIPUNCTURE: CPT

## 2022-06-06 PROCEDURE — 65270000032 HC RM SEMIPRIVATE

## 2022-06-06 PROCEDURE — 74011636637 HC RX REV CODE- 636/637: Performed by: STUDENT IN AN ORGANIZED HEALTH CARE EDUCATION/TRAINING PROGRAM

## 2022-06-06 PROCEDURE — 89050 BODY FLUID CELL COUNT: CPT

## 2022-06-06 PROCEDURE — 82962 GLUCOSE BLOOD TEST: CPT

## 2022-06-06 PROCEDURE — 74011250636 HC RX REV CODE- 250/636: Performed by: STUDENT IN AN ORGANIZED HEALTH CARE EDUCATION/TRAINING PROGRAM

## 2022-06-06 PROCEDURE — 77010033678 HC OXYGEN DAILY

## 2022-06-06 PROCEDURE — 80307 DRUG TEST PRSMV CHEM ANLYZR: CPT

## 2022-06-06 PROCEDURE — 85025 COMPLETE CBC W/AUTO DIFF WBC: CPT

## 2022-06-06 PROCEDURE — 95816 EEG AWAKE AND DROWSY: CPT | Performed by: STUDENT IN AN ORGANIZED HEALTH CARE EDUCATION/TRAINING PROGRAM

## 2022-06-06 PROCEDURE — 83605 ASSAY OF LACTIC ACID: CPT

## 2022-06-06 PROCEDURE — 84100 ASSAY OF PHOSPHORUS: CPT

## 2022-06-06 RX ORDER — SODIUM CHLORIDE 0.9 % (FLUSH) 0.9 %
10 SYRINGE (ML) INJECTION AS NEEDED
Status: DISCONTINUED | OUTPATIENT
Start: 2022-06-06 | End: 2022-06-07 | Stop reason: SDUPTHER

## 2022-06-06 RX ORDER — LORAZEPAM 2 MG/ML
0.5 INJECTION INTRAMUSCULAR ONCE
Status: COMPLETED | OUTPATIENT
Start: 2022-06-06 | End: 2022-06-06

## 2022-06-06 RX ORDER — DEXTROSE 50 % IN WATER (D50W) INTRAVENOUS SYRINGE
25-50 AS NEEDED
Status: DISCONTINUED | OUTPATIENT
Start: 2022-06-06 | End: 2022-06-13 | Stop reason: HOSPADM

## 2022-06-06 RX ORDER — INSULIN LISPRO 100 [IU]/ML
INJECTION, SOLUTION INTRAVENOUS; SUBCUTANEOUS EVERY 6 HOURS
Status: DISCONTINUED | OUTPATIENT
Start: 2022-06-06 | End: 2022-06-09

## 2022-06-06 RX ORDER — MAGNESIUM SULFATE 100 %
4 CRYSTALS MISCELLANEOUS AS NEEDED
Status: DISCONTINUED | OUTPATIENT
Start: 2022-06-06 | End: 2022-06-13 | Stop reason: HOSPADM

## 2022-06-06 RX ORDER — MAGNESIUM SULFATE HEPTAHYDRATE 40 MG/ML
2 INJECTION, SOLUTION INTRAVENOUS ONCE
Status: COMPLETED | OUTPATIENT
Start: 2022-06-06 | End: 2022-06-07

## 2022-06-06 RX ORDER — INSULIN GLARGINE 100 [IU]/ML
10 INJECTION, SOLUTION SUBCUTANEOUS DAILY
Status: DISCONTINUED | OUTPATIENT
Start: 2022-06-06 | End: 2022-06-13 | Stop reason: HOSPADM

## 2022-06-06 RX ORDER — POTASSIUM CHLORIDE 7.45 MG/ML
10 INJECTION INTRAVENOUS ONCE
Status: COMPLETED | OUTPATIENT
Start: 2022-06-06 | End: 2022-06-07

## 2022-06-06 RX ADMIN — NYSTATIN: 100000 POWDER TOPICAL at 18:40

## 2022-06-06 RX ADMIN — CEFEPIME HYDROCHLORIDE 2 G: 2 INJECTION, POWDER, FOR SOLUTION INTRAMUSCULAR; INTRAVENOUS at 09:58

## 2022-06-06 RX ADMIN — AMPICILLIN SODIUM 2 G: 2 INJECTION, POWDER, FOR SOLUTION INTRAVENOUS at 17:46

## 2022-06-06 RX ADMIN — SODIUM CHLORIDE, PRESERVATIVE FREE 10 ML: 5 INJECTION INTRAVENOUS at 12:55

## 2022-06-06 RX ADMIN — Medication 3 UNITS: at 12:16

## 2022-06-06 RX ADMIN — POTASSIUM CHLORIDE 10 MEQ: 7.46 INJECTION, SOLUTION INTRAVENOUS at 11:35

## 2022-06-06 RX ADMIN — ACYCLOVIR SODIUM 750 MG: 1000 INJECTION, SOLUTION INTRAVENOUS at 06:01

## 2022-06-06 RX ADMIN — AMPICILLIN SODIUM 2 G: 2 INJECTION, POWDER, FOR SOLUTION INTRAVENOUS at 06:23

## 2022-06-06 RX ADMIN — ENOXAPARIN SODIUM 30 MG: 100 INJECTION SUBCUTANEOUS at 08:52

## 2022-06-06 RX ADMIN — SODIUM CHLORIDE, PRESERVATIVE FREE 10 ML: 5 INJECTION INTRAVENOUS at 06:02

## 2022-06-06 RX ADMIN — ACETAMINOPHEN 650 MG: 650 SUPPOSITORY RECTAL at 10:11

## 2022-06-06 RX ADMIN — LORAZEPAM 0.5 MG: 2 INJECTION INTRAMUSCULAR; INTRAVENOUS at 16:17

## 2022-06-06 RX ADMIN — SODIUM CHLORIDE 1000 MG: 9 INJECTION, SOLUTION INTRAVENOUS at 21:34

## 2022-06-06 RX ADMIN — SODIUM CHLORIDE, PRESERVATIVE FREE 10 ML: 5 INJECTION INTRAVENOUS at 22:33

## 2022-06-06 RX ADMIN — MAGNESIUM SULFATE HEPTAHYDRATE 2 G: 2 INJECTION, SOLUTION INTRAVENOUS at 11:35

## 2022-06-06 RX ADMIN — VANCOMYCIN HYDROCHLORIDE 750 MG: 750 INJECTION, POWDER, LYOPHILIZED, FOR SOLUTION INTRAVENOUS at 08:53

## 2022-06-06 RX ADMIN — AMPICILLIN SODIUM 2 G: 2 INJECTION, POWDER, FOR SOLUTION INTRAVENOUS at 02:20

## 2022-06-06 RX ADMIN — CEFEPIME HYDROCHLORIDE 2 G: 2 INJECTION, POWDER, FOR SOLUTION INTRAMUSCULAR; INTRAVENOUS at 01:14

## 2022-06-06 RX ADMIN — SODIUM CHLORIDE, PRESERVATIVE FREE 10 ML: 5 INJECTION INTRAVENOUS at 12:40

## 2022-06-06 RX ADMIN — AMPICILLIN SODIUM 2 G: 2 INJECTION, POWDER, FOR SOLUTION INTRAVENOUS at 11:34

## 2022-06-06 RX ADMIN — Medication 10 UNITS: at 11:00

## 2022-06-06 RX ADMIN — AMPICILLIN SODIUM 2 G: 2 INJECTION, POWDER, FOR SOLUTION INTRAVENOUS at 21:38

## 2022-06-06 RX ADMIN — POTASSIUM PHOSPHATE, MONOBASIC AND POTASSIUM PHOSPHATE, DIBASIC: 224; 236 INJECTION, SOLUTION, CONCENTRATE INTRAVENOUS at 11:34

## 2022-06-06 RX ADMIN — ENOXAPARIN SODIUM 30 MG: 100 INJECTION SUBCUTANEOUS at 20:56

## 2022-06-06 RX ADMIN — SODIUM CHLORIDE 1000 MG: 9 INJECTION, SOLUTION INTRAVENOUS at 09:58

## 2022-06-06 RX ADMIN — ACYCLOVIR SODIUM 750 MG: 1000 INJECTION, SOLUTION INTRAVENOUS at 22:28

## 2022-06-06 RX ADMIN — VANCOMYCIN HYDROCHLORIDE 1000 MG: 1 INJECTION, POWDER, LYOPHILIZED, FOR SOLUTION INTRAVENOUS at 20:01

## 2022-06-06 RX ADMIN — Medication 2 UNITS: at 18:38

## 2022-06-06 RX ADMIN — CEFEPIME HYDROCHLORIDE 2 G: 2 INJECTION, POWDER, FOR SOLUTION INTRAMUSCULAR; INTRAVENOUS at 17:45

## 2022-06-06 RX ADMIN — ACYCLOVIR SODIUM 750 MG: 1000 INJECTION, SOLUTION INTRAVENOUS at 14:51

## 2022-06-06 RX ADMIN — SODIUM CHLORIDE, PRESERVATIVE FREE 10 ML: 5 INJECTION INTRAVENOUS at 12:45

## 2022-06-06 RX ADMIN — AMPICILLIN SODIUM 2 G: 2 INJECTION, POWDER, FOR SOLUTION INTRAVENOUS at 00:04

## 2022-06-06 RX ADMIN — NYSTATIN: 100000 POWDER TOPICAL at 08:54

## 2022-06-06 NOTE — PROGRESS NOTES
0700  Shift change report received from [facility]  (Nurse). Report included review of SBAR, accordion report results review, orders, meds, ROS and POC. (things to be done) All questions answered. Transfer of care completed. 0800 Patient adjusted in bed    1000 Patient cleaned    1100 Spoke to patient's sons. Updated them and filled ou MRI screen. 1200 Lester removed in preporation for mri    1300 Patein sleeping    1400 Patient resting comfortably    1600  Preparing for LP    1700 LP preformed     1800  Patient adjusted in bed.

## 2022-06-06 NOTE — PROGRESS NOTES
Shift change report given to Mayuri Mosqueda (oncoming nurse) by Diamond Higgins (offgoing nurse). Report included the following information VS, review of systems, improved neurological status, medcations, and plans for the day. Planned tests: MRI, ECHO, LP: all of which will need signed consents from pt's designated member.

## 2022-06-06 NOTE — PROGRESS NOTES
Physical therapy:     Chart reviewed. Patient received sleeping in bed. Patient able to wake briefly with sternal rub before immediately going back to sleep. Will attempt as able.      Valery Rossi, PT

## 2022-06-06 NOTE — PROGRESS NOTES
Diagnostic Lumbar Puncture    Patient sedated with Ativan  Right lateral positioning  Sterile technique  @22 Spinal needle to L 3-4    Pressure not measured but appeared low    Vial #1 blood tinged due to blood in needle hub.   Vials 2,3,4 appeared crystal clear

## 2022-06-06 NOTE — PROGRESS NOTES
Attempted to see patient at reported time for OT evaluation, although patient presented with decreased arousal and was unable to actively participate. Evaluation to be completed at a later time.

## 2022-06-06 NOTE — PROGRESS NOTES
Problem: Aspiration - Risk of  Goal: *Absence of aspiration  Outcome: Progressing Towards Goal     Problem: Patient Education: Go to Patient Education Activity  Goal: Patient/Family Education  Outcome: Not Progressing Towards Goal     Problem: Pressure Injury - Risk of  Goal: *Prevention of pressure injury  Description: Document Andrzej Scale and appropriate interventions in the flowsheet. Outcome: Progressing Towards Goal  Note: Pressure Injury Interventions:  Sensory Interventions: Float heels    Moisture Interventions: Absorbent underpads,Minimize layers    Activity Interventions: Pressure redistribution bed/mattress(bed type)    Mobility Interventions: Pressure redistribution bed/mattress (bed type)    Nutrition Interventions:  (currently NPO)    Friction and Shear Interventions: Lift sheet                Problem: Patient Education: Go to Patient Education Activity  Goal: Patient/Family Education  Outcome: Not Progressing Towards Goal     Problem: Falls - Risk of  Goal: *Absence of Falls  Description: Document Luis Eduardo Fall Risk and appropriate interventions in the flowsheet.   Outcome: Progressing Towards Goal  Note: Fall Risk Interventions:       Mentation Interventions: Adequate sleep, hydration, pain control,Door open when patient unattended    Medication Interventions: Bed/chair exit alarm    Elimination Interventions: Bed/chair exit alarm    History of Falls Interventions: Consult care management for discharge planning         Problem: Patient Education: Go to Patient Education Activity  Goal: Patient/Family Education  Outcome: Not Progressing Towards Goal     Problem: Sepsis: Day of Diagnosis  Goal: *Fluid resuscitation  Outcome: Progressing Towards Goal  Goal: *Paired blood cultures prior to first dose of antibiotic  Outcome: Progressing Towards Goal  Goal: *First dose of  appropriate antibiotic within 3 hours of arrival to ED, within 1 hour of arrival to ICU  Outcome: Progressing Towards Goal  Goal: *Lactic acid with first set of blood cultures  Outcome: Progressing Towards Goal  Goal: Activity/Safety  Outcome: Progressing Towards Goal  Goal: Consults, if ordered  Outcome: Progressing Towards Goal  Goal: Diagnostic Test/Procedures  Outcome: Progressing Towards Goal  Goal: Discharge Planning  Outcome: Not Progressing Towards Goal  Goal: Medications  Outcome: Progressing Towards Goal  Goal: Treatments/Interventions/Procedures  Outcome: Progressing Towards Goal  Goal: Psychosocial  Outcome: Progressing Towards Goal

## 2022-06-06 NOTE — CONSULTS
Infectious Disease Consult Note    Reason for Consult: Sepsis  Date of Consultation: June 6, 2022  Date of Admission: 6/5/2022  Referring Physician: Dr Yandy Waters       HPI:    Chart reviewed for remote infectious disease consult . Mr Vitor Meehan is a 63-year-old gentleman with a history of left total knee arthroplasty in 2018, prostate adenocarcinoma status post cryoablation in 2009, diabetes who presented to the emergency room with high blood sugar (422). He is found to be in sepsis with an elevated lactate, mild leukocytosis of 45.1 with neutrophilic predominance and febrile with a T-max of 102.4. He has been hypertensive initially and oxygenating well on 2 L nasal cannula per chart. A CT of his head was reported as no acute findings. Blood cultures have been sent and pending. Patient was empirically started on vancomycin and cefepime ampicillin and acyclovir. Plans for LP per primary team.  He is on Keppra with plans for EEG neurology consult MRI of the brain. I received a consult today for guidance for antimicrobials. Past Medical History:  Past Medical History:   Diagnosis Date    Allergic rhinitis     Arthritis     djd of knees    Chronic pain     Diabetes ([address]     Hypercholesteremia     Hypertension     Obesity     Stroke ([address]     left--ring finger/ pinky finger numb sensation         Surgical History:  Past Surgical History:   Procedure Laterality Date    HX CHOLECYSTECTOMY      laparoscopic         Family History:   Family History   Problem Relation Age of Onset    Diabetes Mother     Hypertension Mother     Kidney Disease Mother     OSTEOARTHRITIS Father     Diabetes Father     No Known Problems Sister     No Known Problems Sister     No Known Problems Sister     No Known Problems Sister     Cancer Sister         cancer         Allergies:  No Known Allergies          Medications:  No current facility-administered medications on file prior to encounter.      Current Outpatient Medications on File Prior to Encounter   Medication Sig Dispense Refill    amLODIPine (NORVASC) 10 mg tablet Take 1 Tablet by mouth daily. 90 Tablet 0    metFORMIN (GLUCOPHAGE) 1,000 mg tablet TAKE 1 TABLET BY MOUTH  TWICE DAILY WITH MEALS 180 Tablet 3    glipiZIDE (GLUCOTROL) 10 mg tablet TAKE 1 TABLET BY MOUTH  TWICE DAILY 180 Tablet 3    lisinopriL (PRINIVIL, ZESTRIL) 10 mg tablet Take 1 Tablet by mouth daily. 90 Tablet 3    escitalopram oxalate (LEXAPRO) 5 mg tablet Take 1 Tablet by mouth daily.  90 Tablet 1           Physical Exam:    Vitals:   Patient Vitals for the past 24 hrs:   Temp Pulse Resp BP SpO2   06/06/22 1219 99.1 °F (37.3 °C) 84 20 (!) 136/92 96 %   06/06/22 1200 -- 84 -- -- --   06/06/22 0800 (!) 101 °F (38.3 °C) 88 22 138/71 94 %   06/06/22 0500 -- 89 21 (!) 149/75 92 %   06/06/22 0400 98.8 °F (37.1 °C) 90 20 137/65 95 %   06/06/22 0300 -- 88 22 (!) 145/64 --   06/06/22 0200 -- 90 22 137/67 --   06/06/22 0100 -- 95 21 (!) 144/60 --   06/06/22 0000 (!) 101.1 °F (38.4 °C) (!) 105 19 135/70 97 %   06/05/22 2300 -- 98 24 (!) 151/66 --   06/05/22 2200 -- 96 20 (!) 153/63 --   06/05/22 2130 -- 95 23 (!) 169/81 --   06/05/22 2115 -- 97 20 (!) 164/79 --   06/05/22 2100 -- 98 24 (!) 160/83 --   06/05/22 2000 (!) 101.7 °F (38.7 °C) (!) 103 22 (!) 149/82 98 %   06/05/22 1903 -- (!) 106 26 (!) 146/73 --   06/05/22 1845 -- (!) 105 21 (!) 183/81 --   06/05/22 1836 -- (!) 108 23 (!) 168/86 95 %   06/05/22 1815 -- (!) 111 21 (!) 195/101 --   06/05/22 1800 (!) 102.4 °F (39.1 °C) (!) 111 24 (!) 177/88 96 %   06/05/22 1700 (!) 102.2 °F (39 °C) -- -- -- --   06/05/22 1645 -- (!) 107 24 (!) 168/80 96 %   06/05/22 1630 -- (!) 103 21 (!) 183/76 95 %   ·     Labs:   Recent Results (from the past 24 hour(s))   GLUCOSE, POC    Collection Time: 06/05/22  6:11 PM   Result Value Ref Range    Glucose (POC) 311 (H) 65 - 117 mg/dL    Performed by [employer] ACID    Collection Time: 06/06/22  5:04 AM Result Value Ref Range    Lactic acid 1.1 0.4 - 2.0 MMOL/L   LIPID PANEL    Collection Time: 06/06/22  5:04 AM   Result Value Ref Range    Cholesterol, total 142 <200 MG/DL    Triglyceride 151 (H) <150 MG/DL    HDL Cholesterol 28 MG/DL    LDL, calculated 83.8 0 - 100 MG/DL    VLDL, calculated 30.2 MG/DL    CHOL/HDL Ratio 5.1 (H) 0.0 - 5.0     HEMOGLOBIN A1C WITH EAG    Collection Time: 06/06/22  5:04 AM   Result Value Ref Range    Hemoglobin A1c 12.5 (H) 4.0 - 5.6 %    Est. average glucose 312 mg/dL   CBC WITH AUTOMATED DIFF    Collection Time: 06/06/22  5:04 AM   Result Value Ref Range    WBC 8.4 4.1 - 11.1 K/uL    RBC 4.62 4.10 - 5.70 M/uL    HGB 10.8 (L) 12.1 - 17.0 g/dL    HCT 36.0 (L) 36.6 - 50.3 %    MCV 77.9 (L) 80.0 - 99.0 FL    MCH 23.4 (L) 26.0 - 34.0 PG    MCHC 30.0 30.0 - 36.5 g/dL    RDW 15.9 (H) 11.5 - 14.5 %    PLATELET 090 732 - 750 K/uL    MPV 11.0 8.9 - 12.9 FL    NRBC 0.0 0  WBC    ABSOLUTE NRBC 0.00 0.00 - 0.01 K/uL    NEUTROPHILS 66 32 - 75 %    LYMPHOCYTES 22 12 - 49 %    MONOCYTES 11 5 - 13 %    EOSINOPHILS 0 0 - 7 %    BASOPHILS 0 0 - 1 %    IMMATURE GRANULOCYTES 1 (H) 0.0 - 0.5 %    ABS. NEUTROPHILS 5.7 1.8 - 8.0 K/UL    ABS. LYMPHOCYTES 1.8 0.8 - 3.5 K/UL    ABS. MONOCYTES 0.9 0.0 - 1.0 K/UL    ABS. EOSINOPHILS 0.0 0.0 - 0.4 K/UL    ABS. BASOPHILS 0.0 0.0 - 0.1 K/UL    ABS. IMM.  GRANS. 0.1 (H) 0.00 - 0.04 K/UL    DF AUTOMATED     METABOLIC PANEL, BASIC    Collection Time: 06/06/22  5:04 AM   Result Value Ref Range    Sodium 141 136 - 145 mmol/L    Potassium 3.3 (L) 3.5 - 5.1 mmol/L    Chloride 108 97 - 108 mmol/L    CO2 24 21 - 32 mmol/L    Anion gap 9 5 - 15 mmol/L    Glucose 228 (H) 65 - 100 mg/dL    BUN 6 6 - 20 MG/DL    Creatinine 0.98 0.70 - 1.30 MG/DL    BUN/Creatinine ratio 6 (L) 12 - 20      GFR est AA >60 >60 ml/min/1.73m2    GFR est non-AA >60 >60 ml/min/1.73m2    Calcium 7.4 (L) 8.5 - 10.1 MG/DL   MAGNESIUM    Collection Time: 06/06/22  5:04 AM   Result Value Ref Range Magnesium 1.2 (L) 1.6 - 2.4 mg/dL   PHOSPHORUS    Collection Time: 06/06/22  5:04 AM   Result Value Ref Range    Phosphorus 2.3 (L) 2.6 - 4.7 MG/DL   HEPATIC FUNCTION PANEL    Collection Time: 06/06/22  5:04 AM   Result Value Ref Range    Protein, total 6.2 (L) 6.4 - 8.2 g/dL    Albumin 2.6 (L) 3.5 - 5.0 g/dL    Globulin 3.6 2.0 - 4.0 g/dL    A-G Ratio 0.7 (L) 1.1 - 2.2      Bilirubin, total 0.6 0.2 - 1.0 MG/DL    Bilirubin, direct 0.1 0.0 - 0.2 MG/DL    Alk.  phosphatase 80 45 - 117 U/L    AST (SGOT) 21 15 - 37 U/L    ALT (SGPT) 16 12 - 78 U/L   GLUCOSE, POC    Collection Time: 06/06/22 11:59 AM   Result Value Ref Range    Glucose (POC) 241 (H) 65 - 117 mg/dL    Performed by Matilde Rosales    ECHO ADULT COMPLETE    Collection Time: 06/06/22  1:47 PM   Result Value Ref Range    IVSd 1.1 (A) 0.6 - 1.0 cm    LVIDd 4.9 4.2 - 5.9 cm    LVIDs 3.2 cm    LVOT Diameter 2.3 cm    LVPWd 1.5 (A) 0.6 - 1.0 cm    LV Ejection Fraction A4C 61 %    LV EDV A4C 87 mL    LV ESV A4C 33 mL    LVOT Peak Gradient 6 mmHg    LVOT Peak Velocity 1.2 m/s    RVSP 38 mmHg    LA Diameter 2.7 cm    LA Volume A/L 37 mL    LA Volume 2C 41 18 - 58 mL    LA Volume 4C 26 18 - 58 mL    LA Volume BP 35 18 - 58 mL    Est. RA Pressure 5 mmHg    AV Area by Planimetry 2.3 cm2    AV Area by Peak Velocity 3.2 cm2    AR .0 millisecond    AR Max Velocity PISA 2.3 m/s    AV Peak Gradient 10 mmHg    AV Peak Velocity 1.6 m/s    MV Area by Planimetry 4.1 cm2    MV A Velocity 0.59 m/s    MV E Wave Deceleration Time 145.4 ms    MV E Velocity 0.46 m/s    MV PHT 45.6 ms    MV Peak Gradient 3 mmHg    MV Mean Gradient 1 mmHg    MV Max Velocity 0.9 m/s    MV Mean Velocity 0.5 m/s    MV VTI 26.5 cm    PV Peak Gradient 2 mmHg    PV Max Velocity 0.8 m/s    TR Peak Gradient 33 mmHg    TR Max Velocity 2.87 m/s    Aortic Root 2.7 cm    Fractional Shortening 2D 35 28 - 44 %    LV RWT Ratio 0.61     LV Mass 2D 253.7 (A) 88 - 224 g    MV E/A 0.78     LVOT Area 4.2 cm2 LA/AO Root Ratio 1.00     AV Velocity Ratio 0.75     MV Area by PHT 4.8 cm2       Microbiology Data:       Blood:pending           Imaging:   CT head     FINDINGS:  Mild prominence of the ventricles and cortical sulci consistent with atrophy is  shown in the interval. There is mild bilateral periventricular diminished  attenuation shown in the cerebrum in the interval, consistent with chronic small  vessel ischemic disease the white matter. A lacunar area of diminished  attenuation is again noted in the lateral right thalamus which could represent a  remote infarct or a prominent perivascular space. There is no intracranial  hemorrhage, extra-axial collection, or mass effect. The basilar cisterns are  open. No CT evidence of acute infarct.     The bone windows demonstrate no abnormalities. A small mucosal retention cyst is  shown posteroinferiorly in the right maxillary sinus, with other paranasal  sinuses and mastoid air cells showing normal aeration.     IMPRESSION  Mild atrophy and chronic small vessel ischemic disease the white matter. No  acute findings. CXR    FINDINGS: AP portable imaging of the chest performed at 2:02 PM demonstrates a  stable cardiomediastinal silhouette. The lungs are clear bilaterally. No  significant osseous abnormalities are seen.     IMPRESSION  No evidence of acute cardiopulmonary process. TTE  Important Information    PRELIMINARY TECH WORK NOTES     PHYSICIAN RESULT TO FOLLOW    Vitals    Weight Height BSA (calculated - sq m) BP Pulse (Heart Rate)      136/92      Study Details    Image quality: adequate. The view(s) performed were parasternal, apical, subcostal and suprasternal. Color flow Doppler was performed and pulse wave and/or continuous wave Doppler was performed. Saline contrast was given to evaluate for intracardiac shunt. No shunt seen. Bubble study was negative. Interpretation Summary         Left Ventricle: Normal left ventricular systolic function.  Left ventricle size is normal. Mildly increased wall thickness. Mild septal thickening. Findings consistent with mild concentric hypertrophy. Normal wall motion. Normal diastolic function.         Echo Findings    Left Ventricle Normal left ventricular systolic function. Left ventricle size is normal. Mildly increased wall thickness. Mild septal thickening. Findings consistent with mild concentric hypertrophy. Normal wall motion. Normal diastolic function. Left Atrium Left atrium size is normal.   Interatrial Septum No interatrial shunt visualized with color Doppler. Right Ventricle Right ventricle size is normal. Normal wall thickness. Normal systolic function. Right Atrium Right atrium size is normal.   Aortic Valve Valve structure is normal. Trace regurgitation. No stenosis. Mitral Valve Valve structure is normal. No regurgitation. No stenosis noted. Tricuspid Valve Valve structure is normal. Trace regurgitation. No stenosis noted. Pulmonic Valve Valve structure is normal. No regurgitation. No stenosis noted. Aorta Normal sized aortic root and ascending aorta. IVC/Hepatic Veins IVC diameter is less than or equal to 21 mm and decreases greater than 50% during inspiration; therefore the estimated right atrial pressure is normal (~3 mmHg). IVC size is normal.   Pericardium No pericardial effusion. CT C/A/P  CT CHEST ABD PELV W WO CONT: Patient Communication     Add Comments   Not seen       Study Result    Narrative & Impression   EXAM: CT CHEST ABD PELV W WO CONT     INDICATION: Fever, AMS, severe sepsis, assess for source     COMPARISON: None     CONTRAST: 100 mL of Isovue-370.     TECHNIQUE:   Following the uneventful intravenous administration of contrast, thin axial  images were obtained through the chest, abdomen and pelvis. Coronal and sagittal  reconstructions were generated. Oral contrast was not administered.  CT dose  reduction was achieved through use of a standardized protocol tailored for this  examination and automatic exposure control for dose modulation.     FINDINGS:      CHEST WALL:No axillary or supraclavicular adenopathy  THYROID: No nodule. MEDIASTINUM: No mass or lymphadenopathy. VIOLETTE: No mass or lymphadenopathy. THORACIC AORTA: No dissection or aneurysm. MAIN PULMONARY ARTERY: Normal in caliber. TRACHEA/BRONCHI: Patent. ESOPHAGUS: No wall thickening or dilatation. HEART: Coronary atherosclerotic disease  PLEURA: No effusion or pneumothorax. LUNGS: No nodule, mass, or airspace disease. LIVER: No mass or biliary dilatation. GALLBLADDER: Cholecystectomy  BILIARY TREE: No biliary dilatation  SPLEEN: No mass. PANCREAS: No mass or ductal dilatation. ADRENALS: Unremarkable. KIDNEYS: Possible small nonobstructing left renal stone. No evidence of  hydronephrosis or hydroureter bilaterally. STOMACH: Unremarkable. SMALL BOWEL: No dilatation or wall thickening. COLON: No dilatation or wall thickening. APPENDIX: Unremarkable  PERITONEUM: No ascites or pneumoperitoneum. RETROPERITONEUM: No lymphadenopathy or aortic aneurysm. REPRODUCTIVE ORGANS: Unremarkable  URINARY BLADDER: Lester catheter. Nondistended. Possible wall thickening  BONES: Multilevel degenerative changes in the lumbar spine  ADDITIONAL COMMENTS: N/A     IMPRESSION  No acute pathology in the chest, abdomen or pelvis. Incidental findings as above             Assessment / Plan:      [facility] is a 42-year-old gentleman with a history of left total knee arthroplasty in 2018, prostate adenocarcinoma status post cryoablation in 2009, diabetes who presented to the emergency room with high blood sugar (422). He is found to be in sepsis with an elevated lactate, mild leukocytosis of 51.5 with neutrophilic predominance and febrile with a T-max of 102.4. He has been hypertensive initially and oxygenating well on 2 L nasal cannula per chart. A CT of his head was reported as no acute findings.   Blood cultures have been sent and pending. Patient was empirically started on vancomycin and cefepime ampicillin and acyclovir. Plans for LP per primary team.  He is on Keppra with plans for EEG neurology consult MRI of the brain. 1) sepsis:  Work-up pending including MRI brain and LP   Patient has been started empirically on acyclovir, ampicillin, cefepime and vancomycin by the primary team  His renal function is improved today compared to yesterday.   Would recommend checking urine drug screen  Await blood cultures  Awaiting to discuss with the primary team  Antimicrobials to be adjusted based on work-up  Recommend checking HIV   Hep C ab negative 3/8/18  Antibiotics can lower seizure threshold and monitor closely      2) DM A1c 12.5  3) Hx of CVA per chart   4) Prostate cancer hx per chart     Discussed briefly via perfect serve with the hospitalist was requested a non urgent consult   will continue to follow chart --  pending tests above once completed  Please do not hesitate to contact me if patient needs to be seen in person  Or if clinical status is changed necessitating a more urgent consult      Matt Sage DO  4:36 PM

## 2022-06-06 NOTE — PROGRESS NOTES
Hospitalist Progress Note    NAME: Licha Velásquez   :  1950   MRN:  [id number]   Room Number:  Misael Mckeon  @ [facility]     Please note that this dictation was completed with Danette Curiel, the computer voice recognition software. Quite often unanticipated grammatical, syntax, homophones, and other interpretive errors are inadvertently transcribed by the computer software. Please disregard these errors. Please excuse any errors that have escaped final proofreading. Interim Hospital Summary: 73 y.o. male whom presented on 2022 with      Assessment / Plan:      Severe sepsis POA  Elevated lactic acid POA  On admission temperature 102.3 F, heart rate 112 bpm, respiratory rate 22/minute, lactic acid 4.1. Chest x-ray interpreted independently did not reveal any consolidation. Urinalysis negative for infection. COVID PCR negative influenza PCR negative. Received sepsis bolus in the ER. CT chest abdomen pelvis did not show evidence of infection     - empiric CNS coverage, vancomycin, cefepime, ampicillin, acyclovir   - ID consulted  -LP ordered with CSF studies, meningitis pathogen PCR panel  -Await blood culture results.  -Continue maintenance fluid          Acute encephalopathy POA (due to sepsis, need to rule out CNS infection. CT Head interpreted independently 2022 4PM- no evidence of mass, infarct, hemorrhage noted. ). Suspected seizure   Right sided gaze deviation POA, resolved    History of CVA  Residual left sided hemiparesis POA  Continues to remain somnolent, suspect due to CNS infection, need to evaluate for CVA. Acute change in mental status, patient somnolent, inconsistently following commands, right sided gaze deviation noted at 1815. Code S called. Code CT Head without contrast  Did not reveal hemorrhage.     - Teleneurologist called yesterday, Levetiracetam 2g IV load given per their recs.    - Continue levetiracetam 1g BID  - EEG   - Neurology consult   - Nursing swallow assessment  - PT/OT/SLP  - ECHO  - MRI Brain without contrast, MRA Brain and Neck  - neurologist contacted         CKD III POA   Creatinine at baseline 1.3-1.4.   -Strict I's and O's, avoid nephrotoxic meds, renally dose meds.     Type 2 diabetes with hyperglycemia POA  Lab Results   Component Value Date/Time    Hemoglobin A1c 12.5 (H) 06/06/2022 05:04 AM    Hemoglobin A1c (POC) 13.9 (A) 06/18/2021 12:42 PM   - add glargine insulin   - Lispro correctional scale, FSG AC HS  - Consistent carb diet, hypoglycemia protocol.         Uncontrolled Hypertension POA   HLD POA  - resume amlodipine  - Labetalol PRN for SBP > 180 mm Hg,DBP > 100 mm Hg         Body mass index is 34.9 kg/m². Code Status: full   Surrogate Decision Maker:  Bill Kauffman [phone]  Jeniffer Moss [phone]     DVT Prophylaxis: Lovenox  GI Prophylaxis: not indicated  Baseline: does not walk much, mostly wheelchair, sometimes ambulates with walker         Subjective:     Chief Complaint / Reason for Physician Visit  Not responding to commands or opening eyes to stimuli. Discussed with RN events overnight. Review of Systems:  Unable to obtain due to AMS      Objective:     VITALS:   Last 24hrs VS reviewed since prior progress note.  Most recent are:  Patient Vitals for the past 24 hrs:   Temp Pulse Resp BP SpO2   06/06/22 1219 99.1 °F (37.3 °C) 84 20 (!) 136/92 96 %   06/06/22 1200 -- 84 -- -- --   06/06/22 0800 (!) 101 °F (38.3 °C) 88 22 138/71 94 %   06/06/22 0500 -- 89 21 (!) 149/75 92 %   06/06/22 0400 98.8 °F (37.1 °C) 90 20 137/65 95 %   06/06/22 0300 -- 88 22 (!) 145/64 --   06/06/22 0200 -- 90 22 137/67 --   06/06/22 0100 -- 95 21 (!) 144/60 --   06/06/22 0000 (!) 101.1 °F (38.4 °C) (!) 105 19 135/70 97 %   06/05/22 2300 -- 98 24 (!) 151/66 --   06/05/22 2200 -- 96 20 (!) 153/63 --   06/05/22 2130 -- 95 23 (!) 169/81 --   06/05/22 2115 -- 97 20 (!) 164/79 --   06/05/22 2100 -- 98 24 (!) 160/83 --   06/05/22 2000 (!) 101.7 °F (38.7 °C) (!) 103 22 (!) 149/82 98 %   06/05/22 1903 -- (!) 106 26 (!) 146/73 --   06/05/22 1845 -- (!) 105 21 (!) 183/81 --   06/05/22 1836 -- (!) 108 23 (!) 168/86 95 %   06/05/22 1815 -- (!) 111 21 (!) 195/101 --   06/05/22 1800 (!) 102.4 °F (39.1 °C) (!) 111 24 (!) 177/88 96 %   06/05/22 1700 (!) 102.2 °F (39 °C) -- -- -- --   06/05/22 1645 -- (!) 107 24 (!) 168/80 96 %   06/05/22 1630 -- (!) 103 21 (!) 183/76 95 %   06/05/22 1530 -- (!) 113 24 (!) 186/70 98 %   06/05/22 1512 (!) 101.9 °F (38.8 °C) -- -- -- --       Intake/Output Summary (Last 24 hours) at 6/6/2022 1509  Last data filed at 6/6/2022 1200  Gross per 24 hour   Intake 4094 ml   Output 1725 ml   Net 2369 ml        PHYSICAL EXAM:  General: Somnolent, cooperative, no acute distress    EENT:  EOMI. Anicteric sclerae. MMM  Resp:  CTA bilaterally, no wheezing or rales. No accessory muscle use  CV:  Regular  rhythm,  normal S1/S2, no murmurs rubs gallops, No edema  GI:  Soft, Non distended, Non tender. +Bowel sounds  Neurologic:  Somnolent, PERRL  Psych:   Not anxious nor agitated  Skin:  No rashes. No jaundice    Reviewed most current lab test results and cultures  YES  Reviewed most current radiology test results   YES  Review and summation of old records today    NO  Reviewed patient's current orders and MAR    YES  PMH/SH reviewed - no change compared to H&P  ________________________________________________________________________  Care Plan discussed with:    Comments   Patient x    Family  x    RN x    Care Manager x    Consultant  x                     x Multidiciplinary team rounds were held today with , nursing, pharmacist and clinical coordinator. Patient's plan of care was discussed; medications were reviewed and discharge planning was addressed.      ________________________________________________________________________  Total NON critical care TIME:  45  Minutes    Total CRITICAL CARE TIME Spent:   Minutes non procedure based Comments   >50% of visit spent in counseling and coordination of care x    ________________________________________________________________________  Miriam Light MD     Procedures: see electronic medical records for all procedures/Xrays and details which were not copied into this note but were reviewed prior to creation of Plan. LABS:  I reviewed today's most current labs and imaging studies.   Pertinent labs include:  Recent Labs     06/06/22  0504 06/05/22  1347   WBC 8.4 11.5*   HGB 10.8* 12.9   HCT 36.0* 43.3    287     Recent Labs     06/06/22  0504 06/05/22  1347    137   K 3.3* 4.1    101   CO2 24 27   * 422*   BUN 6 10   CREA 0.98 1.43*   CA 7.4* 9.1   MG 1.2*  --    PHOS 2.3*  --    ALB 2.6* 3.6   TBILI 0.6 0.5   ALT 16 19       Signed: Miriam Light MD

## 2022-06-06 NOTE — PROGRESS NOTES
ID note  Discussed by phone with Dr Cynthia Castillo   Per discussion, patient is not awake, alert enough for a history or a telemedicine visit  Currently   He also received ativan for LP which is being done today   MRI pending   Discussed his physical exam   Cefepime can cause seizures and would like to de escalate  pending LP    If clinical change , please contact me   Will follow along     Bethanie Rice, DO  4:57 PM

## 2022-06-06 NOTE — PROGRESS NOTES
Speech path   Acknowledge consult to evaluate this patient who is currently too lethargic for evaluation per nsg. Please keep NPO until evaluation. We will follow up tomorrow.   Ritu Ballesteros, SLP

## 2022-06-06 NOTE — PROGRESS NOTES
137 [facility] Admission Pharmacy Medication Reconciliation     Information obtained from: [address], [address] [place] [address] Drive: Yes    Comments/recommendations: This is a follow-up medication reconciliation note. Please refer to the previous note for futher information    Adam Mae was contacted for further medication dispense history clarification. The following medications are active with [employer]:  Amlodipine  Glipizide  Lisinopril   Metformin    Dr. Jannie Hill office was contacted for clarification on PTA medication list.   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 Per Dr. Fiona Mayorga note in December 2021, patient can discontinue apixaban. Last refills requested by patient to Dr. Fiona Mayorga office were the following:  Lisinopril  Metformin  Glipizide  No new refills of insulin were requested following last dispense in February 2022. Medication changes (since last review): Added  None   Removed  Apixaban- last dispense date 12/8/21  Insulin- last dispense date 2/2022  No record found for the following medications  Clopidogrel  Clotrimazole   FeroSul   Furosemide  Gabapentin  Hydrochlorothiazide  Methocarbamol  Pravastatin  Senna-Docusate  Adjusted  None     The [place] Prescription Monitoring Program () was accessed to determine fill history of any controlled medications. No record found in 2022.    [facility] data reflects medications filled and processed through the patient's insurance, however                this data does NOT capture whether the medication was picked up or is currently being taken by the patient. Patient allergies: Allergies as of 06/05/2022    (No Known Allergies)     Prior to Admission Medications   Prescriptions Last Dose Informant Patient Reported? Taking? amLODIPine (NORVASC) 10 mg tablet   No No   Sig: Take 1 Tablet by mouth daily.    escitalopram oxalate (LEXAPRO) 5 mg tablet   No No   Sig: Take 1 Tablet by mouth daily. glipiZIDE (GLUCOTROL) 10 mg tablet   No No   Sig: TAKE 1 TABLET BY MOUTH  TWICE DAILY   lisinopriL (PRINIVIL, ZESTRIL) 10 mg tablet   No No   Sig: Take 1 Tablet by mouth daily.    metFORMIN (GLUCOPHAGE) 1,000 mg tablet   No No   Sig: TAKE 1 TABLET BY MOUTH  TWICE DAILY WITH MEALS      Facility-Administered Medications: None     Thank you,     Aide Henderson, PharmD   Contact: [phone]

## 2022-06-07 LAB
ALBUMIN SERPL-MCNC: 2.5 G/DL (ref 3.5–5)
ALBUMIN/GLOB SERPL: 0.7 {RATIO} (ref 1.1–2.2)
ALP SERPL-CCNC: 73 U/L (ref 45–117)
ALT SERPL-CCNC: 19 U/L (ref 12–78)
ANION GAP SERPL CALC-SCNC: 5 MMOL/L (ref 5–15)
AST SERPL-CCNC: 30 U/L (ref 15–37)
BASOPHILS # BLD: 0 K/UL (ref 0–0.1)
BASOPHILS NFR BLD: 0 % (ref 0–1)
BILIRUB DIRECT SERPL-MCNC: 0.1 MG/DL (ref 0–0.2)
BILIRUB SERPL-MCNC: 0.6 MG/DL (ref 0.2–1)
BUN SERPL-MCNC: 5 MG/DL (ref 6–20)
BUN/CREAT SERPL: 5 (ref 12–20)
CALCIUM SERPL-MCNC: 8.2 MG/DL (ref 8.5–10.1)
CHLORIDE SERPL-SCNC: 105 MMOL/L (ref 97–108)
CO2 SERPL-SCNC: 27 MMOL/L (ref 21–32)
CREAT SERPL-MCNC: 0.97 MG/DL (ref 0.7–1.3)
DIFFERENTIAL METHOD BLD: ABNORMAL
ECHO AO ROOT DIAM: 2.7 CM
ECHO AR MAX VEL PISA: 2.3 M/S
ECHO AV AREA PEAK VELOCITY: 3.2 CM2
ECHO AV AREA PLAN: 2.3 CM2
ECHO AV PEAK GRADIENT: 10 MMHG
ECHO AV PEAK VELOCITY: 1.6 M/S
ECHO AV REGURGITANT PHT: 927 MILLISECOND
ECHO AV VELOCITY RATIO: 0.75
ECHO EST RA PRESSURE: 5 MMHG
ECHO LA DIAMETER: 2.7 CM
ECHO LA TO AORTIC ROOT RATIO: 1
ECHO LA VOL 2C: 41 ML (ref 18–58)
ECHO LA VOL 4C: 26 ML (ref 18–58)
ECHO LA VOL BP: 35 ML (ref 18–58)
ECHO LA VOLUME AREA LENGTH: 37 ML
ECHO LV EDV A4C: 87 ML
ECHO LV EJECTION FRACTION A4C: 61 %
ECHO LV ESV A4C: 33 ML
ECHO LV FRACTIONAL SHORTENING: 35 % (ref 28–44)
ECHO LV INTERNAL DIMENSION DIASTOLIC: 4.9 CM (ref 4.2–5.9)
ECHO LV INTERNAL DIMENSION SYSTOLIC: 3.2 CM
ECHO LV IVSD: 1.1 CM (ref 0.6–1)
ECHO LV MASS 2D: 253.7 G (ref 88–224)
ECHO LV POSTERIOR WALL DIASTOLIC: 1.5 CM (ref 0.6–1)
ECHO LV RELATIVE WALL THICKNESS RATIO: 0.61
ECHO LVOT AREA: 4.2 CM2
ECHO LVOT DIAM: 2.3 CM
ECHO LVOT PEAK GRADIENT: 6 MMHG
ECHO LVOT PEAK VELOCITY: 1.2 M/S
ECHO MV A VELOCITY: 0.59 M/S
ECHO MV AREA PHT: 4.8 CM2
ECHO MV AREA PLAN: 4.1 CM2
ECHO MV E DECELERATION TIME (DT): 145.4 MS
ECHO MV E VELOCITY: 0.46 M/S
ECHO MV E/A RATIO: 0.78
ECHO MV MAX VELOCITY: 0.9 M/S
ECHO MV MEAN GRADIENT: 1 MMHG
ECHO MV MEAN VELOCITY: 0.5 M/S
ECHO MV PEAK GRADIENT: 3 MMHG
ECHO MV PRESSURE HALF TIME (PHT): 45.6 MS
ECHO MV VTI: 26.5 CM
ECHO PV MAX VELOCITY: 0.8 M/S
ECHO PV PEAK GRADIENT: 2 MMHG
ECHO RIGHT VENTRICULAR SYSTOLIC PRESSURE (RVSP): 38 MMHG
ECHO TV REGURGITANT MAX VELOCITY: 2.87 M/S
ECHO TV REGURGITANT PEAK GRADIENT: 33 MMHG
EOSINOPHIL # BLD: 0.1 K/UL (ref 0–0.4)
EOSINOPHIL NFR BLD: 1 % (ref 0–7)
ERYTHROCYTE [DISTWIDTH] IN BLOOD BY AUTOMATED COUNT: 15.9 % (ref 11.5–14.5)
GLOBULIN SER CALC-MCNC: 3.6 G/DL (ref 2–4)
GLUCOSE BLD STRIP.AUTO-MCNC: 118 MG/DL (ref 65–117)
GLUCOSE BLD STRIP.AUTO-MCNC: 127 MG/DL (ref 65–117)
GLUCOSE BLD STRIP.AUTO-MCNC: 136 MG/DL (ref 65–117)
GLUCOSE BLD STRIP.AUTO-MCNC: 165 MG/DL (ref 65–117)
GLUCOSE SERPL-MCNC: 129 MG/DL (ref 65–100)
HCT VFR BLD AUTO: 35.8 % (ref 36.6–50.3)
HGB BLD-MCNC: 11.2 G/DL (ref 12.1–17)
IMM GRANULOCYTES # BLD AUTO: 0 K/UL (ref 0–0.04)
IMM GRANULOCYTES NFR BLD AUTO: 0 % (ref 0–0.5)
LYMPHOCYTES # BLD: 1.9 K/UL (ref 0.8–3.5)
LYMPHOCYTES NFR BLD: 25 % (ref 12–49)
MAGNESIUM SERPL-MCNC: 1.6 MG/DL (ref 1.6–2.4)
MCH RBC QN AUTO: 24.1 PG (ref 26–34)
MCHC RBC AUTO-ENTMCNC: 31.3 G/DL (ref 30–36.5)
MCV RBC AUTO: 77.2 FL (ref 80–99)
MONOCYTES # BLD: 0.7 K/UL (ref 0–1)
MONOCYTES NFR BLD: 9 % (ref 5–13)
NEUTS SEG # BLD: 5 K/UL (ref 1.8–8)
NEUTS SEG NFR BLD: 65 % (ref 32–75)
NRBC # BLD: 0 K/UL (ref 0–0.01)
NRBC BLD-RTO: 0 PER 100 WBC
PHOSPHATE SERPL-MCNC: 2.3 MG/DL (ref 2.6–4.7)
PLATELET # BLD AUTO: 213 K/UL (ref 150–400)
PMV BLD AUTO: 9.6 FL (ref 8.9–12.9)
POTASSIUM SERPL-SCNC: 3.6 MMOL/L (ref 3.5–5.1)
PROCALCITONIN SERPL-MCNC: 0.07 NG/ML
PROT SERPL-MCNC: 6.1 G/DL (ref 6.4–8.2)
RBC # BLD AUTO: 4.64 M/UL (ref 4.1–5.7)
RPR SER QL: NONREACTIVE
SERVICE CMNT-IMP: ABNORMAL
SODIUM SERPL-SCNC: 137 MMOL/L (ref 136–145)
VANCOMYCIN SERPL-MCNC: 15.2 UG/ML
WBC # BLD AUTO: 7.8 K/UL (ref 4.1–11.1)

## 2022-06-07 PROCEDURE — 80076 HEPATIC FUNCTION PANEL: CPT

## 2022-06-07 PROCEDURE — 86592 SYPHILIS TEST NON-TREP QUAL: CPT

## 2022-06-07 PROCEDURE — 80202 ASSAY OF VANCOMYCIN: CPT

## 2022-06-07 PROCEDURE — 36415 COLL VENOUS BLD VENIPUNCTURE: CPT

## 2022-06-07 PROCEDURE — 84100 ASSAY OF PHOSPHORUS: CPT

## 2022-06-07 PROCEDURE — 74011636637 HC RX REV CODE- 636/637: Performed by: STUDENT IN AN ORGANIZED HEALTH CARE EDUCATION/TRAINING PROGRAM

## 2022-06-07 PROCEDURE — 99233 SBSQ HOSP IP/OBS HIGH 50: CPT | Performed by: INTERNAL MEDICINE

## 2022-06-07 PROCEDURE — 97162 PT EVAL MOD COMPLEX 30 MIN: CPT

## 2022-06-07 PROCEDURE — 74011250636 HC RX REV CODE- 250/636: Performed by: STUDENT IN AN ORGANIZED HEALTH CARE EDUCATION/TRAINING PROGRAM

## 2022-06-07 PROCEDURE — 74011250636 HC RX REV CODE- 250/636: Performed by: INTERNAL MEDICINE

## 2022-06-07 PROCEDURE — 92610 EVALUATE SWALLOWING FUNCTION: CPT

## 2022-06-07 PROCEDURE — 93306 TTE W/DOPPLER COMPLETE: CPT | Performed by: INTERNAL MEDICINE

## 2022-06-07 PROCEDURE — 83735 ASSAY OF MAGNESIUM: CPT

## 2022-06-07 PROCEDURE — 74011000258 HC RX REV CODE- 258: Performed by: INTERNAL MEDICINE

## 2022-06-07 PROCEDURE — 74011000250 HC RX REV CODE- 250: Performed by: STUDENT IN AN ORGANIZED HEALTH CARE EDUCATION/TRAINING PROGRAM

## 2022-06-07 PROCEDURE — 84145 PROCALCITONIN (PCT): CPT

## 2022-06-07 PROCEDURE — 85025 COMPLETE CBC W/AUTO DIFF WBC: CPT

## 2022-06-07 PROCEDURE — 65270000032 HC RM SEMIPRIVATE

## 2022-06-07 PROCEDURE — 82962 GLUCOSE BLOOD TEST: CPT

## 2022-06-07 PROCEDURE — 99223 1ST HOSP IP/OBS HIGH 75: CPT | Performed by: PSYCHIATRY & NEUROLOGY

## 2022-06-07 PROCEDURE — 80048 BASIC METABOLIC PNL TOTAL CA: CPT

## 2022-06-07 PROCEDURE — 74011000258 HC RX REV CODE- 258: Performed by: STUDENT IN AN ORGANIZED HEALTH CARE EDUCATION/TRAINING PROGRAM

## 2022-06-07 PROCEDURE — 2709999900 HC NON-CHARGEABLE SUPPLY

## 2022-06-07 PROCEDURE — 74011250637 HC RX REV CODE- 250/637: Performed by: STUDENT IN AN ORGANIZED HEALTH CARE EDUCATION/TRAINING PROGRAM

## 2022-06-07 RX ORDER — METRONIDAZOLE 500 MG/100ML
500 INJECTION, SOLUTION INTRAVENOUS EVERY 8 HOURS
Status: DISCONTINUED | OUTPATIENT
Start: 2022-06-07 | End: 2022-06-09

## 2022-06-07 RX ORDER — LORAZEPAM 2 MG/ML
1 INJECTION INTRAMUSCULAR ONCE
Status: DISCONTINUED | OUTPATIENT
Start: 2022-06-07 | End: 2022-06-07

## 2022-06-07 RX ORDER — LORAZEPAM 2 MG/ML
1 INJECTION INTRAMUSCULAR
Status: DISCONTINUED | OUTPATIENT
Start: 2022-06-07 | End: 2022-06-08

## 2022-06-07 RX ADMIN — ENOXAPARIN SODIUM 30 MG: 100 INJECTION SUBCUTANEOUS at 09:51

## 2022-06-07 RX ADMIN — VANCOMYCIN HYDROCHLORIDE 1250 MG: 10 INJECTION, POWDER, LYOPHILIZED, FOR SOLUTION INTRAVENOUS at 20:36

## 2022-06-07 RX ADMIN — SODIUM CHLORIDE, PRESERVATIVE FREE 10 ML: 5 INJECTION INTRAVENOUS at 14:00

## 2022-06-07 RX ADMIN — CEFEPIME HYDROCHLORIDE 2 G: 2 INJECTION, POWDER, FOR SOLUTION INTRAMUSCULAR; INTRAVENOUS at 01:22

## 2022-06-07 RX ADMIN — METRONIDAZOLE 500 MG: 500 INJECTION, SOLUTION INTRAVENOUS at 11:15

## 2022-06-07 RX ADMIN — SODIUM CHLORIDE, PRESERVATIVE FREE 10 ML: 5 INJECTION INTRAVENOUS at 14:01

## 2022-06-07 RX ADMIN — NYSTATIN: 100000 POWDER TOPICAL at 18:39

## 2022-06-07 RX ADMIN — ACETAMINOPHEN 650 MG: 160 SOLUTION ORAL at 01:00

## 2022-06-07 RX ADMIN — ENOXAPARIN SODIUM 30 MG: 100 INJECTION SUBCUTANEOUS at 20:36

## 2022-06-07 RX ADMIN — METRONIDAZOLE 500 MG: 500 INJECTION, SOLUTION INTRAVENOUS at 18:39

## 2022-06-07 RX ADMIN — SODIUM CHLORIDE, PRESERVATIVE FREE 10 ML: 5 INJECTION INTRAVENOUS at 06:29

## 2022-06-07 RX ADMIN — VANCOMYCIN HYDROCHLORIDE 1000 MG: 1 INJECTION, POWDER, LYOPHILIZED, FOR SOLUTION INTRAVENOUS at 08:53

## 2022-06-07 RX ADMIN — CEFTRIAXONE SODIUM 2 G: 2 INJECTION, POWDER, FOR SOLUTION INTRAMUSCULAR; INTRAVENOUS at 11:09

## 2022-06-07 RX ADMIN — AMPICILLIN SODIUM 2 G: 2 INJECTION, POWDER, FOR SOLUTION INTRAVENOUS at 09:51

## 2022-06-07 RX ADMIN — CEFEPIME HYDROCHLORIDE 2 G: 2 INJECTION, POWDER, FOR SOLUTION INTRAMUSCULAR; INTRAVENOUS at 10:04

## 2022-06-07 RX ADMIN — AMPICILLIN SODIUM 2 G: 2 INJECTION, POWDER, FOR SOLUTION INTRAVENOUS at 05:21

## 2022-06-07 RX ADMIN — SODIUM CHLORIDE 1000 MG: 9 INJECTION, SOLUTION INTRAVENOUS at 10:12

## 2022-06-07 RX ADMIN — ACYCLOVIR SODIUM 750 MG: 1000 INJECTION, SOLUTION INTRAVENOUS at 06:29

## 2022-06-07 RX ADMIN — Medication 10 UNITS: at 08:51

## 2022-06-07 RX ADMIN — Medication 2 UNITS: at 00:50

## 2022-06-07 RX ADMIN — NYSTATIN: 100000 POWDER TOPICAL at 08:59

## 2022-06-07 RX ADMIN — AMPICILLIN SODIUM 2 G: 2 INJECTION, POWDER, FOR SOLUTION INTRAVENOUS at 01:54

## 2022-06-07 RX ADMIN — SODIUM CHLORIDE 1000 MG: 9 INJECTION, SOLUTION INTRAVENOUS at 21:39

## 2022-06-07 RX ADMIN — SODIUM CHLORIDE, PRESERVATIVE FREE 10 ML: 5 INJECTION INTRAVENOUS at 21:39

## 2022-06-07 NOTE — PROGRESS NOTES
Physician Progress Note      PATIENT:               [place]  CSN #:                  [id number]  :                       1950  ADMIT DATE:       2022 1:25 PM  [address] [place] DATE:  RESPONDING  PROVIDER #:        Javier Bailey MD          QUERY TEXT:    Pt admitted with sepsis, hyperglycemia and has encephalopathy documented. If possible, please document in progress notes and discharge summary further specificity regarding the type of encephalopathy:    The medical record reflects the following:  Risk Factors: sepsis    Clinical Indicators:    CT Head- Mild atrophy and chronic small vessel ischemic disease the white matter. No acute findings. H&P-  Patient is somnolent, not following commands, right sided gaze deviation on my exam  General:    Somnolent, opens eye to stimuli, not following commands, shivering, appears stated age. Acute encephalopathy POA (due to sepsis, need to rule out CNS infection. CT Head interpreted independently 2022 4PM- no evidence of mass, infarct, hemorrhage noted. ). Treatment: CT Head; Ampicillin 2g IV Q 4hrs; Cefepime 2g IV Q 8hrs; Vanc 1g IV BID; Zosyn 4.5g IV x 1; 0.9% NS 2,190L bolus      Thank you,  Katharine Harmon RN, BSN, CRCR, CCDS, SMART  Clinical Documentation Improvement  [phone] or via Perfect Serve  Options provided:  -- Metabolic encephalopathy  -- Septic encephalopathy  -- Other - I will add my own diagnosis  -- Disagree - Not applicable / Not valid  -- Disagree - Clinically unable to determine / Unknown  -- Refer to Clinical Documentation Reviewer    PROVIDER RESPONSE TEXT:    This patient has metabolic encephalopathy.     Query created by: Kathy Bedoya on 2022 9:43 AM      Electronically signed by:  Javier Bailey MD 2022 10:03 AM

## 2022-06-07 NOTE — PROGRESS NOTES
Infectious Disease Progress Note       Subjective:   Mr Vitor Meehan seen   Met with his family son Mr Jamison Acevedo  and son's fiance in the room   Mr Vitor Meehan is awake but not able to provide history  Per  His son, Mr Vitor Meehan was at his baseline mentation and  Health  as of 6/4/22.  On 6/5/22 his other son called [facility] that his father fell  ( most likely fell out of his  Hospital bed per son)  No reported of headache or fever, chills, preceding  At baseline transferred wt walker and was not much ambulatory since his knee surgery years ago   They state that patient is improved today and atleast awake and recognizing them     ROS: unable to obtain from patient due to altered mentation       Objective:    Vitals:   Patient Vitals for the past 24 hrs:   Temp Pulse Resp BP SpO2   06/07/22 1108 98.7 °F (37.1 °C) 82 25 (!) 164/90 95 %   06/07/22 0843 98.9 °F (37.2 °C) 80 22 (!) 149/95 98 %   06/07/22 0400 99.3 °F (37.4 °C) 82 21 (!) 150/79 100 %   06/07/22 0000 100 °F (37.8 °C) 85 19 133/72 --   06/06/22 2300 -- 86 21 134/63 --   06/06/22 2000 99.9 °F (37.7 °C) 83 20 (!) 153/72 96 %   06/06/22 1600 -- 80 -- -- --   06/06/22 1352 99.7 °F (37.6 °C) 67 22 137/60 98 %   06/06/22 1219 99.1 °F (37.3 °C) 84 20 (!) 136/92 96 %   06/06/22 1200 -- 84 -- -- --       Physical Exam:  Gen: No apparent distress  HEENT:  Normocephalic, atraumatic, no scleral icterus, no thrush, no cervical lymphadenopathy palpated, no nuchal rigidity, some minimal teeth   CV: S1,2 heard regular on monitor   Lungs: non labored   Abdomen: soft, non tender, no facial grimace to palpation   Skin: no rash on chest, abdomen face extremities   Psych: non tearful, smiling   Neuro: awake, recognized his son and said son's name, does not follow commands but moves all extremities un purposefully , no tremors   Musculoskeletal:  No joint edema, erythema noted knees ( old scar L knee )       Medications:    Current Facility-Administered Medications:     LORazepam (ATIVAN) injection 1 mg, 1 mg, IntraVENous, ONCE, Afshin Allison MD    cefTRIAXone (ROCEPHIN) 2 g in 0.9% sodium chloride (MBP/ADV) 50 mL MBP, 2 g, IntraVENous, Q12H, Christin Gordon DO, Last Rate: 100 mL/hr at 06/07/22 1109, 2 g at 06/07/22 1109    metroNIDAZOLE (FLAGYL) IVPB premix 500 mg, 500 mg, IntraVENous, Q8H, Christin Gordon DO, Last Rate: 100 mL/hr at 06/07/22 1115, 500 mg at 06/07/22 1115    levETIRAcetam (KEPPRA) 1,000 mg in 0.9% sodium chloride 100 mL IVPB, 1,000 mg, IntraVENous, Q12H, Afshin Allison MD, Last Rate: 400 mL/hr at 06/07/22 1012, 1,000 mg at 06/07/22 1012    vancomycin (VANCOCIN) 1,000 mg in 0.9% sodium chloride 250 mL (VIAL-MATE), 1,000 mg, IntraVENous, Q12H, Afshin Allison MD, Last Rate: 250 mL/hr at 06/07/22 0853, 1,000 mg at 06/07/22 0853    glucose chewable tablet 16 g, 4 Tablet, Oral, PRN, Afshin Allison MD    dextrose (D50W) injection syrg 12.5-25 g, 25-50 mL, IntraVENous, PRN, Afshin Allison MD    glucagon (GLUCAGEN) injection 1 mg, 1 mg, IntraMUSCular, PRN, Afshin Allison MD    insulin lispro (HUMALOG) injection, , SubCUTAneous, Q6H, Afshin Allison MD, 2 Units at 06/07/22 0050    insulin glargine (LANTUS) injection 10 Units, 10 Units, SubCUTAneous, DAILY, Afshin Allison MD, 10 Units at 06/07/22 0851    sodium chloride (NS) flush 10 mL, 10 mL, IntraVENous, PRN, Afshin Allison MD, 10 mL at 06/06/22 1255    sodium chloride (NS) flush 5-40 mL, 5-40 mL, IntraVENous, Q8H, Afshin Allison MD, 10 mL at 06/07/22 3710    sodium chloride (NS) flush 5-40 mL, 5-40 mL, IntraVENous, PRN, Afshin Allison MD    polyethylene glycol (MIRALAX) packet 17 g, 17 g, Oral, DAILY PRN, Afshin Alilson MD    ondansetron (ZOFRAN ODT) tablet 4 mg, 4 mg, Oral, Q8H PRN **OR** ondansetron (ZOFRAN) injection 4 mg, 4 mg, IntraVENous, Q6H PRN, Afshin Allison MD, 4 mg at 06/05/22 1819    enoxaparin (LOVENOX) injection 30 mg, 30 mg, SubCUTAneous, Q12H, Afshin Allison MD, 30 mg at 06/07/22 0951    amLODIPine (NORVASC) tablet 10 mg, 10 mg, Oral, DAILY, Enrique Welsh MD    labetaloL (NORMODYNE;TRANDATE) 20 mg/4 mL (5 mg/mL) injection 20 mg, 20 mg, IntraVENous, Q6H PRN, Enrique Welsh MD    acetaminophen (TYLENOL) tablet 650 mg, 650 mg, Oral, Q4H PRN **OR** acetaminophen (TYLENOL) solution 650 mg, 650 mg, Per NG tube, Q4H PRN, 650 mg at 06/07/22 0100 **OR** acetaminophen (TYLENOL) suppository 650 mg, 650 mg, Rectal, Q4H PRN, Enriuqe Welsh MD, 650 mg at 06/06/22 1011    nystatin (MYCOSTATIN) 100,000 unit/gram powder, , Topical, BID, Enrique Welsh MD, Given at 06/07/22 0859      Labs:  Recent Results (from the past 24 hour(s))   GLUCOSE, POC    Collection Time: 06/06/22 11:59 AM   Result Value Ref Range    Glucose (POC) 241 (H) 65 - 117 mg/dL    Performed by Aicha Arciniega    ECHO ADULT COMPLETE    Collection Time: 06/06/22  1:47 PM   Result Value Ref Range    IVSd 1.1 (A) 0.6 - 1.0 cm    LVIDd 4.9 4.2 - 5.9 cm    LVIDs 3.2 cm    LVOT Diameter 2.3 cm    LVPWd 1.5 (A) 0.6 - 1.0 cm    LV Ejection Fraction A4C 61 %    LV EDV A4C 87 mL    LV ESV A4C 33 mL    LVOT Peak Gradient 6 mmHg    LVOT Peak Velocity 1.2 m/s    RVSP 38 mmHg    LA Diameter 2.7 cm    LA Volume A/L 37 mL    LA Volume 2C 41 18 - 58 mL    LA Volume 4C 26 18 - 58 mL    LA Volume BP 35 18 - 58 mL    Est. RA Pressure 5 mmHg    AV Area by Planimetry 2.3 cm2    AV Area by Peak Velocity 3.2 cm2    AR .0 millisecond    AR Max Velocity PISA 2.3 m/s    AV Peak Gradient 10 mmHg    AV Peak Velocity 1.6 m/s    MV Area by Planimetry 4.1 cm2    MV A Velocity 0.59 m/s    MV E Wave Deceleration Time 145.4 ms    MV E Velocity 0.46 m/s    MV PHT 45.6 ms    MV Peak Gradient 3 mmHg    MV Mean Gradient 1 mmHg    MV Max Velocity 0.9 m/s    MV Mean Velocity 0.5 m/s    MV VTI 26.5 cm    PV Peak Gradient 2 mmHg    PV Max Velocity 0.8 m/s    TR Peak Gradient 33 mmHg    TR Max Velocity 2.87 m/s    Aortic Root 2.7 cm    Fractional Shortening 2D 35 28 - 44 %    LV RWT Ratio 0.61     LV Mass 2D 253.7 (A) 88 - 224 g    MV E/A 0.78     LVOT Area 4.2 cm2    LA/AO Root Ratio 1.00     AV Velocity Ratio 0.75     MV Area by PHT 4.8 cm2   PROTEIN, CSF    Collection Time: 06/06/22  4:45 PM   Result Value Ref Range    Tube No. 1      Protein,CSF 69 (H) 15 - 45 MG/DL   GLUCOSE, CSF    Collection Time: 06/06/22  4:45 PM   Result Value Ref Range    Tube No. 1      Glucose, (H) 40 - 70 MG/DL   CULTURE, CSF W GRAM STAIN    Collection Time: 06/06/22  4:45 PM    Specimen: Cerebrospinal Fluid   Result Value Ref Range    Special Requests: NO SPECIAL REQUESTS      GRAM STAIN NO WBC'S SEEN      GRAM STAIN NO ORGANISMS SEEN      Culture result: PENDING    CELL COUNT, CSF    Collection Time: 06/06/22  4:45 PM   Result Value Ref Range    CSF TUBE NO. 3      CSF COLOR PINK (A) COL      SPUN COLOR COLORLESS COL      CSF APPEARANCE HAZY (A) CLEAR      CSF RBCs 5,000 (H) 0 /cu mm    CSF WBCs 38 (H) 0 - 5 /cu mm    CSF Neutrophils 98 (H) 0 - 7 %    CSF LYMPH 1 (L) 28 - 96 %    CSF MONO 1 (L) 16 - 56 %   MENINGITIS PATHOGENS PANEL, CSF (BY PCR)    Collection Time: 06/06/22  4:45 PM   Result Value Ref Range    Escherichia coli K1 Not detected NOTD      Haemophilus Influenzae Not detected NOTD      Listeria Monocytogenes Not detected NOTD      Neisseria Meningitidis Not detected NOTD      Streptococcus Agalactiae Not detected NOTD      Streptococcus Pneumoniae Not detected NOTD      Cytomegalovirus Not detected NOTD      Enterovirus Not detected NOTD      Herpes Simplex Virus 1 Not detected NOTD      Herpes Simplex Virus 2 Not detected NOTD      Human Herpesvirus 6 Not detected NOTD      Human Parechovirus Not detected NOTD      Varicella Zoster Virus Not detected NOTD      Crypto.  neoformans/gattii Not detected NOTD     GLUCOSE, POC    Collection Time: 06/06/22  5:51 PM   Result Value Ref Range    Glucose (POC) 191 (H) 65 - 117 mg/dL    Performed by Hai Torres RN DRUG SCREEN, URINE    Collection Time: 06/06/22  5:56 PM   Result Value Ref Range    AMPHETAMINES Negative NEG      BARBITURATES Negative NEG      BENZODIAZEPINES Negative NEG      COCAINE Negative NEG      METHADONE Negative NEG      OPIATES Negative NEG      PCP(PHENCYCLIDINE) Negative NEG      THC (TH-CANNABINOL) Negative NEG      Drug screen comment (NOTE)    GLUCOSE, POC    Collection Time: 06/07/22 12:44 AM   Result Value Ref Range    Glucose (POC) 165 (H) 65 - 117 mg/dL    Performed by Terrance Mclain (TRV RN)    Viktoriya Crockett, RANDOM    Collection Time: 06/07/22  5:09 AM   Result Value Ref Range    Vancomycin, random 15.2 UG/ML   CBC WITH AUTOMATED DIFF    Collection Time: 06/07/22  5:09 AM   Result Value Ref Range    WBC 7.8 4.1 - 11.1 K/uL    RBC 4.64 4.10 - 5.70 M/uL    HGB 11.2 (L) 12.1 - 17.0 g/dL    HCT 35.8 (L) 36.6 - 50.3 %    MCV 77.2 (L) 80.0 - 99.0 FL    MCH 24.1 (L) 26.0 - 34.0 PG    MCHC 31.3 30.0 - 36.5 g/dL    RDW 15.9 (H) 11.5 - 14.5 %    PLATELET 058 621 - 897 K/uL    MPV 9.6 8.9 - 12.9 FL    NRBC 0.0 0  WBC    ABSOLUTE NRBC 0.00 0.00 - 0.01 K/uL    NEUTROPHILS 65 32 - 75 %    LYMPHOCYTES 25 12 - 49 %    MONOCYTES 9 5 - 13 %    EOSINOPHILS 1 0 - 7 %    BASOPHILS 0 0 - 1 %    IMMATURE GRANULOCYTES 0 0.0 - 0.5 %    ABS. NEUTROPHILS 5.0 1.8 - 8.0 K/UL    ABS. LYMPHOCYTES 1.9 0.8 - 3.5 K/UL    ABS. MONOCYTES 0.7 0.0 - 1.0 K/UL    ABS. EOSINOPHILS 0.1 0.0 - 0.4 K/UL    ABS. BASOPHILS 0.0 0.0 - 0.1 K/UL    ABS. IMM.  GRANS. 0.0 0.00 - 0.04 K/UL    DF AUTOMATED     METABOLIC PANEL, BASIC    Collection Time: 06/07/22  5:09 AM   Result Value Ref Range    Sodium 137 136 - 145 mmol/L    Potassium 3.6 3.5 - 5.1 mmol/L    Chloride 105 97 - 108 mmol/L    CO2 27 21 - 32 mmol/L    Anion gap 5 5 - 15 mmol/L    Glucose 129 (H) 65 - 100 mg/dL    BUN 5 (L) 6 - 20 MG/DL    Creatinine 0.97 0.70 - 1.30 MG/DL    BUN/Creatinine ratio 5 (L) 12 - 20      GFR est AA >60 >60 ml/min/1.73m2    GFR est non-AA >60 >60 ml/min/1.73m2    Calcium 8.2 (L) 8.5 - 10.1 MG/DL   MAGNESIUM    Collection Time: 06/07/22  5:09 AM   Result Value Ref Range    Magnesium 1.6 1.6 - 2.4 mg/dL   PHOSPHORUS    Collection Time: 06/07/22  5:09 AM   Result Value Ref Range    Phosphorus 2.3 (L) 2.6 - 4.7 MG/DL   HEPATIC FUNCTION PANEL    Collection Time: 06/07/22  5:09 AM   Result Value Ref Range    Protein, total 6.1 (L) 6.4 - 8.2 g/dL    Albumin 2.5 (L) 3.5 - 5.0 g/dL    Globulin 3.6 2.0 - 4.0 g/dL    A-G Ratio 0.7 (L) 1.1 - 2.2      Bilirubin, total 0.6 0.2 - 1.0 MG/DL    Bilirubin, direct 0.1 0.0 - 0.2 MG/DL    Alk.  phosphatase 73 45 - 117 U/L    AST (SGOT) 30 15 - 37 U/L    ALT (SGPT) 19 12 - 78 U/L   GLUCOSE, POC    Collection Time: 06/07/22  6:33 AM   Result Value Ref Range    Glucose (POC) 136 (H) 65 - 117 mg/dL    Performed by Roland Triana (HANNAH RN)    GLUCOSE, POC    Collection Time: 06/07/22 11:16 AM   Result Value Ref Range    Glucose (POC) 127 (H) 65 - 117 mg/dL    Performed by Gi Cordova (GWENDOLYN)            Micro:     CULTURE, BLOOD  Order: [id number]   Collected 6/5/2022 13:47     Status: Preliminary result    Specimen Information: Blood         0 Result Notes     Ref Range & Units 6/5/22 1347 Resulting Agency   Special Requests:   NO SPECIAL REQUESTS P  [facility]   Culture result:   NO GROWTH 2 DAYS P           Collected 6/6/2022 16:45     Status: Preliminary result    Specimen Information: Cerebrospinal Fluid         0 Result Notes     Ref Range & Units 6/6/22 1645 Resulting Agency   Special Requests:   NO SPECIAL REQUESTS P  [facility]   GRAM STAIN   NO WBC'S SEEN P  [facility]   Culture result:  PENDING P               Imaging:   CT head      FINDINGS:  Mild prominence of the ventricles and cortical sulci consistent with atrophy is  shown in the interval. There is mild bilateral periventricular diminished  attenuation shown in the cerebrum in the interval, consistent with chronic small  vessel ischemic disease the white matter. A lacunar area of diminished  attenuation is again noted in the lateral right thalamus which could represent a  remote infarct or a prominent perivascular space. There is no intracranial  hemorrhage, extra-axial collection, or mass effect. The basilar cisterns are  open. No CT evidence of acute infarct.     The bone windows demonstrate no abnormalities. A small mucosal retention cyst is  shown posteroinferiorly in the right maxillary sinus, with other paranasal  sinuses and mastoid air cells showing normal aeration.     IMPRESSION  Mild atrophy and chronic small vessel ischemic disease the white matter. No  acute findings.     CXR     FINDINGS: AP portable imaging of the chest performed at 2:02 PM demonstrates a  stable cardiomediastinal silhouette. The lungs are clear bilaterally. No  significant osseous abnormalities are seen.     IMPRESSION  No evidence of acute cardiopulmonary process.     TTE  Important Information     PRELIMINARY TECH WORK NOTES     PHYSICIAN RESULT TO FOLLOW     Vitals     Weight Height BSA (calculated - sq m) BP Pulse (Heart Rate)         136/92        Study Details     Image quality: adequate. The view(s) performed were parasternal, apical, subcostal and suprasternal. Color flow Doppler was performed and pulse wave and/or continuous wave Doppler was performed. Saline contrast was given to evaluate for intracardiac shunt. No shunt seen. Bubble study was negative.      Interpretation Summary          Left Ventricle: Normal left ventricular systolic function. Left ventricle size is normal. Mildly increased wall thickness. Mild septal thickening. Findings consistent with mild concentric hypertrophy. Normal wall motion. Normal diastolic function.           Echo Findings     Left Ventricle Normal left ventricular systolic function. Left ventricle size is normal. Mildly increased wall thickness. Mild septal thickening.  Findings consistent with mild concentric hypertrophy. Normal wall motion. Normal diastolic function. Left Atrium Left atrium size is normal.   Interatrial Septum No interatrial shunt visualized with color Doppler. Right Ventricle Right ventricle size is normal. Normal wall thickness. Normal systolic function. Right Atrium Right atrium size is normal.   Aortic Valve Valve structure is normal. Trace regurgitation. No stenosis. Mitral Valve Valve structure is normal. No regurgitation. No stenosis noted. Tricuspid Valve Valve structure is normal. Trace regurgitation. No stenosis noted. Pulmonic Valve Valve structure is normal. No regurgitation. No stenosis noted. Aorta Normal sized aortic root and ascending aorta. IVC/Hepatic Veins IVC diameter is less than or equal to 21 mm and decreases greater than 50% during inspiration; therefore the estimated right atrial pressure is normal (~3 mmHg). IVC size is normal.   Pericardium No pericardial effusion.            CT C/A/P  CT CHEST ABD PELV W WO CONT: Patient Communication      Add Comments   Not seen         Study Result     Narrative & Impression   EXAM: CT CHEST ABD PELV W WO CONT     INDICATION: Fever, AMS, severe sepsis, assess for source     COMPARISON: None     CONTRAST: 100 mL of Isovue-370.     TECHNIQUE:   Following the uneventful intravenous administration of contrast, thin axial  images were obtained through the chest, abdomen and pelvis. Coronal and sagittal  reconstructions were generated. Oral contrast was not administered. CT dose  reduction was achieved through use of a standardized protocol tailored for this  examination and automatic exposure control for dose modulation.     FINDINGS:      CHEST WALL:No axillary or supraclavicular adenopathy  THYROID: No nodule. MEDIASTINUM: No mass or lymphadenopathy. VIOLETTE: No mass or lymphadenopathy. THORACIC AORTA: No dissection or aneurysm. MAIN PULMONARY ARTERY: Normal in caliber.   TRACHEA/BRONCHI: Patent. ESOPHAGUS: No wall thickening or dilatation. HEART: Coronary atherosclerotic disease  PLEURA: No effusion or pneumothorax. LUNGS: No nodule, mass, or airspace disease. LIVER: No mass or biliary dilatation. GALLBLADDER: Cholecystectomy  BILIARY TREE: No biliary dilatation  SPLEEN: No mass. PANCREAS: No mass or ductal dilatation. ADRENALS: Unremarkable. KIDNEYS: Possible small nonobstructing left renal stone. No evidence of  hydronephrosis or hydroureter bilaterally. STOMACH: Unremarkable. SMALL BOWEL: No dilatation or wall thickening. COLON: No dilatation or wall thickening. APPENDIX: Unremarkable  PERITONEUM: No ascites or pneumoperitoneum. RETROPERITONEUM: No lymphadenopathy or aortic aneurysm. REPRODUCTIVE ORGANS: Unremarkable  URINARY BLADDER: Lester catheter. Nondistended. Possible wall thickening  BONES: Multilevel degenerative changes in the lumbar spine  ADDITIONAL COMMENTS: N/A     IMPRESSION  No acute pathology in the chest, abdomen or pelvis.  Incidental findings as above                  Assessment / Plan:      Mr Priscila Mckee is a 61-year-old gentleman with a history of left total knee arthroplasty in 2018, prostate adenocarcinoma status post cryoablation in 2009, diabetes who presented to the emergency room with high blood sugar (422) and found to have altered mentation     1) sepsis   2) altered mentation   3) uncontrolled DM A1C 12.5  4) Hx of CVA  5) Hx of prostate cancer , post cryoablation 2018  6) HTN  7) L knee replacement         S/P LP OP not measured but reported as  \" appeared low\"   CSF wt 5000 RBC , 38 WBC 98% neutrophils, protein 69, glucose 133  Cultures pending , Meningitis panel PCR negative    Recommendations:  Stop Acyclovir  Stop ampicillin   MRI brain pending   EEG   Other neuro workup per primary team   Can see elevated wbc, protein in non infectious etiologies as well   On empiric Vancomycin, Ceftriaxone, flagyl  Pending above workup and cultures   Aspiration risk Fever curve improved   HIV screen if not obtained in past   Added RPR and VDRL to CSF     Christin Gordon DO  12:01 PM

## 2022-06-07 NOTE — PROGRESS NOTES
Problem: Mobility Impaired (Adult and Pediatric)  Goal: *Acute Goals and Plan of Care (Insert Text)  Description:   FUNCTIONAL STATUS PRIOR TO ADMISSION: Per son's report. Pt lived with his son in a 2 story townhouse. Has been mostly non-ambulatory since knee replacement in 2018. 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 Pt Mod Indep with RW to transfer to wheelchair. Sponges bathes, able to do simple meal prep and dresses himself. HOME SUPPORT PRIOR TO ADMISSION: The patient lived with his son and a second son comes over when the other goes to work. Pt is not left alone. Physical Therapy Goals  Initiated 6/7/2022  1. Patient will move from supine to sit and sit to supine , scoot up and down, and roll side to side in bed with moderate assistance  within 7 day(s). 2.  Patient will transfer from bed to chair and chair to bed with maximal assistance using the least restrictive device within 7 day(s). 3.  Patient will perform sit to stand with maximal assistance within 7 day(s). 4.  Patient will ambulate with maximal assistance for 5 feet with the least restrictive device within 7 day(s). 5.  Patient will sit EOB x 5 minutes with minimal assistance within 7 days. 6.  Patient will improve Jeronimo Balance score by 7 points within 7 days. Outcome: Progressing Towards Goal   PHYSICAL THERAPY EVALUATION  Patient: Alison Oliver (86 y.o. male)  Date: 6/7/2022  Primary Diagnosis: Severe sepsis ([facility] [A41.9, R65.20]        Precautions:   Fall,Bed Alarm    ASSESSMENT  Based on the objective data described below, the patient presents with global weakness, confusion, minimal speaking, impaired sitting balance, high fall risk, and functional mobility well below baseline following admission for sepsis and now undergoing CVA workup(MRI pending). Called son to gather PLOF and history. Pt is functional at a w/c level and communicative at baseline.  Old CVA with residual L sided weakness though stopped ambulating after knee replacement in 2018. Pt requires significant time for processing and initiation. Follows simple one step commands to wiggle toes(L only) and squeeze hand(R only) commands on 2 out of 6 trials. Pt with persistent R sided lean while semi-supine in bed. Attempted EOB with nursing though unable to achieve despite Total A as pt confused, not purposefully following commands and resisting EOB. Returned to middle of the bed and placed call bell in hand. Will follow 3x/week until pt more alert and following increased commands. Current Level of Function Impacting Discharge (mobility/balance): Total care at this time    Functional Outcome Measure: The patient scored 0/56 on the Jeronimo outcome measure which is indicative of high fall risk. Other factors to consider for discharge: awaiting MRI     Patient will benefit from skilled therapy intervention to address the above noted impairments. PLAN :  Recommendations and Planned Interventions: bed mobility training, transfer training, gait training, therapeutic exercises, neuromuscular re-education, patient and family training/education, and therapeutic activities      Frequency/Duration: Patient will be followed by physical therapy:  3 times a week to address goals.     Recommendation for discharge: (in order for the patient to meet his/her long term goals)  Therapy up to 5 days/week in SNF setting    This discharge recommendation:  Has been made in collaboration with the attending provider and/or case management    IF patient discharges home will need the following DME: to be determined (TBD)         SUBJECTIVE:   Patient stated 8.    OBJECTIVE DATA SUMMARY:   HISTORY:    Past Medical History:   Diagnosis Date    Allergic rhinitis     Arthritis     djd of knees    Chronic pain     Diabetes ([address]     Hypercholesteremia     Hypertension     Obesity     Stroke ([address]     left--ring finger/ pinky finger numb sensation     Past Surgical History: Procedure Laterality Date    HX CHOLECYSTECTOMY      laparoscopic       Personal factors and/or comorbidities impacting plan of care: CVA, chronic pain, HTN, obesity, arthritis, mostly w/c bound x 4 years    Home Situation  Home Environment: Private residence  One/Two Story Residence: Two story, live on 1st floor  Living Alone: No  Support Systems: Child(sergei)  Patient Expects to be Discharged to[de-identified] Unable to determine at this time  Current DME Used/Available at Home: [address] [facility] chair,Hospital bed  Tub or Shower Type:  (sponge bathes)    EXAMINATION/PRESENTATION/DECISION MAKING:   Critical Behavior:  Neurologic State: Drowsy,Confused  Orientation Level: Oriented to person,Disoriented to time,Disoriented to situation,Disoriented to place  Cognition: Decreased command following     Hearing: Auditory  Auditory Impairment: None  Skin:    Edema:   Range Of Motion:  AROM: Grossly decreased, non-functional           PROM: Generally decreased, functional           Strength:    Strength: Grossly decreased, non-functional                    Tone & Sensation:   Tone: Abnormal                              Coordination:  Coordination: Grossly decreased, non-functional  Vision:      Functional Mobility:  Bed Mobility:  Rolling: Total assistance  Supine to Sit: Total assistance     Scooting: Total assistance  Transfers:                             Balance:   Sitting: Impaired (back support of the bed)  Ambulation/Gait Training:                                                         Stairs:               Therapeutic Exercises:       Functional Measure:  Jeronimo Balance Test:    Sitting to Standin  Standing Unsupported: 0  Sitting with Back Unsupported: 0  Standing to Sittin  Transfers: 0  Standing Unsupported with Eyes Closed: 0  Standing Unsupported with Feet Together: 0  Reach Forward with Outstretched Arm: 0   Object: 0  Turn to Look Over Shoulders: 0  Turn 360 Degrees: 0  Alternate Foot on Step/Stool: 0  Standing Unsupported One Foot in Front: 0  Stand on One Le  Total: 0/56         56=Maximum possible score;   0-20=High fall risk  21-40=Moderate fall risk   41-56=Low fall risk                Physical Therapy Evaluation Charge Determination   History Examination Presentation Decision-Making   HIGH Complexity :3+ comorbidities / personal factors will impact the outcome/ POC  MEDIUM Complexity : 3 Standardized tests and measures addressing body structure, function, activity limitation and / or participation in recreation  MEDIUM Complexity : Evolving with changing characteristics  Other outcome measures Jeronimo  MEDIUM      Based on the above components, the patient evaluation is determined to be of the following complexity level: MEDIUM    Pain Rating:  None reported    Activity Tolerance:   Poor    After treatment patient left in no apparent distress:   Supine in bed    COMMUNICATION/EDUCATION:   The patients plan of care was discussed with: Registered nurse. Patient is unable to participate in goal setting and plan of care.     Thank you for this referral.  Tra Bee, PT   Time Calculation: 15 mins

## 2022-06-07 NOTE — PROGRESS NOTES
[facility] Dosing Services: Antimicrobial Stewardship Daily Doc  Consult for dosing of vancomycin by Dr. Miguelito Mistry  Indication: Sepsis  Day of Therapy: 3    Ht Readings from Last 1 Encounters:   08/31/21 177.8 cm (70\")        Wt Readings from Last 1 Encounters:   06/07/22 118.8 kg (262 lb)      Vancomycin therapy:  Current maintenance dose: 1000 mg IV every 12 hours   Dose calculated to approximate a therapeutic AUC of 400-600  Last trough level: 15.2 mcg/mL on 6/7/22 with corresponds to an   Plan: SCr stable. Vancomycin level this morning at lower end of target range. Due to severity of infection, will increase to vancomycin 1250 mg IV every 12 hours. Dose administration notes:   Doses given appropriately as scheduled    Date Dose & Interval Measured (mcg/mL) Extrapolated (mcg/mL)   6/7 1000 mg IV q12h 15.2 458                   Other Antimicrobial   (not dosed by pharmacist) Ceftriaxone 2 g IV every 12 hours  Metronidazole 500 mg IV q8h   Cultures 6/5: blood: NG @ 2 days  6/5 blood: NG @ 2 days  6/5: CSF Cx: pending    Serum Creatinine Lab Results   Component Value Date/Time    Creatinine 0.97 06/07/2022 05:09 AM    Creatinine (POC) 1.0 07/20/2018 01:08 PM       Creatinine Clearance Estimated Creatinine Clearance: 92.8 mL/min (based on SCr of 0.97 mg/dL).    Temp Temp: 98.7 °F (37.1 °C)     WBC Lab Results   Component Value Date/Time    WBC 7.8 06/07/2022 05:09 AM      H/H Lab Results   Component Value Date/Time    HGB 11.2 (L) 06/07/2022 05:09 AM      Platelets    Lab Results   Component Value Date/Time    PLATELET 754 99/47/8901 05:09 AM        Thank you,  Steven Moore, PharmD, BCPS  [phone]

## 2022-06-07 NOTE — PROGRESS NOTES
Problem: Aspiration - Risk of  Goal: *Absence of aspiration  Outcome: Not Progressing Towards Goal  Note: Coughing during sleep, concern for silent aspiration.

## 2022-06-07 NOTE — PROGRESS NOTES
26) Verbal shift change report given to Olivier Mcelroy, RN and Maria De Jesus Cancer, RN (oncoming nurse) by Yasmine Agarwal RN (offgoing nurse). Report included the following information SBAR, Kardex, Intake/Output, MAR, Recent Results and Cardiac Rhythm NSR. Junior Pizano

## 2022-06-07 NOTE — PROGRESS NOTES
Hospitalist Progress Note    NAME: Farshad Orlando   :  1950   MRN:  [id number]   Room Number:  Darvin Campos  @ [facility]     Please note that this dictation was completed with Doris Turner, the computer voice recognition software. Quite often unanticipated grammatical, syntax, homophones, and other interpretive errors are inadvertently transcribed by the computer software. Please disregard these errors. Please excuse any errors that have escaped final proofreading. Interim Hospital Summary: 73 y.o. male whom presented on 2022 with      Assessment / Plan:  Anticipated discharge date :   Anticipated disposition :   Barriers to discharge :               Acute encephalopathy POA (due to sepsis, need to rule out CNS infection. CT Head interpreted independently 2022 4PM- no evidence of mass, infarct, hemorrhage noted. ). Suspected seizure   Right sided gaze deviation POA, resolved    History of CVA  Residual left sided hemiparesis POA  Continues to remain somnolent, suspect due to CNS infection, need to evaluate for CVA. Acute change in mental status, patient somnolent, inconsistently following commands, right sided gaze deviation noted at 1815. Code S called. Code CT Head without contrast  Did not reveal hemorrhage.       - Continue levetiracetam 1g BID  - EEG   - Neurology following  - Nursing swallow assessment  - PT/OT/SLP  - ECHO  - MRI Brain without contrast, MRA Brain and Neck      Severe sepsis POA  Elevated lactic acid POA  On admission temperature 102.3 F, heart rate 112 bpm, respiratory rate 22/minute, lactic acid 4.1. Chest x-ray interpreted independently did not reveal any consolidation. Urinalysis negative for infection. COVID PCR negative influenza PCR negative. Received sepsis bolus in the ER. CT chest abdomen pelvis did not show evidence of infection.  LP  : meningitis pathogen PCR panel negative, negative gram stain.      -stop ampicillin, acyclovir  - continue vanc, cefepime, flagyl  -Await blood culture results.  -Continue maintenance fluid           CKD III POA   Creatinine at baseline 1.3-1.4.   -Strict I's and O's, avoid nephrotoxic meds, renally dose meds.     Type 2 diabetes with hyperglycemia POA        Lab Results   Component Value Date/Time     Hemoglobin A1c 12.5 (H) 06/06/2022 05:04 AM     Hemoglobin A1c (POC) 13.9 (A) 06/18/2021 12:42 PM   - add glargine insulin   - Lispro correctional scale, FSG AC HS  - Consistent carb diet, hypoglycemia protocol.         Uncontrolled Hypertension POA   HLD POA  - resume amlodipine  - Labetalol PRN for SBP > 180 mm Hg,DBP > 100 mm Hg         Body mass index is 34.9 kg/m². Code Status: full   Surrogate Decision Maker:  Son Cindee Jeans [phone]  Lindsay Quan [phone]     DVT Prophylaxis: Lovenox  GI Prophylaxis: not indicated  Baseline: does not walk much, mostly wheelchair, sometimes ambulates with walker     Subjective:     Chief Complaint / Reason for Physician Visit  More alert today, eyes open, makes eye contact, unable to follow commands, picking at things in the air \". Discussed with RN events overnight. Review of Systems:  Unable to obtain      Objective:     VITALS:   Last 24hrs VS reviewed since prior progress note.  Most recent are:  Patient Vitals for the past 24 hrs:   Temp Pulse Resp BP SpO2   06/07/22 1108 98.7 °F (37.1 °C) 82 25 (!) 164/90 95 %   06/07/22 0843 98.9 °F (37.2 °C) 80 22 (!) 149/95 98 %   06/07/22 0400 99.3 °F (37.4 °C) 82 21 (!) 150/79 100 %   06/07/22 0000 100 °F (37.8 °C) 85 19 133/72 --   06/06/22 2300 -- 86 21 134/63 --   06/06/22 2000 99.9 °F (37.7 °C) 83 20 (!) 153/72 96 %   06/06/22 1600 -- 80 -- -- --   06/06/22 1352 99.7 °F (37.6 °C) 67 22 137/60 98 %       Intake/Output Summary (Last 24 hours) at 6/7/2022 1327  Last data filed at 6/7/2022 6461  Gross per 24 hour   Intake 2100 ml   Output 1275 ml   Net 825 ml        PHYSICAL EXAM:  General: Alert, cooperative, no acute distress    EENT:  EOMI. Anicteric sclerae. MMM  Resp:  CTA bilaterally, no wheezing or rales. No accessory muscle use  CV:  Regular  rhythm,  normal S1/S2, no murmurs rubs gallops, No edema  GI:  Soft, Non distended, Non tender. +Bowel sounds  Neurologic:  Alert, picking at things in the air, nonverbal, not following commands, EOMI, PERRL  Psych:   poor insight. Not anxious nor agitated  Skin:  No rashes. No jaundice    Reviewed most current lab test results and cultures  YES  Reviewed most current radiology test results   YES  Review and summation of old records today    NO  Reviewed patient's current orders and MAR    YES  PMH/SH reviewed - no change compared to H&P  ________________________________________________________________________  Care Plan discussed with:    Comments   Patient x    Family  x    RN x    Care Manager xx    Consultant  x                     x Multidiciplinary team rounds were held today with , nursing, pharmacist and clinical coordinator. Patient's plan of care was discussed; medications were reviewed and discharge planning was addressed. ________________________________________________________________________  Total NON critical care TIME: 35 Minutes    Total CRITICAL CARE TIME Spent:   Minutes non procedure based      Comments   >50% of visit spent in counseling and coordination of care x    ________________________________________________________________________  Carolina Mar MD     Procedures: see electronic medical records for all procedures/Xrays and details which were not copied into this note but were reviewed prior to creation of Plan. LABS:  I reviewed today's most current labs and imaging studies.   Pertinent labs include:  Recent Labs     06/07/22  0509 06/06/22  0504 06/05/22  1347   WBC 7.8 8.4 11.5*   HGB 11.2* 10.8* 12.9   HCT 35.8* 36.0* 43.3    223 287     Recent Labs     06/07/22  0509 06/06/22  0504 06/05/22  1347    141 137   K 3.6 3.3* 4.1    108 101   CO2 27 24 27   * 228* 422*   BUN 5* 6 10   CREA 0.97 0.98 1.43*   CA 8.2* 7.4* 9.1   MG 1.6 1.2*  --    PHOS 2.3* 2.3*  --    ALB 2.5* 2.6* 3.6   TBILI 0.6 0.6 0.5   ALT 19 16 19       Signed: La Nena Vanessa MD

## 2022-06-07 NOTE — PROGRESS NOTES
Problem: Dysphagia (Adult)  Goal: *Acute Goals and Plan of Care (Insert Text)  Description: Speech pathology goals  Initiated 6/7/2022  1. Patient will tolerate puree/thin liquid diet with no overt s/s aspiration or adverse effects within 7 days  Outcome: Progressing Towards Goal     SPEECH [address] EVALUATION  Patient: Licha Cloud (48 y.o. male)  Date: 6/7/2022  Primary Diagnosis: Severe sepsis ([facility] [A41.9, R65.20]       Precautions: aspiration       ASSESSMENT :  Based on the objective data described below, the patient presents with continued AMS that limits safety with PO intake. Patient now alert and speaking minimally, however question if patient was hallucinating toward end of session (as he kept looking next to SLP, reaching out toward empty space next to SLP, and groaning). Patient required verbal and tactile cues to suck on straw, accept spoon, and manipulate puree. However, pharyngeal swallow appeared intact with no overt s/s aspiration observed. Patient remains at high risk for aspiration secondary to AMS, however when cognitive status improves, recommend initiate puree/thin liquid diet as pharyngeal swallow WFL. Patient will benefit from skilled intervention to address the above impairments. Patients rehabilitation potential is considered to be Fair     PLAN :  Recommendations and Planned Interventions:  -NPO until mental status improves  -When mental status improves and patient begins accepting PO items without verbal/tactile cues, recommend initiate puree/thin liquid diet  -SLP to follow as mental status improves  Frequency/Duration: Patient will be followed by speech-language pathology 1 time a week to address goals. Discharge Recommendations: To Be Determined     SUBJECTIVE:   Patient stated I'm fine.     OBJECTIVE:     Past Medical History:   Diagnosis Date    Allergic rhinitis     Arthritis     djd of knees    Chronic pain     Diabetes ([facility]     Hypercholesteremia     Hypertension     Obesity     Stroke ([facility]     left--ring finger/ pinky finger numb sensation     Past Surgical History:   Procedure Laterality Date    HX CHOLECYSTECTOMY      laparoscopic     Prior Level of Function/Home Situation:      Diet prior to admission: unknown  Current Diet:  NPO   Cognitive and Communication Status:  Neurologic State: Alert,Confused  Orientation Level: Unable to verbalize  Cognition: Decreased attention/concentration,Decreased command following           Oral Assessment:  Oral Assessment  Dentition: Edentulous  Oral Hygiene: dry  P.O. Trials:  Patient Position: upright in bed  Vocal quality prior to P.O.: No impairment  Consistency Presented: Ice chips; Thin liquid;Puree  How Presented: SLP-fed/presented;Spoon;Straw     Bolus Acceptance: Impaired  Bolus Formation/Control: Impaired  Type of Impairment: Delayed  Propulsion: Delayed (# of seconds)  Oral Residue: None  Initiation of Swallow: No impairment  Laryngeal Elevation: Functional  Aspiration Signs/Symptoms: None                Oral Phase Severity: Moderate-severe  Pharyngeal Phase Severity : No impairment    NOMS:   The NOMS functional outcome measure was used to quantify this patient's level of swallowing impairment. Based on the NOMS, the patient was determined to be at level 2 for swallow function       [facility]:  Level 1 (CN): NPO  Level 2 (CM): NPO but takes consistency in therapy  Level 3 (CL): Takes less than 50% of nutrition p.o. and continues with nonoral feedings; and/or safe with mod cues; and/or max diet restriction  Level 4 (CK): Safe swallow but needs mod cues; and/or mod diet restriction; and/or still requires some nonoral feeding/supplements  Level 5 (CJ): Safe swallow with min diet restriction; and/or needs min cues  Level 6 (CI): Independent with p.o.; rare cues; usually self cues; may need to avoid some foods or needs extra time  Level 7 [facility]): Independent for all p.o.  EMILY. (2003). National Outcomes Measurement System (NOMS): Adult Speech-Language Pathology User's Guide. Pain:             After treatment:   Patient left in no apparent distress in bed and Nursing notified    COMMUNICATION/EDUCATION:   Patient was too confused for education  The patient's plan of care including recommendations, planned interventions, and recommended diet changes were discussed with: Registered nurse. Patient is unable to participate in goal setting and plan of care.     Thank you for this referral.  JENA Hernandez  Time Calculation: 15 mins

## 2022-06-07 NOTE — PROGRESS NOTES
9681) Bedside shift change report given to Linda Engle, RN (oncoming nurse) by Kori Coleman (offgoing nurse). Report included the following information SBAR, Kardex and MAR.   6355) MRI planning to come after 12. Pt will need stretcher. 1030) IDR with Dr. Yandy NORRIS), Walter Larsen (pharmacist), , Kenn Pascual (nurse supervisor),  and Linda Engle (RN) to discuss plan of care including MRI, consult to ID  56) Discuss with Dr. Shlomo Cole for MRI  [phone]) ID at bedside. 1145) Discuss with MRI, plan at 3 pm.  Stretcher transfer outside of CT  1215) Call to [facility] lab, able to run VDRL from yesterday's CSF  1230) [facility] Dr. Varinder Sutherland to let you know some of the Keppra infiltrated. Pt is still occasionally grabbing air in front of his face  [phone]) Pt call to pt Beverly Carl son [phone]. Discuss plan for MRI  1540) [facility] Dr. Dang Lion is currently 97% on room air. BP is still elevated 167/89 but below prn labetol parameters. MRI  is still working on the machine--if they are not able to fix the machine tonight can I change the Ativan order to once prn prior to MRI so that is usable tomorrow?  1600) [facility]--Vitals recheck-- MEWS was a 3, heart rate was briefly in low 100's; now returned to 70's and 80's  1835) Call to Beverly Carl, son--plan to come by after 3 tomorrow. Discuss MRI delay due to technical repairs. 1845) pt swinging arms during CHG bath. Gown change delayed until pt calmer  1945) pt sister at bedside. Pt at baseline able to use wheelchair for mobility. 2000) Bedside shift change report given to Clayborne Kawasaki, RN (oncoming nurse) by Linda Engle RN (offgoing nurse). Report included the following information SBAR, Kardex, MAR and Cardiac Rhythm NSR with PAC and PVC's. Pt gown changed.

## 2022-06-08 ENCOUNTER — APPOINTMENT (OUTPATIENT)
Dept: MRI IMAGING | Age: 72
DRG: 871 | End: 2022-06-08
Attending: STUDENT IN AN ORGANIZED HEALTH CARE EDUCATION/TRAINING PROGRAM
Payer: MEDICARE

## 2022-06-08 LAB
ATRIAL RATE: 114 BPM
CALCULATED P AXIS, ECG09: 31 DEGREES
CALCULATED R AXIS, ECG10: 10 DEGREES
CALCULATED T AXIS, ECG11: 55 DEGREES
CREAT SERPL-MCNC: 0.93 MG/DL (ref 0.7–1.3)
DIAGNOSIS, 93000: NORMAL
GLUCOSE BLD STRIP.AUTO-MCNC: 113 MG/DL (ref 65–117)
GLUCOSE BLD STRIP.AUTO-MCNC: 149 MG/DL (ref 65–117)
GLUCOSE BLD STRIP.AUTO-MCNC: 85 MG/DL (ref 65–117)
GLUCOSE BLD STRIP.AUTO-MCNC: 85 MG/DL (ref 65–117)
GLUCOSE BLD STRIP.AUTO-MCNC: 99 MG/DL (ref 65–117)
P-R INTERVAL, ECG05: 200 MS
Q-T INTERVAL, ECG07: 320 MS
QRS DURATION, ECG06: 72 MS
QTC CALCULATION (BEZET), ECG08: 441 MS
REAGIN AB CSF QL: NON REACTIVE
SERVICE CMNT-IMP: ABNORMAL
SERVICE CMNT-IMP: NORMAL
TSH SERPL DL<=0.05 MIU/L-ACNC: 1.38 UIU/ML (ref 0.36–3.74)
VENTRICULAR RATE, ECG03: 114 BPM
VIT B12 SERPL-MCNC: 413 PG/ML (ref 193–986)

## 2022-06-08 PROCEDURE — 74011000258 HC RX REV CODE- 258: Performed by: INTERNAL MEDICINE

## 2022-06-08 PROCEDURE — A9576 INJ PROHANCE MULTIPACK: HCPCS | Performed by: STUDENT IN AN ORGANIZED HEALTH CARE EDUCATION/TRAINING PROGRAM

## 2022-06-08 PROCEDURE — 82607 VITAMIN B-12: CPT

## 2022-06-08 PROCEDURE — 65270000032 HC RM SEMIPRIVATE

## 2022-06-08 PROCEDURE — 84443 ASSAY THYROID STIM HORMONE: CPT

## 2022-06-08 PROCEDURE — 70544 MR ANGIOGRAPHY HEAD W/O DYE: CPT

## 2022-06-08 PROCEDURE — 74011000258 HC RX REV CODE- 258: Performed by: STUDENT IN AN ORGANIZED HEALTH CARE EDUCATION/TRAINING PROGRAM

## 2022-06-08 PROCEDURE — 82962 GLUCOSE BLOOD TEST: CPT

## 2022-06-08 PROCEDURE — 74011000250 HC RX REV CODE- 250: Performed by: STUDENT IN AN ORGANIZED HEALTH CARE EDUCATION/TRAINING PROGRAM

## 2022-06-08 PROCEDURE — 95819 EEG AWAKE AND ASLEEP: CPT | Performed by: PSYCHIATRY & NEUROLOGY

## 2022-06-08 PROCEDURE — 74011250637 HC RX REV CODE- 250/637: Performed by: STUDENT IN AN ORGANIZED HEALTH CARE EDUCATION/TRAINING PROGRAM

## 2022-06-08 PROCEDURE — 95816 EEG AWAKE AND DROWSY: CPT | Performed by: PSYCHIATRY & NEUROLOGY

## 2022-06-08 PROCEDURE — 70549 MR ANGIOGRAPH NECK W/O&W/DYE: CPT

## 2022-06-08 PROCEDURE — 36415 COLL VENOUS BLD VENIPUNCTURE: CPT

## 2022-06-08 PROCEDURE — 74011250636 HC RX REV CODE- 250/636: Performed by: STUDENT IN AN ORGANIZED HEALTH CARE EDUCATION/TRAINING PROGRAM

## 2022-06-08 PROCEDURE — 77010033678 HC OXYGEN DAILY

## 2022-06-08 PROCEDURE — 70553 MRI BRAIN STEM W/O & W/DYE: CPT

## 2022-06-08 PROCEDURE — 82746 ASSAY OF FOLIC ACID SERUM: CPT

## 2022-06-08 PROCEDURE — 82565 ASSAY OF CREATININE: CPT

## 2022-06-08 PROCEDURE — 2709999900 HC NON-CHARGEABLE SUPPLY

## 2022-06-08 PROCEDURE — 74011250636 HC RX REV CODE- 250/636: Performed by: INTERNAL MEDICINE

## 2022-06-08 RX ORDER — DEXTROSE MONOHYDRATE AND SODIUM CHLORIDE 5; .45 G/100ML; G/100ML
50 INJECTION, SOLUTION INTRAVENOUS CONTINUOUS
Status: DISCONTINUED | OUTPATIENT
Start: 2022-06-08 | End: 2022-06-09

## 2022-06-08 RX ORDER — LORAZEPAM 2 MG/ML
2 INJECTION INTRAMUSCULAR
Status: COMPLETED | OUTPATIENT
Start: 2022-06-08 | End: 2022-06-08

## 2022-06-08 RX ORDER — LISINOPRIL 5 MG/1
10 TABLET ORAL DAILY
Status: DISCONTINUED | OUTPATIENT
Start: 2022-06-08 | End: 2022-06-09

## 2022-06-08 RX ADMIN — VANCOMYCIN HYDROCHLORIDE 1250 MG: 10 INJECTION, POWDER, LYOPHILIZED, FOR SOLUTION INTRAVENOUS at 08:10

## 2022-06-08 RX ADMIN — CEFTRIAXONE SODIUM 2 G: 2 INJECTION, POWDER, FOR SOLUTION INTRAMUSCULAR; INTRAVENOUS at 00:06

## 2022-06-08 RX ADMIN — LORAZEPAM 1 MG: 2 INJECTION INTRAMUSCULAR; INTRAVENOUS at 11:30

## 2022-06-08 RX ADMIN — SODIUM CHLORIDE, PRESERVATIVE FREE 10 ML: 5 INJECTION INTRAVENOUS at 21:29

## 2022-06-08 RX ADMIN — NYSTATIN: 100000 POWDER TOPICAL at 18:21

## 2022-06-08 RX ADMIN — DEXTROSE AND SODIUM CHLORIDE 50 ML/HR: 5; 450 INJECTION, SOLUTION INTRAVENOUS at 18:20

## 2022-06-08 RX ADMIN — CEFTRIAXONE SODIUM 2 G: 2 INJECTION, POWDER, FOR SOLUTION INTRAMUSCULAR; INTRAVENOUS at 15:08

## 2022-06-08 RX ADMIN — ENOXAPARIN SODIUM 30 MG: 100 INJECTION SUBCUTANEOUS at 08:20

## 2022-06-08 RX ADMIN — ENOXAPARIN SODIUM 30 MG: 100 INJECTION SUBCUTANEOUS at 21:28

## 2022-06-08 RX ADMIN — LISINOPRIL 10 MG: 5 TABLET ORAL at 09:28

## 2022-06-08 RX ADMIN — VANCOMYCIN HYDROCHLORIDE 1250 MG: 10 INJECTION, POWDER, LYOPHILIZED, FOR SOLUTION INTRAVENOUS at 20:01

## 2022-06-08 RX ADMIN — ACETAMINOPHEN 650 MG: 325 TABLET ORAL at 09:27

## 2022-06-08 RX ADMIN — AMLODIPINE BESYLATE 10 MG: 5 TABLET ORAL at 09:28

## 2022-06-08 RX ADMIN — LEVETIRACETAM 1000 MG: 100 SOLUTION ORAL at 21:28

## 2022-06-08 RX ADMIN — NYSTATIN: 100000 POWDER TOPICAL at 08:21

## 2022-06-08 RX ADMIN — SODIUM CHLORIDE 1000 MG: 9 INJECTION, SOLUTION INTRAVENOUS at 09:29

## 2022-06-08 RX ADMIN — METRONIDAZOLE 500 MG: 500 INJECTION, SOLUTION INTRAVENOUS at 03:57

## 2022-06-08 RX ADMIN — SODIUM CHLORIDE, PRESERVATIVE FREE 10 ML: 5 INJECTION INTRAVENOUS at 14:00

## 2022-06-08 RX ADMIN — LORAZEPAM 0.5 MG: 2 INJECTION INTRAMUSCULAR; INTRAVENOUS at 12:56

## 2022-06-08 RX ADMIN — GADOTERIDOL 20 ML: 279.3 INJECTION, SOLUTION INTRAVENOUS at 13:18

## 2022-06-08 RX ADMIN — METRONIDAZOLE 500 MG: 500 INJECTION, SOLUTION INTRAVENOUS at 22:31

## 2022-06-08 RX ADMIN — METRONIDAZOLE 500 MG: 500 INJECTION, SOLUTION INTRAVENOUS at 15:08

## 2022-06-08 NOTE — PROGRESS NOTES
ID follow up  Chart reviewed  Fever curve much better   MRI brain pending  CSF cx so far negative  On empiric vancomycin, ceftriaxone, flagyl   If MRI has other non infectious etiology such that explains altered mentation and CSF , blood cx are negative, will work on de escalating antibiotics   Can see elevated protein, wbc in non infectious etiologies as well.  Meninginitis panel negative   RPR NR         Jose Rena, DO  12:36 PM

## 2022-06-08 NOTE — PROGRESS NOTES
Problem: Aspiration - Risk of  Goal: *Absence of aspiration  Outcome: Progressing Towards Goal     Problem: Patient Education: Go to Patient Education Activity  Goal: Patient/Family Education  Outcome: Progressing Towards Goal     Problem: Pressure Injury - Risk of  Goal: *Prevention of pressure injury  Description: Document Andrzej Scale and appropriate interventions in the flowsheet. Outcome: Progressing Towards Goal  Note: Pressure Injury Interventions:  Sensory Interventions: Check visual cues for pain,Float heels,Keep linens dry and wrinkle-free,Minimize linen layers    Moisture Interventions: Absorbent underpads,Check for incontinence Q2 hours and as needed,Internal/External urinary devices,Limit adult briefs,Minimize layers    Activity Interventions: PT/OT evaluation    Mobility Interventions: Assess need for specialty bed,Float heels,PT/OT evaluation,Turn and reposition approx. every two hours(pillow and wedges)    Nutrition Interventions: Offer support with meals,snacks and hydration,Discuss nutritional consult with provider,Document food/fluid/supplement intake    Friction and Shear Interventions: Lift sheet,Minimize layers                Problem: Patient Education: Go to Patient Education Activity  Goal: Patient/Family Education  Outcome: Progressing Towards Goal     Problem: Falls - Risk of  Goal: *Absence of Falls  Description: Document Luis Eduardo Fall Risk and appropriate interventions in the flowsheet.   Outcome: Progressing Towards Goal  Note: Fall Risk Interventions:       Mentation Interventions: Bed/chair exit alarm,Adequate sleep, hydration, pain control,Door open when patient unattended,Increase mobility,More frequent rounding,Reorient patient,Room close to nurse's station,Update white board    Medication Interventions: Bed/chair exit alarm,Evaluate medications/consider consulting pharmacy    Elimination Interventions: Bed/chair exit alarm,Call light in reach,Patient to call for help with toileting needs,Toileting schedule/hourly rounds    History of Falls Interventions: Bed/chair exit alarm,Consult care management for discharge planning,Door open when patient unattended,Evaluate medications/consider consulting pharmacy,Investigate reason for fall,Room close to nurse's station         Problem: Patient Education: Go to Patient Education Activity  Goal: Patient/Family Education  Outcome: Progressing Towards Goal     Problem: Sepsis: Day of Diagnosis  Goal: Discharge Planning  Outcome: Progressing Towards Goal  Goal: Medications  Outcome: Progressing Towards Goal  Goal: Treatments/Interventions/Procedures  Outcome: Progressing Towards Goal  Goal: Psychosocial  Outcome: Progressing Towards Goal     Problem: Diabetes Self-Management  Goal: *Disease process and treatment process  Description: Define diabetes and identify own type of diabetes; list 3 options for treating diabetes. Outcome: Progressing Towards Goal  Goal: *Incorporating nutritional management into lifestyle  Description: Describe effect of type, amount and timing of food on blood glucose; list 3 methods for planning meals. Outcome: Progressing Towards Goal  Goal: *Incorporating physical activity into lifestyle  Description: State effect of exercise on blood glucose levels. Outcome: Progressing Towards Goal  Goal: *Developing strategies to promote health/change behavior  Description: Define the ABC's of diabetes; identify appropriate screenings, schedule and personal plan for screenings. Outcome: Progressing Towards Goal  Goal: *Using medications safely  Description: State effect of diabetes medications on diabetes; name diabetes medication taking, action and side effects. Outcome: Progressing Towards Goal  Goal: *Monitoring blood glucose, interpreting and using results  Description: Identify recommended blood glucose targets  and personal targets.   Outcome: Progressing Towards Goal  Goal: *Prevention, detection, treatment of acute complications  Description: List symptoms of hyper- and hypoglycemia; describe how to treat low blood sugar and actions for lowering  high blood glucose level. Outcome: Progressing Towards Goal  Goal: *Prevention, detection and treatment of chronic complications  Description: Define the natural course of diabetes and describe the relationship of blood glucose levels to long term complications of diabetes.   Outcome: Progressing Towards Goal  Goal: *Developing strategies to address psychosocial issues  Description: Describe feelings about living with diabetes; identify support needed and support network  Outcome: Progressing Towards Goal  Goal: *Insulin pump training  Outcome: Progressing Towards Goal  Goal: *Sick day guidelines  Outcome: Progressing Towards Goal  Goal: *Patient Specific Goal (EDIT GOAL, INSERT TEXT)  Outcome: Progressing Towards Goal     Problem: Patient Education: Go to Patient Education Activity  Goal: Patient/Family Education  Outcome: Progressing Towards Goal     Problem: Patient Education: Go to Patient Education Activity  Goal: Patient/Family Education  Outcome: Progressing Towards Goal     Problem: TIA/CVA Stroke: Day 2 Until Discharge  Goal: Off Pathway (Use only if patient is Off Pathway)  Outcome: Progressing Towards Goal  Goal: Activity/Safety  Outcome: Progressing Towards Goal  Goal: Diagnostic Test/Procedures  Outcome: Progressing Towards Goal  Goal: Nutrition/Diet  Outcome: Progressing Towards Goal  Goal: Discharge Planning  Outcome: Progressing Towards Goal  Goal: Medications  Outcome: Progressing Towards Goal  Goal: Respiratory  Outcome: Progressing Towards Goal  Goal: Treatments/Interventions/Procedures  Outcome: Progressing Towards Goal  Goal: Psychosocial  Outcome: Progressing Towards Goal  Goal: *Verbalizes anxiety and depression are reduced or absent  Outcome: Progressing Towards Goal  Goal: *Absence of aspiration  Outcome: Progressing Towards Goal  Goal: *Absence of deep venous thrombosis signs and symptoms(Stroke Metric)  Outcome: Progressing Towards Goal  Goal: *Optimal pain control at patient's stated goal  Outcome: Progressing Towards Goal  Goal: *Tolerating diet  Outcome: Progressing Towards Goal  Goal: *Ability to perform ADLs and demonstrates progressive mobility and function  Outcome: Progressing Towards Goal  Goal: *Stroke education continued(Stroke Metric)  Outcome: Progressing Towards Goal     Problem: Ischemic Stroke: Discharge Outcomes  Goal: *Verbalizes anxiety and depression are reduced or absent  Outcome: Progressing Towards Goal  Goal: *Verbalize understanding of risk factor modification(Stroke Metric)  Outcome: Progressing Towards Goal  Goal: *Hemodynamically stable  Outcome: Progressing Towards Goal  Goal: *Absence of aspiration pneumonia  Outcome: Progressing Towards Goal  Goal: *Aware of needed dietary changes  Outcome: Progressing Towards Goal  Goal: *Verbalize understanding of prescribed medications including anti-coagulants, anti-lipid, and/or anti-platelets(Stroke Metric)  Outcome: Progressing Towards Goal  Goal: *Tolerating diet  Outcome: Progressing Towards Goal  Goal: *Aware of follow-up diagnostics related to anticoagulants  Outcome: Progressing Towards Goal  Goal: *Ability to perform ADLs and demonstrates progressive mobility and function  Outcome: Progressing Towards Goal  Goal: *Absence of DVT(Stroke Metric)  Outcome: Progressing Towards Goal  Goal: *Absence of aspiration  Outcome: Progressing Towards Goal  Goal: *Optimal pain control at patient's stated goal  Outcome: Progressing Towards Goal  Goal: *Home safety concerns addressed  Outcome: Progressing Towards Goal  Goal: *Describes available resources and support systems  Outcome: Progressing Towards Goal  Goal: *Verbalizes understanding of activation of EMS(911) for stroke symptoms(Stroke Metric)  Outcome: Progressing Towards Goal  Goal: *Understands and describes signs and symptoms to report to providers(Stroke Metric)  Outcome: Progressing Towards Goal  Goal: *Neurolgocially stable (absence of additional neurological deficits)  Outcome: Progressing Towards Goal  Goal: *Verbalizes importance of follow-up with primary care physician(Stroke Metric)  Outcome: Progressing Towards Goal  Goal: *Smoking cessation discussed,if applicable(Stroke Metric)  Outcome: Progressing Towards Goal  Goal: *Depression screening completed(Stroke Metric)  Outcome: Progressing Towards Goal

## 2022-06-08 NOTE — PROGRESS NOTES
Transition of Care Plan   RUR- Low 13%    DISPOSITION: The disposition plan is home with family assistance; pending medical progression   F/U with PCP/Specialist   o NEW PCP appt needed     Transport: Family    Following: ID, PT/OT     Reason for Admission:  Severe sepsis ([address]                       Plan for utilizing home health:    PT/OT following       PCP: First and Last name:  Elroy Jarvis MD     Name of Practice:    Are you a current patient: Yes/No: Yes    Approximate date of last visit: Within the past year    Can you participate in a virtual visit with your PCP:                     Current Advanced Directive/Advance Care Plan: Full Code      Healthcare Decision Maker:   Click here to complete [place] including selection of the Healthcare Decision Maker Relationship (ie \"Primary\")             Primary Decision Maker: Eloina Dsouza - Child - [phone]                  Transition of Care Plan: This cm conducted the initial evaluation with the pt's son: Miguel Romo, [phone]. He confirmed the pt demographics and insurance provider. He reported that the pt's primary pharmacy is [facility]. He reported that the pt lives with him and his other son: Italo Richard. He reported that the pt is semi independent with completing ADLs and IADLs. He reported that the pt is able to feed himself and bathe his UE and LE independently. He reported that the pt has the listed DME: hospital bed, walker, and W/C. He reported that the pt has been primarily [facility] bound since his knee surgery in 2018 but the pt is able to transfer with 1 assist. He reported that the pt has hx of HH and being in a SNF. He reported that the goal is for the pt to return home, once medically stable. This cm will continue to follow for Elisa needs. Care Management Interventions  PCP Verified by CM: Yes  Mode of Transport at Discharge:  Other (see comment) (Family)  Transition of Care Consult (CM Consult): Discharge Planning  MyChart Signup: No  Discharge Durable Medical Equipment: No  Physical Therapy Consult: Yes  Occupational Therapy Consult: Yes  Speech Therapy Consult: Yes  Support Systems: Child(sergei)  Confirm Follow Up Transport: Family  The Patient and/or Patient Representative was Provided with a Choice of Provider and Agrees with the Discharge Plan?: Yes  Name of the Patient Representative Who was Provided with a Choice of Provider and Agrees with the Discharge Plan: Jocelynn Guzman  Freedom of Choice List was Provided with Basic Dialogue that Supports the Patient's Individualized Plan of Care/Goals, Treatment Preferences and Shares the Quality Data Associated with the Providers?: Yes  Discharge Location  Patient Expects to be Discharged to[de-identified] Home  [facility] [address] [facility] MSW,   [phone]

## 2022-06-08 NOTE — PROGRESS NOTES
Speech pathology note  New orders received. Called and discussed case with RN. Note patient now alert with improved mental status, now Ox3. RN completed dysphagia screen and patient passed. RN reported patient remains confused, but is having no difficulty swallowing purees and thin liquids. Given SLP evaluation yesterday that showed WFL pharyngeal swallow and AMS as biggest aspiration risk, agree with initiating puree/thin liquid diet with supervision now that mental status has improved. SLP will continue to follow. Thank you.     Simone Stevens., CCC-SLP

## 2022-06-08 NOTE — PROGRESS NOTES
1102: Pt transported off unit in bed, fully monitored by RN on tele box, to MRI. 1130: Ativan 1mg IV given for MRI. See MAR documentation. Pt on MRI monitor for duration of MRI. BP monitoring unavailable due to lack of cuff compatible with MRI monitor. HR and SpO2 being monitored continuously. RN will monitor patient from MRI work area. 1217: Pt de-satted to 85% on Room Air during MRI. RN applied 4L NC. SpO2 increased to 95%. RN will continue to monitor patient.

## 2022-06-08 NOTE — PROGRESS NOTES
Physical therapy:     Chart reviewed. Patient currently off floor. Will attempt as able. Continue to recommend SNF at discharge.      Mukul Sullivan, PT

## 2022-06-08 NOTE — PROGRESS NOTES
0720  Shift change report received from [facility], [facility]. Report received by this RN and Calos Thompson RN (float pool orientee). Report included review of SBAR, accordion report, results review, orders, meds, ROS, and POC. All questions answered. Transfer of care complete. 0900  Speech note reviewed, pt alert enough at this time to attempt po intake. Bedside swallow study conducted with Florencio Schulte RN. Pt passed screening, will discuss with MD at IDR reassessment by speech. Any po intake at this point to be closely monitored by RN d/t pts altered mental and neuro status. 1045  IDR completed; continue with current POC; pt due for MRI, speech telephone consult, EEG and will need consent to test for HIV per ID note suggestion (Dr. Daniel Hobbs to contact this RN if testing to be ordered). Veronica, SLP called and s/w this RN to review pts level of orientation and preformance with bed side swallow. Speech specified diet to be ordered for pt and pt to be a 1:1 nurse feed only, no food or drink to be put within reach of pt. Note made and put on room door. 1102  Pt put on tele box 2 for continued monitoring while pt transferred down to MRI. 1130  Pt transferred onto MRI bed, hooked up to MRI compatible VS/tele monitor, positioned securely for optimal image collection. Once pt settled Ativan 1mg IVP administered by Florencio Schulte RN.    1220  Pt O2 sats down to 85% while in MRI machine. MOHIT Prater and this RN in at pt side while on scanner bed and pt put on O2 via NC at 4L. Will continue to monitor. 1256  Additional dose of Ativan admin by Florencio Schulte RN. Order was for 2mg however d/t pts desat after administration of Ativan at 1130 only 05mg administered at this time. [address] put on pt room bed, pt then transferred onto bed from MRI stretcher. Transfer onto bed done in ER to minimize amt of time pt on stretcher. [id number]  Pt returned from MRI via bed. Pt hooked up to tele monitor.  Male alisonwick hooked back up to suction after pericare completed. Q4H repeat assessment and VS collected; BP elevated at 157/88, MEWS=1, pt denies pain. Pt very drowsy post admin of Ativan while in MRI, declining po intake at this time. Pharmacy called to reschedule IVAB dose times d/t meds not able to be admin at scheduled time while pt at MRI. Will continue to monitor. Safety measures in place. Rounds completed. 1411  Q6H BG collected upon return to unit from MRI. BG=85, pt drowsy and not taking po at this time. Will reassess prior to 1800 and d/w Dr. Abi Godinez start if IVF with dextrose to prevent pt from go ing hypoglycemic. [id number]  Pt sats dropped to 73 while this RN in at pt bedside, pt put on 2L O2 via NC, pt aroused, HOB elevated to 30 degrees. Sats up to 97%. Will continue to monitor. 1425  Pt sleeping, sats 99% on 2L O2. No s/sx of distress. Safety measures in place. Rounds completed. 1508  Scheduled flagyl and ceftriaxone IVAB PB hung. 1530  Pt in bed sleeping, safety measures in place, no s/sx of distress. Rounds completed. 1600  Q4H repeat VS, reassessment and neuro reassessment completed. VS WNL, MEWS=1,   Pain via visual scale =0.  Pt remains drowsy, aroused easily but back to sleep prior to staff exit room. Pt on 2L 02 via NC while in drowsy state. Will continue to monitor. Safety measures in place, rounds completed. 1731  YB=603, due to jump in BG level from 85 to 149 with zero po intake, this RN requested PCT to repeat and make sure first blood drop wiped away to ensure ruminants of alcohol swab not causing false elevated BG.    1733  UE=940. Dr. García Roles in nursing station at time of BG collection, orders entered for IVF D5 1/2NS at 50ml/hr. Pt sleeping in bed. No s/sx of distress. Rounds completed. [phone]  Newly scheduled IVF hung, infusing without issue to right FA PIV. Pt arouses easily, refusing po intake.   Pt informed that EEG technician will be entering room soon to conduct EEG.  Pt nodding head in \"yes\"  Motion. Safety measures in place. Rounds completed. [address]  EEG technician at bedside. 1915  Shift change report given to MOHIT Acosta. Report included review of SBAR, accordion report, results review, orders, meds, ROS, and POC. All questions answered. Transfer of care complete.

## 2022-06-08 NOTE — PROGRESS NOTES
Hospitalist Progress Note    NAME: Linda Perry   :  1950   MRN:  [id number]   Room Number:  Edwin Tapia  @ [facility]     Please note that this dictation was completed with Kylie Bird, the computer voice recognition software. Quite often unanticipated grammatical, syntax, homophones, and other interpretive errors are inadvertently transcribed by the computer software. Please disregard these errors. Please excuse any errors that have escaped final proofreading. Interim Hospital Summary: 73 y.o. male whom presented on 2022 with      Assessment / Plan:  Anticipated discharge date :   Anticipated disposition :   Barriers to discharge :     Acute metabolic encephalopathy POA resolving   Suspected seizure   Right sided gaze deviation POA, resolved    History of CVA  Residual left sided hemiparesis POA  -CT head negative for acute process  -Patient has been assessed by tele neurology  -MRI brain EEG pending  -RPR negative VDRL CSF pending  -Meningitis panel negative per LP  -Blood culture negative growth so far    -Check folate B12 and TSH  -Follow MRI and MRA  - Cont ABx for now         Severe sepsis POA resolving   Elevated lactic acid POA resolved   On admission temperature 102.3 F, heart rate 112 bpm, respiratory rate 22/minute, lactic acid 4.1. Chest x-ray interpreted independently did not reveal any consolidation. Urinalysis negative for infection. COVID PCR negative influenza PCR negative. Received sepsis bolus in the ER. CT chest abdomen pelvis did not show evidence of infection.  LP  : meningitis pathogen PCR panel negative, negative gram stain.      -stop ampicillin, acyclovir  - continue vanc, cefepime, flagyl  -Await blood culture results.  -Continue maintenance fluid           CKD III POA   Creatinine at baseline 1.3-1.4.   -Strict I's and O's, avoid nephrotoxic meds, renally dose meds.     Type 2 diabetes with hyperglycemia POA        Lab Results   Component Value Date/Time     Hemoglobin A1c 12.5 (H) 06/06/2022 05:04 AM     Hemoglobin A1c (POC) 13.9 (A) 06/18/2021 12:42 PM   - add glargine insulin   - Lispro correctional scale, FSG AC HS  - Consistent carb diet, hypoglycemia protocol.         Uncontrolled Hypertension POA   HLD POA  - resume amlodipine and lisinopril   - Labetalol PRN for SBP > 180 mm Hg,DBP > 100 mm Hg         Body mass index is 34.9 kg/m². Code Status: full   Surrogate Decision Maker:  Bill Small [phone]  Perla Sawyer [phone]     DVT Prophylaxis: Lovenox  GI Prophylaxis: not indicated  Baseline: does not walk much, mostly wheelchair, sometimes ambulates with walker     Subjective:     Chief Complaint / Reason for Physician Visit  . Mental status improving , still confused  Discussed with RN events overnight. Review of Systems:  Review of Systems   Constitutional: Negative for chills and fever. Respiratory: Negative for cough and shortness of breath. Cardiovascular: Negative for chest pain. Gastrointestinal: Negative for abdominal pain. Genitourinary: Negative for frequency and urgency. Musculoskeletal: Negative for myalgias. Objective:     VITALS:   Last 24hrs VS reviewed since prior progress note.  Most recent are:  Patient Vitals for the past 24 hrs:   Temp Pulse Resp BP SpO2   06/08/22 0804 -- -- -- (!) 171/92 --   06/08/22 0800 98.5 °F (36.9 °C) 62 19 (!) 181/86 100 %   06/08/22 0400 98.8 °F (37.1 °C) 74 19 (!) 160/94 99 %   06/08/22 0000 98.9 °F (37.2 °C) 74 18 (!) 170/95 97 %   06/07/22 1959 98.3 °F (36.8 °C) 78 14 (!) 152/96 97 %   06/07/22 1556 -- 73 -- -- --   06/07/22 1549 -- 83 -- -- --   06/07/22 1547 98.1 °F (36.7 °C) (!) 103 22 (!) 151/67 95 %   06/07/22 1536 99.1 °F (37.3 °C) 76 21 (!) 167/89 97 %   06/07/22 1108 98.7 °F (37.1 °C) 82 25 (!) 164/90 95 %   06/07/22 0843 98.9 °F (37.2 °C) 80 22 (!) 149/95 98 %       Intake/Output Summary (Last 24 hours) at 6/8/2022 0834  Last data filed at 6/8/2022 0800  Gross per 24 hour   Intake 850 ml   Output 2500 ml   Net -1650 ml        PHYSICAL EXAM:  General: , no acute distress    EENT:  EOMI. Anicteric sclerae. MMM  Resp:  CTA bilaterally, no wheezing or rales. No accessory muscle use  CV:  Regular  rhythm,  normal S1/S2, no murmurs rubs gallops, No edema  GI:  Soft, Non distended, Non tender. +Bowel sounds  Neurologic:   AO x2 , redirectable   Psych:   poor insight. Not anxious nor agitated  Skin:  No rashes. No jaundice    Reviewed most current lab test results and cultures  YES  Reviewed most current radiology test results   YES  Review and summation of old records today    NO  Reviewed patient's current orders and MAR    YES  PMH/SH reviewed - no change compared to H&P  ________________________________________________________________________  Care Plan discussed with:    Comments   Patient x    Family  x    RN x    Care Manager xx    Consultant  x                     x Multidiciplinary team rounds were held today with , nursing, pharmacist and clinical coordinator. Patient's plan of care was discussed; medications were reviewed and discharge planning was addressed. ________________________________________________________________________  Total NON critical care TIME: 35 Minutes    Total CRITICAL CARE TIME Spent:   Minutes non procedure based      Comments   >50% of visit spent in counseling and coordination of care x    ________________________________________________________________________  Baron Diana MD     Procedures: see electronic medical records for all procedures/Xrays and details which were not copied into this note but were reviewed prior to creation of Plan. LABS:  I reviewed today's most current labs and imaging studies.   Pertinent labs include:  Recent Labs     06/07/22  0509 06/06/22  0504 06/05/22  1347   WBC 7.8 8.4 11.5*   HGB 11.2* 10.8* 12.9   HCT 35.8* 36.0* 43.3    223 287     Recent Labs     06/08/22  0354 06/07/22  0509 06/06/22  0504 06/05/22  1347 06/05/22  1347   NA  --  137 141  --  137   K  --  3.6 3.3*  --  4.1   CL  --  105 108  --  101   CO2  --  27 24  --  27   GLU  --  129* 228*  --  422*   BUN  --  5* 6  --  10   CREA 0.93 0.97 0.98   < > 1.43*   CA  --  8.2* 7.4*  --  9.1   MG  --  1.6 1.2*  --   --    PHOS  --  2.3* 2.3*  --   --    ALB  --  2.5* 2.6*  --  3.6   TBILI  --  0.6 0.6  --  0.5   ALT  --  19 16  --  19    < > = values in this interval not displayed.        Signed: Nadeen Harris MD

## 2022-06-09 LAB
ALBUMIN SERPL-MCNC: 2.7 G/DL (ref 3.5–5)
ALBUMIN/GLOB SERPL: 0.7 {RATIO} (ref 1.1–2.2)
ALP SERPL-CCNC: 85 U/L (ref 45–117)
ALT SERPL-CCNC: 19 U/L (ref 12–78)
ANION GAP SERPL CALC-SCNC: 13 MMOL/L (ref 5–15)
AST SERPL-CCNC: 30 U/L (ref 15–37)
BASOPHILS # BLD: 0 K/UL (ref 0–0.1)
BASOPHILS NFR BLD: 0 % (ref 0–1)
BILIRUB SERPL-MCNC: 0.4 MG/DL (ref 0.2–1)
BUN SERPL-MCNC: 6 MG/DL (ref 6–20)
BUN/CREAT SERPL: 7 (ref 12–20)
CALCIUM SERPL-MCNC: 8.4 MG/DL (ref 8.5–10.1)
CHLORIDE SERPL-SCNC: 104 MMOL/L (ref 97–108)
CO2 SERPL-SCNC: 24 MMOL/L (ref 21–32)
CREAT SERPL-MCNC: 0.83 MG/DL (ref 0.7–1.3)
DIFFERENTIAL METHOD BLD: ABNORMAL
EOSINOPHIL # BLD: 0.3 K/UL (ref 0–0.4)
EOSINOPHIL NFR BLD: 4 % (ref 0–7)
ERYTHROCYTE [DISTWIDTH] IN BLOOD BY AUTOMATED COUNT: 15.6 % (ref 11.5–14.5)
FOLATE SERPL-MCNC: 12.7 NG/ML
GLOBULIN SER CALC-MCNC: 4.1 G/DL (ref 2–4)
GLUCOSE BLD STRIP.AUTO-MCNC: 112 MG/DL (ref 65–117)
GLUCOSE BLD STRIP.AUTO-MCNC: 159 MG/DL (ref 65–117)
GLUCOSE BLD STRIP.AUTO-MCNC: 193 MG/DL (ref 65–117)
GLUCOSE BLD STRIP.AUTO-MCNC: 226 MG/DL (ref 65–117)
GLUCOSE BLD STRIP.AUTO-MCNC: 227 MG/DL (ref 65–117)
GLUCOSE SERPL-MCNC: 112 MG/DL (ref 65–100)
HCT VFR BLD AUTO: 41.4 % (ref 36.6–50.3)
HGB BLD-MCNC: 12.7 G/DL (ref 12.1–17)
IMM GRANULOCYTES # BLD AUTO: 0 K/UL (ref 0–0.04)
IMM GRANULOCYTES NFR BLD AUTO: 1 % (ref 0–0.5)
LYMPHOCYTES # BLD: 1.2 K/UL (ref 0.8–3.5)
LYMPHOCYTES NFR BLD: 19 % (ref 12–49)
MCH RBC QN AUTO: 24 PG (ref 26–34)
MCHC RBC AUTO-ENTMCNC: 30.7 G/DL (ref 30–36.5)
MCV RBC AUTO: 78.1 FL (ref 80–99)
MONOCYTES # BLD: 0.6 K/UL (ref 0–1)
MONOCYTES NFR BLD: 9 % (ref 5–13)
NEUTS SEG # BLD: 4.1 K/UL (ref 1.8–8)
NEUTS SEG NFR BLD: 67 % (ref 32–75)
NRBC # BLD: 0 K/UL (ref 0–0.01)
NRBC BLD-RTO: 0 PER 100 WBC
PLATELET # BLD AUTO: 234 K/UL (ref 150–400)
PMV BLD AUTO: 10 FL (ref 8.9–12.9)
POTASSIUM SERPL-SCNC: 3.3 MMOL/L (ref 3.5–5.1)
PROT SERPL-MCNC: 6.8 G/DL (ref 6.4–8.2)
RBC # BLD AUTO: 5.3 M/UL (ref 4.1–5.7)
SERVICE CMNT-IMP: ABNORMAL
SERVICE CMNT-IMP: NORMAL
SODIUM SERPL-SCNC: 141 MMOL/L (ref 136–145)
WBC # BLD AUTO: 6.2 K/UL (ref 4.1–11.1)

## 2022-06-09 PROCEDURE — 36415 COLL VENOUS BLD VENIPUNCTURE: CPT

## 2022-06-09 PROCEDURE — 74011000258 HC RX REV CODE- 258: Performed by: INTERNAL MEDICINE

## 2022-06-09 PROCEDURE — 97530 THERAPEUTIC ACTIVITIES: CPT | Performed by: PHYSICAL THERAPIST

## 2022-06-09 PROCEDURE — 97165 OT EVAL LOW COMPLEX 30 MIN: CPT | Performed by: OCCUPATIONAL THERAPIST

## 2022-06-09 PROCEDURE — 74011250637 HC RX REV CODE- 250/637: Performed by: STUDENT IN AN ORGANIZED HEALTH CARE EDUCATION/TRAINING PROGRAM

## 2022-06-09 PROCEDURE — 82962 GLUCOSE BLOOD TEST: CPT

## 2022-06-09 PROCEDURE — 97535 SELF CARE MNGMENT TRAINING: CPT | Performed by: OCCUPATIONAL THERAPIST

## 2022-06-09 PROCEDURE — 74011250636 HC RX REV CODE- 250/636: Performed by: STUDENT IN AN ORGANIZED HEALTH CARE EDUCATION/TRAINING PROGRAM

## 2022-06-09 PROCEDURE — 85025 COMPLETE CBC W/AUTO DIFF WBC: CPT

## 2022-06-09 PROCEDURE — 74011636637 HC RX REV CODE- 636/637: Performed by: STUDENT IN AN ORGANIZED HEALTH CARE EDUCATION/TRAINING PROGRAM

## 2022-06-09 PROCEDURE — 2709999900 HC NON-CHARGEABLE SUPPLY

## 2022-06-09 PROCEDURE — 74011000250 HC RX REV CODE- 250: Performed by: STUDENT IN AN ORGANIZED HEALTH CARE EDUCATION/TRAINING PROGRAM

## 2022-06-09 PROCEDURE — 74011250636 HC RX REV CODE- 250/636: Performed by: INTERNAL MEDICINE

## 2022-06-09 PROCEDURE — 65270000032 HC RM SEMIPRIVATE

## 2022-06-09 PROCEDURE — 80053 COMPREHEN METABOLIC PANEL: CPT

## 2022-06-09 RX ORDER — PRAVASTATIN SODIUM 10 MG/1
10 TABLET ORAL
Status: DISCONTINUED | OUTPATIENT
Start: 2022-06-09 | End: 2022-06-13 | Stop reason: HOSPADM

## 2022-06-09 RX ORDER — LISINOPRIL 20 MG/1
20 TABLET ORAL DAILY
Status: DISCONTINUED | OUTPATIENT
Start: 2022-06-10 | End: 2022-06-10

## 2022-06-09 RX ORDER — LISINOPRIL 5 MG/1
10 TABLET ORAL
Status: COMPLETED | OUTPATIENT
Start: 2022-06-09 | End: 2022-06-09

## 2022-06-09 RX ORDER — GUAIFENESIN 100 MG/5ML
81 LIQUID (ML) ORAL DAILY
Status: DISCONTINUED | OUTPATIENT
Start: 2022-06-10 | End: 2022-06-13 | Stop reason: HOSPADM

## 2022-06-09 RX ORDER — ESCITALOPRAM OXALATE 10 MG/1
5 TABLET ORAL DAILY
Status: DISCONTINUED | OUTPATIENT
Start: 2022-06-10 | End: 2022-06-13 | Stop reason: HOSPADM

## 2022-06-09 RX ORDER — INSULIN LISPRO 100 [IU]/ML
INJECTION, SOLUTION INTRAVENOUS; SUBCUTANEOUS
Status: DISCONTINUED | OUTPATIENT
Start: 2022-06-09 | End: 2022-06-13 | Stop reason: HOSPADM

## 2022-06-09 RX ADMIN — NYSTATIN: 100000 POWDER TOPICAL at 09:48

## 2022-06-09 RX ADMIN — VANCOMYCIN HYDROCHLORIDE 1250 MG: 10 INJECTION, POWDER, LYOPHILIZED, FOR SOLUTION INTRAVENOUS at 21:11

## 2022-06-09 RX ADMIN — SODIUM CHLORIDE, PRESERVATIVE FREE 10 ML: 5 INJECTION INTRAVENOUS at 21:12

## 2022-06-09 RX ADMIN — CEFTRIAXONE SODIUM 2 G: 2 INJECTION, POWDER, FOR SOLUTION INTRAMUSCULAR; INTRAVENOUS at 15:50

## 2022-06-09 RX ADMIN — LISINOPRIL 10 MG: 5 TABLET ORAL at 09:48

## 2022-06-09 RX ADMIN — CEFTRIAXONE SODIUM 2 G: 2 INJECTION, POWDER, FOR SOLUTION INTRAMUSCULAR; INTRAVENOUS at 02:04

## 2022-06-09 RX ADMIN — Medication 10 UNITS: at 09:47

## 2022-06-09 RX ADMIN — POTASSIUM BICARBONATE 20 MEQ: 782 TABLET, EFFERVESCENT ORAL at 16:00

## 2022-06-09 RX ADMIN — SODIUM CHLORIDE, PRESERVATIVE FREE 10 ML: 5 INJECTION INTRAVENOUS at 06:36

## 2022-06-09 RX ADMIN — SODIUM CHLORIDE, PRESERVATIVE FREE 10 ML: 5 INJECTION INTRAVENOUS at 15:50

## 2022-06-09 RX ADMIN — Medication 3 UNITS: at 17:27

## 2022-06-09 RX ADMIN — VANCOMYCIN HYDROCHLORIDE 1250 MG: 10 INJECTION, POWDER, LYOPHILIZED, FOR SOLUTION INTRAVENOUS at 09:55

## 2022-06-09 RX ADMIN — PRAVASTATIN SODIUM 10 MG: 10 TABLET ORAL at 21:11

## 2022-06-09 RX ADMIN — LISINOPRIL 10 MG: 5 TABLET ORAL at 15:50

## 2022-06-09 RX ADMIN — LEVETIRACETAM 1000 MG: 100 SOLUTION ORAL at 21:11

## 2022-06-09 RX ADMIN — AMLODIPINE BESYLATE 10 MG: 5 TABLET ORAL at 09:48

## 2022-06-09 RX ADMIN — METRONIDAZOLE 500 MG: 500 INJECTION, SOLUTION INTRAVENOUS at 06:37

## 2022-06-09 RX ADMIN — ENOXAPARIN SODIUM 30 MG: 100 INJECTION SUBCUTANEOUS at 21:11

## 2022-06-09 RX ADMIN — NYSTATIN: 100000 POWDER TOPICAL at 16:50

## 2022-06-09 RX ADMIN — LEVETIRACETAM 1000 MG: 100 SOLUTION ORAL at 09:47

## 2022-06-09 RX ADMIN — Medication 2 UNITS: at 12:56

## 2022-06-09 RX ADMIN — ENOXAPARIN SODIUM 30 MG: 100 INJECTION SUBCUTANEOUS at 09:47

## 2022-06-09 RX ADMIN — Medication 2 UNITS: at 06:37

## 2022-06-09 RX ADMIN — Medication 2 UNITS: at 22:05

## 2022-06-09 NOTE — CONSULTS
NEUROLOGY CONSULTATION    DATE OF CONSULTATION: 2022    CONSULTED BY:Dr DANY Rogers    REASON FOR CONSULT: Altered mental status, CVA      HISTORY OF PRESENT ILLNESS  Riki Morrell is a 73 y.o. right-handed black male with history of allergic rhinitis, CVA, degenerative joint disease, chronic pain diabetes mellitus, dyslipidemia, hypertension, who was admitted for altered mental status. According to the history obtained from the chart, patient was in his baseline of health the night prior to presentation when one of his sons stay with him had a noise and found him on the floor. Patient did not know how the fell out of the bed to the floor, was somewhat confused, as they could not get him up from the floor, ambulance was called. EMS reported that patient was talking however, at the ER, patient was not communicative and suddenly unable to move extremities. Patient was also noted to have high temperature. He was subsequently admitted for further evaluation and management. Initial CT scan was unremarkable for bleed or acute lesion. At the time of examination, patient was noncommunicative but make some noise inappropriately, and occasionally following commands. Review of Systems - History obtained from chart review and unobtainable from patient due to mental status     PMH  Past Medical History:   Diagnosis Date    Allergic rhinitis     Arthritis     djd of knees    Chronic pain     Diabetes ([address]     Hypercholesteremia     Hypertension     Obesity     Stroke ([address]     left--ring finger/ pinky finger numb sensation       SH  Social History     Socioeconomic History    Marital status:    Tobacco Use    Smoking status: Former Smoker     Years: 2.00     Quit date: 2017     Years since quittin.5    Smokeless tobacco: Never Used   Vaping Use    Vaping Use: Never used   Substance and Sexual Activity    Alcohol use: No     Comment: stopped -used to drink wine (7 days a week    Drug use:  Yes Types: Prescription, OTC    Sexual activity: Yes       FH  Family History   Problem Relation Age of Onset    Diabetes Mother     Hypertension Mother     Kidney Disease Mother     OSTEOARTHRITIS Father     Diabetes Father     No Known Problems Sister     No Known Problems Sister     No Known Problems Sister     No Known Problems Sister     Cancer Sister         cancer       ALLERGIES  No Known Allergies    CURRENT MEDS  Current Facility-Administered Medications   Medication Dose Route Frequency Provider Last Rate Last Admin    insulin lispro (HUMALOG) injection   SubCUTAneous AC&HS Eligio Felder MD        [START ON 6/10/2022] escitalopram oxalate (LEXAPRO) tablet 5 mg  5 mg Oral DAILY Eligio Felder MD        [START ON 6/10/2022] lisinopriL (PRINIVIL, ZESTRIL) tablet 20 mg  20 mg Oral DAILY Jerry Poole MD        lisinopriL (PRINIVIL, ZESTRIL) tablet 10 mg  10 mg Oral NOW Eligio Felder MD        potassium bicarb-citric acid (EFFER-K) tablet 20 mEq  20 mEq Oral NOW Eligio Felder MD        [START ON 6/10/2022] aspirin chewable tablet 81 mg  81 mg Oral DAILY Eligio Felder MD        pravastatin (PRAVACHOL) tablet 10 mg  10 mg Oral QHS Eligio Felder MD        levETIRAcetam (KEPPRA) oral solution 1,000 mg  1,000 mg Oral BID Jerry Poole MD   1,000 mg at 06/09/22 0947    cefTRIAXone (ROCEPHIN) 2 g in 0.9% sodium chloride (MBP/ADV) 50 mL MBP  2 g IntraVENous Q12H Christin Gordon  mL/hr at 06/09/22 0204 2 g at 06/09/22 0204    vancomycin (VANCOCIN) 1,250 mg in 0.9% sodium chloride 250 mL IVPB  1,250 mg IntraVENous Q12H Mary Blanco  mL/hr at 06/09/22 0955 1,250 mg at 06/09/22 0955    glucose chewable tablet 16 g  4 Tablet Oral PRN Mary Blanco MD        dextrose (D50W) injection syrg 12.5-25 g  25-50 mL IntraVENous PRN Mary Blanco MD        glucagon (GLUCAGEN) injection 1 mg  1 mg IntraMUSCular PRN Mary Blanco MD        insulin glargine (LANTUS) injection 10 Units 10 Units SubCUTAneous DAILY Kay South MD   10 Units at 06/09/22 0947    sodium chloride (NS) flush 5-40 mL  5-40 mL IntraVENous Q8H Kay South MD   10 mL at 06/09/22 0636    sodium chloride (NS) flush 5-40 mL  5-40 mL IntraVENous PRN Kay South MD        polyethylene glycol (MIRALAX) packet 17 g  17 g Oral DAILY PRN Kay South MD        ondansetron (ZOFRAN ODT) tablet 4 mg  4 mg Oral Q8H PRN Kay South MD        Or    ondansetron [facility]) injection 4 mg  4 mg IntraVENous Q6H PRN Kay South MD   4 mg at 06/05/22 1819    enoxaparin (LOVENOX) injection 30 mg  30 mg SubCUTAneous Q12H Kay South MD   30 mg at 06/09/22 0947    amLODIPine (NORVASC) tablet 10 mg  10 mg Oral DAILY Kay South MD   10 mg at 06/09/22 0948    labetaloL (NORMODYNE;TRANDATE) 20 mg/4 mL (5 mg/mL) injection 20 mg  20 mg IntraVENous Q6H PRN Kay South MD        acetaminophen (TYLENOL) tablet 650 mg  650 mg Oral Q4H PRN Devonte Maurer MD   650 mg at 06/08/22 9299    Or    acetaminophen (TYLENOL) solution 650 mg  650 mg Per NG tube Q4H PRN Devonte Maurer MD   650 mg at 06/07/22 0100    Or    acetaminophen (TYLENOL) suppository 650 mg  650 mg Rectal Q4H PRN Devonte Maurer MD   650 mg at 06/06/22 1011    nystatin (MYCOSTATIN) 100,000 unit/gram powder   Topical BID Kay South MD   Given at 06/09/22 0948       ROS  As per HPI      CLINICAL DATA REVIEW  IMAGING: (I personally reviewed these images in PACS )        LABS  Recent Results (from the past 48 hour(s))   GLUCOSE, POC    Collection Time: 06/07/22  4:49 PM   Result Value Ref Range    Glucose (POC) 118 (H) 65 - 117 mg/dL    Performed by Tania Tate, POC    Collection Time: 06/08/22 12:05 AM   Result Value Ref Range    Glucose (POC) 99 65 - 117 mg/dL    Performed by Anisa Blancas (TRV RN)    CREATININE    Collection Time: 06/08/22  3:54 AM   Result Value Ref Range    Creatinine 0.93 0.70 - 1.30 MG/DL    GFR est AA >60 >60 ml/min/1.73m2    GFR est non-AA >60 >60 ml/min/1.73m2   GLUCOSE, POC    Collection Time: 06/08/22  6:03 AM   Result Value Ref Range    Glucose (POC) 85 65 - 117 mg/dL    Performed by Tricia Yan (HANNAH RUEDA)    TSH 3RD GENERATION    Collection Time: 06/08/22  9:46 AM   Result Value Ref Range    TSH 1.38 0.36 - 3.74 uIU/mL   VITAMIN B12    Collection Time: 06/08/22  9:46 AM   Result Value Ref Range    Vitamin B12 413 193 - 986 pg/mL   FOLATE    Collection Time: 06/08/22  9:46 AM   Result Value Ref Range    Folate 12.7 >3.0 ng/mL   GLUCOSE, POC    Collection Time: 06/08/22  2:11 PM   Result Value Ref Range    Glucose (POC) 85 65 - 117 mg/dL    Performed by Adjug María, POC    Collection Time: 06/08/22  5:31 PM   Result Value Ref Range    Glucose (POC) 149 (H) 65 - 117 mg/dL    Performed by Jami Hernandez PCT    GLUCOSE, POC    Collection Time: 06/08/22  5:33 PM   Result Value Ref Range    Glucose (POC) 113 65 - 117 mg/dL    Performed by Jami Hernandez PCT    GLUCOSE, POC    Collection Time: 06/09/22 12:24 AM   Result Value Ref Range    Glucose (POC) 112 65 - 117 mg/dL    Performed by Tricia Yan (HANNAH RUEDA)    CBC WITH AUTOMATED DIFF    Collection Time: 06/09/22  2:35 AM   Result Value Ref Range    WBC 6.2 4.1 - 11.1 K/uL    RBC 5.30 4. 10 - 5.70 M/uL    HGB 12.7 12.1 - 17.0 g/dL    HCT 41.4 36.6 - 50.3 %    MCV 78.1 (L) 80.0 - 99.0 FL    MCH 24.0 (L) 26.0 - 34.0 PG    MCHC 30.7 30.0 - 36.5 g/dL    RDW 15.6 (H) 11.5 - 14.5 %    PLATELET 237 382 - 787 K/uL    MPV 10.0 8.9 - 12.9 FL    NRBC 0.0 0  WBC    ABSOLUTE NRBC 0.00 0.00 - 0.01 K/uL    NEUTROPHILS 67 32 - 75 %    LYMPHOCYTES 19 12 - 49 %    MONOCYTES 9 5 - 13 %    EOSINOPHILS 4 0 - 7 %    BASOPHILS 0 0 - 1 %    IMMATURE GRANULOCYTES 1 (H) 0.0 - 0.5 %    ABS. NEUTROPHILS 4.1 1.8 - 8.0 K/UL    ABS. LYMPHOCYTES 1.2 0.8 - 3.5 K/UL    ABS. MONOCYTES 0.6 0.0 - 1.0 K/UL    ABS. EOSINOPHILS 0.3 0.0 - 0.4 K/UL    ABS. BASOPHILS 0.0 0.0 - 0.1 K/UL    ABS. IMM. Gay Nyhan. 0.0 0.00 - 0.04 K/UL    DF AUTOMATED     METABOLIC PANEL, COMPREHENSIVE    Collection Time: 06/09/22  2:35 AM   Result Value Ref Range    Sodium 141 136 - 145 mmol/L    Potassium 3.3 (L) 3.5 - 5.1 mmol/L    Chloride 104 97 - 108 mmol/L    CO2 24 21 - 32 mmol/L    Anion gap 13 5 - 15 mmol/L    Glucose 112 (H) 65 - 100 mg/dL    BUN 6 6 - 20 MG/DL    Creatinine 0.83 0.70 - 1.30 MG/DL    BUN/Creatinine ratio 7 (L) 12 - 20      GFR est AA >60 >60 ml/min/1.73m2    GFR est non-AA >60 >60 ml/min/1.73m2    Calcium 8.4 (L) 8.5 - 10.1 MG/DL    Bilirubin, total 0.4 0.2 - 1.0 MG/DL    ALT (SGPT) 19 12 - 78 U/L    AST (SGOT) 30 15 - 37 U/L    Alk. phosphatase 85 45 - 117 U/L    Protein, total 6.8 6.4 - 8.2 g/dL    Albumin 2.7 (L) 3.5 - 5.0 g/dL    Globulin 4.1 (H) 2.0 - 4.0 g/dL    A-G Ratio 0.7 (L) 1.1 - 2.2     GLUCOSE, POC    Collection Time: 06/09/22  6:23 AM   Result Value Ref Range    Glucose (POC) 159 (H) 65 - 117 mg/dL    Performed by Prince Elias (HANNAH RN)    GLUCOSE, POC    Collection Time: 06/09/22 12:23 PM   Result Value Ref Range    Glucose (POC) 193 (H) 65 - 117 mg/dL    Performed by Virginia Her         PHYSICAL EXAM  Visit Vitals  BP (!) 156/96   Pulse 90   Temp 98.5 °F (36.9 °C)   Resp 20   Ht 5' 10\" (1.778 m)   Wt 260 lb 6.4 oz (118.1 kg)   SpO2 100%   BMI 37.36 kg/m²     General:  Awake no distress. Head:  Normocephalic, without obvious abnormality, atraumatic. Eyes:  Conjunctivae/corneas clear. Pupils equal, round, reactive to light. Extraocular movements intact, VFF, NO papilledema   Lungs:  Heart:   Non labored breathing  Regular rate and rhythm, no carotid bruits   Abdomen:   Soft, non-distended   Extremities: Extremities normal, atraumatic, no cyanosis or edema. Pulses: 2+ and symmetric all extremities. Skin: Skin color, texture, turgor normal. No rashes or lesions.    Neurologic:  Gen:              Language: Not assessed  Cranial Nerves:  I: smell Not tested   II: visual fields Full to confrontation   II: pupils Equal, round, reactive to light   II: optic disc No papilledema   III,VII: ptosis none   III,IV,VI: extraocular muscles  Full ROM   V: mastication Not assessed   V: facial light touch sensation  Deferred. VII: facial muscle function   symmetric   VIII: hearing asymmetric   IX: soft palate elevation  Deferred. XI: trapezius strength  deferred   XI: sternocleidomastoid strength 5/5   XI: neck flexion strength  5/5   XII: tongue  midline     Motor: normal bulk and tone, no tremor              Strength: Minimal movement of the extremities right more than the left  Sensory: Responded appropriately to noxious stimulus  Coordination: Not assessed  Gait: Not assessed  Reflexes: 1+ throughout  Plantar: Withdrawal       IMPRESSION: This is a 68-year-old black male who is being evaluated for altered mental status. MRI of the brain reviewed which showed no acute findings no mass. 2. Prominent atrophy white matter disease, progression since the prior examination, remote bilateral basal ganglia lacunes  Encephalopathy possibly secondary to sepsis  Rule out encephalitis  White matter disease  Dementia  Rule out seizure disorder    RECOMMENDATIONS:  EEG  Blood for C-reactive protein, homocystine, ESR, prolactin, RPR, VZV, CK, aldolase,  PT/OT evaluation  Will consider spinal tap. VTE prophylaxis:     Thank you very much for this consultation.      Yola Quispe MD

## 2022-06-09 NOTE — PROGRESS NOTES
Problem: Self Care Deficits Care Plan (Adult)  Goal: *Acute Goals and Plan of Care (Insert Text)  Description: FUNCTIONAL STATUS PRIOR TO ADMISSION: Per son's report. Patient lived with his son in a 2 story townhouse. Has been mostly non-ambulatory since knee replacement in 2018. 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 Patient Mod Indep with RW to transfer to wheelchair. Sponges bathes, able to do simple meal prep and dresses himself. Patient reports using a sock-aid to don socks    HOME SUPPORT PRIOR TO ADMISSION: The patient lived with his son and a second son comes over when the other goes to work. Pt is not left alone. Occupational Therapy Goals  Initiated 6/9/2022  1. Patient will perform self-feeding with set-up within 7 day(s). 2.  Patient will perform grooming with set-up seated in chair within 7 day(s). 3.  Patient will perform upper body dressing with supervision/set-up within 7 day(s). 4.  Patient will perform LE dressing with adaptive equipment with minimal assistance/contact guard assist within 7 day(s). 5.  Patient will perform stand pivot transfer to bedside commode with CGA and min cues with rolling walker within 7 day(s). 6.  Patient will stand with bilateral UE support > or = 1 minute without increased shortness of breath within 7 day(s). Outcome: Not Met       OCCUPATIONAL THERAPY EVALUATION  Patient: Zac Owens (79 y.o. male)  Date: 6/9/2022  Primary Diagnosis: Severe sepsis ([facility] [facility] [A41.9, R65.20]        Precautions:   Fall,Bed Alarm,Skin    ASSESSMENT  Based on the objective data described below, the patient presents with decreased safety awareness and LE's over right lower bedrail on arrival. Demonstrated good participation and alert, however decreased command following during mobility efforts, required total assist to maneuver rolling walker.  Patient with decreased activity tolerance and increased shortness of breath after standing edge of bed, oxygen sats 94% on room air. Limited by hx of left UE hemiparesis, however reports using rolling walker for stand pivot transfers. Nursing reported assist for self -feeding which he performed at baseline. Recommend rehab as he is functioning below his baseline. Current Level of Function Impacting Discharge (ADLs/self-care): max to total assist toileting, LE ADLs, mod assist UE ADLs, sit to stand with mod assist of 2    Functional Outcome Measure: The patient scored 15/100 on the Barthel Index outcome measure which is indicative of total dependence. Other factors to consider for discharge: lives with his son who assists him     Patient will benefit from skilled therapy intervention to address the above noted impairments. PLAN :  Recommendations and Planned Interventions: self care training, functional mobility training, therapeutic exercise, balance training, therapeutic activities, cognitive retraining, endurance activities, neuromuscular re-education, patient education, home safety training, and family training/education    Frequency/Duration: Patient will be followed by occupational therapy 3 times a week to address goals. Recommendation for discharge: (in order for the patient to meet his/her long term goals)  Therapy up to 5 days/week in SNF setting    This discharge recommendation:  Has not yet been discussed the attending provider and/or case management    IF patient discharges home will need the following DME: none       SUBJECTIVE:   Patient stated I need to call my son.     OBJECTIVE DATA SUMMARY:   HISTORY:   Past Medical History:   Diagnosis Date    Allergic rhinitis     Arthritis     djd of knees    Chronic pain     Diabetes ([address]     Hypercholesteremia     Hypertension     Obesity     Stroke ([facility]     left--ring finger/ pinky finger numb sensation     Past Surgical History:   Procedure Laterality Date    HX CHOLECYSTECTOMY      laparoscopic       Expanded or extensive additional review of patient history:     Home Situation  Home Environment: Private residence  One/Two Story Residence: Two story, live on 1st floor  Living Alone: No  Support Systems: Child(sergei)  Patient Expects to be Discharged to[de-identified] Home  Current DME Used/Available at Home: Wheelchair,Shower chair,Hospital bed  Tub or Shower Type:  (sponge bathes)    Hand dominance: Right    EXAMINATION OF PERFORMANCE DEFICITS:  Cognitive/Behavioral Status:  Neurologic State: Alert;Confused  Orientation Level: Oriented to place;Oriented to person;Disoriented to time;Disoriented to situation  Cognition: Poor safety awareness;Decreased command following; Impaired decision making;Memory loss  Perception: Cues to attend to left side of body  Perseveration: No perseveration noted  Safety/Judgement: Lack of insight into deficits; Decreased awareness of need for assistance;Home safety; Fall prevention    Skin: intact    Edema: none noted    Hearing: Auditory  Auditory Impairment: None    Vision/Perceptual:       Not formally assessed            Range of Motion:  AROM: Generally decreased, functional (right UE)  PROM: Generally decreased, functional          Strength:  Strength: Generally decreased, functional                Coordination:  Coordination: Generally decreased, functional (right UE)  Fine Motor Skills-Upper: Left Impaired;Right Intact    Gross Motor Skills-Upper: Left Impaired;Right Intact    Tone & Sensation:  Tone: Abnormal   Sensation: impaired left hand, primarily digit 5        Balance:  Sitting: Impaired  Sitting - Static: Fair (occasional)  Sitting - Dynamic: Fair (occasional)  Standing: Impaired; With support  Standing - Static: Constant support; Fair  Standing - Dynamic : Constant support;Poor    Functional Mobility and Transfers for ADLs:  Bed Mobility:  Rolling: Moderate assistance  Supine to Sit: Moderate assistance  Scooting: Moderate assistance    Transfers:  Sit to Stand:  Moderate assistance;Assist x2  Stand to Sit: Moderate assistance  Bed to Chair: Moderate assistance;Assist x2  Bathroom Mobility: Dependent/total assistance  Toilet Transfer : Total assistance    ADL Assessment:  Feeding: Minimum assistance    Oral Facial Hygiene/Grooming: Minimum assistance       Upper Body Dressing: Moderate assistance    Lower Body Dressing: Total assistance    Toileting: Total assistance                ADL Intervention and task modifications:        Educated on role of OT, on arrival patient with LE's hanging over bedrail on right side of bed and laying on diagonal in bed. Educated on safety awareness, need to call for assist using call bell and educated on fall prevention. Required mod assist to come to sit. Instructed on use of phone to call his son, required cues for sequencing to put down items in his hand so he could hold the phone       Cognitive Retraining  Safety/Judgement: Lack of insight into deficits; Decreased awareness of need for assistance;Home safety; Fall prevention     Functional Measure:    Barthel Index:  Bathin  Bladder: 0  Bowels: 5  Groomin  Dressin  Feedin  Mobility: 0  Stairs: 0  Toilet Use: 5  Transfer (Bed to Chair and Back): 5  Total: 15/100      The Barthel ADL Index: Guidelines  1. The index should be used as a record of what a patient does, not as a record of what a patient could do. 2. The main aim is to establish degree of independence from any help, physical or verbal, however minor and for whatever reason. 3. The need for supervision renders the patient not independent. 4. A patient's performance should be established using the best available evidence. Asking the patient, friends/relatives and nurses are the usual sources, but direct observation and common sense are also important. However direct testing is not needed. 5. Usually the patient's performance over the preceding 24-48 hours is important, but occasionally longer periods will be relevant.   6. Middle categories imply that the patient supplies over 50 per cent of the effort. 7. Use of aids to be independent is allowed. Score Interpretation (from [facility])    Independent   60-79 Minimally independent   40-59 Partially dependent   20-39 Very dependent   <20 Totally dependent     -Shaw Avila., Barthel, D.W. (1965). Functional evaluation: the Barthel Index. [address] ([address] (1997). The Barthel activities of daily living index: self-reporting versus actual performance in the old (> or = 75 years). Journal of [address] [place] [address] 45(7), [facility], J.J.DARIO.F, Calvin Cobb., Genny Valdez. (1999). Measuring the change in disability after inpatient rehabilitation; comparison of the responsiveness of the Barthel Index and Functional [place] Measure. Journal of Neurology, Neurosurgery, and Psychiatry, 66(4), 197-851. Ana Branch NAubreyJ.A, TEJA Wheeler, & Santy Montero MAubreyA. (2004) Assessment of post-stroke quality of life in cost-effectiveness studies: The usefulness of the Barthel Index and the EuroQoL-5D. Quality of Life Research, 15, 278-78         Occupational Therapy Evaluation Charge Determination   History Examination Decision-Making   LOW Complexity : Brief history review  MEDIUM Complexity : 3-5 performance deficits relating to physical, cognitive , or psychosocial skils that result in activity limitations and / or participation restrictions MEDIUM Complexity : Patient may present with comorbidities that affect occupational performnce.  Miniml to moderate modification of tasks or assistance (eg, physical or verbal ) with assesment(s) is necessary to enable patient to complete evaluation       Based on the above components, the patient evaluation is determined to be of the following complexity level: LOW   Pain Rating:  Right hip with transfers, reported hx of pain but no complaint this date    Activity Tolerance:   Fair, SpO2 stable on RA, requires frequent rest breaks, and observed SOB with activity    After treatment patient left in no apparent distress:    Supine in bed, Call bell within reach, Bed / chair alarm activated, Side rails x 3, and bed in chair position    COMMUNICATION/EDUCATION:   The patients plan of care was discussed with: Physical therapist and Registered nurse. Home safety education was provided and the patient/caregiver indicated understanding. and Patient/family have participated as able in goal setting and plan of care. This patients plan of care is appropriate for delegation to [facility].     Thank you for this referral.  Dell Castro OTR/L  Time Calculation: 27 mins

## 2022-06-09 NOTE — PROGRESS NOTES
1021) Assisted pt in eating breakfast.  Pt ate 100% of breakfast.  Primary nurse notified. 1228) Assisted pt with eating lunch. Pt ate 50% of lunch. Pt stated, \"I want to wait to eat the rest of my lunch. I like to eat my green beans late in the day. \"  Primary nurse notified. 1256) 2 units of correctional Insulin Humalog administered per parameters. Pt left sitting up in bed watching TV.    1709) Blood sugar collected= 226.    1727) 3 units of correctional Insulin Humalog administered. Assisted pt with eating dinner. Pt ate 100% of dinner. Pt left sitting up in bed with family member at bedside.

## 2022-06-09 NOTE — PROGRESS NOTES
Problem: Mobility Impaired (Adult and Pediatric)  Goal: *Acute Goals and Plan of Care (Insert Text)  Description:   FUNCTIONAL STATUS PRIOR TO ADMISSION: Per son's report. Pt lived with his son in a 2 story townhouse. Has been mostly non-ambulatory since knee replacement in 2018. 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 Pt Mod Indep with RW to transfer to wheelchair. Sponges bathes, able to do simple meal prep and dresses himself. HOME SUPPORT PRIOR TO ADMISSION: The patient lived with his son and a second son comes over when the other goes to work. Pt is not left alone. Physical Therapy Goals  Initiated 6/7/2022  1. Patient will move from supine to sit and sit to supine , scoot up and down, and roll side to side in bed with moderate assistance  within 7 day(s). 2.  Patient will transfer from bed to chair and chair to bed with maximal assistance using the least restrictive device within 7 day(s). 3.  Patient will perform sit to stand with maximal assistance within 7 day(s). 4.  Patient will ambulate with maximal assistance for 5 feet with the least restrictive device within 7 day(s). 5.  Patient will sit EOB x 5 minutes with minimal assistance within 7 days. 6.  Patient will improve Jeronimo Balance score by 7 points within 7 days. Outcome: Progressing Towards Goal     PHYSICAL THERAPY TREATMENT  Patient: Guevara Gould (20 y.o. male)  Date: 6/9/2022  Diagnosis: Severe sepsis ([facility] [A41.9, R65.20] <principal problem not specified>       Precautions: Fall,Bed Alarm,Skin  Chart, physical therapy assessment, plan of care and goals were reviewed. ASSESSMENT  Patient continues with skilled PT services and is progressing towards goals. Patient more alert compared to evaluation. Patient pleasant throughout session. Patient performed bed mobility and transfers with moderate assist and increased cues for safety mobility. Patient able to take 2-3 side steps to head of bed with walker and min A x2. Patient required cues for sequencing with walker. Recommend SNF at discharge. Current Level of Function Impacting Discharge (mobility/balance): mod A    Other factors to consider for discharge: cognition          PLAN :  Patient continues to benefit from skilled intervention to address the above impairments. Continue treatment per established plan of care. to address goals. Recommendation for discharge: (in order for the patient to meet his/her long term goals)  Therapy up to 5 days/week in SNF setting    This discharge recommendation:  Has been made in collaboration with the attending provider and/or case management    IF patient discharges home will need the following DME: patient owns DME required for discharge       SUBJECTIVE:   Patient stated I'm doing okay.     OBJECTIVE DATA SUMMARY:   Critical Behavior:  Neurologic State: Alert,Confused  Orientation Level: Oriented to place,Oriented to person,Disoriented to time,Disoriented to situation  Cognition: (P) Poor safety awareness,Decreased command following,Impaired decision making,Memory loss  Safety/Judgement: (P) Lack of insight into deficits,Decreased awareness of need for assistance,Home safety,Fall prevention  Functional Mobility Training:  Bed Mobility:  Rolling: Moderate assistance  Supine to Sit: Moderate assistance     Scooting: Moderate assistance    Transfers:  Sit to Stand: Moderate assistance;Assist x2  Stand to Sit: Moderate assistance        Bed to Chair: Moderate assistance;Assist x2    Balance:  Sitting: (P) Impaired (Simultaneous filing. User may not have seen previous data.)  Sitting - Static: Fair (occasional)  Sitting - Dynamic: Fair (occasional)  Standing: Impaired; With support  Standing - Static: Constant support; Fair  Standing - Dynamic : Constant support;Poor    Pain Rating:  No pain    Activity Tolerance:   Good    After treatment patient left in no apparent distress:   Supine in bed, Call bell within reach, Bed / chair alarm activated, and Side rails x 3    COMMUNICATION/COLLABORATION:   The patients plan of care was discussed with: Occupational therapist and Registered nurse.      Kyrie Vicente, PT   Time Calculation: 20 mins

## 2022-06-09 NOTE — PROGRESS NOTES
FLY   RUR 13 %     Nursing indicates sister in the room wants to talked to [address] the room spoke with one of the sister. She had more medical questions  Wanted to know about MRI. - indicates needs to get this information from the Doctor   Indicates he looks better. The sons are the primary caretakers for the patient. Will have the CM follow-up with the family tomorrow. The sons.     Chris BLISS Walter. MSORACIO RN   [phone]

## 2022-06-09 NOTE — PROGRESS NOTES
[phone] Patel PCT and Ignacia PCT bathed pt with CHG wipes, changed all linens and pt's gown, replaced electrodes, performed oral care, applied lotion, and applied a condom catheter after doing perineal care, performing hand hygiene, and applied SurePrep (skin prep).

## 2022-06-09 NOTE — PROGRESS NOTES
SLP Contact Note    Discussed with RN, Arjun Lam, appreciate her assistance. Will reassess patient around 1:30/2:00 pm this afternoon.       Thank you,  KNIGSLEY Ordoñez, [address] [facility]  Speech-Language Pathologist

## 2022-06-09 NOTE — PROGRESS NOTES
3736  Shift change report received from [address]. Report included review of SBAR, accordion report, results review, orders, meds, ROS, and POC. All questions answered. Transfer of care complete.    0728  AM shift start VS collected by PCT and reviewed by this RN. BP elevated at 169/84, all other VS WNL, MEWS=1.    0800  Am shift assessment completed; pt alert and oriented to self only this AM.  Pt denies pain. Safety measures reviewed withpt. Rounds completed. 2159  Received call from SLP to inform this RN that she will arrive to assess pt at approx 1300, continue current diet.

## 2022-06-09 NOTE — PROGRESS NOTES
1915: Bedside and Verbal shift change report given to Flo Zavaleta RN (oncoming nurse) by Viktoriya Zapata RN (offgoing nurse). Report included the following information SBAR, Kardex, MAR and Recent Results. 0710: Bedside and Verbal shift change report given to Annie Olvera RN (oncoming nurse) by Flo Zavaleta RN (offgoing nurse). Report included the following information SBAR, Kardex, MAR and Recent Results.

## 2022-06-09 NOTE — PROGRESS NOTES
PROCEDURE: ROUTINE INPATIENT EEG  NAME:   Germán Quezada  EEG NUMBER: [id number]  ACCOUNT NUMBER : [de-identified]  MRN:   [id number]  DATE OF SERVICE: 6/8/2022    HISTORY/INDICATION: Altered mental status  MEDICATIONS:   CONDITIONS OF RECORDING: This is a routine 21-channel EEG recording performed in accordance with the international 10-20 system with one channel devoted to limited EKG. This study was done during states of wakefulness. Photic stimulation, no hyperventilation was performed as activating procedures. In addition, eye movement was recorded    DESCRIPTION:   Upon maximal arousal the posterior dominant rhythm has a frequency of 6 to 9 hz with an amplitude of 15 uV. This activity is symmetric over the bilateral posterior derivations and attenuates with eye opening. Photic stimulation did not significantly alter the tracing. The patient becomes drowsy, but deeper stages of sleep are not attained . Normal sleep architecture is seen with stage II sleep recognized by the presence of symmetric vertex waves and sleep spindles. There are no focal abnormalities, epileptiform discharges, or electrographic seizures seen. Noted was diffuse slowing of most of activity in the range of 3 to 5 cps delta activity. The EEG however was degraded by a lot of movement artifact. No significant asymmetry noted  INTERPRETATION:   This is an abnormal EEG. The EEG revealed diffuse slowing of most of activity, however, no significant asymmetry or overt epileptic activity was observed. This is consistent with cerebral disturbance, encephalopathy versus postictal state. CLINICAL CORRELATION: A normal EEG does not definitively exclude a diagnosis of epilepsy if clinical suspicion is high consider sleep deprived EEG.      Jimbo Tian MD

## 2022-06-09 NOTE — PROGRESS NOTES
Problem: Dysphagia (Adult)  Goal: *Acute Goals and Plan of Care (Insert Text)  Description: Speech pathology goals  Initiated 6/7/2022  1. Patient will tolerate puree/thin liquid diet with no overt s/s aspiration or adverse effects within 7 days  Outcome: Resolved/Met     SPEECH LANGUAGE PATHOLOGY DYSPHAGIA TREATMENT/DISCHARGE  Patient: Sarah Beth Barr (54 y.o. male)  Date: 6/9/2022  Diagnosis: [facility] [A41.9, R65.20] <principal problem not specified>       Precautions:  Fall,Bed Alarm    ASSESSMENT:  Pt with significant improvement in mentation on this date, alert and talking with SLP. Pt's largest barrier to PO is his teeth as it does take a prolonged amount of time to chew because of his lack of dentition. Despite this, patient would be fine to initiate an easy to chew diet. When discussing this with patient, he stated that he actually enjoyed the puree and would let the RN know if he wished to upgrade. Therefore, will keep patient on pureed diet/thin liquids and medical team can advance to easy to chew if patient wishes. PLAN:  --Pt wishes to remain on pureed diet/thin liquids  --can advance to easy to chew diet/thin liquids if patient wishes    Patient will be discharged from acute skilled speech therapy at this time. Rationale for discharge:  Goals achieved    Discharge Recommendations:  None     SUBJECTIVE:   Patient stated, \"I like those regarding his potatoes and green beans.     OBJECTIVE:   Cognitive and Communication Status:  Neurologic State: Alert,Confused  Orientation Level: Oriented to place,Oriented to person,Disoriented to time,Disoriented to situation  Cognition: Decreased attention/concentration,Decreased command following,Impaired decision making,Memory loss                 Dysphagia Treatment:  Oral Assessment:  Oral Assessment  Labial: No impairment  Dentition: Edentulous  Oral Hygiene: oral mucosa moist and clear of secretions  Lingual: No impairment  Velum: No impairment  Mandible: No impairment  P.O. Trials:  Patient Position: upright in bed  Vocal quality prior to P.O.: No impairment  Consistency Presented: Thin liquid; Solid  How Presented: Self-fed/presented;Cup/sip     Bolus Acceptance: No impairment  Bolus Formation/Control:  (delayed mastication likely 2/2 dentiion)     Propulsion: No impairment  Oral Residue: None  Initiation of Swallow: No impairment  Laryngeal Elevation: Functional  Aspiration Signs/Symptoms: None  Pharyngeal Phase Characteristics: No impairment, issues, or problems         Comments: cough occurred x2 while pt chewing so does not relate to swallow  Oral Phase Severity: No impairment  Pharyngeal Phase Severity : No impairment  NOMS:   The NOMS functional outcome measure was used to quantify this patient's level of swallowing impairment. Based on the NOMS, the patient was determined to be at level 7 for swallow function     NOMS Swallowing Levels:  Level 1 (CN): NPO  Level 2 (CM): NPO but takes consistency in therapy  Level 3 (CL): Takes less than 50% of nutrition p.o. and continues with nonoral feedings; and/or safe with mod cues; and/or max diet restriction  Level 4 (CK): Safe swallow but needs mod cues; and/or mod diet restriction; and/or still requires some nonoral feeding/supplements  Level 5 (CJ): Safe swallow with min diet restriction; and/or needs min cues  Level 6 (CI): Independent with p.o.; rare cues; usually self cues; may need to avoid some foods or needs extra time  Level 7 ([address] [place] [address]): Independent for all p.o.  EMILY. (2003). National Outcomes Measurement System (NOMS): Adult Speech-Language Pathology User's Guide. Pain:  Pain Scale 1: Numeric (0 - 10)  Pain Intensity 1: 0       After treatment:   Call bell within reach and Nursing notified    COMMUNICATION/EDUCATION:     The patient's plan of care including recommendations, planned interventions, and recommended diet changes were discussed with: Registered nurse.      Katrina Prater SLP  Time Calculation: 15 mins

## 2022-06-09 NOTE — PROGRESS NOTES
Hospitalist Progress Note    NAME: Guevara Gould   :  1950   MRN:  [id number]   Room Number:  Angel Dasilva  @ [facility]     Please note that this dictation was completed with Ivy Kauffman, the computer voice recognition software. Quite often unanticipated grammatical, syntax, homophones, and other interpretive errors are inadvertently transcribed by the computer software. Please disregard these errors. Please excuse any errors that have escaped final proofreading. Interim Hospital Summary: 73 y.o. male whom presented on 2022 with      Assessment / Plan:  Anticipated discharge date :   Anticipated disposition :   Barriers to discharge :     Acute metabolic encephalopathy POA resolving   Suspected seizure   Right sided gaze deviation POA, resolved    History of CVA 2017   Residual left sided hemiparesis POA  -CT head negative for acute process  -Patient has been assessed by tele neurology  -MRI and MRA are negative for acute process or stroke. -RPR negative VDRL CSF pending  -Meningitis panel negative per LP  -Blood culture negative growth so far      -EEG read pending  -Neurology on board appreciate help  - resume asa/ statin   -We will discuss with ID to  discontinue antibiotic and monitor off antibiotic             Severe sepsis POA resolved  Elevated lactic acid POA resolved   On admission temperature 102.3 F, heart rate 112 bpm, respiratory rate 22/minute, lactic acid 4.1. Chest x-ray interpreted independently did not reveal any consolidation. Urinalysis negative for infection. COVID PCR negative influenza PCR negative. Received sepsis bolus in the ER. CT chest abdomen pelvis did not show evidence of infection.  LP  : meningitis pathogen PCR panel negative, negative gram stain.      -stop ampicillin, acyclovir  - continue vanc, cefepime, flagyl  -Await blood culture results.  -Continue maintenance fluid           CKD III POA   Creatinine at baseline 1.3-1.4.   -Strict I's and O's, avoid nephrotoxic meds, renally dose meds.     Type 2 diabetes with hyperglycemia POA        Lab Results   Component Value Date/Time     Hemoglobin A1c 12.5 (H) 06/06/2022 05:04 AM     Hemoglobin A1c (POC) 13.9 (A) 06/18/2021 12:42 PM   - add glargine insulin   - Lispro correctional scale, FSG AC HS  - Consistent carb diet, hypoglycemia protocol.         Uncontrolled Hypertension POA   HLD POA  - resume amlodipine and lisinopril   - Labetalol PRN for SBP > 180 mm Hg,DBP > 100 mm Hg       # H/o of PE   - patient was on eliquis and stopped per PCP office           Body mass index is 34.9 kg/m². Code Status: full   Surrogate Decision Maker:  Bill Castillo [phone]  Sarahi Tran [phone]     DVT Prophylaxis: Lovenox  GI Prophylaxis: not indicated  Baseline: does not walk much, mostly wheelchair, sometimes ambulates with walker     Subjective:     Chief Complaint / Reason for Physician Visit  . Mental status improving , still confused  Discussed with RN events overnight. Review of Systems:  Review of Systems   Constitutional: Negative for chills and fever. Respiratory: Negative for cough and shortness of breath. Cardiovascular: Negative for chest pain. Gastrointestinal: Negative for abdominal pain. Genitourinary: Negative for frequency and urgency. Musculoskeletal: Negative for myalgias. Objective:     VITALS:   Last 24hrs VS reviewed since prior progress note.  Most recent are:  Patient Vitals for the past 24 hrs:   Temp Pulse Resp BP SpO2   06/09/22 0728 98.4 °F (36.9 °C) 66 20 (!) 169/84 97 %   06/09/22 0404 98.7 °F (37.1 °C) 71 20 (!) 172/99 95 %   06/08/22 2316 98.2 °F (36.8 °C) 72 17 (!) 162/84 97 %   06/08/22 2030 97.9 °F (36.6 °C) 80 26 (!) 149/99 94 %   06/08/22 1900 -- 78 22 -- 98 %   06/08/22 1835 -- 74 22 -- 99 %   06/08/22 1733 -- 72 20 -- 98 %   06/08/22 1600 97.8 °F (36.6 °C) 71 19 130/78 97 %   06/08/22 1530 -- 72 19 -- 97 %   06/08/22 1425 -- 79 18 -- 99 %   06/08/22 1418 -- 79 18 -- 97 %   06/08/22 1417 -- 87 15 -- (!) 73 %   06/08/22 1400 -- 76 21 -- 92 %   06/08/22 1357 98 °F (36.7 °C) 78 19 (!) 157/88 92 %   06/08/22 1342 -- 81 21 -- --   06/08/22 1230 -- 78 -- -- 96 %   06/08/22 1220 -- 76 -- -- 95 %   06/08/22 1218 -- 77 -- -- (!) 85 %   06/08/22 1200 -- 63 -- -- 93 %   06/08/22 1015 -- 63 -- (!) 163/81 --       Intake/Output Summary (Last 24 hours) at 6/9/2022 0927  Last data filed at 6/9/2022 0404  Gross per 24 hour   Intake 1386.67 ml   Output 1600 ml   Net -213.33 ml        PHYSICAL EXAM:  General: , no acute distress    EENT:  EOMI. Anicteric sclerae. MMM  Resp:  CTA bilaterally, no wheezing or rales. No accessory muscle use  CV:  Regular  rhythm,  normal S1/S2, no murmurs rubs gallops, No edema  GI:  Soft, Non distended, Non tender. +Bowel sounds  Neurologic:   AO x2 , redirectable   Psych:   poor insight. Not anxious nor agitated  Skin:  No rashes. No jaundice    Reviewed most current lab test results and cultures  YES  Reviewed most current radiology test results   YES  Review and summation of old records today    NO  Reviewed patient's current orders and MAR    YES  PMH/SH reviewed - no change compared to H&P  ________________________________________________________________________  Care Plan discussed with:    Comments   Patient x    Family  x    RN x    Care Manager xx    Consultant  x                     x Multidiciplinary team rounds were held today with , nursing, pharmacist and clinical coordinator. Patient's plan of care was discussed; medications were reviewed and discharge planning was addressed.      ________________________________________________________________________  Total NON critical care TIME: 35 Minutes    Total CRITICAL CARE TIME Spent:   Minutes non procedure based      Comments   >50% of visit spent in counseling and coordination of care x    ________________________________________________________________________  Jenniffer Angela Lamont Barkley MD     Procedures: see electronic medical records for all procedures/Xrays and details which were not copied into this note but were reviewed prior to creation of Plan. LABS:  I reviewed today's most current labs and imaging studies.   Pertinent labs include:  Recent Labs     06/09/22 0235 06/07/22  0509   WBC 6.2 7.8   HGB 12.7 11.2*   HCT 41.4 35.8*    213     Recent Labs     06/09/22 0235 06/08/22  0354 06/07/22  0509     --  137   K 3.3*  --  3.6     --  105   CO2 24  --  27   *  --  129*   BUN 6  --  5*   CREA 0.83 0.93 0.97   CA 8.4*  --  8.2*   MG  --   --  1.6   PHOS  --   --  2.3*   ALB 2.7*  --  2.5*   TBILI 0.4  --  0.6   ALT 19  --  19       Signed: Mike Amaral MD

## 2022-06-09 NOTE — PROGRESS NOTES
ID follow up  D/W Dr Zavala Courser status slowly improving but not at baseline yet   EEG pending  On empiric Vancomycin, Ceftriaxone, Flagyl  MRI brain no acute finding   Will stop flagyl   CSF cx prelim so far negative  Can see elevated protein, WBC in non infectiou etiology as well, CSF also had > 5000 RBC   Will de escalte and monitor pending EEG and neuro workup    Christin Gordon, DO  2:10 PM

## 2022-06-09 NOTE — PROGRESS NOTES
Comprehensive Nutrition Assessment    Type and Reason for Visit: Initial    Nutrition Recommendations/Plan:   Puree diet as tolerated; please include carbohydrate restrictions with diet order. Aspiration precautions     Nutrition Assessment:    (P) Pt admitted with sepsis, met encep/suspected seizure, AMS. Hx notable for TIA/CVA, DM2 (12.5), HTN, HLD, CKD-III    Nutrition Related Findings:    (P) Pt tolerating diet      Current Nutrition Intake & Therapies:  Average Meal Intake: (P) %  Average Supplement Intake: (P) None ordered  ADULT DIET Dysphagia - Pureed    Anthropometric Measures:  Height: (P) 5' 10\" (177.8 cm)  Ideal Body Weight (IBW): (P) 166 lbs ((P) 75 kg)     Current Body Wt:  (P) 117.9 kg (260 lb), (P) 156.6 % IBW. (P) Bed scale  Current BMI (kg/m2): (P) 37.3        Weight Adjustment: (P)  (ABW 96.8 kg)                 BMI Category: (P) Obese class 2 (BMI 35.0-39. 9)    Estimated Daily Nutrient Needs:  Energy Requirements Based On: (P) Kcal/kg  Weight Used for Energy Requirements: (P) Adjusted  Energy (kcal/day): (P) 2130  Weight Used for Protein Requirements: (P) Adjusted  Protein (g/day): (P) 97  Method Used for Fluid Requirements: (P) Other (comment)  Fluid (ml/day): (P) 3000    Nutrition Diagnosis:   (P) Overweight/obesity,Not ready for diet/lifestyle change,Limited adherence to nutrition-related recommendations related to (P) cognitive or neurological impairment,acute injury/trauma,food and nutrition related knowledge deficit as evidenced by (P) BMI,swallowing study results    Nutrition Interventions:   Food and/or Nutrient Delivery: (P) Continue current diet  Nutrition Education/Counseling: (P) No recommendations at this time  Coordination of Nutrition Care: (P) Continue to monitor while inpatient  Plan of Care discussed with: (P) RN    Goals:  Previous Goal Met: (P) Progressing toward goal(s)  Goals: (P) Meet at least 75% of estimated needs,by next RD assessment       Nutrition Monitoring and Evaluation:   Behavioral-Environmental Outcomes: (P) Beliefs and attitudes,Readiness for change  Food/Nutrient Intake Outcomes: (P) Food and nutrient intake  Physical Signs/Symptoms Outcomes: (P) Chewing or swallowing,Weight    Discharge Planning:    (P) Too soon to determine    Vidal Tobar, PhD, [address] [place] [address]   Contact: [phone]

## 2022-06-10 LAB
ALBUMIN SERPL-MCNC: 2.5 G/DL (ref 3.5–5)
ALBUMIN/GLOB SERPL: 0.6 {RATIO} (ref 1.1–2.2)
ALP SERPL-CCNC: 84 U/L (ref 45–117)
ALT SERPL-CCNC: 20 U/L (ref 12–78)
ANION GAP SERPL CALC-SCNC: 10 MMOL/L (ref 5–15)
AST SERPL-CCNC: 23 U/L (ref 15–37)
BACTERIA SPEC CULT: NORMAL
BACTERIA SPEC CULT: NORMAL
BASOPHILS # BLD: 0 K/UL (ref 0–0.1)
BASOPHILS NFR BLD: 0 % (ref 0–1)
BILIRUB SERPL-MCNC: 0.3 MG/DL (ref 0.2–1)
BUN SERPL-MCNC: 8 MG/DL (ref 6–20)
BUN/CREAT SERPL: 9 (ref 12–20)
CALCIUM SERPL-MCNC: 8.3 MG/DL (ref 8.5–10.1)
CHLORIDE SERPL-SCNC: 107 MMOL/L (ref 97–108)
CO2 SERPL-SCNC: 25 MMOL/L (ref 21–32)
CREAT SERPL-MCNC: 0.88 MG/DL (ref 0.7–1.3)
DIFFERENTIAL METHOD BLD: ABNORMAL
EOSINOPHIL # BLD: 0.2 K/UL (ref 0–0.4)
EOSINOPHIL NFR BLD: 3 % (ref 0–7)
ERYTHROCYTE [DISTWIDTH] IN BLOOD BY AUTOMATED COUNT: 15.9 % (ref 11.5–14.5)
GLOBULIN SER CALC-MCNC: 3.9 G/DL (ref 2–4)
GLUCOSE BLD STRIP.AUTO-MCNC: 158 MG/DL (ref 65–117)
GLUCOSE BLD STRIP.AUTO-MCNC: 193 MG/DL (ref 65–117)
GLUCOSE BLD STRIP.AUTO-MCNC: 215 MG/DL (ref 65–117)
GLUCOSE SERPL-MCNC: 214 MG/DL (ref 65–100)
HCT VFR BLD AUTO: 38.8 % (ref 36.6–50.3)
HGB BLD-MCNC: 11.9 G/DL (ref 12.1–17)
IMM GRANULOCYTES # BLD AUTO: 0 K/UL (ref 0–0.04)
IMM GRANULOCYTES NFR BLD AUTO: 0 % (ref 0–0.5)
LYMPHOCYTES # BLD: 1.4 K/UL (ref 0.8–3.5)
LYMPHOCYTES NFR BLD: 25 % (ref 12–49)
MCH RBC QN AUTO: 23.8 PG (ref 26–34)
MCHC RBC AUTO-ENTMCNC: 30.7 G/DL (ref 30–36.5)
MCV RBC AUTO: 77.8 FL (ref 80–99)
MONOCYTES # BLD: 0.5 K/UL (ref 0–1)
MONOCYTES NFR BLD: 10 % (ref 5–13)
NEUTS SEG # BLD: 3.3 K/UL (ref 1.8–8)
NEUTS SEG NFR BLD: 62 % (ref 32–75)
NRBC # BLD: 0 K/UL (ref 0–0.01)
NRBC BLD-RTO: 0 PER 100 WBC
PLATELET # BLD AUTO: 258 K/UL (ref 150–400)
PMV BLD AUTO: 10.1 FL (ref 8.9–12.9)
POTASSIUM SERPL-SCNC: 3.8 MMOL/L (ref 3.5–5.1)
PROT SERPL-MCNC: 6.4 G/DL (ref 6.4–8.2)
RBC # BLD AUTO: 4.99 M/UL (ref 4.1–5.7)
SERVICE CMNT-IMP: ABNORMAL
SERVICE CMNT-IMP: NORMAL
SERVICE CMNT-IMP: NORMAL
SODIUM SERPL-SCNC: 142 MMOL/L (ref 136–145)
WBC # BLD AUTO: 5.3 K/UL (ref 4.1–11.1)

## 2022-06-10 PROCEDURE — 74011636637 HC RX REV CODE- 636/637: Performed by: STUDENT IN AN ORGANIZED HEALTH CARE EDUCATION/TRAINING PROGRAM

## 2022-06-10 PROCEDURE — 74011250637 HC RX REV CODE- 250/637: Performed by: STUDENT IN AN ORGANIZED HEALTH CARE EDUCATION/TRAINING PROGRAM

## 2022-06-10 PROCEDURE — 74011000258 HC RX REV CODE- 258: Performed by: INTERNAL MEDICINE

## 2022-06-10 PROCEDURE — 85025 COMPLETE CBC W/AUTO DIFF WBC: CPT

## 2022-06-10 PROCEDURE — C1758 CATHETER, URETERAL: HCPCS

## 2022-06-10 PROCEDURE — 74011250636 HC RX REV CODE- 250/636: Performed by: STUDENT IN AN ORGANIZED HEALTH CARE EDUCATION/TRAINING PROGRAM

## 2022-06-10 PROCEDURE — 74011000258 HC RX REV CODE- 258: Performed by: STUDENT IN AN ORGANIZED HEALTH CARE EDUCATION/TRAINING PROGRAM

## 2022-06-10 PROCEDURE — 74011250636 HC RX REV CODE- 250/636: Performed by: INTERNAL MEDICINE

## 2022-06-10 PROCEDURE — 80053 COMPREHEN METABOLIC PANEL: CPT

## 2022-06-10 PROCEDURE — 65270000032 HC RM SEMIPRIVATE

## 2022-06-10 PROCEDURE — 74011000250 HC RX REV CODE- 250: Performed by: STUDENT IN AN ORGANIZED HEALTH CARE EDUCATION/TRAINING PROGRAM

## 2022-06-10 PROCEDURE — 36415 COLL VENOUS BLD VENIPUNCTURE: CPT

## 2022-06-10 PROCEDURE — 82962 GLUCOSE BLOOD TEST: CPT

## 2022-06-10 RX ORDER — LISINOPRIL 5 MG/1
10 TABLET ORAL
Status: COMPLETED | OUTPATIENT
Start: 2022-06-10 | End: 2022-06-10

## 2022-06-10 RX ADMIN — CEFTRIAXONE SODIUM 2 G: 2 INJECTION, POWDER, FOR SOLUTION INTRAMUSCULAR; INTRAVENOUS at 19:20

## 2022-06-10 RX ADMIN — SODIUM CHLORIDE, PRESERVATIVE FREE 10 ML: 5 INJECTION INTRAVENOUS at 14:38

## 2022-06-10 RX ADMIN — LEVETIRACETAM 1000 MG: 100 SOLUTION ORAL at 08:31

## 2022-06-10 RX ADMIN — Medication 3 UNITS: at 17:23

## 2022-06-10 RX ADMIN — VANCOMYCIN HYDROCHLORIDE 1250 MG: 10 INJECTION, POWDER, LYOPHILIZED, FOR SOLUTION INTRAVENOUS at 20:45

## 2022-06-10 RX ADMIN — VANCOMYCIN HYDROCHLORIDE 1250 MG: 10 INJECTION, POWDER, LYOPHILIZED, FOR SOLUTION INTRAVENOUS at 08:06

## 2022-06-10 RX ADMIN — LEVETIRACETAM 1000 MG: 100 SOLUTION ORAL at 21:47

## 2022-06-10 RX ADMIN — NYSTATIN: 100000 POWDER TOPICAL at 08:58

## 2022-06-10 RX ADMIN — ENOXAPARIN SODIUM 30 MG: 100 INJECTION SUBCUTANEOUS at 21:47

## 2022-06-10 RX ADMIN — LISINOPRIL 10 MG: 5 TABLET ORAL at 14:46

## 2022-06-10 RX ADMIN — AMLODIPINE BESYLATE 10 MG: 5 TABLET ORAL at 08:31

## 2022-06-10 RX ADMIN — SODIUM CHLORIDE, PRESERVATIVE FREE 10 ML: 5 INJECTION INTRAVENOUS at 22:45

## 2022-06-10 RX ADMIN — Medication 2 UNITS: at 07:53

## 2022-06-10 RX ADMIN — LISINOPRIL 20 MG: 20 TABLET ORAL at 08:31

## 2022-06-10 RX ADMIN — Medication 2 UNITS: at 12:04

## 2022-06-10 RX ADMIN — CEFTRIAXONE SODIUM 2 G: 2 INJECTION, POWDER, FOR SOLUTION INTRAMUSCULAR; INTRAVENOUS at 03:02

## 2022-06-10 RX ADMIN — PRAVASTATIN SODIUM 10 MG: 10 TABLET ORAL at 22:45

## 2022-06-10 RX ADMIN — ESCITALOPRAM OXALATE 5 MG: 10 TABLET ORAL at 08:31

## 2022-06-10 RX ADMIN — NYSTATIN: 100000 POWDER TOPICAL at 17:25

## 2022-06-10 RX ADMIN — ACETAMINOPHEN 650 MG: 325 TABLET ORAL at 08:31

## 2022-06-10 RX ADMIN — Medication 10 UNITS: at 08:29

## 2022-06-10 RX ADMIN — ENOXAPARIN SODIUM 30 MG: 100 INJECTION SUBCUTANEOUS at 08:29

## 2022-06-10 NOTE — PROGRESS NOTES
0710) Bedside shift change report given to Ced Kumar, RN (oncoming nurse) by Riki Mari RN (offgoing nurse). Report included the following information SBAR, Kardex, [facility] and Cardiac Rhythm NSR with PVC's.  0740) Clarify ACHS order with Dr. Sarah Rodríguez via [employer]  [phone]) IDR with Dr. Kraig NORRIS), Michael Smith (pharmacist), Ubaldo Nicole and Laverna Jeans (),  and [facility] (RN) to discuss plan of care including downgrade to medical, two more days of antibiotics, possible SNF, wheelchair bound at baseline, pt more oriented than Wednesday. 1920)Bedside shift change report given to Paul Torres RN (oncoming nurse) by Ced Kumar RN (offgoing nurse). Report included the following information SBAR, Kardex and MAR.

## 2022-06-10 NOTE — PROGRESS NOTES
ID follow up  Discussed with Dr Yasir Park who reports patient is back to his baseline mental status    The etiology for his altered mental status not clear to me   His CSF was bloody wt 5000 RBC , 38 WBC and neutrophilic  Cultures blood csf negative  Meningitis panel negative  MRI brain no acute findings   EEG - encephalopathy per Dr Yasir Park    Patient did present wt high fevers  Will complete an empiric 7 day course  Of Vancomycin and Ceftriaxone ( can include the previous does of cefepime patient received )     Monitor off antibiotics and if worsening, fever , decline in mental status re start antibiotics     Recommend close follow up after discharge     Will sign off     Pls contact if any questions or patients clinical status worsens     Christin Gordon, DO  1:00 PM

## 2022-06-10 NOTE — PROGRESS NOTES
Care plan reviewed. Problem: Aspiration - Risk of  Goal: *Absence of aspiration  Outcome: Progressing Towards Goal     Problem: Patient Education: Go to Patient Education Activity  Goal: Patient/Family Education  Outcome: Progressing Towards Goal     Problem: Pressure Injury - Risk of  Goal: *Prevention of pressure injury  Description: Document Andrzej Scale and appropriate interventions in the flowsheet. Outcome: Progressing Towards Goal  Note: Pressure Injury Interventions:  Sensory Interventions: Assess changes in LOC,Assess need for specialty bed,Avoid rigorous massage over bony prominences,Check visual cues for pain,Discuss PT/OT consult with provider,Float heels,Keep linens dry and wrinkle-free,Maintain/enhance activity level,Minimize linen layers,Turn and reposition approx. every two hours (pillows and wedges if needed)    Moisture Interventions: Absorbent underpads,Apply protective barrier, creams and emollients,Check for incontinence Q2 hours and as needed,Internal/External urinary devices,Limit adult briefs,Minimize layers    Activity Interventions: Assess need for specialty bed,Increase time out of bed,PT/OT evaluation    Mobility Interventions: Assess need for specialty bed,Float heels,HOB 30 degrees or less,PT/OT evaluation,Turn and reposition approx. every two hours(pillow and wedges)    Nutrition Interventions: Document food/fluid/supplement intake,Offer support with meals,snacks and hydration,Discuss nutritional consult with provider    Friction and Shear Interventions: Apply protective barrier, creams and emollients,HOB 30 degrees or less,Lift sheet,Minimize layers                Problem: Falls - Risk of  Goal: *Absence of Falls  Description: Document Luis Eduardo Fall Risk and appropriate interventions in the flowsheet.   Outcome: Progressing Towards Goal  Note: Fall Risk Interventions:       Mentation Interventions: Adequate sleep, hydration, pain control,Bed/chair exit alarm,Evaluate medications/consider consulting pharmacy,Door open when patient unattended,Increase mobility,More frequent rounding,Reorient patient,Room close to nurse's station,Update white board    Medication Interventions: Bed/chair exit alarm,Evaluate medications/consider consulting pharmacy    Elimination Interventions: Bed/chair exit alarm,Call light in reach,Patient to call for help with toileting needs,Toileting schedule/hourly rounds    History of Falls Interventions: Bed/chair exit alarm,Consult care management for discharge planning,Door open when patient unattended,Evaluate medications/consider consulting pharmacy,Investigate reason for fall,Room close to nurse's station         Problem: Patient Education: Go to Patient Education Activity  Goal: Patient/Family Education  Outcome: Progressing Towards Goal     Problem: Diabetes Self-Management  Goal: *Disease process and treatment process  Description: Define diabetes and identify own type of diabetes; list 3 options for treating diabetes. Outcome: Progressing Towards Goal  Goal: *Incorporating nutritional management into lifestyle  Description: Describe effect of type, amount and timing of food on blood glucose; list 3 methods for planning meals. Outcome: Progressing Towards Goal  Goal: *Incorporating physical activity into lifestyle  Description: State effect of exercise on blood glucose levels. Outcome: Progressing Towards Goal     Problem: Diabetes Self-Management  Goal: *Disease process and treatment process  Description: Define diabetes and identify own type of diabetes; list 3 options for treating diabetes. Outcome: Progressing Towards Goal  Goal: *Incorporating nutritional management into lifestyle  Description: Describe effect of type, amount and timing of food on blood glucose; list 3 methods for planning meals.   Outcome: Progressing Towards Goal  Goal: *Incorporating physical activity into lifestyle  Description: State effect of exercise on blood glucose levels.   Outcome: Progressing Towards Goal     Problem: Patient Education: Go to Patient Education Activity  Goal: Patient/Family Education  Outcome: Progressing Towards Goal     Problem: TIA/CVA Stroke: Day 2 Until Discharge  Goal: Off Pathway (Use only if patient is Off Pathway)  Outcome: Progressing Towards Goal  Goal: Activity/Safety  Outcome: Progressing Towards Goal  Goal: Diagnostic Test/Procedures  Outcome: Progressing Towards Goal  Goal: Nutrition/Diet  Outcome: Progressing Towards Goal  Goal: Discharge Planning  Outcome: Progressing Towards Goal  Goal: Medications  Outcome: Progressing Towards Goal  Goal: Respiratory  Outcome: Progressing Towards Goal  Goal: Treatments/Interventions/Procedures  Outcome: Progressing Towards Goal  Goal: Psychosocial  Outcome: Progressing Towards Goal  Goal: *Verbalizes anxiety and depression are reduced or absent  Outcome: Progressing Towards Goal  Goal: *Absence of aspiration  Outcome: Progressing Towards Goal  Goal: *Absence of deep venous thrombosis signs and symptoms(Stroke Metric)  Outcome: Progressing Towards Goal  Goal: *Optimal pain control at patient's stated goal  Outcome: Progressing Towards Goal  Goal: *Tolerating diet  Outcome: Progressing Towards Goal  Goal: *Ability to perform ADLs and demonstrates progressive mobility and function  Outcome: Not Progressing Towards Goal  Goal: *Stroke education continued(Stroke Metric)  Outcome: Progressing Towards Goal

## 2022-06-10 NOTE — ADT AUTH CERT NOTES
Comments  Comment            Patient Demographics    Patient Name   Kelsey Gardner   [id number] Legal Sex   Male    1950 Address   [address] [address] Phone   [phone] (Mobile) *Preferred*     Patient Demographics    Patient Name   Kelsey Gardner   [id number] Legal Sex   Male    1950 Address   [address] [address] Phone   [phone] (Mobile) *Preferred*   CSN:   [id number]     Admit Date: Admit Time Room Bed   2022  1:25 PM Orquidea Acevedo [[id number]] 01 [[id number]]       Attending Providers    Provider Pager From To   Angelika Hutson MD  22   Luiz Watson MD  22   Rob Pelaez MD  22      Emergency Contact(s)    Name Relation Home Work Bandar Dumont Child   [phone]     Utilization Reviews         Sepsis and Other Febrile Illness, without Focal Infection - Care Day 5 (2022) by Rica Connor RN       Review Entered Review Status   6/10/2022 13:20 Completed      Criteria Review      Care Day: 5 Care Date: 2022 Level of Care: Step Down    Guideline Day 3    Level Of Care    (X) Floor    6/10/2022 13:20:22 EDT by Rica Connor      steptre    Clinical Status    ( ) * Mental status at baseline    6/10/2022 13:20:22 EDT by Media Banana confused, calm    (X) * Afebrile or fever improved    6/10/2022 13:20:22 EDT by Rica Connor      98.4 114 176/99 25 95%.1kg    Activity    (X) Activity as tolerated    6/10/2022 13:20:22 EDT by Media Banana as tolerated with assist    Routes    ( ) * Oral hydration    (X) Diet as tolerated    6/10/2022 13:20:22 EDT by Rica Connor      ADULT DIET Dysphagia - Pureed    (X) Parenteral or oral medications    Interventions    (X) WBC    6/10/2022 13:20:22 EDT by Rica Connor      WBC: 6.2    Medications    (X) Possible antimicrobial treatment    6/10/2022 13:20:22 EDT by Media Banana rocephin 2g GUT00 vanco 1250mg ivq12 flagyl 500mg ivq8    (X) Possible DVT prophylaxis    6/10/2022 13:20:22 EDT by Tom Mckay      lovenox 30mg scq12    * Milestone   Additional Notes   6/9 LOC IP Stepdown      Labs   WBC: 6.2   NRBC: 0.0   RBC: 5.30   HGB: 12.7   HCT: 41.4   MCV: 78.1 (L)   MCH: 24.0 (L)   MCHC: 30.7   RDW: 15.6 (H)   PLATELET: 327   MPV: 10.0   NEUTROPHILS: 67   LYMPHOCYTES: 19   MONOCYTES: 9   EOSINOPHILS: 4   BASOPHILS: 0   IMMATURE GRANULOCYTES: 1 (H)   DF: AUTOMATED   ABSOLUTE NRBC: 0.00   ABS. NEUTROPHILS: 4.1   ABS. IMM. GRANS.: 0.0   ABS. LYMPHOCYTES: 1.2   ABS. MONOCYTES: 0.6   ABS. EOSINOPHILS: 0.3   ABS. BASOPHILS: 0.0   Sodium: 141   Potassium: 3.3 (L)   Chloride: 104   CO2: 24   Anion gap: 13   Glucose: 112 (H)   BUN: 6   Creatinine: 0.83   BUN/Creatinine ratio: 7 (L)   Calcium: 8.4 (L)   GFR est non-AA: >60   GFR est AA: >60   Bilirubin, total: 0.4   Protein, total: 6.8   Albumin: 2.7 (L)   Globulin: 4.1 (H)   A-G Ratio: 0.7 (L)   ALT: 19   AST: 30   Alk. phosphatase: 80      Attending   Acute metabolic encephalopathy POA resolving    Suspected seizure    Right sided gaze deviation POA, resolved     History of CVA 2017    Residual left sided hemiparesis POA   -CT head negative for acute process   -Patient has been assessed by tele neurology   -MRI and MRA are negative for acute process or stroke.    -RPR negative VDRL CSF pending   -Meningitis panel negative per LP   -Blood culture negative growth so far   -EEG read pending   -Neurology on board appreciate help   - resume asa/ statin    -We will discuss with ID to  discontinue antibiotic and monitor off antibiotic   # H/o of PE    - patient was on eliquis and stopped per PCP office       ID   D/W Dr Chandrakant Maynard status slowly improving but not at baseline yet    EEG pending   On empiric Vancomycin, Ceftriaxone, Flagyl   MRI brain no acute finding    Will stop flagyl    CSF cx prelim so far negative   Can see elevated protein, WBC in non infectiou etiology as well, CSF also had > 5000 RBC    Will de escalte and monitor pending EEG and neuro workup      PHYSICAL EXAM:   General:          , no acute distress     EENT:              EOMI. Anicteric sclerae. MMM   Resp:               CTA bilaterally, no wheezing or rales.  No accessory muscle use   CV:                  Regular  rhythm,  normal S1/S2, no murmurs rubs gallops, No edema   GI:                   Soft, Non distended, Non tender.  +Bowel sounds   Neurologic:        AO x2 , redirectable    Psych:   poor insight. Not anxious nor agitated   Skin:                No rashes.  No jaundice      ST   ASSESSMENT:   Pt with significant improvement in mentation on this date, alert and talking with SLP.  Pt's largest barrier to PO is his teeth as it does take a prolonged amount of time to chew because of his lack of dentition.  Despite this, patient would be fine to initiate an easy to chew diet. When discussing this with patient, he stated that he actually enjoyed the puree and would let the RN know if he wished to [place], will keep patient on pureed diet/thin liquids and medical team can advance to easy to chew if patient wishes. PT   ASSESSMENT   Patient continues with skilled PT services and is progressing towards goals. Patient more alert compared to evaluation. Patient pleasant throughout session. Patient performed bed mobility and transfers with moderate assist and increased cues for safety mobility. Patient able to take 2-3 side steps to head of bed with walker and min A x2. Patient required cues for sequencing with walker. Recommend SNF at discharge. OT   ASSESSMENT   Based on the objective data described below, the patient presents with decreased safety awareness and LE's over right lower bedrail on arrival. Demonstrated good participation and alert, however decreased command following during mobility efforts, required total assist to maneuver rolling walker.  Patient with decreased activity tolerance and increased shortness of breath after standing edge of bed, oxygen sats 94% on room air. Limited by hx of left UE hemiparesis, however reports using rolling walker for stand pivot transfers. Nursing reported assist for self -feeding which he performed at baseline. Recommend rehab as he is functioning below his baseline.        Sepsis and Other Febrile Illness, without Focal Infection - Care Day 4 (6/8/2022) by Tom Bashir RN       Review Entered Review Status   6/10/2022 13:13 Completed      Criteria Review      Care Day: 4 Care Date: 6/8/2022 Level of Care: Step Down    Guideline Day 3    Level Of Care    (X) Floor    6/10/2022 13:13:51 EDT by Tom Bashir      steptre    Clinical Status    ( ) * Mental status at baseline    (X) * Afebrile or fever improved    6/10/2022 13:13:51 EDT by Tom Bashir      98.2 80 181/86 26 94%NC4L 117.9kg    Activity    (X) Activity as tolerated    6/10/2022 13:13:51 EDT by Mehdi Villalobos as tolerated with assist    Routes    ( ) * Oral hydration    6/10/2022 13:13:51 EDT by [email or web address]    (X) Diet as tolerated    6/10/2022 13:13:51 EDT by Tom Bashir      ADULT DIET Dysphagia - Pureed    (X) Parenteral or oral medications    6/10/2022 13:13:51 EDT by Tom Bashir      ativan 1mg ivx1 ativan 0.5mg ivx1 keppra 1000mg ivq12    Medications    (X) Possible antimicrobial treatment    6/10/2022 13:13:51 EDT by Tom Bashir      rocephin 2g ivq12 vanco 1250mg ivq12 flagyl 500mg ivq8    (X) Possible DVT prophylaxis    6/10/2022 13:13:51 EDT by Tom Bashir      lovenox 30mg scq12    * Milestone   Additional Notes   6/8 LOC IP Stepdown      MRA brain    impression: No significant proximal stenosis. Poor visualization of the distal   right middle cerebral artery is likely technical.      MRA neck   No flow-limiting stenosis or arterial occlusion. MRI brain   1. No acute findings no mass.    2. Prominent atrophy white matter disease, progression since the prior   examination   3. Remote bilateral basal ganglia lacuneS        Attending   Acute metabolic encephalopathy POA resolving    Suspected seizure    Right sided gaze deviation POA, resolved     History of CVA   Residual left sided hemiparesis POA   -CT head negative for acute process   -Patient has been assessed by tele neurology   -MRI brain EEG pending   -RPR negative VDRL CSF pending   -Meningitis panel negative per LP   -Blood culture negative growth so far   -Check folate B12 and TSH   -Follow MRI and MRA   - Cont ABx for now        ID   Chart reviewed   Fever curve much better    MRI brain pending   CSF cx so far negative   On empiric vancomycin, ceftriaxone, flagyl    If MRI has other non infectious etiology such that explains altered mentation and CSF , blood cx are negative, will work on de escalating antibiotics    Can see elevated protein, wbc in non infectious etiologies as well. Meninginitis panel negative    RPR NR       EEG   HISTORY/INDICATION: Altered mental status   MEDICATIONS:    CONDITIONS OF RECORDING: This is a routine 21-channel EEG recording performed in accordance with the international 10-20 system with one channel devoted to limited EKG. This study was done during states of wakefulness.  Photic stimulation, no hyperventilation was performed as activating procedures. In addition, eye movement was recorded   DESCRIPTION:    Upon maximal arousal the posterior dominant rhythm has a frequency of 6 to 9 hz with an amplitude of 15 uV. This activity is symmetric over the bilateral posterior derivations and attenuates with eye opening. Photic stimulation did not significantly alter the tracing. The patient becomes drowsy, but deeper stages of sleep are not attained . Normal sleep architecture is seen with stage II sleep recognized by the presence of symmetric vertex waves and sleep spindles.  There are no focal abnormalities, epileptiform discharges, or electrographic seizures seen. Noted was diffuse slowing of most of activity in the range of 3 to 5 cps delta activity.  The EEG however was degraded by a lot of movement artifact.  No significant asymmetry noted   INTERPRETATION:    This is an abnormal EEG.  The EEG revealed diffuse slowing of most of activity, however, no significant asymmetry or overt epileptic activity was observed.  This is consistent with cerebral disturbance, encephalopathy versus postictal state. CLINICAL CORRELATION: A normal EEG does not definitively exclude a diagnosis of epilepsy if clinical suspicion is high consider sleep deprived EEG.        PHYSICAL EXAM:   General:          , no acute distress     EENT:              EOMI. Anicteric sclerae.  MMM   Resp:               CTA bilaterally, no wheezing or rales.  No accessory muscle use   CV:                  Regular  rhythm,  normal S1/S2, no murmurs rubs gallops, No edema   GI:                   Soft, Non distended, Non tender.  +Bowel sounds   Neurologic:        AO x2 , redirectable    Psych:   poor insight. Not anxious nor agitated   Skin:                No rashes.  No jaundice

## 2022-06-10 NOTE — PROGRESS NOTES
8706) PCT reported blood sugar= 158    0753) 2 untis of correctional Insulin administered per parameters. Pt resting in bed with no additional needs.

## 2022-06-10 NOTE — PROGRESS NOTES
1945: Bedside and Verbal shift change report given to Kar Hernandes RN (oncoming nurse) by Woody Perez RN (offgoing nurse). Report included the following information SBAR, Kardex, MAR and Recent Results. 0720: Bedside and Verbal shift change report given to Abrahan Rosario RN (oncoming nurse) by Kar Hernandes RN (offgoing nurse). Report included the following information SBAR, Kardex, MAR and Recent Results.

## 2022-06-10 NOTE — PROGRESS NOTES
Problem: Aspiration - Risk of  Goal: *Absence of aspiration  Outcome: Progressing Towards Goal     Problem: Patient Education: Go to Patient Education Activity  Goal: Patient/Family Education  Outcome: Progressing Towards Goal     Problem: Pressure Injury - Risk of  Goal: *Prevention of pressure injury  Description: Document Andrzej Scale and appropriate interventions in the flowsheet. Outcome: Progressing Towards Goal  Note: Pressure Injury Interventions:  Sensory Interventions: Assess changes in LOC,Discuss PT/OT consult with provider,Float heels,Keep linens dry and wrinkle-free,Minimize linen layers    Moisture Interventions: Absorbent underpads,Apply protective barrier, creams and emollients,Check for incontinence Q2 hours and as needed,Internal/External urinary devices,Limit adult briefs,Minimize layers    Activity Interventions: Assess need for specialty bed,Increase time out of bed,PT/OT evaluation    Mobility Interventions: Assess need for specialty bed,Float heels,HOB 30 degrees or less,PT/OT evaluation    Nutrition Interventions: Document food/fluid/supplement intake,Offer support with meals,snacks and hydration    Friction and Shear Interventions: Apply protective barrier, creams and emollients,Lift sheet,Minimize layers                Problem: Patient Education: Go to Patient Education Activity  Goal: Patient/Family Education  Outcome: Progressing Towards Goal     Problem: Falls - Risk of  Goal: *Absence of Falls  Description: Document Luis Eduardo Fall Risk and appropriate interventions in the flowsheet.   Outcome: Progressing Towards Goal  Note: Fall Risk Interventions:  Mobility Interventions: Bed/chair exit alarm,Communicate number of staff needed for ambulation/transfer,Patient to call before getting OOB,PT Consult for mobility concerns,PT Consult for assist device competence,OT consult for ADLs    Mentation Interventions: Adequate sleep, hydration, pain control,Bed/chair exit alarm,Door open when patient unattended,Evaluate medications/consider consulting pharmacy,Increase mobility,More frequent rounding,Reorient patient,Room close to nurse's station,Update white board    Medication Interventions: Evaluate medications/consider consulting pharmacy,Bed/chair exit alarm,Patient to call before getting OOB,Teach patient to arise slowly    Elimination Interventions: Bed/chair exit alarm,Call light in reach,Patient to call for help with toileting needs,Toileting schedule/hourly rounds    History of Falls Interventions: Bed/chair exit alarm,Consult care management for discharge planning,Door open when patient unattended,Evaluate medications/consider consulting pharmacy,Investigate reason for fall,Room close to nurse's station         Problem: Patient Education: Go to Patient Education Activity  Goal: Patient/Family Education  Outcome: Progressing Towards Goal     Problem: Sepsis: Day of Diagnosis  Goal: Discharge Planning  Outcome: Progressing Towards Goal  Goal: Medications  Outcome: Progressing Towards Goal  Goal: Treatments/Interventions/Procedures  Outcome: Progressing Towards Goal  Goal: Psychosocial  Outcome: Progressing Towards Goal     Problem: Diabetes Self-Management  Goal: *Disease process and treatment process  Description: Define diabetes and identify own type of diabetes; list 3 options for treating diabetes. Outcome: Progressing Towards Goal  Goal: *Incorporating nutritional management into lifestyle  Description: Describe effect of type, amount and timing of food on blood glucose; list 3 methods for planning meals.   Outcome: Progressing Towards Goal     Problem: Patient Education: Go to Patient Education Activity  Goal: Patient/Family Education  Outcome: Progressing Towards Goal     Problem: Patient Education: Go to Patient Education Activity  Goal: Patient/Family Education  Outcome: Progressing Towards Goal     Problem: Patient Education: Go to Patient Education Activity  Goal: Patient/Family Education  Outcome: Progressing Towards Goal

## 2022-06-10 NOTE — PROGRESS NOTES
TRANSITION OF CARE    Discharge dated:  June 11, 2022  Discharge location:  SNF for rehabilitation  RUR:  12%    BRIANNA spoke with patient and his sons, Laxmi Arenas and Eve Rios, regarding the recommendation that patient to receive rehab in a skilled nursing facility. All of them were in agreement with the plan. He will return to his home where he lives with his son, after rehab. [facility] and they're working to obtain authorization from [employer]) for the admission. CM reviewed the IMM letter with patient, who verbalized understanding an signed the form. Patient was given the original copy and a copy was placed on the medical record to be scanned. CM received call from Steven Packer ([phone]), Admissions Coordinator for [facility] [place] [id number]. and Rehab stating that the authorization for patient's admission to SNF has been received. However, authorization for the IV meds has not been received. Once the medication authorization is obtained, patient can be transferred to SNF. IV ABX end on Sunday, June 12,2022 at 2359. If authorization for IV ABX at the SNF is obtained prior to 6/12/22, patient can be transferred. Otherwise, projected discharge date is Monday, June 13, 2022. BRIANNA spoke with patient's son, Laxmi Arenas ([phone]) regarding patient's acceptance by SNF and the discharge plan. Family cannot assist with transportation to SNF but agreed to bring patient's wheelchair to the hospital for pt's use at discharge. BRIANNA also spoke with patient about the above.

## 2022-06-10 NOTE — PROGRESS NOTES
Hospitalist Progress Note    NAME: Leo Benson   :  1950   MRN:  [id number]   Room Number:  Luca Joyner  @ [facility]     Please note that this dictation was completed with Richy Lee, the computer voice recognition software. Quite often unanticipated grammatical, syntax, homophones, and other interpretive errors are inadvertently transcribed by the computer software. Please disregard these errors. Please excuse any errors that have escaped final proofreading. Interim Hospital Summary: 73 y.o. male whom presented on 2022 with      Assessment / Plan:  Anticipated discharge date :   Anticipated disposition :   Barriers to discharge : IV abx     Acute metabolic encephalopathy POA resolved  ? Infectious process contributing to encephalopathy  Suspected seizure   Right sided gaze deviation POA, resolved    History of CVA 2017   Residual left sided hemiparesis POA  -CT head negative for acute process  -Patient has been assessed by tele neurology  -MRI and MRA are negative for acute process or stroke. -RPR negative VDRL CSF neg   -Lumbar puncture with WBC 38 with 98% neutrophils protein 69 and glucose 133  -Meningitis panel negative per LP  -Blood culture negative growth so far  -EEG with diffuse slowing. Encephalopathy versus postictal state    -Discussed with ID physician in detail we will complete the IV antibiotic for total of 7 days given marked improvement in patient condition with IV antibiotic  -Continue Keppra per neurology      Severe sepsis POA resolved  Elevated lactic acid POA resolved   On admission temperature 102.3 F, heart rate 112 bpm, respiratory rate 22/minute, lactic acid 4.1. Chest x-ray interpreted independently did not reveal any consolidation. Urinalysis negative for infection. COVID PCR negative influenza PCR negative. Received sepsis bolus in the ER. CT chest abdomen pelvis did not show evidence of infection.  LP  : meningitis pathogen PCR panel negative, negative gram stain.      -Discussed the case with ID physician in detail we will complete the IV antibiotic for total 7 days given marked improvement in patient clinical condition with IV antibiotics           CKD III POA   Creatinine at baseline 1.3-1.4.   -Strict I's and O's, avoid nephrotoxic meds, renally dose meds.     Type 2 diabetes with hyperglycemia POA        Lab Results   Component Value Date/Time     Hemoglobin A1c 12.5 (H) 06/06/2022 05:04 AM     Hemoglobin A1c (POC) 13.9 (A) 06/18/2021 12:42 PM   - add glargine insulin   - Lispro correctional scale, FSG AC HS  - Consistent carb diet, hypoglycemia protocol.         Uncontrolled Hypertension POA resolving   HLD POA  - resume amlodipine and lisinopril   - Labetalol PRN for SBP > 180 mm Hg,DBP > 100 mm Hg       # H/o of PE   - patient was on eliquis and stopped per PCP office           Body mass index is 34.9 kg/m². Code Status: full   Surrogate Decision Maker:  Bill Braden [phone]  Sylvester Gwendolyn [phone]     DVT Prophylaxis: Lovenox  GI Prophylaxis: not indicated  Baseline: does not walk much, mostly wheelchair, sometimes ambulates with walker     Subjective:     Chief Complaint / Reason for Physician Visit  . BECKYEON Discussed with RN events overnight. Review of Systems:  Review of Systems   Constitutional: Negative for chills and fever. Respiratory: Negative for cough and shortness of breath. Cardiovascular: Negative for chest pain. Gastrointestinal: Negative for abdominal pain. Genitourinary: Negative for frequency and urgency. Musculoskeletal: Negative for myalgias. Objective:     VITALS:   Last 24hrs VS reviewed since prior progress note.  Most recent are:  Patient Vitals for the past 24 hrs:   Temp Pulse Resp BP SpO2   06/10/22 0712 98.4 °F (36.9 °C) 73 19 (!) 159/94 100 %   06/10/22 0331 97.7 °F (36.5 °C) 72 25 (!) 152/98 93 %   06/10/22 0021 -- 82 21 (!) 178/96 93 %   06/09/22 2000 98.2 °F (36.8 °C) 82 25 (!) 176/99 98 %   06/09/22 1545 98.4 °F (36.9 °C) 86 25 (!) 163/87 98 %   06/09/22 1500 -- 90 -- -- 100 %   06/09/22 1445 -- 90 -- -- 95 %   06/09/22 1438 -- (!) 114 -- -- --   06/09/22 1427 -- 95 -- (!) 156/96 --   06/09/22 1355 -- 80 -- -- --   06/09/22 1205 98.5 °F (36.9 °C) 69 20 (!) 154/89 98 %   06/09/22 1105 -- 77 -- -- 95 %       Intake/Output Summary (Last 24 hours) at 6/10/2022 0902  Last data filed at 6/10/2022 0331  Gross per 24 hour   Intake 1580 ml   Output 1500 ml   Net 80 ml        PHYSICAL EXAM:  General: , no acute distress    EENT:  EOMI. Anicteric sclerae. MMM  Resp:  CTA bilaterally, no wheezing or rales. No accessory muscle use  CV:  Regular  rhythm,  normal S1/S2, no murmurs rubs gallops, No edema  GI:  Soft, Non distended, Non tender. +Bowel sounds  Neurologic:   AO x3 ,   Psych:    Not anxious nor agitated  Skin:  No rashes. No jaundice    Reviewed most current lab test results and cultures  YES  Reviewed most current radiology test results   YES  Review and summation of old records today    NO  Reviewed patient's current orders and MAR    YES  PMH/ reviewed - no change compared to H&P  ________________________________________________________________________  Care Plan discussed with:    Comments   Patient x    Family  x    RN x    Care Manager xx    Consultant  x                     x Multidiciplinary team rounds were held today with , nursing, pharmacist and clinical coordinator. Patient's plan of care was discussed; medications were reviewed and discharge planning was addressed.      ________________________________________________________________________  Total NON critical care TIME: 35 Minutes    Total CRITICAL CARE TIME Spent:   Minutes non procedure based      Comments   >50% of visit spent in counseling and coordination of care x    ________________________________________________________________________  Genevia Sicard, MD     Procedures: see electronic medical records for all procedures/Xrays and details which were not copied into this note but were reviewed prior to creation of Plan. LABS:  I reviewed today's most current labs and imaging studies.   Pertinent labs include:  Recent Labs     06/10/22  0318 06/09/22  0235   WBC 5.3 6.2   HGB 11.9* 12.7   HCT 38.8 41.4    234     Recent Labs     06/10/22  0318 06/09/22  0235 06/08/22  0354    141  --    K 3.8 3.3*  --     104  --    CO2 25 24  --    * 112*  --    BUN 8 6  --    CREA 0.88 0.83 0.93   CA 8.3* 8.4*  --    ALB 2.5* 2.7*  --    TBILI 0.3 0.4  --    ALT 20 19  --        Signed: Margoth Constantino MD

## 2022-06-11 LAB
CREAT SERPL-MCNC: 0.74 MG/DL (ref 0.7–1.3)
GLUCOSE BLD STRIP.AUTO-MCNC: 166 MG/DL (ref 65–117)
GLUCOSE BLD STRIP.AUTO-MCNC: 182 MG/DL (ref 65–117)
GLUCOSE BLD STRIP.AUTO-MCNC: 229 MG/DL (ref 65–117)
GLUCOSE BLD STRIP.AUTO-MCNC: 278 MG/DL (ref 65–117)
SERVICE CMNT-IMP: ABNORMAL

## 2022-06-11 PROCEDURE — 74011250636 HC RX REV CODE- 250/636: Performed by: STUDENT IN AN ORGANIZED HEALTH CARE EDUCATION/TRAINING PROGRAM

## 2022-06-11 PROCEDURE — 65270000032 HC RM SEMIPRIVATE

## 2022-06-11 PROCEDURE — 82962 GLUCOSE BLOOD TEST: CPT

## 2022-06-11 PROCEDURE — 74011000250 HC RX REV CODE- 250: Performed by: STUDENT IN AN ORGANIZED HEALTH CARE EDUCATION/TRAINING PROGRAM

## 2022-06-11 PROCEDURE — 74011000258 HC RX REV CODE- 258: Performed by: STUDENT IN AN ORGANIZED HEALTH CARE EDUCATION/TRAINING PROGRAM

## 2022-06-11 PROCEDURE — 74011636637 HC RX REV CODE- 636/637: Performed by: STUDENT IN AN ORGANIZED HEALTH CARE EDUCATION/TRAINING PROGRAM

## 2022-06-11 PROCEDURE — 82565 ASSAY OF CREATININE: CPT

## 2022-06-11 PROCEDURE — 74011250637 HC RX REV CODE- 250/637: Performed by: STUDENT IN AN ORGANIZED HEALTH CARE EDUCATION/TRAINING PROGRAM

## 2022-06-11 PROCEDURE — 36415 COLL VENOUS BLD VENIPUNCTURE: CPT

## 2022-06-11 RX ADMIN — Medication 3 UNITS: at 18:05

## 2022-06-11 RX ADMIN — LISINOPRIL 30 MG: 5 TABLET ORAL at 08:48

## 2022-06-11 RX ADMIN — ACETAMINOPHEN 650 MG: 325 TABLET ORAL at 04:55

## 2022-06-11 RX ADMIN — PRAVASTATIN SODIUM 10 MG: 10 TABLET ORAL at 20:25

## 2022-06-11 RX ADMIN — VANCOMYCIN HYDROCHLORIDE 1250 MG: 10 INJECTION, POWDER, LYOPHILIZED, FOR SOLUTION INTRAVENOUS at 20:25

## 2022-06-11 RX ADMIN — Medication 2 UNITS: at 12:20

## 2022-06-11 RX ADMIN — ENOXAPARIN SODIUM 30 MG: 100 INJECTION SUBCUTANEOUS at 08:46

## 2022-06-11 RX ADMIN — LEVETIRACETAM 1000 MG: 100 SOLUTION ORAL at 20:26

## 2022-06-11 RX ADMIN — AMLODIPINE BESYLATE 10 MG: 5 TABLET ORAL at 08:48

## 2022-06-11 RX ADMIN — VANCOMYCIN HYDROCHLORIDE 1250 MG: 10 INJECTION, POWDER, LYOPHILIZED, FOR SOLUTION INTRAVENOUS at 08:47

## 2022-06-11 RX ADMIN — LEVETIRACETAM 1000 MG: 100 SOLUTION ORAL at 08:47

## 2022-06-11 RX ADMIN — NYSTATIN: 100000 POWDER TOPICAL at 09:00

## 2022-06-11 RX ADMIN — CEFTRIAXONE SODIUM 2 G: 2 INJECTION, POWDER, FOR SOLUTION INTRAMUSCULAR; INTRAVENOUS at 14:17

## 2022-06-11 RX ADMIN — ASPIRIN 81 MG: 81 TABLET, CHEWABLE ORAL at 08:48

## 2022-06-11 RX ADMIN — SODIUM CHLORIDE, PRESERVATIVE FREE 10 ML: 5 INJECTION INTRAVENOUS at 06:00

## 2022-06-11 RX ADMIN — Medication 2 UNITS: at 08:47

## 2022-06-11 RX ADMIN — Medication 3 UNITS: at 20:41

## 2022-06-11 RX ADMIN — SODIUM CHLORIDE, PRESERVATIVE FREE 10 ML: 5 INJECTION INTRAVENOUS at 12:21

## 2022-06-11 RX ADMIN — Medication 10 UNITS: at 08:46

## 2022-06-11 RX ADMIN — SODIUM CHLORIDE, PRESERVATIVE FREE 10 ML: 5 INJECTION INTRAVENOUS at 20:33

## 2022-06-11 RX ADMIN — ENOXAPARIN SODIUM 30 MG: 100 INJECTION SUBCUTANEOUS at 20:25

## 2022-06-11 RX ADMIN — NYSTATIN: 100000 POWDER TOPICAL at 18:07

## 2022-06-11 RX ADMIN — ESCITALOPRAM OXALATE 5 MG: 10 TABLET ORAL at 08:48

## 2022-06-11 NOTE — PROGRESS NOTES
Neurology Progress Note    NAME:  Kiarra Infante   :   1950   MRN:   [id number]     Date/Time:  2022 12:56 PM  Subjective:         Review of Systems:  Y  N       Y  N  [] [x]  Fever/chills                                               [] [x]  Chest Pain  [] [x]  Cough                                                       [] [x]  Diarrhea   [] [x]  Sputum                                                     [] [x]  Constipation  [] [x]  SOB/DE JESUS                                                [] [x]  Nausea/Vomit  [] [x]  Abd Pain                                                    [x] []  Tolerating PT  [] [x]  Dysuria                                                      [x] []  Tolerating Diet     []Unable to obtain  ROS due to  []mental status change  []sedated   []intubated    Medications reviewed:  Current Facility-Administered Medications   Medication Dose Route Frequency    lisinopriL (PRINIVIL, ZESTRIL) tablet 30 mg  30 mg Oral DAILY    insulin lispro (HUMALOG) injection   SubCUTAneous AC&HS    escitalopram oxalate (LEXAPRO) tablet 5 mg  5 mg Oral DAILY    aspirin chewable tablet 81 mg  81 mg Oral DAILY    pravastatin (PRAVACHOL) tablet 10 mg  10 mg Oral QHS    levETIRAcetam (KEPPRA) oral solution 1,000 mg  1,000 mg Oral BID    cefTRIAXone (ROCEPHIN) 2 g in 0.9% sodium chloride (MBP/ADV) 50 mL MBP  2 g IntraVENous Q12H    vancomycin (VANCOCIN) 1,250 mg in 0.9% sodium chloride 250 mL IVPB  1,250 mg IntraVENous Q12H    glucose chewable tablet 16 g  4 Tablet Oral PRN    dextrose (D50W) injection syrg 12.5-25 g  25-50 mL IntraVENous PRN    glucagon (GLUCAGEN) injection 1 mg  1 mg IntraMUSCular PRN    insulin glargine (LANTUS) injection 10 Units  10 Units SubCUTAneous DAILY    sodium chloride (NS) flush 5-40 mL  5-40 mL IntraVENous Q8H    sodium chloride (NS) flush 5-40 mL  5-40 mL IntraVENous PRN    polyethylene glycol (MIRALAX) packet 17 g  17 g Oral DAILY PRN    ondansetron (ZOFRAN ODT) tablet 4 mg  4 mg Oral Q8H PRN    Or    ondansetron (ZOFRAN) injection 4 mg  4 mg IntraVENous Q6H PRN    enoxaparin (LOVENOX) injection 30 mg  30 mg SubCUTAneous Q12H    amLODIPine (NORVASC) tablet 10 mg  10 mg Oral DAILY    labetaloL (NORMODYNE;TRANDATE) 20 mg/4 mL (5 mg/mL) injection 20 mg  20 mg IntraVENous Q6H PRN    acetaminophen (TYLENOL) tablet 650 mg  650 mg Oral Q4H PRN    Or    acetaminophen (TYLENOL) solution 650 mg  650 mg Per NG tube Q4H PRN    Or    acetaminophen (TYLENOL) suppository 650 mg  650 mg Rectal Q4H PRN    nystatin (MYCOSTATIN) 100,000 unit/gram powder   Topical BID        Objective:   Vitals:  Visit Vitals  BP (!) 162/72 (BP 1 Location: Right upper arm, BP Patient Position: Lying)   Pulse 60   Temp 97.8 °F (36.6 °C)   Resp 18   Ht 5' 10\" (1.778 m)   Wt 259 lb 3.2 oz (117.6 kg)   SpO2 100%   BMI 37.19 kg/m²     Temp (24hrs), Av.4 °F (36.9 °C), Min:97.8 °F (36.6 °C), Max:98.8 °F (37.1 °C)    O2 Flow Rate (L/min): 2 l/min O2 Device: None (Room air)      PHYSICAL EXAM:  General:    Alert, cooperative, no distress, appears stated age. Head:   Normocephalic, without obvious abnormality, atraumatic. Eyes:   Conjunctivae/corneas clear. PERRLA  Nose:  Nares normal. No drainage or sinus tenderness. Throat:    Lips, mucosa, and tongue normal.  No Thrush  Neck:  Supple, symmetrical,  no adenopathy, thyroid: non tender    no carotid bruit and no JVD. Back:    Symmetric,  No CVA tenderness. Lungs:   Clear to auscultation bilaterally. No Wheezing or Rhonchi. No rales. Chest wall:  No tenderness or deformity. No Accessory muscle use. Heart:   Regular rate and rhythm,  no murmur, rub or gallop. Abdomen:   Soft, non-tender. Not distended. Bowel sounds normal. No masses  Extremities: Extremities normal, atraumatic, No cyanosis. No edema. No clubbing  Skin:     Texture, turgor normal. No rashes or lesions.   Not Jaundiced  Lymph nodes: Cervical, supraclavicular normal.  Psych:  Good insight. Not depressed. Not anxious or agitated. NEUROLOGICAL EXAM:  Appearance: The patient is well developed, well nourished, provides a coherent history and is in no acute distress. Mental Status: Oriented to time, place and person. Mood and affect appropriate. Cranial Nerves:   Intact visual fields. Fundi are benign. LOLA, EOM's full, no nystagmus, no ptosis. Facial sensation is normal. Corneal reflexes are intact. Facial movement is symmetric. Hearing is normal bilaterally. Palate is midline with normal sternocleidomastoid and trapezius muscles are normal. Tongue is midline. Motor:  5/5 strength in upper and lower proximal and distal muscles. Normal bulk and tone. No fasciculations. Reflexes:   Deep tendon reflexes 2+/4 and symmetrical.   Sensory:   Normal to touch, pinprick and vibration. Gait:  Normal gait. Tremor:   No tremor noted. Cerebellar:  No cerebellar signs present. Neurovascular:  Normal heart sounds and regular rhythm, peripheral pulses intact, and no carotid bruits.          Lab Data Reviewed:    Recent Results (from the past 24 hour(s))   GLUCOSE, POC    Collection Time: 06/10/22  4:44 PM   Result Value Ref Range    Glucose (POC) 215 (H) 65 - 117 mg/dL    Performed by [address]    Collection Time: 06/11/22  3:43 AM   Result Value Ref Range    Creatinine 0.74 0.70 - 1.30 MG/DL    GFR est AA >60 >60 ml/min/1.73m2    GFR est non-AA >60 >60 ml/min/1.73m2   GLUCOSE, POC    Collection Time: 06/11/22  7:55 AM   Result Value Ref Range    Glucose (POC) 166 (H) 65 - 117 mg/dL    Performed by Kerry Mcintosh RN    GLUCOSE, POC    Collection Time: 06/11/22 11:27 AM   Result Value Ref Range    Glucose (POC) 182 (H) 65 - 117 mg/dL    Performed by [address]  Active Problems:    Severe sepsis ([address] (6/5/2022)      ___________________________________________________  PLAN:    1. ***        :    ___________________________________________________    Attending Physician: Petros Padilla MD

## 2022-06-11 NOTE — PROGRESS NOTES
[address] called Shelly, Admissions Coordinator for [facility] [[phone]]. She confirmed the pt is authorized for admission to [facility], however the pt would need to complete all IV antibiotics prior to admission. Shelly reported the transfer to [facility] needed to occur no later than 1600.    [address] spoke with Dr. Jarrod Patterson regarding this pending discharge. She requested for the discharge to occur on Monday, 6/13/22, due to the fact the last IV dose of Vancomycin is not given until 6/12/22 at 2000.    1235 CM called the pt's son, Syeda Hurd [[phone]] and reported to him that his father's discharge to [facility] is set for Monday, 6/13. Syeda Hurd agreed with this plan.

## 2022-06-11 NOTE — PROGRESS NOTES
1915 - Bedside and Verbal shift change report given to Zachery Jay RN (oncoming nurse) by Rukhsana Ellis RN (offgoing nurse). Report included the following information SBAR, Kardex, MAR and Recent Results. 0345 - Incontinent of bowel - large loose amt diarrhea - pulled condom cath off accidentally. Incontinence care completed - redness to bilateral inner thighs and testicles although pt denies pain at site - zinc oxide ointment applied after pt cleaned. Linens, sheree, gown, brief and cone changed. New condom cath applied to drainage bag. Pt calm and cooperative. Cognition has improved since yesterday as pt is more oriented today. 0715 - Bedside and Verbal shift change report given to ? ? (oncoming nurse) by Zachery Jay RN (offgoing nurse). Report included the following information SBAR, Kardex, MAR and Recent Results.

## 2022-06-11 NOTE — PROGRESS NOTES
Handoff report    Report received from Perico Desai on Mr. Kamryn Ramirez    Report consisted of patients Situation, Background, Assessment and   Recommendations(SBAR), accordion report, MAR, plan of care, results review, orders, review of systems. Patient is in bed awake and alert. He has no complaints at this time. Opportunity for questions and clarification was provided. 3362: Writer in to see patient and perform assessment, vitals and fingerstick. Patient in bed, pleasant and talkative. He reported some discomfort at IV site, unwrapped Coban and visualized and flushed, no issues, rewrapped loosely, small scab and light swelling on left hand. Patient does not recall falling prior to admission or injury. Provided son phone number per his request.     0420: IV Vanco infusing, patient ate 100% of breakfast but wishes the consistency of his food was normal and not pureed. aheis pleasant and talkative, showing pictures of his family and discussing his children. 1808: Patient ate dinner and is sleeping soundly. Per discussion earlier, patient states he did not sleep last night.

## 2022-06-11 NOTE — PROGRESS NOTES
Hospitalist Progress Note    NAME: Waqar Blake   :  1950   MRN:  [id number]   Room Number:  Dakota Melendrez  @ [facility]     Please note that this dictation was completed with [employer], the computer voice recognition software. Quite often unanticipated grammatical, syntax, homophones, and other interpretive errors are inadvertently transcribed by the computer software. Please disregard these errors. Please excuse any errors that have escaped final proofreading. Interim Hospital Summary: 73 y.o. male whom presented on 2022 with      Assessment / Plan:  Anticipated discharge date :   Anticipated disposition :   Barriers to discharge : IV abx     Acute metabolic encephalopathy POA resolved  ? Infectious process contributing to encephalopathy  Suspected seizure   Right sided gaze deviation POA, resolved    History of CVA 2017   Residual left sided hemiparesis POA  -CT head negative for acute process  -Patient has been assessed by tele neurology  -MRI and MRA are negative for acute process or stroke. -RPR negative VDRL CSF neg   -Lumbar puncture with WBC 38 with 98% neutrophils protein 69 and glucose 133  -Meningitis panel negative per LP  -Blood culture negative growth so far  -EEG with diffuse slowing. Encephalopathy versus postictal state    -Discussed with ID physician in detail we will complete the IV antibiotic for total of 7 days given marked improvement in patient condition with IV antibiotic  -Continue Keppra per neurology      Severe sepsis POA resolved  Elevated lactic acid POA resolved   On admission temperature 102.3 F, heart rate 112 bpm, respiratory rate 22/minute, lactic acid 4.1. Chest x-ray interpreted independently did not reveal any consolidation. Urinalysis negative for infection. COVID PCR negative influenza PCR negative. Received sepsis bolus in the ER. CT chest abdomen pelvis did not show evidence of infection.  LP  : meningitis pathogen PCR panel negative, negative gram stain.      -Discussed the case with ID physician in detail we will complete the IV antibiotic for total 7 days given marked improvement in patient clinical condition with IV antibiotics           CKD III POA   Creatinine at baseline 1.3-1.4.   -Strict I's and O's, avoid nephrotoxic meds, renally dose meds.     Type 2 diabetes with hyperglycemia POA        Lab Results   Component Value Date/Time     Hemoglobin A1c 12.5 (H) 06/06/2022 05:04 AM     Hemoglobin A1c (POC) 13.9 (A) 06/18/2021 12:42 PM   - add glargine insulin   - Lispro correctional scale, FSG AC HS  - Consistent carb diet, hypoglycemia protocol.         Uncontrolled Hypertension POA resolving   HLD POA  - resume amlodipine and lisinopril   - Labetalol PRN for SBP > 180 mm Hg,DBP > 100 mm Hg       # H/o of PE   - patient was on eliquis and stopped per PCP office           Body mass index is 34.9 kg/m². Code Status: full   Surrogate Decision Maker:  Bill Templeton [phone]  Christiano Jaeger [phone]     DVT Prophylaxis: Lovenox  GI Prophylaxis: not indicated  Baseline: does not walk much, mostly wheelchair, sometimes ambulates with walker     Subjective:     Chief Complaint / Reason for Physician Visit  Lorena Abreu Discussed with RN events overnight. Review of Systems:  Review of Systems   Constitutional: Negative for chills and fever. Respiratory: Negative for cough and shortness of breath. Cardiovascular: Negative for chest pain. Gastrointestinal: Negative for abdominal pain. Genitourinary: Negative for frequency and urgency. Musculoskeletal: Negative for myalgias. Objective:     VITALS:   Last 24hrs VS reviewed since prior progress note.  Most recent are:  Patient Vitals for the past 24 hrs:   Temp Pulse Resp BP SpO2   06/11/22 0803 97.8 °F (36.6 °C) 60 18 (!) 162/72 100 %   06/11/22 0345 98.5 °F (36.9 °C) 79 18 139/71 97 %   06/10/22 1920 -- -- -- -- 97 %   06/10/22 1442 98.8 °F (37.1 °C) 84 11 (!) 149/86 98 % Intake/Output Summary (Last 24 hours) at 6/11/2022 1031  Last data filed at 6/11/2022 0345  Gross per 24 hour   Intake 872 ml   Output 800 ml   Net 72 ml        PHYSICAL EXAM:  General: , no acute distress    EENT:  EOMI. Anicteric sclerae. MMM  Resp:  CTA bilaterally, no wheezing or rales. No accessory muscle use  CV:  Regular  rhythm,  normal S1/S2, no murmurs rubs gallops, No edema  GI:  Soft, Non distended, Non tender. +Bowel sounds  Neurologic:   AO x3 ,   Psych:    Not anxious nor agitated  Skin:  No rashes. No jaundice    Reviewed most current lab test results and cultures  YES  Reviewed most current radiology test results   YES  Review and summation of old records today    NO  Reviewed patient's current orders and MAR    YES  PMH/ reviewed - no change compared to H&P  ________________________________________________________________________  Care Plan discussed with:    Comments   Patient x    Family  x    RN x    Care Manager xx    Consultant  x                     x Multidiciplinary team rounds were held today with , nursing, pharmacist and clinical coordinator. Patient's plan of care was discussed; medications were reviewed and discharge planning was addressed. ________________________________________________________________________  Total NON critical care TIME: 35 Minutes    Total CRITICAL CARE TIME Spent:   Minutes non procedure based      Comments   >50% of visit spent in counseling and coordination of care x    ________________________________________________________________________  Miriam Light MD     Procedures: see electronic medical records for all procedures/Xrays and details which were not copied into this note but were reviewed prior to creation of Plan. LABS:  I reviewed today's most current labs and imaging studies.   Pertinent labs include:  Recent Labs     06/10/22  0318 06/09/22  0235   WBC 5.3 6.2   HGB 11.9* 12.7   HCT 38.8 41.4    234 Recent Labs     06/11/22  0343 06/10/22  0318 06/09/22  0235   NA  --  142 141   K  --  3.8 3.3*   CL  --  107 104   CO2  --  25 24   GLU  --  214* 112*   BUN  --  8 6   CREA 0.74 0.88 0.83   CA  --  8.3* 8.4*   ALB  --  2.5* 2.7*   TBILI  --  0.3 0.4   ALT  --  20 19       Signed: Alisha Garcia MD

## 2022-06-12 LAB
CREAT SERPL-MCNC: 0.79 MG/DL (ref 0.7–1.3)
GLUCOSE BLD STRIP.AUTO-MCNC: 160 MG/DL (ref 65–117)
GLUCOSE BLD STRIP.AUTO-MCNC: 161 MG/DL (ref 65–117)
GLUCOSE BLD STRIP.AUTO-MCNC: 196 MG/DL (ref 65–117)
GLUCOSE BLD STRIP.AUTO-MCNC: 246 MG/DL (ref 65–117)
SERVICE CMNT-IMP: ABNORMAL

## 2022-06-12 PROCEDURE — 74011636637 HC RX REV CODE- 636/637: Performed by: STUDENT IN AN ORGANIZED HEALTH CARE EDUCATION/TRAINING PROGRAM

## 2022-06-12 PROCEDURE — 74011000250 HC RX REV CODE- 250: Performed by: STUDENT IN AN ORGANIZED HEALTH CARE EDUCATION/TRAINING PROGRAM

## 2022-06-12 PROCEDURE — 74011250636 HC RX REV CODE- 250/636: Performed by: STUDENT IN AN ORGANIZED HEALTH CARE EDUCATION/TRAINING PROGRAM

## 2022-06-12 PROCEDURE — 74011250637 HC RX REV CODE- 250/637: Performed by: STUDENT IN AN ORGANIZED HEALTH CARE EDUCATION/TRAINING PROGRAM

## 2022-06-12 PROCEDURE — 74011000258 HC RX REV CODE- 258: Performed by: STUDENT IN AN ORGANIZED HEALTH CARE EDUCATION/TRAINING PROGRAM

## 2022-06-12 PROCEDURE — 36415 COLL VENOUS BLD VENIPUNCTURE: CPT

## 2022-06-12 PROCEDURE — 82565 ASSAY OF CREATININE: CPT

## 2022-06-12 PROCEDURE — 82962 GLUCOSE BLOOD TEST: CPT

## 2022-06-12 PROCEDURE — 65270000032 HC RM SEMIPRIVATE

## 2022-06-12 RX ADMIN — VANCOMYCIN HYDROCHLORIDE 1250 MG: 10 INJECTION, POWDER, LYOPHILIZED, FOR SOLUTION INTRAVENOUS at 23:51

## 2022-06-12 RX ADMIN — LISINOPRIL 30 MG: 5 TABLET ORAL at 09:04

## 2022-06-12 RX ADMIN — Medication 10 UNITS: at 09:05

## 2022-06-12 RX ADMIN — VANCOMYCIN HYDROCHLORIDE 1250 MG: 10 INJECTION, POWDER, LYOPHILIZED, FOR SOLUTION INTRAVENOUS at 11:59

## 2022-06-12 RX ADMIN — Medication 3 UNITS: at 12:14

## 2022-06-12 RX ADMIN — CEFTRIAXONE SODIUM 2 G: 2 INJECTION, POWDER, FOR SOLUTION INTRAMUSCULAR; INTRAVENOUS at 15:43

## 2022-06-12 RX ADMIN — CEFTRIAXONE SODIUM 2 G: 2 INJECTION, POWDER, FOR SOLUTION INTRAMUSCULAR; INTRAVENOUS at 02:30

## 2022-06-12 RX ADMIN — LEVETIRACETAM 1000 MG: 100 SOLUTION ORAL at 09:03

## 2022-06-12 RX ADMIN — Medication 2 UNITS: at 09:05

## 2022-06-12 RX ADMIN — NYSTATIN: 100000 POWDER TOPICAL at 17:30

## 2022-06-12 RX ADMIN — NYSTATIN: 100000 POWDER TOPICAL at 09:06

## 2022-06-12 RX ADMIN — Medication 2 UNITS: at 17:30

## 2022-06-12 RX ADMIN — SODIUM CHLORIDE, PRESERVATIVE FREE 10 ML: 5 INJECTION INTRAVENOUS at 17:30

## 2022-06-12 RX ADMIN — ESCITALOPRAM OXALATE 5 MG: 10 TABLET ORAL at 09:04

## 2022-06-12 RX ADMIN — ASPIRIN 81 MG: 81 TABLET, CHEWABLE ORAL at 09:04

## 2022-06-12 RX ADMIN — AMLODIPINE BESYLATE 10 MG: 5 TABLET ORAL at 09:05

## 2022-06-12 RX ADMIN — ENOXAPARIN SODIUM 30 MG: 100 INJECTION SUBCUTANEOUS at 23:16

## 2022-06-12 RX ADMIN — LEVETIRACETAM 1000 MG: 100 SOLUTION ORAL at 23:15

## 2022-06-12 RX ADMIN — SODIUM CHLORIDE, PRESERVATIVE FREE 10 ML: 5 INJECTION INTRAVENOUS at 23:17

## 2022-06-12 RX ADMIN — PRAVASTATIN SODIUM 10 MG: 10 TABLET ORAL at 23:16

## 2022-06-12 RX ADMIN — ENOXAPARIN SODIUM 30 MG: 100 INJECTION SUBCUTANEOUS at 09:03

## 2022-06-12 RX ADMIN — SODIUM CHLORIDE, PRESERVATIVE FREE 10 ML: 5 INJECTION INTRAVENOUS at 06:19

## 2022-06-12 NOTE — PROGRESS NOTES
[facility] Dosing Services: Antimicrobial Stewardship Daily Doc  Consult for dosing of vancomycin by Dr. Rene Hernandez  Indication: Sepsis  End of therapy: 6/12/22    Ht Readings from Last 1 Encounters:   06/09/22 177.8 cm (70\")        Wt Readings from Last 1 Encounters:   06/10/22 117.6 kg (259 lb 3.2 oz)      Vancomycin therapy:  Current maintenance dose: 1250 mg IV every 12 hours   Dose calculated to approximate a therapeutic AUC of 400-600  Last trough level: 15.2 mcg/mL on 6/7/22 with corresponds to an     Plan: SCr stable. Vancomycin adjusted 6/7/22. Continue vancomycin 1250 mg IV q 12 hours. Stop date 6/12/22 per ID. Dose administration notes:   6/12 vanc due at 08:00 given @ 11:59    Date Dose & Interval Measured (mcg/mL) Extrapolated (mcg/mL)   6/7 1000 mg IV q12h 15.2 458                   Other Antimicrobial   (not dosed by pharmacist) Ceftriaxone 2 g IV every 12 hours   Cultures 6/5: blood: Negative (final)  6/5 blood: negative (final)   6/6: CSF Cx: NGSF   Serum Creatinine Lab Results   Component Value Date/Time    Creatinine 0.79 06/12/2022 05:09 AM    Creatinine (POC) 1.0 07/20/2018 01:08 PM       Creatinine Clearance Estimated Creatinine Clearance: 113.3 mL/min (based on SCr of 0.79 mg/dL).    Temp Temp: 98.7 °F (37.1 °C)     WBC Lab Results   Component Value Date/Time    WBC 5.3 06/10/2022 03:18 AM      H/H Lab Results   Component Value Date/Time    HGB 11.9 (L) 06/10/2022 03:18 AM      Platelets    Lab Results   Component Value Date/Time    PLATELET 592 95/88/7192 03:18 AM        Fernanda Morrell RPh Contact: [phone]

## 2022-06-12 NOTE — PROGRESS NOTES
Problem: Aspiration - Risk of  Goal: *Absence of aspiration  Outcome: Progressing Towards Goal     Problem: Patient Education: Go to Patient Education Activity  Goal: Patient/Family Education  Outcome: Progressing Towards Goal     Problem: Pressure Injury - Risk of  Goal: *Prevention of pressure injury  Description: Document Andrzej Scale and appropriate interventions in the flowsheet. Outcome: Progressing Towards Goal  Note: Pressure Injury Interventions:  Sensory Interventions: Assess changes in LOC    Moisture Interventions: Moisture barrier    Activity Interventions: PT/OT evaluation    Mobility Interventions: PT/OT evaluation    Nutrition Interventions: Document food/fluid/supplement intake    Friction and Shear Interventions: Apply protective barrier, creams and emollients                Problem: Patient Education: Go to Patient Education Activity  Goal: Patient/Family Education  Outcome: Progressing Towards Goal     Problem: Falls - Risk of  Goal: *Absence of Falls  Description: Document [place] Fall Risk and appropriate interventions in the flowsheet.   Outcome: Progressing Towards Goal  Note: Fall Risk Interventions:  Mobility Interventions: Bed/chair exit alarm,Patient to call before getting OOB,PT Consult for mobility concerns,OT consult for ADLs    Mentation Interventions: Adequate sleep, hydration, pain control    Medication Interventions: Patient to call before getting OOB    Elimination Interventions: Call light in reach    History of Falls Interventions: Bed/chair exit alarm         Problem: Patient Education: Go to Patient Education Activity  Goal: Patient/Family Education  Outcome: Progressing Towards Goal     Problem: Sepsis: Day of Diagnosis  Goal: Discharge Planning  Outcome: Progressing Towards Goal  Goal: Medications  Outcome: Progressing Towards Goal  Goal: Treatments/Interventions/Procedures  Outcome: Progressing Towards Goal  Goal: Psychosocial  Outcome: Progressing Towards Goal Problem: Diabetes Self-Management  Goal: *Disease process and treatment process  Description: Define diabetes and identify own type of diabetes; list 3 options for treating diabetes. Outcome: Progressing Towards Goal  Goal: *Incorporating nutritional management into lifestyle  Description: Describe effect of type, amount and timing of food on blood glucose; list 3 methods for planning meals. Outcome: Progressing Towards Goal  Goal: *Incorporating physical activity into lifestyle  Description: State effect of exercise on blood glucose levels. Outcome: Progressing Towards Goal  Goal: *Developing strategies to promote health/change behavior  Description: Define the ABC's of diabetes; identify appropriate screenings, schedule and personal plan for screenings. Outcome: Progressing Towards Goal  Goal: *Using medications safely  Description: State effect of diabetes medications on diabetes; name diabetes medication taking, action and side effects. Outcome: Progressing Towards Goal  Goal: *Monitoring blood glucose, interpreting and using results  Description: Identify recommended blood glucose targets  and personal targets. Outcome: Progressing Towards Goal  Goal: *Prevention, detection, treatment of acute complications  Description: List symptoms of hyper- and hypoglycemia; describe how to treat low blood sugar and actions for lowering  high blood glucose level. Outcome: Progressing Towards Goal  Goal: *Prevention, detection and treatment of chronic complications  Description: Define the natural course of diabetes and describe the relationship of blood glucose levels to long term complications of diabetes.   Outcome: Progressing Towards Goal  Goal: *Developing strategies to address psychosocial issues  Description: Describe feelings about living with diabetes; identify support needed and support network  Outcome: Progressing Towards Goal  Goal: *Insulin pump training  Outcome: Progressing Towards Goal  Goal: *Sick day guidelines  Outcome: Progressing Towards Goal  Goal: *Patient Specific Goal (EDIT GOAL, INSERT TEXT)  Outcome: Progressing Towards Goal     Problem: Patient Education: Go to Patient Education Activity  Goal: Patient/Family Education  Outcome: Progressing Towards Goal     Problem: Patient Education: Go to Patient Education Activity  Goal: Patient/Family Education  Outcome: Progressing Towards Goal     Problem: TIA/CVA Stroke: Day 2 Until Discharge  Goal: Off Pathway (Use only if patient is Off Pathway)  Outcome: Progressing Towards Goal  Goal: Activity/Safety  Outcome: Progressing Towards Goal  Goal: Diagnostic Test/Procedures  Outcome: Progressing Towards Goal  Goal: Nutrition/Diet  Outcome: Progressing Towards Goal  Goal: Discharge Planning  Outcome: Progressing Towards Goal  Goal: Medications  Outcome: Progressing Towards Goal  Goal: Respiratory  Outcome: Progressing Towards Goal  Goal: Treatments/Interventions/Procedures  Outcome: Progressing Towards Goal  Goal: Psychosocial  Outcome: Progressing Towards Goal  Goal: *Verbalizes anxiety and depression are reduced or absent  Outcome: Progressing Towards Goal  Goal: *Absence of aspiration  Outcome: Progressing Towards Goal  Goal: *Absence of deep venous thrombosis signs and symptoms(Stroke Metric)  Outcome: Progressing Towards Goal  Goal: *Optimal pain control at patient's stated goal  Outcome: Progressing Towards Goal  Goal: *Tolerating diet  Outcome: Progressing Towards Goal  Goal: *Ability to perform ADLs and demonstrates progressive mobility and function  Outcome: Progressing Towards Goal  Goal: *Stroke education continued(Stroke Metric)  Outcome: Progressing Towards Goal     Problem: Ischemic Stroke: Discharge Outcomes  Goal: *Verbalizes anxiety and depression are reduced or absent  Outcome: Progressing Towards Goal  Goal: *Verbalize understanding of risk factor modification(Stroke Metric)  Outcome: Progressing Towards Goal  Goal: *Hemodynamically stable  Outcome: Progressing Towards Goal  Goal: *Absence of aspiration pneumonia  Outcome: Progressing Towards Goal  Goal: *Aware of needed dietary changes  Outcome: Progressing Towards Goal  Goal: *Verbalize understanding of prescribed medications including anti-coagulants, anti-lipid, and/or anti-platelets(Stroke Metric)  Outcome: Progressing Towards Goal  Goal: *Tolerating diet  Outcome: Progressing Towards Goal  Goal: *Aware of follow-up diagnostics related to anticoagulants  Outcome: Progressing Towards Goal  Goal: *Ability to perform ADLs and demonstrates progressive mobility and function  Outcome: Progressing Towards Goal  Goal: *Absence of DVT(Stroke Metric)  Outcome: Progressing Towards Goal  Goal: *Absence of aspiration  Outcome: Progressing Towards Goal  Goal: *Optimal pain control at patient's stated goal  Outcome: Progressing Towards Goal  Goal: *Home safety concerns addressed  Outcome: Progressing Towards Goal  Goal: *Describes available resources and support systems  Outcome: Progressing Towards Goal  Goal: *Verbalizes understanding of activation of EMS(911) for stroke symptoms(Stroke Metric)  Outcome: Progressing Towards Goal  Goal: *Understands and describes signs and symptoms to report to providers(Stroke Metric)  Outcome: Progressing Towards Goal  Goal: *Neurolgocially stable (absence of additional neurological deficits)  Outcome: Progressing Towards Goal  Goal: *Verbalizes importance of follow-up with primary care physician(Stroke Metric)  Outcome: Progressing Towards Goal  Goal: *Smoking cessation discussed,if applicable(Stroke Metric)  Outcome: Progressing Towards Goal  Goal: *Depression screening completed(Stroke Metric)  Outcome: Progressing Towards Goal     Problem: Patient Education: Go to Patient Education Activity  Goal: Patient/Family Education  Outcome: Progressing Towards Goal     Problem: Patient Education: Go to Patient Education Activity  Goal: Patient/Family Education  Outcome: Progressing Towards Goal

## 2022-06-12 NOTE — PROGRESS NOTES
Problem: Aspiration - Risk of  Goal: *Absence of aspiration  6/11/2022 2353 by Fidel Bridges RN  Outcome: Progressing Towards Goal  6/11/2022 2351 by Fidel Bridges RN  Outcome: Progressing Towards Goal     Problem: Patient Education: Go to Patient Education Activity  Goal: Patient/Family Education  6/11/2022 2353 by Fidel Bridges RN  Outcome: Progressing Towards Goal  6/11/2022 2351 by Fidel Bridges RN  Outcome: Progressing Towards Goal     Problem: Pressure Injury - Risk of  Goal: *Prevention of pressure injury  Description: Document Andrzej Scale and appropriate interventions in the flowsheet. 6/11/2022 2353 by Fidel Bridges RN  Outcome: Progressing Towards Goal  Note: Pressure Injury Interventions:  Sensory Interventions: Assess changes in LOC    Moisture Interventions: Moisture barrier    Activity Interventions: PT/OT evaluation    Mobility Interventions: PT/OT evaluation    Nutrition Interventions: Document food/fluid/supplement intake    Friction and Shear Interventions: Apply protective barrier, creams and emollients             6/11/2022 2351 by Fidel Bridges RN  Outcome: Progressing Towards Goal  Note: Pressure Injury Interventions:  Sensory Interventions: Assess changes in LOC    Moisture Interventions: Moisture barrier    Activity Interventions: PT/OT evaluation    Mobility Interventions: PT/OT evaluation    Nutrition Interventions: Document food/fluid/supplement intake    Friction and Shear Interventions: Apply protective barrier, creams and emollients                Problem: Patient Education: Go to Patient Education Activity  Goal: Patient/Family Education  6/11/2022 2353 by Fidel Bridges RN  Outcome: Progressing Towards Goal  6/11/2022 2351 by Fidel Bridges RN  Outcome: Progressing Towards Goal     Problem: Falls - Risk of  Goal: *Absence of Falls  Description: Document [place] Fall Risk and appropriate interventions in the flowsheet.   6/11/2022 2353 by Fidel Bridges RN  Outcome: Progressing Towards Goal  Note: Fall Risk Interventions:  Mobility Interventions: Bed/chair exit alarm,Patient to call before getting OOB,PT Consult for mobility concerns,OT consult for ADLs    Mentation Interventions: Adequate sleep, hydration, pain control    Medication Interventions: Patient to call before getting OOB    Elimination Interventions: Call light in reach    History of Falls Interventions: Bed/chair exit alarm      6/11/2022 2351 by Carol Bravo RN  Outcome: Progressing Towards Goal  Note: Fall Risk Interventions:  Mobility Interventions: Bed/chair exit alarm,Patient to call before getting OOB,PT Consult for mobility concerns,OT consult for ADLs    Mentation Interventions: Adequate sleep, hydration, pain control    Medication Interventions: Patient to call before getting OOB    Elimination Interventions: Call light in reach    History of Falls Interventions: Bed/chair exit alarm         Problem: Patient Education: Go to Patient Education Activity  Goal: Patient/Family Education  6/11/2022 2353 by Carol Bravo RN  Outcome: Progressing Towards Goal  6/11/2022 2351 by Carol Bravo RN  Outcome: Progressing Towards Goal     Problem: Sepsis: Day of Diagnosis  Goal: Discharge Planning  6/11/2022 2353 by Carol Bravo RN  Outcome: Progressing Towards Goal  6/11/2022 2351 by Carol Bravo RN  Outcome: Progressing Towards Goal  Goal: Medications  6/11/2022 2353 by Carol Bravo RN  Outcome: Progressing Towards Goal  6/11/2022 2351 by Carol Bravo RN  Outcome: Progressing Towards Goal  Goal: Treatments/Interventions/Procedures  6/11/2022 2353 by Carol Bravo RN  Outcome: Progressing Towards Goal  6/11/2022 2351 by Carol Bravo RN  Outcome: Progressing Towards Goal  Goal: Psychosocial  6/11/2022 2353 by Carol Bravo RN  Outcome: Progressing Towards Goal  6/11/2022 2351 by Carol Bravo RN  Outcome: Progressing Towards Goal     Problem: Diabetes Self-Management  Goal: *Disease process and treatment process  Description: Define diabetes and identify own type of diabetes; list 3 options for treating diabetes. 6/11/2022 2353 by Carol Bravo RN  Outcome: Progressing Towards Goal  6/11/2022 2351 by Carol Bravo RN  Outcome: Progressing Towards Goal  Goal: *Incorporating nutritional management into lifestyle  Description: Describe effect of type, amount and timing of food on blood glucose; list 3 methods for planning meals. 6/11/2022 2353 by Carol Bravo RN  Outcome: Progressing Towards Goal  6/11/2022 2351 by Carol Bravo RN  Outcome: Progressing Towards Goal  Goal: *Incorporating physical activity into lifestyle  Description: State effect of exercise on blood glucose levels. 6/11/2022 2353 by Carol Bravo RN  Outcome: Progressing Towards Goal  6/11/2022 2351 by Carol Bravo RN  Outcome: Progressing Towards Goal  Goal: *Developing strategies to promote health/change behavior  Description: Define the ABC's of diabetes; identify appropriate screenings, schedule and personal plan for screenings. 6/11/2022 2353 by Carol Bravo RN  Outcome: Progressing Towards Goal  6/11/2022 2351 by Carol Bravo RN  Outcome: Progressing Towards Goal  Goal: *Using medications safely  Description: State effect of diabetes medications on diabetes; name diabetes medication taking, action and side effects. 6/11/2022 2353 by Carol Bravo RN  Outcome: Progressing Towards Goal  6/11/2022 2351 by Carol Bravo RN  Outcome: Progressing Towards Goal  Goal: *Monitoring blood glucose, interpreting and using results  Description: Identify recommended blood glucose targets  and personal targets. 6/11/2022 2353 by Carol Bravo RN  Outcome: Progressing Towards Goal  6/11/2022 2351 by Carol Bravo RN  Outcome: Progressing Towards Goal  Goal: *Prevention, detection, treatment of acute complications  Description: List symptoms of hyper- and hypoglycemia; describe how to treat low blood sugar and actions for lowering  high blood glucose level.   6/11/2022 2353 by Carol Bravo RN  Outcome: Progressing Towards Goal  6/11/2022 2351 by Magnus Vanegas RN  Outcome: Progressing Towards Goal  Goal: *Prevention, detection and treatment of chronic complications  Description: Define the natural course of diabetes and describe the relationship of blood glucose levels to long term complications of diabetes.   6/11/2022 2353 by Magnus Vanegas RN  Outcome: Progressing Towards Goal  6/11/2022 2351 by Magnus Vanegas RN  Outcome: Progressing Towards Goal  Goal: *Developing strategies to address psychosocial issues  Description: Describe feelings about living with diabetes; identify support needed and support network  6/11/2022 2353 by Magnus Vanegas RN  Outcome: Progressing Towards Goal  6/11/2022 2351 by Magnus Vanegas RN  Outcome: Progressing Towards Goal  Goal: *Insulin pump training  6/11/2022 2353 by Magnus Vanegas RN  Outcome: Progressing Towards Goal  6/11/2022 2351 by Magnus Vanegas RN  Outcome: Progressing Towards Goal  Goal: *Sick day guidelines  6/11/2022 2353 by Magnus Vanegas RN  Outcome: Progressing Towards Goal  6/11/2022 2351 by Magnus Vanegas RN  Outcome: Progressing Towards Goal  Goal: *Patient Specific Goal (EDIT GOAL, INSERT TEXT)  6/11/2022 2353 by Magnus Vanegas RN  Outcome: Progressing Towards Goal  6/11/2022 2351 by Magnus Vanegas RN  Outcome: Progressing Towards Goal     Problem: Patient Education: Go to Patient Education Activity  Goal: Patient/Family Education  6/11/2022 2353 by Magnus Vanegas RN  Outcome: Progressing Towards Goal  6/11/2022 2351 by Magnus Vanegas RN  Outcome: Progressing Towards Goal     Problem: Patient Education: Go to Patient Education Activity  Goal: Patient/Family Education  6/11/2022 2353 by Magnus Vanegas RN  Outcome: Progressing Towards Goal  6/11/2022 2351 by Magnus Vanegas RN  Outcome: Progressing Towards Goal     Problem: TIA/CVA Stroke: Day 2 Until Discharge  Goal: Off Pathway (Use only if patient is Off Pathway)  6/11/2022 2353 by Magnus Vanegas RN  Outcome: Progressing Towards Goal  6/11/2022 2351 by Clovis Marino RN  Outcome: Progressing Towards Goal  Goal: Activity/Safety  6/11/2022 2353 by Clovis Marino RN  Outcome: Progressing Towards Goal  6/11/2022 2351 by Clovis Marino, RN  Outcome: Progressing Towards Goal  Goal: Diagnostic Test/Procedures  6/11/2022 2353 by Clovis Marino RN  Outcome: Progressing Towards Goal  6/11/2022 2351 by Clovis Marino, RN  Outcome: Progressing Towards Goal  Goal: Nutrition/Diet  6/11/2022 2353 by Clovis Marino, RN  Outcome: Progressing Towards Goal  6/11/2022 2351 by Clovis Marino RN  Outcome: Progressing Towards Goal  Goal: Discharge Planning  6/11/2022 2353 by Clovis Marino, RN  Outcome: Progressing Towards Goal  6/11/2022 2351 by Clovis Marino, RN  Outcome: Progressing Towards Goal  Goal: Medications  6/11/2022 2353 by Clovis Marino, RN  Outcome: Progressing Towards Goal  6/11/2022 2351 by Clovis Marino, RN  Outcome: Progressing Towards Goal  Goal: Respiratory  6/11/2022 2353 by Clovis Marino, RN  Outcome: Progressing Towards Goal  6/11/2022 2351 by Clovis Marino, RN  Outcome: Progressing Towards Goal  Goal: Treatments/Interventions/Procedures  6/11/2022 2353 by Clovis Marino, RN  Outcome: Progressing Towards Goal  6/11/2022 2351 by Clovis Marino, RN  Outcome: Progressing Towards Goal  Goal: Psychosocial  6/11/2022 2353 by Clovis Marino, RN  Outcome: Progressing Towards Goal  6/11/2022 2351 by Clovis Marino, RN  Outcome: Progressing Towards Goal  Goal: *Verbalizes anxiety and depression are reduced or absent  6/11/2022 2353 by Clovis Marino, RN  Outcome: Progressing Towards Goal  6/11/2022 2351 by Clovis Marino, RN  Outcome: Progressing Towards Goal  Goal: *Absence of aspiration  6/11/2022 2353 by Clovis Marino, RN  Outcome: Progressing Towards Goal  6/11/2022 2351 by Clovis Marino, RN  Outcome: Progressing Towards Goal  Goal: *Absence of deep venous thrombosis signs and symptoms(Stroke Metric)  6/11/2022 2353 by Clovis Marino, RN  Outcome: Progressing Towards Goal  6/11/2022 2351 by Clovis Marino, RN  Outcome: Progressing Towards Goal  Goal: *Optimal pain control at patient's stated goal  6/11/2022 2353 by Silviano Cloud RN  Outcome: Progressing Towards Goal  6/11/2022 2351 by Silviano Cloud RN  Outcome: Progressing Towards Goal  Goal: *Tolerating diet  6/11/2022 2353 by Silviano Cloud RN  Outcome: Progressing Towards Goal  6/11/2022 2351 by Silviano Cloud RN  Outcome: Progressing Towards Goal  Goal: *Ability to perform ADLs and demonstrates progressive mobility and function  6/11/2022 2353 by Silviano Cloud RN  Outcome: Progressing Towards Goal  6/11/2022 2351 by Silviano Cloud RN  Outcome: Progressing Towards Goal  Goal: *Stroke education continued(Stroke Metric)  6/11/2022 2353 by Silviano Cloud RN  Outcome: Progressing Towards Goal  6/11/2022 2351 by Silviano Cloud RN  Outcome: Progressing Towards Goal     Problem: Ischemic Stroke: Discharge Outcomes  Goal: *Verbalizes anxiety and depression are reduced or absent  6/11/2022 2353 by Silviano Cloud RN  Outcome: Progressing Towards Goal  6/11/2022 2351 by Silviano Cloud RN  Outcome: Progressing Towards Goal  Goal: *Verbalize understanding of risk factor modification(Stroke Metric)  6/11/2022 2353 by Silviano Cloud RN  Outcome: Progressing Towards Goal  6/11/2022 2351 by Silviano Cloud RN  Outcome: Progressing Towards Goal  Goal: *Hemodynamically stable  6/11/2022 2353 by Silviano Cloud RN  Outcome: Progressing Towards Goal  6/11/2022 2351 by Silviano Cloud RN  Outcome: Progressing Towards Goal  Goal: *Absence of aspiration pneumonia  6/11/2022 2353 by Silviano Cloud RN  Outcome: Progressing Towards Goal  6/11/2022 2351 by Silviano Cloud RN  Outcome: Progressing Towards Goal  Goal: *Aware of needed dietary changes  6/11/2022 2353 by Silviano Cloud RN  Outcome: Progressing Towards Goal  6/11/2022 2351 by Silviano Cloud RN  Outcome: Progressing Towards Goal  Goal: *Verbalize understanding of prescribed medications including anti-coagulants, anti-lipid, and/or anti-platelets(Stroke Metric)  6/11/2022 2353 by Maria Alejandra Mcgowan, RN  Outcome: Progressing Towards Goal  6/11/2022 2351 by Maria Alejandra Mcgowan, RN  Outcome: Progressing Towards Goal  Goal: *Tolerating diet  6/11/2022 2353 by Maria Alejandra Mcgowan, RN  Outcome: Progressing Towards Goal  6/11/2022 2351 by Maria Alejandra Mcgowan, RN  Outcome: Progressing Towards Goal  Goal: *Aware of follow-up diagnostics related to anticoagulants  6/11/2022 2353 by Maria Alejandra Mcgowan, RN  Outcome: Progressing Towards Goal  6/11/2022 2351 by Maria Alejandra Mcgowan, RN  Outcome: Progressing Towards Goal  Goal: *Ability to perform ADLs and demonstrates progressive mobility and function  6/11/2022 2353 by Maria Alejandra Mcgowan, RN  Outcome: Progressing Towards Goal  6/11/2022 2351 by Maria Alejandra Mcgowan, RN  Outcome: Progressing Towards Goal  Goal: *Absence of DVT(Stroke Metric)  6/11/2022 2353 by Maria Alejandra Mcgowan, RN  Outcome: Progressing Towards Goal  6/11/2022 2351 by Maria Alejandra Mcgowan, RN  Outcome: Progressing Towards Goal  Goal: *Absence of aspiration  6/11/2022 2353 by Shirlb Mcgowan, RN  Outcome: Progressing Towards Goal  6/11/2022 2351 by Maria Alejandra Mcgowan, RN  Outcome: Progressing Towards Goal  Goal: *Optimal pain control at patient's stated goal  6/11/2022 2353 by Maria Alejandra Mcgowan, RN  Outcome: Progressing Towards Goal  6/11/2022 2351 by Maria Alejandra Mcgowan, RN  Outcome: Progressing Towards Goal  Goal: *Home safety concerns addressed  6/11/2022 2353 by Maria Alejandra Mcgowan, RN  Outcome: Progressing Towards Goal  6/11/2022 2351 by Maria Alejandra Mcgowan, RN  Outcome: Progressing Towards Goal  Goal: *Describes available resources and support systems  6/11/2022 2353 by Maria Alejandra Mcgowan, RN  Outcome: Progressing Towards Goal  6/11/2022 2351 by Maria Alejandra Mcgowan, RN  Outcome: Progressing Towards Goal  Goal: *Verbalizes understanding of activation of EMS(911) for stroke symptoms(Stroke Metric)  6/11/2022 2353 by Maria Alejandra Mcgowan, RN  Outcome: Progressing Towards Goal  6/11/2022 2351 by Maria Alejandra Mcgowan, RN  Outcome: Progressing Towards Goal  Goal: *Understands and describes signs and symptoms to report to providers(Stroke Metric)  6/11/2022 2353 by Nadeem Jaquez, RN  Outcome: Progressing Towards Goal  6/11/2022 2351 by Nadeem Jaquez, RN  Outcome: Progressing Towards Goal  Goal: *Neurolgocially stable (absence of additional neurological deficits)  6/11/2022 2353 by Nadeem Jaquez, RN  Outcome: Progressing Towards Goal  6/11/2022 2351 by Nadeem Jaquez, RN  Outcome: Progressing Towards Goal  Goal: *Verbalizes importance of follow-up with primary care physician(Stroke Metric)  6/11/2022 2353 by Nadeem Jaquez RN  Outcome: Progressing Towards Goal  6/11/2022 2351 by Nadeem Jaquez, RN  Outcome: Progressing Towards Goal  Goal: *Smoking cessation discussed,if applicable(Stroke Metric)  6/11/2022 2353 by Nadeem Jaquez, RN  Outcome: Progressing Towards Goal  6/11/2022 2351 by Nadeem Jaquez, RN  Outcome: Progressing Towards Goal  Goal: *Depression screening completed(Stroke Metric)  6/11/2022 2353 by Nadeem Jaquez, RN  Outcome: Progressing Towards Goal  6/11/2022 2351 by Nadeem Jaquez RN  Outcome: Progressing Towards Goal     Problem: Patient Education: Go to Patient Education Activity  Goal: Patient/Family Education  6/11/2022 2353 by Nadeem Jaquez, RN  Outcome: Progressing Towards Goal  6/11/2022 2351 by Nadeem Jaquez, RN  Outcome: Progressing Towards Goal     Problem: Patient Education: Go to Patient Education Activity  Goal: Patient/Family Education  6/11/2022 2353 by Nadeem Jaquez, RN  Outcome: Progressing Towards Goal  6/11/2022 2351 by Nadeem Jaquez RN  Outcome: Progressing Towards Goal

## 2022-06-12 NOTE — PROGRESS NOTES
Hospitalist Progress Note    NAME: Tyson Cornelius   :  1950   MRN:  [id number]   Room Number:  Reid Barber  @ [facility]     Please note that this dictation was completed with Raghu Coekr, the computer voice recognition software. Quite often unanticipated grammatical, syntax, homophones, and other interpretive errors are inadvertently transcribed by the computer software. Please disregard these errors. Please excuse any errors that have escaped final proofreading. Interim Hospital Summary: 73 y.o. male whom presented on 2022 with      Assessment / Plan:  Anticipated discharge date :   Anticipated disposition : SNF  Barriers to discharge : IV abx     Acute metabolic encephalopathy POA resolved  ? Infectious process contributing to encephalopathy  Suspected seizure   Right sided gaze deviation POA, resolved    History of CVA 2017   Residual left sided hemiparesis POA  -CT head negative for acute process  -Patient has been assessed by tele neurology  -MRI and MRA are negative for acute process or stroke. -RPR negative VDRL CSF neg   -Lumbar puncture with WBC 38 with 98% neutrophils protein 69 and glucose 133  -Meningitis panel negative per LP  -Blood culture negative growth so far  -EEG with diffuse slowing. Encephalopathy versus postictal state    -Discussed with ID physician in detail we will complete the IV antibiotic for total of 7 days given marked improvement in patient condition with IV antibiotic  -Continue Keppra per neurology      Severe sepsis POA resolved  Elevated lactic acid POA resolved   On admission temperature 102.3 F, heart rate 112 bpm, respiratory rate 22/minute, lactic acid 4.1. Chest x-ray interpreted independently did not reveal any consolidation. Urinalysis negative for infection. COVID PCR negative influenza PCR negative. Received sepsis bolus in the ER. CT chest abdomen pelvis did not show evidence of infection.  LP  : meningitis pathogen PCR panel negative, negative gram stain.      -Discussed the case with ID physician in detail we will complete the IV antibiotic for total 7 days given marked improvement in patient clinical condition with IV antibiotics           CKD III POA   Creatinine at baseline 1.3-1.4.   -Strict I's and O's, avoid nephrotoxic meds, renally dose meds.     Type 2 diabetes with hyperglycemia POA        Lab Results   Component Value Date/Time     Hemoglobin A1c 12.5 (H) 06/06/2022 05:04 AM     Hemoglobin A1c (POC) 13.9 (A) 06/18/2021 12:42 PM   - add glargine insulin   - Lispro correctional scale, FSG AC HS  - Consistent carb diet, hypoglycemia protocol.         Uncontrolled Hypertension POA resolving   HLD POA  - resume amlodipine and lisinopril   - Labetalol PRN for SBP > 180 mm Hg,DBP > 100 mm Hg       # H/o of PE   - patient was on eliquis and stopped per PCP office           Body mass index is 34.9 kg/m². Code Status: full   Surrogate Decision Maker:  Bill Allison [phone]  Leon Walters [phone]     DVT Prophylaxis: Lovenox  GI Prophylaxis: not indicated  Baseline: does not walk much, mostly wheelchair, sometimes ambulates with walker     Subjective:     Chief Complaint / Reason for Physician Visit  Biju Ivy Discussed with RN events overnight. Review of Systems:  Review of Systems   Constitutional: Negative for chills and fever. Respiratory: Negative for cough and shortness of breath. Cardiovascular: Negative for chest pain. Gastrointestinal: Negative for abdominal pain. Genitourinary: Negative for frequency and urgency. Musculoskeletal: Negative for myalgias. Objective:     VITALS:   Last 24hrs VS reviewed since prior progress note.  Most recent are:  Patient Vitals for the past 24 hrs:   Temp Pulse Resp BP SpO2   06/12/22 0742 98.7 °F (37.1 °C) 66 18 (!) 163/72 100 %   06/12/22 0147 -- -- -- (!) 154/85 --   06/12/22 0059 97.8 °F (36.6 °C) 72 19 (!) 172/83 99 %   06/11/22 1714 98.7 °F (37.1 °C) 65 18 (!) 147/86 97 %       Intake/Output Summary (Last 24 hours) at 6/12/2022 1033  Last data filed at 6/12/2022 0104  Gross per 24 hour   Intake 360 ml   Output 2150 ml   Net -1790 ml        PHYSICAL EXAM:  General: , no acute distress    EENT:  EOMI. Anicteric sclerae. MMM  Resp:  CTA bilaterally, no wheezing or rales. No accessory muscle use  CV:  Regular  rhythm,  normal S1/S2, no murmurs rubs gallops, No edema  GI:  Soft, Non distended, Non tender. +Bowel sounds  Neurologic:   AO x3 ,   Psych:    Not anxious nor agitated  Skin:  No rashes. No jaundice    Reviewed most current lab test results and cultures  YES  Reviewed most current radiology test results   YES  Review and summation of old records today    NO  Reviewed patient's current orders and MAR    YES  PMH/ reviewed - no change compared to H&P  ________________________________________________________________________  Care Plan discussed with:    Comments   Patient x    Family  x    RN x    Care Manager xx    Consultant  x                     x Multidiciplinary team rounds were held today with , nursing, pharmacist and clinical coordinator. Patient's plan of care was discussed; medications were reviewed and discharge planning was addressed. ________________________________________________________________________  Total NON critical care TIME: 35 Minutes    Total CRITICAL CARE TIME Spent:   Minutes non procedure based      Comments   >50% of visit spent in counseling and coordination of care x    ________________________________________________________________________  Sherly Jang MD     Procedures: see electronic medical records for all procedures/Xrays and details which were not copied into this note but were reviewed prior to creation of Plan. LABS:  I reviewed today's most current labs and imaging studies.   Pertinent labs include:  Recent Labs     06/10/22  0318   WBC 5.3   HGB 11.9*   HCT 38.8        Recent Labs 06/12/22  0509 06/11/22  0343 06/10/22  0318   NA  --   --  142   K  --   --  3.8   CL  --   --  107   CO2  --   --  25   GLU  --   --  214*   BUN  --   --  8   CREA 0.79 0.74 0.88   CA  --   --  8.3*   ALB  --   --  2.5*   TBILI  --   --  0.3   ALT  --   --  20       Signed: Yun Gerard MD chest pain

## 2022-06-13 VITALS
WEIGHT: 253.09 LBS | TEMPERATURE: 98.6 F | BODY MASS INDEX: 36.23 KG/M2 | RESPIRATION RATE: 20 BRPM | OXYGEN SATURATION: 94 % | HEIGHT: 70 IN | HEART RATE: 65 BPM | SYSTOLIC BLOOD PRESSURE: 171 MMHG | DIASTOLIC BLOOD PRESSURE: 93 MMHG

## 2022-06-13 LAB
BACTERIA SPEC CULT: NORMAL
CREAT SERPL-MCNC: 0.58 MG/DL (ref 0.7–1.3)
FLUAV RNA SPEC QL NAA+PROBE: NOT DETECTED
FLUBV RNA SPEC QL NAA+PROBE: NOT DETECTED
GLUCOSE BLD STRIP.AUTO-MCNC: 139 MG/DL (ref 65–117)
GLUCOSE BLD STRIP.AUTO-MCNC: 186 MG/DL (ref 65–117)
GRAM STN SPEC: NORMAL
GRAM STN SPEC: NORMAL
SARS-COV-2, COV2: NOT DETECTED
SERVICE CMNT-IMP: ABNORMAL
SERVICE CMNT-IMP: ABNORMAL
SERVICE CMNT-IMP: NORMAL

## 2022-06-13 PROCEDURE — 97535 SELF CARE MNGMENT TRAINING: CPT | Performed by: OCCUPATIONAL THERAPIST

## 2022-06-13 PROCEDURE — 74011250636 HC RX REV CODE- 250/636: Performed by: STUDENT IN AN ORGANIZED HEALTH CARE EDUCATION/TRAINING PROGRAM

## 2022-06-13 PROCEDURE — 74011000250 HC RX REV CODE- 250: Performed by: STUDENT IN AN ORGANIZED HEALTH CARE EDUCATION/TRAINING PROGRAM

## 2022-06-13 PROCEDURE — 74011636637 HC RX REV CODE- 636/637: Performed by: STUDENT IN AN ORGANIZED HEALTH CARE EDUCATION/TRAINING PROGRAM

## 2022-06-13 PROCEDURE — 87636 SARSCOV2 & INF A&B AMP PRB: CPT

## 2022-06-13 PROCEDURE — 82565 ASSAY OF CREATININE: CPT

## 2022-06-13 PROCEDURE — 74011250637 HC RX REV CODE- 250/637: Performed by: STUDENT IN AN ORGANIZED HEALTH CARE EDUCATION/TRAINING PROGRAM

## 2022-06-13 PROCEDURE — 97530 THERAPEUTIC ACTIVITIES: CPT | Performed by: PHYSICAL THERAPIST

## 2022-06-13 PROCEDURE — 36415 COLL VENOUS BLD VENIPUNCTURE: CPT

## 2022-06-13 PROCEDURE — 2709999900 HC NON-CHARGEABLE SUPPLY

## 2022-06-13 PROCEDURE — 82962 GLUCOSE BLOOD TEST: CPT

## 2022-06-13 PROCEDURE — 97530 THERAPEUTIC ACTIVITIES: CPT | Performed by: OCCUPATIONAL THERAPIST

## 2022-06-13 RX ORDER — PRAVASTATIN SODIUM 10 MG/1
10 TABLET ORAL
Qty: 30 TABLET | Refills: 0 | Status: SHIPPED
Start: 2022-06-13 | End: 2022-07-15 | Stop reason: SDUPTHER

## 2022-06-13 RX ORDER — LISINOPRIL 40 MG/1
40 TABLET ORAL DAILY
Qty: 30 TABLET | Refills: 1 | Status: SHIPPED
Start: 2022-06-13 | End: 2022-07-15 | Stop reason: SDUPTHER

## 2022-06-13 RX ORDER — GUAIFENESIN 100 MG/5ML
81 LIQUID (ML) ORAL DAILY
Qty: 30 TABLET | Refills: 0 | Status: SHIPPED
Start: 2022-06-13 | End: 2022-07-15 | Stop reason: SDUPTHER

## 2022-06-13 RX ORDER — LISINOPRIL 30 MG/1
30 TABLET ORAL DAILY
Qty: 30 TABLET | Refills: 1 | Status: SHIPPED
Start: 2022-06-13 | End: 2022-06-13

## 2022-06-13 RX ADMIN — Medication 10 UNITS: at 09:25

## 2022-06-13 RX ADMIN — ESCITALOPRAM OXALATE 5 MG: 10 TABLET ORAL at 09:25

## 2022-06-13 RX ADMIN — ASPIRIN 81 MG: 81 TABLET, CHEWABLE ORAL at 09:25

## 2022-06-13 RX ADMIN — LISINOPRIL 30 MG: 5 TABLET ORAL at 09:26

## 2022-06-13 RX ADMIN — AMLODIPINE BESYLATE 10 MG: 5 TABLET ORAL at 09:26

## 2022-06-13 RX ADMIN — ENOXAPARIN SODIUM 30 MG: 100 INJECTION SUBCUTANEOUS at 09:25

## 2022-06-13 RX ADMIN — ACETAMINOPHEN 650 MG: 325 TABLET ORAL at 00:24

## 2022-06-13 RX ADMIN — Medication 2 UNITS: at 11:58

## 2022-06-13 RX ADMIN — SODIUM CHLORIDE, PRESERVATIVE FREE 10 ML: 5 INJECTION INTRAVENOUS at 06:13

## 2022-06-13 RX ADMIN — NYSTATIN: 100000 POWDER TOPICAL at 09:54

## 2022-06-13 RX ADMIN — LEVETIRACETAM 1000 MG: 100 SOLUTION ORAL at 09:25

## 2022-06-13 NOTE — PROGRESS NOTES
Problem: Aspiration - Risk of  Goal: *Absence of aspiration  Outcome: Progressing Towards Goal     Problem: Patient Education: Go to Patient Education Activity  Goal: Patient/Family Education  Outcome: Progressing Towards Goal     Problem: Pressure Injury - Risk of  Goal: *Prevention of pressure injury  Description: Document Andrzej Scale and appropriate interventions in the flowsheet. Outcome: Progressing Towards Goal  Note: Pressure Injury Interventions:  Sensory Interventions: Assess changes in LOC    Moisture Interventions: Absorbent underpads,Maintain skin hydration (lotion/cream),Minimize layers    Activity Interventions: PT/OT evaluation    Mobility Interventions: PT/OT evaluation    Nutrition Interventions: Document food/fluid/supplement intake    Friction and Shear Interventions: Apply protective barrier, creams and emollients                Problem: Patient Education: Go to Patient Education Activity  Goal: Patient/Family Education  Outcome: Progressing Towards Goal     Problem: Falls - Risk of  Goal: *Absence of Falls  Description: Document Luis Eduardo Fall Risk and appropriate interventions in the flowsheet.   Outcome: Progressing Towards Goal  Note: Fall Risk Interventions:  Mobility Interventions: Bed/chair exit alarm    Mentation Interventions: Adequate sleep, hydration, pain control    Medication Interventions: Patient to call before getting OOB    Elimination Interventions: Bed/chair exit alarm    History of Falls Interventions: Bed/chair exit alarm         Problem: Patient Education: Go to Patient Education Activity  Goal: Patient/Family Education  Outcome: Progressing Towards Goal     Problem: Sepsis: Day of Diagnosis  Goal: Discharge Planning  Outcome: Progressing Towards Goal  Goal: Medications  Outcome: Progressing Towards Goal  Goal: Treatments/Interventions/Procedures  Outcome: Progressing Towards Goal  Goal: Psychosocial  Outcome: Progressing Towards Goal     Problem: Diabetes Self-Management  Goal: *Disease process and treatment process  Description: Define diabetes and identify own type of diabetes; list 3 options for treating diabetes. Outcome: Progressing Towards Goal  Goal: *Incorporating nutritional management into lifestyle  Description: Describe effect of type, amount and timing of food on blood glucose; list 3 methods for planning meals. Outcome: Progressing Towards Goal  Goal: *Incorporating physical activity into lifestyle  Description: State effect of exercise on blood glucose levels. Outcome: Progressing Towards Goal  Goal: *Developing strategies to promote health/change behavior  Description: Define the ABC's of diabetes; identify appropriate screenings, schedule and personal plan for screenings. Outcome: Progressing Towards Goal  Goal: *Using medications safely  Description: State effect of diabetes medications on diabetes; name diabetes medication taking, action and side effects. Outcome: Progressing Towards Goal  Goal: *Monitoring blood glucose, interpreting and using results  Description: Identify recommended blood glucose targets  and personal targets. Outcome: Progressing Towards Goal  Goal: *Prevention, detection, treatment of acute complications  Description: List symptoms of hyper- and hypoglycemia; describe how to treat low blood sugar and actions for lowering  high blood glucose level. Outcome: Progressing Towards Goal  Goal: *Prevention, detection and treatment of chronic complications  Description: Define the natural course of diabetes and describe the relationship of blood glucose levels to long term complications of diabetes.   Outcome: Progressing Towards Goal  Goal: *Developing strategies to address psychosocial issues  Description: Describe feelings about living with diabetes; identify support needed and support network  Outcome: Progressing Towards Goal  Goal: *Insulin pump training  Outcome: Progressing Towards Goal  Goal: *Sick day guidelines  Outcome: Progressing Towards Goal  Goal: *Patient Specific Goal (EDIT GOAL, INSERT TEXT)  Outcome: Progressing Towards Goal     Problem: Patient Education: Go to Patient Education Activity  Goal: Patient/Family Education  Outcome: Progressing Towards Goal     Problem: Patient Education: Go to Patient Education Activity  Goal: Patient/Family Education  Outcome: Progressing Towards Goal     Problem: TIA/CVA Stroke: Day 2 Until Discharge  Goal: Off Pathway (Use only if patient is Off Pathway)  Outcome: Progressing Towards Goal  Goal: Activity/Safety  Outcome: Progressing Towards Goal  Goal: Diagnostic Test/Procedures  Outcome: Progressing Towards Goal  Goal: Nutrition/Diet  Outcome: Progressing Towards Goal  Goal: Discharge Planning  Outcome: Progressing Towards Goal  Goal: Medications  Outcome: Progressing Towards Goal  Goal: Respiratory  Outcome: Progressing Towards Goal  Goal: Treatments/Interventions/Procedures  Outcome: Progressing Towards Goal  Goal: Psychosocial  Outcome: Progressing Towards Goal  Goal: *Verbalizes anxiety and depression are reduced or absent  Outcome: Progressing Towards Goal  Goal: *Absence of aspiration  Outcome: Progressing Towards Goal  Goal: *Absence of deep venous thrombosis signs and symptoms(Stroke Metric)  Outcome: Progressing Towards Goal  Goal: *Optimal pain control at patient's stated goal  Outcome: Progressing Towards Goal  Goal: *Tolerating diet  Outcome: Progressing Towards Goal  Goal: *Ability to perform ADLs and demonstrates progressive mobility and function  Outcome: Progressing Towards Goal  Goal: *Stroke education continued(Stroke Metric)  Outcome: Progressing Towards Goal     Problem: Ischemic Stroke: Discharge Outcomes  Goal: *Verbalizes anxiety and depression are reduced or absent  Outcome: Progressing Towards Goal  Goal: *Verbalize understanding of risk factor modification(Stroke Metric)  Outcome: Progressing Towards Goal  Goal: *Hemodynamically stable  Outcome: Progressing Towards Goal  Goal: *Absence of aspiration pneumonia  Outcome: Progressing Towards Goal  Goal: *Aware of needed dietary changes  Outcome: Progressing Towards Goal  Goal: *Verbalize understanding of prescribed medications including anti-coagulants, anti-lipid, and/or anti-platelets(Stroke Metric)  Outcome: Progressing Towards Goal  Goal: *Tolerating diet  Outcome: Progressing Towards Goal  Goal: *Aware of follow-up diagnostics related to anticoagulants  Outcome: Progressing Towards Goal  Goal: *Ability to perform ADLs and demonstrates progressive mobility and function  Outcome: Progressing Towards Goal  Goal: *Absence of DVT(Stroke Metric)  Outcome: Progressing Towards Goal  Goal: *Absence of aspiration  Outcome: Progressing Towards Goal  Goal: *Optimal pain control at patient's stated goal  Outcome: Progressing Towards Goal  Goal: *Home safety concerns addressed  Outcome: Progressing Towards Goal  Goal: *Describes available resources and support systems  Outcome: Progressing Towards Goal  Goal: *Verbalizes understanding of activation of EMS(911) for stroke symptoms(Stroke Metric)  Outcome: Progressing Towards Goal  Goal: *Understands and describes signs and symptoms to report to providers(Stroke Metric)  Outcome: Progressing Towards Goal  Goal: *Neurolgocially stable (absence of additional neurological deficits)  Outcome: Progressing Towards Goal  Goal: *Verbalizes importance of follow-up with primary care physician(Stroke Metric)  Outcome: Progressing Towards Goal  Goal: *Smoking cessation discussed,if applicable(Stroke Metric)  Outcome: Progressing Towards Goal  Goal: *Depression screening completed(Stroke Metric)  Outcome: Progressing Towards Goal     Problem: Patient Education: Go to Patient Education Activity  Goal: Patient/Family Education  Outcome: Progressing Towards Goal     Problem: Patient Education: Go to Patient Education Activity  Goal: Patient/Family Education  Outcome: Progressing Towards Goal

## 2022-06-13 NOTE — PROGRESS NOTES
5020) Bedside shift change report given to Arnie Womack, RN, Candance Frizzle, RN and Juventino English LPN (oncoming nurse) by Adelia Bran RN (offgoing nurse). Report included the following information SBAR, Kardex, Intake/Output, MAR and Recent Results. Kyle Navarro

## 2022-06-13 NOTE — PROGRESS NOTES
2259) Bedside shift change report given to Orlando Campbell, RN , Miguelito Bartholomew, RN (oncoming nurse) by Morena Oneill RN (offgoing nurse). Report included the following information SBAR, Kardex, Intake/Output and MAR.

## 2022-06-13 NOTE — DISCHARGE INSTRUCTIONS
- Please follow-up with neurology as an outpatient further management of seizure disorder and encephalopathy  -Please do not drive for next 6-months till seen by neurology

## 2022-06-13 NOTE — PROGRESS NOTES
FLY    RUR: 14%    GABRIELA: Today to SNF    Plan:  Transportation: [facility], Ambulance, Ref [phone], spoke w/ liaison from SNF, bed is available today, going to [address], Authorization number: [id number]   Nursing to call Report Number: [phone]  Discharge folder: H&P, d/c summary, Pt/OT notes, Nursing to add AVS, Current MARS, and COVID results  Called son Cindee Jeans, discussed transportation to SNF, in agreement for transfer. To text address and number of facility to son. CM clarified w/ son if he and brother are Medicaid care givers and who is the CM for the agency. Son, stated yes and will LVM w/ that information, did not have at the time. CM met with pt, pt signed 2nd IM Letter, copy for pt and chart. Care Management Interventions  PCP Verified by CM:  Yes  Mode of Transport at Discharge: [address]  [address] Time of Discharge: 1300  Transition of Care Consult (CM Consult): SNF,Discharge Planning,Transportation Assistance  MyChart Signup: No  Discharge Durable Medical Equipment: No  Physical Therapy Consult: Yes  Occupational Therapy Consult: Yes  Speech Therapy Consult: Yes  Support Systems: Child(sergei),Skilled Nursing Facility  Confirm Follow Up Transport: Family  The Plan for Transition of Care is Related to the Following Treatment Goals : Skilled care, specialist  The Patient and/or Patient Representative was Provided with a Choice of Provider and Agrees with the Discharge Plan?: Yes  Name of the Patient Representative Who was Provided with a Choice of Provider and Agrees with the Discharge Plan: Bel Delong  Freedom of Choice List was Provided with Basic Dialogue that Supports the Patient's Individualized Plan of Care/Goals, Treatment Preferences and Shares the Quality Data Associated with the Providers?: Yes  Discharge Location  Patient Expects to be Discharged to[de-identified] Skilled nursing facility      BRIANNA Pate,MSW   [phone]

## 2022-06-13 NOTE — PROGRESS NOTES
2936) TRANSFER - OUT REPORT:    Verbal report given to Kwame Shin RN (name) on [place]  being transferred to SNF (unit) for routine progression of care       Report consisted of patients Situation, Background, Assessment and   Recommendations(SBAR). Information from the following report(s) SBAR, Kardex, ED Summary, Intake/Output, MAR and Recent Results was reviewed with the receiving nurse. Opportunity for questions and clarification was provided.       Patient transported with:  EMS

## 2022-06-13 NOTE — PROGRESS NOTES
Problem: Aspiration - Risk of  Goal: *Absence of aspiration  Outcome: Progressing Towards Goal     Problem: Pressure Injury - Risk of  Goal: *Prevention of pressure injury  Description: Document Andrzej Scale and appropriate interventions in the flowsheet. Outcome: Progressing Towards Goal  Note: Pressure Injury Interventions:  Sensory Interventions: Assess changes in LOC,Assess need for specialty bed,Maintain/enhance activity level,Float heels    Moisture Interventions: Absorbent underpads,Apply protective barrier, creams and emollients,Minimize layers,Maintain skin hydration (lotion/cream)    Activity Interventions: PT/OT evaluation,Increase time out of bed    Mobility Interventions: Chair cushion,PT/OT evaluation    Nutrition Interventions: Offer support with meals,snacks and hydration    Friction and Shear Interventions: Apply protective barrier, creams and emollients,Foam dressings/transparent film/skin sealants                Problem: Falls - Risk of  Goal: *Absence of Falls  Description: Document Luis Eduardo Fall Risk and appropriate interventions in the flowsheet.   Outcome: Progressing Towards Goal  Note: Fall Risk Interventions:  Mobility Interventions: Bed/chair exit alarm,Communicate number of staff needed for ambulation/transfer,PT Consult for mobility concerns,PT Consult for assist device competence    Mentation Interventions: Adequate sleep, hydration, pain control    Medication Interventions: Teach patient to arise slowly,Patient to call before getting OOB    Elimination Interventions: Bed/chair exit alarm,Call light in reach,Toileting schedule/hourly rounds    History of Falls Interventions: Door open when patient unattended,Room close to nurse's station         Problem: Sepsis: Day of Diagnosis  Goal: Discharge Planning  Outcome: Progressing Towards Goal  Goal: Medications  Outcome: Progressing Towards Goal  Goal: Treatments/Interventions/Procedures  Outcome: Progressing Towards Goal  Goal: Psychosocial  Outcome: Progressing Towards Goal

## 2022-06-13 NOTE — PROGRESS NOTES
Problem: Mobility Impaired (Adult and Pediatric)  Goal: *Acute Goals and Plan of Care (Insert Text)  Description:   FUNCTIONAL STATUS PRIOR TO ADMISSION: Per son's report. Pt lived with his son in a 2 story townhouse. Has been mostly non-ambulatory since knee replacement in 2018. 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 Pt Mod Indep with RW to transfer to wheelchair. Sponges bathes, able to do simple meal prep and dresses himself. HOME SUPPORT PRIOR TO ADMISSION: The patient lived with his son and a second son comes over when the other goes to work. Pt is not left alone. Physical Therapy Goals  Initiated 6/7/2022  1. Patient will move from supine to sit and sit to supine , scoot up and down, and roll side to side in bed with moderate assistance  within 7 day(s). 2.  Patient will transfer from bed to chair and chair to bed with maximal assistance using the least restrictive device within 7 day(s). 3.  Patient will perform sit to stand with maximal assistance within 7 day(s). 4.  Patient will ambulate with maximal assistance for 5 feet with the least restrictive device within 7 day(s). 5.  Patient will sit EOB x 5 minutes with minimal assistance within 7 days. 6.  Patient will improve Jeronimo Balance score by 7 points within 7 days. Outcome: Progressing Towards Goal    PHYSICAL THERAPY TREATMENT  Patient: Denton Santana (3 y.o. male)  Date: 6/13/2022  Diagnosis: Severe sepsis [facility] [A41.9, R65.20] <principal problem not specified>       Precautions: Fall,Bed Alarm,Skin  Chart, physical therapy assessment, plan of care and goals were reviewed. ASSESSMENT  Patient continues with skilled PT services and is progressing towards goals. Patient pleasant and agreeable to therapy. Patient performed bed mobility and sit to stand transfers with moderate assist x1.  Patient able to take steps over to chair with rolling walker and moderate assist. Patient required cues for hand placement with transfers. Recommend SNF rehab at discharge. Current Level of Function Impacting Discharge (mobility/balance): mod A to stand and transfer to chair with RW    Other factors to consider for discharge: cognition         PLAN :  Patient continues to benefit from skilled intervention to address the above impairments. Continue treatment per established plan of care. to address goals. Recommendation for discharge: (in order for the patient to meet his/her long term goals)  Therapy up to 5 days/week in SNF setting    This discharge recommendation:  Has been made in collaboration with the attending provider and/or case management    IF patient discharges home will need the following DME: patient owns DME required for discharge       SUBJECTIVE:   Patient stated I feel a little better.     OBJECTIVE DATA SUMMARY:   Critical Behavior:  Neurologic State: Alert  Orientation Level: Oriented X4 (but with sloww processing and decreased attention to task)  Cognition: Follows commands,Memory loss,Poor safety awareness  Safety/Judgement: Awareness of environment,Decreased awareness of need for assistance,Decreased awareness of need for safety,Decreased insight into deficits,Fall prevention  Functional Mobility Training:  Bed Mobility:     Supine to Sit: Moderate assistance; Additional time;Assist x1     Scooting: Moderate assistance; Additional time    Transfers:  Sit to Stand: Moderate assistance; Additional time;Assist x1  Stand to Sit: Minimum assistance; Additional time;Assist x1        Bed to Chair: Moderate assistance; Additional time;Assist x1          Balance:  Sitting: Intact; With support  Standing: Impaired; With support  Standing - Static: Fair;Constant support  Standing - Dynamic : Poor;Constant support    Therapeutic Exercises:   LAQ x5 reps B    Pain Rating:  No pain    Activity Tolerance:   Good    After treatment patient left in no apparent distress:   Sitting in chair, Call bell within reach, and Bed / chair alarm activated    COMMUNICATION/COLLABORATION:   The patients plan of care was discussed with: Occupational therapist and Registered nurse.      Ziggy Whaley, PT   Time Calculation: 15 mins

## 2022-06-13 NOTE — DISCHARGE SUMMARY
Hospitalist Discharge Summary     Patient ID:  Denton Santana  [id number]  89 y.o.  1950    PCP on record: Dennis Brown MD    Admit date: 6/5/2022  Discharge date and time: 6/13/2022    Please note that this dictation was completed with [employer], the [employer] voice recognition software. Quite often unanticipated grammatical, syntax, homophones, and other interpretive errors are inadvertently transcribed by the computer software. Please disregard these errors. Please excuse any errors that have escaped final proofreading. Admission Diagnoses: Severe sepsis ([facility] [A41.9, R65.20]    Discharge Diagnoses: Active Problems:    Severe sepsis ([facility] (6/5/2022)           Hospital Course:         Acute metabolic encephalopathy POA resolved  ? Infectious process contributing to encephalopathy  Suspected seizure   Right sided gaze deviation POA, resolved    History of CVA 2017   Residual left sided hemiparesis POA  -CT head negative for acute process  -Patient has been assessed by tele neurology  -MRI and MRA are negative for acute process or stroke. -RPR negative VDRL CSF neg   -Lumbar puncture with WBC 38 with 98% neutrophils protein 69 and glucose 133  -Meningitis panel negative per LP  -Blood culture negative growth so far  -EEG with diffuse slowing. Encephalopathy versus postictal state     -Discussed with ID physician in detail, IV antibiotic for total of 7 days given marked improvement in patient condition with IV antibiotic  -Continued Keppra per neurology  -Resume aspirin and statin for history of CVA  - patient to follow up neurology for further management         Severe sepsis POA resolved  Elevated lactic acid POA resolved   On admission temperature 102.3 F, heart rate 112 bpm, respiratory rate 22/minute, lactic acid 4.1.  Chest x-ray interpreted independently did not reveal any consolidation. Urinalysis negative for infection.  COVID PCR negative influenza PCR negative.  Received sepsis bolus in the ER. CT chest abdomen pelvis did not show evidence of infection. LP 6/6 : meningitis pathogen PCR panel negative, negative gram stain.      -Discussed the case with ID physician in detail, completed the IV antibiotic for total 7 days given marked improvement in patient clinical condition with IV antibiotics           CKD III POA   Stable     Type 2 diabetes with hyperglycemia POA            Lab Results   Component Value Date/Time     Hemoglobin A1c 12.5 (H) 06/06/2022 05:04 AM     Hemoglobin A1c (POC) 13.9 (A) 06/18/2021 12:42 PM   - resume regimen         Uncontrolled Hypertension POA resolved  HLD POA  - resume amlodipine and lisinopril   -Resume statin        # H/o of PE   - patient was on eliquis and stopped per PCP office       CONSULTATIONS:  IP CONSULT TO NEUROLOGY  IP CONSULT TO ANESTHESIOLOGY    Excerpted HPI from H&P of Danae Velazco MD:  Jennifer Arnold is a 73 y.o.  male with PMH of diabetes, hypertension, hyperlipidemia, stroke, chronic pain who presents to ED with c/o fall, confusion. Patient is unable to provide meaningful history due to altered mental status. Yesterday, patient was noted to be at baseline, last seen normal at 11 PM.   Patient's son Lyla Parks heard him fall and when he went to check on him, he noted patient to be   incoherent, confused and mumbling prompting him to call EMS.    At baseline, does not walk much, mostly wheelchair, sometimes ambulates with walker  Independent in ADLs. But needs help with meals. Both sons live with him and help him with ADLs.    Patient is somnolent, not following commands, right sided gaze deviation on my exam       ______________________________________________________________________  DISCHARGE SUMMARY/HOSPITAL COURSE:  for full details see H&P, daily progress notes, labs, consult notes.              _______________________________________________________________________  Patient seen and examined by me on discharge day.  Pertinent Findings:  Gen:    Not in distress  Chest: Clear lungs  CVS:   Regular rhythm. No edema  Abd:  Soft, not distended, not tender  Neuro:  Alert with good insight. Oriented to person, place, and time   _______________________________________________________________________  DISCHARGE MEDICATIONS:   Current Discharge Medication List      START taking these medications    Details   levETIRAcetam (KEPPRA) 100 mg/ml soln oral solution Take 10 mL by mouth two (2) times a day. Qty: 1 Bottle, Refills: 1  Start date: 6/13/2022      aspirin 81 mg chewable tablet Take 1 Tablet by mouth daily. Qty: 30 Tablet, Refills: 0  Start date: 6/13/2022      pravastatin (PRAVACHOL) 10 mg tablet Take 1 Tablet by mouth nightly. Qty: 30 Tablet, Refills: 0  Start date: 6/13/2022         CONTINUE these medications which have CHANGED    Details   lisinopriL (PRINIVIL, ZESTRIL) 40 mg tablet Take 1 Tablet by mouth daily. Qty: 30 Tablet, Refills: 1  Start date: 6/13/2022         CONTINUE these medications which have NOT CHANGED    Details   amLODIPine (NORVASC) 10 mg tablet Take 1 Tablet by mouth daily. Qty: 90 Tablet, Refills: 0      metFORMIN (GLUCOPHAGE) 1,000 mg tablet TAKE 1 TABLET BY MOUTH  TWICE DAILY WITH MEALS  Qty: 180 Tablet, Refills: 3    Comments: Requesting 1 year supply      glipiZIDE (GLUCOTROL) 10 mg tablet TAKE 1 TABLET BY MOUTH  TWICE DAILY  Qty: 180 Tablet, Refills: 3    Comments: Requesting 1 year supply      escitalopram oxalate (LEXAPRO) 5 mg tablet Take 1 Tablet by mouth daily. Qty: 90 Tablet, Refills: 1    Associated Diagnoses: Bereavement             My Recommended Diet, Activity, Wound Care, and follow-up labs are listed in the patient's Discharge Insturctions which I have personally completed and reviewed.     _______________________________________________________________________  DISPOSITION:     Home with Family:    Home with HH/PT/OT/RN:    SNF/LTC: x   FERNANDEZ:    OTHER:        Condition at Discharge:  Stable  _______________________________________________________________________  Follow up with:   PCP : Mari Sears MD  Follow-up Information     Follow up With Specialties Details Why Contact Info    Mari Sears MD Internal Medicine Physician  Call for a Follow up appointment with PCP after rehabilitation. [address] [place] [address]  [phone]      Starr Caldera DO Infectious Disease Physician  It is recommended that you follow up with the Infectitious Disease physician. Please schedule a follow up appointment r after  [facility] [address]  [phone]      [address] [place] [address] Jens Lopez  Patient has been accepted for admission. Transfer is expected to occur on Monday, June 13, 2002 if IV meds at the facility is not approved.  [address]  [phone]              Total time in minutes spent coordinating this discharge (includes going over instructions, follow-up, prescriptions, and preparing report for sign off to her PCP) : 55minutes    Signed:  Katlyn Zaidi MD

## 2022-06-13 NOTE — PROGRESS NOTES
Transition of Care Plan to SNF/Rehab    SNF/Rehab Transition:  Patient has been accepted to [facility] and meets criteria for admission. Patient will transported by [employer] transport and expected to leave at 1:00 PM by Adam Ledesma. Communication to Patient/Family:  Met with patient and spoke w/ son Jeannette Braden. (identified care giver) and they are agreeable to the transition plan. Communication to SNF/Rehab:  Bedside RN, Rashid Amado, has been notified to update the transition plan to the facility and call report (phone number [phone]). Discharge information has been updated on the AVS.     Discharge instructions to be fax'd to facility via [employer], hard copy H&P, d/c summary, AVS, PT/OT Notes, current MARS & COVID results. [] BCPI-A  Patient has been identified as part of the N/A BCPI-A Program.  For Care Coordination associated with that Bundle Program, please contact N/A  Bundle information has been communication to N/A. Nursing Please include all hard scripts for controlled substances, med rec and dc summary, and AVS in packet. Reviewed and confirmed with facility, [facility], can manage the patient care needs for the following:     SNF/Rehab Transition:  Patient to follow-up with Home Health:  [facility], other N/A ,none)  PCP/Specialist: N/A    Reviewed and confirmed with facilityRuth Ann they can manage the patient care needs for the following:     [place] with (X) only those applicable:    Medication:  [x]  Medications will be available at the facility  []  IV Antibiotics N/A  []  Controlled Substance - hard copy to be sent with patient   []  Weekly Labs   Documents:  [] Hard RX  [x] MAR  [] Kardex  [x] AVS  [x]Transfer Summary  [x]Discharge   Equipment:  []  CPAP/BiPAP  []  Wound Vacuum  []  Lester or Urinary Device  []  PICC/Central Line  []  Nebulizer  []  Ventilator   Treatment:  []Isolation (for MRSA, VRE, etc.)  []Surgical Drain Management  []Tracheostomy Care  []Dressing Changes  []Dialysis with transportation and chair time N/A.  []PEG Care  []Oxygen  []Daily Weights for Heart Failure   Dietary:  []Any diet limitations  []Tube Feedings   []Total Parenteral Management (TPN)   Eligible for Medicaid Long Term Services and Supports  Yes:  [] Eligible for medical assistance or will become eligible within 180 days and UAI completed. [] Provider/Patient and/or support system has requested screening. [] UAI copy provided to patient or responsible party, N/A. [x] UAI unavailable at discharge will send once processed to SNF provider. (Waiting for the son, Romario Wahl to call us back with CM name and copy to send to Nursing facility)  [] UAI unavailable at discharged mailed to patient  No:   [] Private pay and is not financially eligible for Medicaid within the next 180 days. [] Reside out-of-state.   [] A residents of a state owned/operated facility that is licensed  by [address] [employer] or [facility]  [] Enrollment in [facility] hospice services  [] [address] [place] [address]  [] Patient /Family declines to have screening completed or provide financial information for screening     Financial Resources:  Medicaid    [] Initiated and application pending   [] Full coverage     Advanced Care Plan:  []Surrogate Decision Maker of Care  []POA  []Communicated Code Status Full Code (DDNR\", \"Full\")    Sherry Truong, [address], MSW   [phone]

## 2022-06-13 NOTE — PROGRESS NOTES
Problem: Self Care Deficits Care Plan (Adult)  Goal: *Acute Goals and Plan of Care (Insert Text)  Description: FUNCTIONAL STATUS PRIOR TO ADMISSION: Per son's report. Patient lived with his son in a 2 story townhouse. Has been mostly non-ambulatory since knee replacement in 2018. 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 Patient Mod Indep with RW to transfer to wheelchair. Sponges bathes, able to do simple meal prep and dresses himself. Patient reports using a sock-aid to don socks    HOME SUPPORT PRIOR TO ADMISSION: The patient lived with his son and a second son comes over when the other goes to work. Pt is not left alone. Occupational Therapy Goals  Initiated 6/9/2022  1. Patient will perform self-feeding with set-up within 7 day(s). 2.  Patient will perform grooming with set-up seated in chair within 7 day(s). 3.  Patient will perform upper body dressing with supervision/set-up within 7 day(s). 4.  Patient will perform LE dressing with adaptive equipment with minimal assistance/contact guard assist within 7 day(s). 5.  Patient will perform stand pivot transfer to bedside commode with CGA and min cues with rolling walker within 7 day(s). 6.  Patient will stand with bilateral UE support > or = 1 minute without increased shortness of breath within 7 day(s). Outcome: Progressing Towards Goal     OCCUPATIONAL THERAPY TREATMENT  Patient: Guevara Gould (42 y.o. male)  Date: 6/13/2022  Diagnosis: [facility] [A41.9, R65.20] <principal problem not specified>       Precautions: Fall,Bed Alarm,Skin  Chart, occupational therapy assessment, plan of care, and goals were reviewed. ASSESSMENT  Patient continues with skilled OT services and is progressing towards goals. Pt demonstrated improved ADL performance and able to feed self without assist after set-up and improved mobility. He remains limited by decreased strength, endurance, mobility, balance, safety and coordination.   Continue to recommend SNF at discharge. Current Level of Function Impacting Discharge (ADLs): LE ADLs up to max A, toileting max A, functional mobility mod A at bedside    Other factors to consider for discharge: see above         PLAN :  Patient continues to benefit from skilled intervention to address the above impairments. Continue treatment per established plan of care to address goals. Recommend with staff: up to chair for all meal and BSC for toileting with assist    Recommend next OT session: BSC transfers    Recommendation for discharge: (in order for the patient to meet his/her long term goals)  Therapy up to 5 days/week in SNF setting    This discharge recommendation:  Has been made in collaboration with the attending provider and/or case management    IF patient discharges home will need the following DME: TBD       SUBJECTIVE:   Patient stated I am feeling better.     OBJECTIVE DATA SUMMARY:   Cognitive/Behavioral Status:  Neurologic State: Alert  Orientation Level: Oriented X4 (but with sloww processing and decreased attention to task)  Cognition: Follows commands;Memory loss;Poor safety awareness  Perception: Cues to attend to left side of body  Perseveration: No perseveration noted  Safety/Judgement: Awareness of environment;Decreased awareness of need for assistance;Decreased awareness of need for safety;Decreased insight into deficits; Fall prevention    Functional Mobility and Transfers for ADLs:  Bed Mobility:  Supine to Sit: Moderate assistance; Additional time;Assist x1  Scooting: Moderate assistance; Additional time    Transfers:  Sit to Stand: Moderate assistance; Additional time;Assist x1     Bed to Chair: Moderate assistance; Additional time;Assist x1    Balance:  Sitting: Intact; With support  Standing: Impaired; With support  Standing - Static: Fair;Constant support  Standing - Dynamic : Poor;Constant support    ADL Intervention:  Feeding  Feeding Assistance: Set-up  Container Management: Moderate assistance  Cutting Food: Set-up  Utensil Management: Set-up  Food to Mouth: Modified independent  Drink to Mouth: Modified independent  Cues: Physical assistance;Visual cues provided;Verbal cues provided  Adaptive Equipment:  (modified diet)    Lower Body Dressing Assistance  Socks: Moderate assistance (A to thread over toes, but then pt able to pull over heels)  Position Performed: Bending forward method;Seated edge of bed  Cues: Don;Physical assistance; Tactile cues provided;Verbal cues provided;Visual cues provided         Cognitive Retraining  Safety/Judgement: Awareness of environment;Decreased awareness of need for assistance;Decreased awareness of need for safety;Decreased insight into deficits; Fall prevention      Pain:  0/10    Activity Tolerance:   Fair and requires rest breaks    After treatment patient left in no apparent distress:   Sitting in chair, Call bell within reach, and Bed / chair alarm activated    COMMUNICATION/COLLABORATION:   The patients plan of care was discussed with: Physical therapist, Registered nurse, and Case management.      Nicolle Powell, OT  Time Calculation: 24 mins

## 2022-06-14 ENCOUNTER — PATIENT OUTREACH (OUTPATIENT)
Dept: CASE MANAGEMENT | Age: 72
End: 2022-06-14

## 2022-06-14 ENCOUNTER — TELEPHONE (OUTPATIENT)
Dept: INTERNAL MEDICINE CLINIC | Age: 72
End: 2022-06-14

## 2022-06-14 NOTE — TELEPHONE ENCOUNTER
Christin Gordon, DO  You 1 hour ago (12:18 PM)       Does not need follow up if mental status is his baseline and no new symptoms concerning for infection   He can follow up with PCP   If he has infectious symptoms then needs to call for apt    Message text      Triston Pappas care team Barrington Nation was called and verbalized understanding on note below. Note closed.

## 2022-06-14 NOTE — PROGRESS NOTES
Transition of care outreach postponed for 14 days due to patient's discharge to SNF. Per EMR patient discharged to [facility]. Will continue to monitor patient progress.

## 2022-06-14 NOTE — TELEPHONE ENCOUNTER
Cecilia Vincent from [facility] [place] called and wants to know when Dr. Allie Max wants to see Betsey Terry for a follow up. Call Cecilia Vincent to let her know.

## 2022-06-14 NOTE — TELEPHONE ENCOUNTER
Per coordination note 06/14:    \"Transition of care outreach postponed for 14 days due to patient's discharge to SNF. Per EMR patient discharged to [facility]. Will continue to monitor patient progress. \"    Please advise on if/when follow up apt is due, patient is in SNF currently.

## 2022-06-15 ENCOUNTER — TELEPHONE (OUTPATIENT)
Dept: CASE MANAGEMENT | Age: 72
End: 2022-06-15

## 2022-06-15 NOTE — TELEPHONE ENCOUNTER
CM called patient by telephone to perform post discharge assessment and for the purpose of follow up call from inpatient discharge to check on environmental challenges/medications/appointment follow up/and questions/concerns. The call was answered by  CM left  for patient to return call. CM will attempt a 2nd call.     [address] [facility], [address] [phone]

## 2022-06-28 ENCOUNTER — PATIENT OUTREACH (OUTPATIENT)
Dept: CASE MANAGEMENT | Age: 72
End: 2022-06-28

## 2022-06-28 NOTE — PROGRESS NOTES
Outgoing call placed to [facility] regarding update of patient progress. Spoke to [employer] (staff member) patient identified utilizing 2 identifiers. Introduced self and reason for the call. [employer] reports patient is currently admitted to the facility. Will continue to monitor patient progress.

## 2022-06-30 ENCOUNTER — TELEPHONE (OUTPATIENT)
Dept: INTERNAL MEDICINE CLINIC | Age: 72
End: 2022-06-30

## 2022-06-30 NOTE — TELEPHONE ENCOUNTER
[address] [facility] [facility] states pt will be discharging on 7-1-22. They would like him seen as soon as possible. Pt will be discharging with [facility]. Please contact pt to schedule this appt.

## 2022-06-30 NOTE — TELEPHONE ENCOUNTER
Writer spoke with Mary with [facility]. Writer informed Mary that provider would follow for home health orders at this time. No further actions required at this time.

## 2022-06-30 NOTE — TELEPHONE ENCOUNTER
#[phone]  [address] [place] [address] [id number] with [facility] [address] needs to know if Dr. Charu Terry will follow for home services. See appt history. Thanks.

## 2022-07-05 ENCOUNTER — TELEPHONE (OUTPATIENT)
Dept: INTERNAL MEDICINE CLINIC | Age: 72
End: 2022-07-05

## 2022-07-05 DIAGNOSIS — E11.40 TYPE 2 DIABETES MELLITUS WITH DIABETIC NEUROPATHY, WITHOUT LONG-TERM CURRENT USE OF INSULIN (HCC): Primary | ICD-10-CM

## 2022-07-05 RX ORDER — LANCETS
EACH MISCELLANEOUS
Qty: 100 EACH | Refills: 1 | Status: SHIPPED | OUTPATIENT
Start: 2022-07-05 | End: 2022-08-09 | Stop reason: ALTCHOICE

## 2022-07-05 RX ORDER — INSULIN PUMP SYRINGE, 3 ML
EACH MISCELLANEOUS
Qty: 1 KIT | Refills: 0 | Status: SHIPPED | OUTPATIENT
Start: 2022-07-05 | End: 2022-08-09 | Stop reason: SDUPTHER

## 2022-07-05 RX ORDER — INSULIN HUMAN 100 [IU]/ML
INJECTION, SUSPENSION SUBCUTANEOUS
Qty: 15 ML | Refills: 0 | Status: SHIPPED | OUTPATIENT
Start: 2022-07-05 | End: 2022-07-21 | Stop reason: CLARIF

## 2022-07-05 RX ORDER — IBUPROFEN 200 MG
CAPSULE ORAL
Qty: 100 STRIP | Refills: 1 | Status: SHIPPED | OUTPATIENT
Start: 2022-07-05 | End: 2022-08-09 | Stop reason: SDUPTHER

## 2022-07-05 NOTE — TELEPHONE ENCOUNTER
Mr Nava Gamez, Physical Therapist  [facility]  [phone]      States needs verbal for Physical Therapy plan of care:  · 2 times a week for 6 weeks  · Working on gait training, lower ext strengthening, activity tolerance.

## 2022-07-05 NOTE — TELEPHONE ENCOUNTER
Pt states that he needs test strips and lancets today. Pt has been in the hospital for 13 days in diabetic coma. I did not want to request what is on file as these are much older. What machine does pt have? Pt did not know names of these items. He is out of these testing supplies and will need yet today. Thanks.

## 2022-07-05 NOTE — TELEPHONE ENCOUNTER
----- Message from [place] sent at 7/5/2022  9:49 AM EDT -----  Subject: Appointment Request    Reason for Call: Established Patient Appointment needed: Routine Existing   Condition Follow Up    QUESTIONS    Reason for appointment request? Available appointments did not meet   patient need     Additional Information for Provider? Pt is calling to see if he can do a   F/U appt with his pcp only before September 15th. Pt was in the hospital   for passing out. Pt is asking for a afternoon appt. Please call pt back.    ---------------------------------------------------------------------------  --------------  Ever GALICIA  [phone]; OK to leave message on voicemail  ---------------------------------------------------------------------------  --------------  SCRIPT ANSWERS  COVID Screen: Morgan Rojas

## 2022-07-05 NOTE — TELEPHONE ENCOUNTER
----- Message from Sarbjit Nation sent at 7/5/2022  3:18 PM EDT -----  Subject: Message to Provider    QUESTIONS  Information for Provider? pt is calling checking on the message he sent   over about an hospital f/u appt sooner than september. please call pt   back. ---------------------------------------------------------------------------  --------------  Hilda GROVES  [phone]; OK to leave message on voicemail  ---------------------------------------------------------------------------  --------------  SCRIPT ANSWERS  Relationship to Patient?  Self

## 2022-07-06 ENCOUNTER — TELEPHONE (OUTPATIENT)
Dept: INTERNAL MEDICINE CLINIC | Age: 72
End: 2022-07-06

## 2022-07-06 NOTE — TELEPHONE ENCOUNTER
Called, Spoke with Chin(PT)  Received two pt identifiers  VORB per Dr. Vashti Russo given for PT plan of care  Deysi García verbalizes understanding of the instructions and has no further questions at this time.

## 2022-07-06 NOTE — TELEPHONE ENCOUNTER
----- Message from Richard Stahl sent at 7/6/2022  9:35 AM EDT -----  Subject: Message to Provider    QUESTIONS  Information for Provider? Patient returned call to office , he is unable   to take appointment at 1:45pm on 0/06/2022, but is able to take Thursday   appointment that was offered, please call patient to clarify.  ---------------------------------------------------------------------------  --------------  [address] [facility]  [phone]; OK to leave message on voicemail  ---------------------------------------------------------------------------  --------------  SCRIPT ANSWERS  Relationship to Patient?  Self

## 2022-07-13 ENCOUNTER — PATIENT OUTREACH (OUTPATIENT)
Dept: CASE MANAGEMENT | Age: 72
End: 2022-07-13

## 2022-07-13 NOTE — Clinical Note
Good afternoon Dr. Jayde Grigsby,  I spoke to Mr. Reed Nunn regarding hospital follow up. He states he is waiting to hear from you office to reschedule his hospital follow up appointment. He did not voice any concerns.    Thank you,   Sanaz Deluca, [address] [phone]

## 2022-07-13 NOTE — PROGRESS NOTES
Per EMR patient discharged from [facility] on 2022 with [facility] for home health follow-up. Outgoing call placed to patient for post-acute follow up no answer message left with this writer's contact information. [address] [place] [address] Dr Discharge Follow-Up      Date/Time:  2022 3:52 PM    Patient was admitted to [facility] on 2022 and discharged to [address] [place] [address] on 2022. The patients discharge diagnosis was severe sepsis. Patient was discharge on 2022 from [address] [place] [address]. The discharge summary from the post acute facilty was not available at the time of outreach. Patient was contacted within 7 business days of discharge from the post acute facility. LPN Care Coordinator contacted the patient by telephone to perform post hospital discharge follow up. Verified name and  with patient as identifiers. Provided introduction to self, and explanation of the LPN Care Coordinator role. Patient received post acute facility discharge instructions. LPN Care Coordinator reviewed general  discharge instructions and red flags with patient who verbalized understanding. Patient given an opportunity to ask questions and does not have any further questions or concerns at this time. The patient agrees to contact the PCP office for questions related to their healthcare. LPN Care Coordinator provided contact information for future reference. Advance Care Planning:   Does patient have an Advance Directive:  not on file; education provided     Home Health orders at discharge: [facility]: [facility]  Date of initial visit: 7/3/2022    Durable Medical Equipment ordered at discharge: none  [address] received: n/a    Medication(s):   The post acute facility medication discharge list was not available for this call.   Medication review was not performed with patient, who verbalizes understanding of administration of home medications. There were no barriers to obtaining medications identified at this time. [facility] follow up appointment(s): No future appointments. Patient was unable to attend hospital follow up appointment with Dr Ruperto Bernal 7/6/2022 due to lack of transportation. Patient stated he has contacted office to reschedule the appointment.    Non-[facility] follow up appointment(s): n/a  [facility]:  information provided as a resource

## 2022-07-14 NOTE — TELEPHONE ENCOUNTER
Patient could not accept appt offered for today de to transportation.  Patient did accept tomorrow at 10 am.

## 2022-07-15 ENCOUNTER — OFFICE VISIT (OUTPATIENT)
Dept: INTERNAL MEDICINE CLINIC | Age: 72
End: 2022-07-15
Payer: MEDICARE

## 2022-07-15 VITALS
HEART RATE: 73 BPM | DIASTOLIC BLOOD PRESSURE: 80 MMHG | WEIGHT: 238 LBS | OXYGEN SATURATION: 97 % | SYSTOLIC BLOOD PRESSURE: 150 MMHG | RESPIRATION RATE: 16 BRPM | BODY MASS INDEX: 34.07 KG/M2 | HEIGHT: 70 IN | TEMPERATURE: 97.2 F

## 2022-07-15 DIAGNOSIS — I26.92 SADDLE EMBOLUS OF PULMONARY ARTERY, UNSPECIFIED CHRONICITY, UNSPECIFIED WHETHER ACUTE COR PULMONALE PRESENT (HCC): ICD-10-CM

## 2022-07-15 DIAGNOSIS — E11.69 TYPE 2 DIABETES MELLITUS WITH OTHER SPECIFIED COMPLICATION, WITH LONG-TERM CURRENT USE OF INSULIN (HCC): ICD-10-CM

## 2022-07-15 DIAGNOSIS — A41.9 SEPSIS, DUE TO UNSPECIFIED ORGANISM, UNSPECIFIED WHETHER ACUTE ORGAN DYSFUNCTION PRESENT (HCC): Primary | ICD-10-CM

## 2022-07-15 DIAGNOSIS — C61 PROSTATE CANCER (HCC): ICD-10-CM

## 2022-07-15 DIAGNOSIS — Z79.4 TYPE 2 DIABETES MELLITUS WITH OTHER SPECIFIED COMPLICATION, WITH LONG-TERM CURRENT USE OF INSULIN (HCC): ICD-10-CM

## 2022-07-15 DIAGNOSIS — F33.0 MAJOR DEPRESSIVE DISORDER, RECURRENT, MILD (HCC): ICD-10-CM

## 2022-07-15 DIAGNOSIS — I10 HYPERTENSION, UNSPECIFIED TYPE: ICD-10-CM

## 2022-07-15 DIAGNOSIS — R26.2 DIFFICULTY WALKING: ICD-10-CM

## 2022-07-15 DIAGNOSIS — R56.9 SEIZURE (HCC): ICD-10-CM

## 2022-07-15 PROCEDURE — G9717 DOC PT DX DEP/BP F/U NT REQ: HCPCS | Performed by: INTERNAL MEDICINE

## 2022-07-15 PROCEDURE — G8417 CALC BMI ABV UP PARAM F/U: HCPCS | Performed by: INTERNAL MEDICINE

## 2022-07-15 PROCEDURE — G8754 DIAS BP LESS 90: HCPCS | Performed by: INTERNAL MEDICINE

## 2022-07-15 PROCEDURE — G8536 NO DOC ELDER MAL SCRN: HCPCS | Performed by: INTERNAL MEDICINE

## 2022-07-15 PROCEDURE — G8753 SYS BP > OR = 140: HCPCS | Performed by: INTERNAL MEDICINE

## 2022-07-15 PROCEDURE — 99214 OFFICE O/P EST MOD 30 MIN: CPT | Performed by: INTERNAL MEDICINE

## 2022-07-15 PROCEDURE — G8427 DOCREV CUR MEDS BY ELIG CLIN: HCPCS | Performed by: INTERNAL MEDICINE

## 2022-07-15 PROCEDURE — 1101F PT FALLS ASSESS-DOCD LE1/YR: CPT | Performed by: INTERNAL MEDICINE

## 2022-07-15 PROCEDURE — 3017F COLORECTAL CA SCREEN DOC REV: CPT | Performed by: INTERNAL MEDICINE

## 2022-07-15 PROCEDURE — 2022F DILAT RTA XM EVC RTNOPTHY: CPT | Performed by: INTERNAL MEDICINE

## 2022-07-15 PROCEDURE — 3046F HEMOGLOBIN A1C LEVEL >9.0%: CPT | Performed by: INTERNAL MEDICINE

## 2022-07-15 RX ORDER — LEVETIRACETAM 1000 MG/1
1000 TABLET ORAL 2 TIMES DAILY
Qty: 180 TABLET | Refills: 3 | Status: SHIPPED | OUTPATIENT
Start: 2022-07-15 | End: 2022-07-15 | Stop reason: SDUPTHER

## 2022-07-15 RX ORDER — INSULIN PUMP SYRINGE, 3 ML
EACH MISCELLANEOUS
Qty: 1 KIT | Refills: 0 | Status: SHIPPED | OUTPATIENT
Start: 2022-07-15 | End: 2022-08-09 | Stop reason: SDUPTHER

## 2022-07-15 RX ORDER — PRAVASTATIN SODIUM 10 MG/1
10 TABLET ORAL
Qty: 90 TABLET | Refills: 3 | Status: SHIPPED | OUTPATIENT
Start: 2022-07-15 | End: 2022-08-29 | Stop reason: DRUGHIGH

## 2022-07-15 RX ORDER — GUAIFENESIN 100 MG/5ML
81 LIQUID (ML) ORAL DAILY
Qty: 90 TABLET | Refills: 3 | Status: SHIPPED | OUTPATIENT
Start: 2022-07-15

## 2022-07-15 RX ORDER — CLONIDINE HYDROCHLORIDE 0.1 MG/1
0.1 TABLET ORAL 2 TIMES DAILY
Qty: 180 TABLET | Refills: 3 | Status: SHIPPED | OUTPATIENT
Start: 2022-07-15

## 2022-07-15 RX ORDER — LEVETIRACETAM 1000 MG/1
1000 TABLET ORAL 2 TIMES DAILY
Qty: 180 TABLET | Refills: 3 | Status: SHIPPED | OUTPATIENT
Start: 2022-07-15

## 2022-07-15 RX ORDER — CLONIDINE HYDROCHLORIDE 0.1 MG/1
0.1 TABLET ORAL 2 TIMES DAILY
COMMUNITY
End: 2022-07-15 | Stop reason: SDUPTHER

## 2022-07-15 RX ORDER — CLONIDINE HYDROCHLORIDE 0.1 MG/1
0.1 TABLET ORAL 2 TIMES DAILY
Qty: 180 TABLET | Refills: 3 | Status: SHIPPED | OUTPATIENT
Start: 2022-07-15 | End: 2022-07-15 | Stop reason: SDUPTHER

## 2022-07-15 RX ORDER — LISINOPRIL 40 MG/1
40 TABLET ORAL DAILY
Qty: 90 TABLET | Refills: 3 | Status: SHIPPED | OUTPATIENT
Start: 2022-07-15

## 2022-07-15 NOTE — PATIENT INSTRUCTIONS
Office Policies    Phone calls/patient messages:            Please allow up to 24 hours for someone in the office to contact you about your call or message. Be mindful your provider may be out of the office or your message may require further review. We encourage you to use [employer] for your messages as this is a faster, more efficient way to communicate with our office                         Medication Refills:            Prescription medications require 48-72 business hours to process. We encourage you to use [employer] for your refills. For controlled medications: Please allow 72 business hours to process. Certain medications may require you to  a written prescription at our office. NO narcotic/controlled medications will be prescribed after 4pm Monday through Friday or on weekends              Form/Paperwork Completion:            Please note a $25 fee may incur for all paperwork for completed by our providers. We ask that you allow 7-10 business days. Pre-payment is due prior to picking up/faxing the completed form. You may also download your forms to En[employer] to have your doctor print off.

## 2022-07-15 NOTE — PROGRESS NOTES
1. \"Have you been to the ER, urgent care clinic since your last visit? Hospitalized since your last visit? \" Yes, 6/5/22 MMR    2. \"Have you seen or consulted any other health care providers outside of the [address] [place] [address] since your last visit? \" No     3. For patients aged 39-70: Has the patient had a colonoscopy / FIT/ Cologuard?  Yes, Cologuard

## 2022-07-15 NOTE — PROGRESS NOTES
HISTORY OF PRESENT ILLNESS  Keshav Herr is a 73 y.o. male. HPI   Hx DM-2 HTN anemia  Hx CVA with left weakness, obesity, hx saddle PE  Here for FLY-admitted last month with fall and AMS due to severe sepsis--treated with IV abx with improvement  Blood cx and CSF negative  ? Seizure contributing and or postictal?  keppra started  Also pravastatin started  Went in SNF [facility] home 7/1/22--clonidine 0.1 mg bid prn was added  Admit date: 6/5/2022  Discharge date and time: 6/13/2022     Please note that this dictation was completed with [employer], the [employer] voice recognition software.  Quite often unanticipated grammatical, syntax, homophones, and other interpretive errors are inadvertently transcribed by the computer software.  Please disregard these errors.  Please excuse any errors that have escaped final proofreading.     Admission Diagnoses: Severe sepsis ([facility] [A41.9, R65.20]     Discharge Diagnoses: Active Problems:    Severe sepsis ([address] (6/5/2022)              Hospital Course:            Acute metabolic encephalopathy POA resolved  ?  Infectious process contributing to encephalopathy  Suspected seizure   Right sided gaze deviation POA, resolved    History of CVA 2017   Residual left sided hemiparesis POA  -CT head negative for acute process  -Patient has been assessed by tele neurology  -MRI and MRA are negative for acute process or stroke.   -RPR negative VDRL CSF neg   -Lumbar puncture with WBC 38 with 98% neutrophils protein 69 and glucose 133  -Meningitis panel negative per LP  -Blood culture negative growth so far  -EEG with diffuse slowing.  Encephalopathy versus postictal state     -Discussed with ID physician in detail, IV antibiotic for total of 7 days given marked improvement in patient condition with IV antibiotic  -Continued Keppra per neurology  -Resume aspirin and statin for history of CVA  - patient to follow up neurology for further management         Severe sepsis POA resolved  Elevated lactic acid POA resolved   On admission temperature 102.3 F, heart rate 112 bpm, respiratory rate 22/minute, lactic acid 4.1.  Chest x-ray interpreted independently did not reveal any consolidation. Urinalysis negative for infection.  COVID PCR negative influenza PCR negative.  Received sepsis bolus in the ER. CT chest abdomen pelvis did not show evidence of infection.  LP 6/6 : meningitis pathogen PCR panel negative, negative gram stain.      -Discussed the case with ID physician in detail, completed the IV antibiotic for total 7 days given marked improvement in patient clinical condition with IV antibiotics           CKD III POA   Stable     Type 2 diabetes with hyperglycemia POA            Lab Results   Component Value Date/Time     Hemoglobin A1c 12.5 (H) 06/06/2022 05:04 AM     Hemoglobin A1c (POC) 13.9 (A) 06/18/2021 12:42 PM   - resume regimen         Uncontrolled Hypertension POA resolved  HLD POA  - resume amlodipine and lisinopril   -Resume statin        # H/o of PE   - patient was on eliquis and stopped per PCP office               Patient Active Problem List    Diagnosis Date Noted    Severe sepsis ([address] 06/05/2022    Major depressive disorder, recurrent, mild 12/30/2021    Type 2 diabetes mellitus with diabetic neuropathy ([address] 02/06/2020    Osteoarthrosis, localized, primary, knee, left 06/19/2018    Diabetes ([address]     Stroke ([address]     TIA (transient ischemic attack) 04/04/2017    Transient ischemic attack involving right internal carotid artery 02/15/2017    Left-sided weakness 02/14/2017    OA (osteoarthritis) of knee 08/06/2013    Prostate cancer ([address] 03/10/2011    Type II or unspecified type diabetes mellitus without mention of complication, uncontrolled 08/03/2009    Essential hypertension, benign 08/03/2009    Pure hypercholesterolemia 08/03/2009    Obesity 08/03/2009     Current Outpatient Medications   Medication Sig Dispense Refill    aspirin 81 mg chewable tablet Take 1 Tablet by mouth daily. 90 Tablet 3    pravastatin (PRAVACHOL) 10 mg tablet Take 1 Tablet by mouth nightly. 90 Tablet 3    lisinopriL (PRINIVIL, ZESTRIL) 40 mg tablet Take 1 Tablet by mouth daily. 90 Tablet 3    cloNIDine HCL (CATAPRES) 0.1 mg tablet Take 1 Tablet by mouth two (2) times a day. 180 Tablet 3    levETIRAcetam (Keppra) 1,000 mg tablet Take 1 Tablet by mouth two (2) times a day. 180 Tablet 3    Blood-Glucose Meter monitoring kit Check fsbs bid 250.02==one touch 1 Kit 0    insulin nph-regular human rec (HumuLIN 70/30 U-100 KwikPen) 100 unit/mL (70-30) inpn INJECT 12 UNITS SUBCUTANEOUSLY  TWICE DAILY IF  BLOOD  SUGAR  REMAINS  ABOVE  150  AFTER  5  DAYS  INCREASE  TO  14  UNITS  TWICE  DAILY (Patient taking differently: INJECT 18 UNITS SUBCUTANEOUSLY  TWICE DAILY IF  BLOOD  SUGAR  REMAINS  ABOVE  150  AFTER  5  DAYS  INCREASE  TO  14  UNITS  TWICE  DAILY) 15 mL 0    metFORMIN (GLUCOPHAGE) 1,000 mg tablet TAKE 1 TABLET BY MOUTH  TWICE DAILY WITH MEALS 180 Tablet 3    glipiZIDE (GLUCOTROL) 10 mg tablet TAKE 1 TABLET BY MOUTH  TWICE DAILY 180 Tablet 3    escitalopram oxalate (LEXAPRO) 5 mg tablet Take 1 Tablet by mouth daily. 90 Tablet 1    Blood-Glucose Meter monitoring kit USE TO CHECK BLOOD SUGARS DAILY. (Patient not taking: Reported on 7/15/2022) 1 Kit 0    glucose blood VI test strips (blood glucose test) strip USE AS DIRECTED TO CHECK BLOOD SUGARS DAILY (Patient not taking: Reported on 7/15/2022) 100 Strip 1    lancets misc USE AS DIRECTED TO CHECK BLOOD SUGARS DAILY. (Patient not taking: Reported on 7/15/2022) 100 Each 1    amLODIPine (NORVASC) 10 mg tablet Take 1 Tablet by mouth daily.  90 Tablet 0     No Known Allergies   Lab Results   Component Value Date/Time    WBC 5.3 06/10/2022 03:18 AM    HGB 11.9 (L) 06/10/2022 03:18 AM    HCT 38.8 06/10/2022 03:18 AM    PLATELET 148 95/86/4320 03:18 AM    MCV 77.8 (L) 06/10/2022 03:18 AM     Lab Results   Component Value Date/Time Hemoglobin A1c 12.5 (H) 06/06/2022 05:04 AM    Hemoglobin A1c 9.0 (H) 08/31/2021 03:47 PM    Hemoglobin A1c 8.0 (H) 03/05/2019 02:18 PM    Glucose 214 (H) 06/10/2022 03:18 AM    Glucose (POC) 186 (H) 06/13/2022 11:05 AM    Microalb/Creat ratio (ug/mg creat.) 15 02/06/2020 02:32 PM    LDL, calculated 83.8 06/06/2022 05:04 AM    Creatinine (POC) 1.0 07/20/2018 01:08 PM    Creatinine 0.58 (L) 06/13/2022 12:19 AM      Lab Results   Component Value Date/Time    GFR est non-AA >60 06/13/2022 12:19 AM    GFRNA, POC >60 07/20/2018 01:08 PM    GFR est AA >60 06/13/2022 12:19 AM    GFRAA, POC >60 07/20/2018 01:08 PM    Creatinine 0.58 (L) 06/13/2022 12:19 AM    Creatinine (POC) 1.0 07/20/2018 01:08 PM    BUN 8 06/10/2022 03:18 AM    Sodium 142 06/10/2022 03:18 AM    Potassium 3.8 06/10/2022 03:18 AM    Chloride 107 06/10/2022 03:18 AM    CO2 25 06/10/2022 03:18 AM    Magnesium 1.6 06/07/2022 05:09 AM    Phosphorus 2.3 (L) 06/07/2022 05:09 AM     Lab Results   Component Value Date/Time    TSH 1.38 06/08/2022 09:46 AM      Lab Results   Component Value Date/Time    Glucose 214 (H) 06/10/2022 03:18 AM    Glucose (POC) 186 (H) 06/13/2022 11:05 AM         ROS    Physical Exam  Vitals and nursing note reviewed. Constitutional:       General: He is not in acute distress. Appearance: Normal appearance. He is well-developed. He is obese. Comments: Appears stated age, stting in [facility], NAD   HENT:      Head: Normocephalic. Cardiovascular:      Rate and Rhythm: Normal rate and regular rhythm. Heart sounds: Normal heart sounds. Pulmonary:      Effort: Pulmonary effort is normal.      Breath sounds: Normal breath sounds. Abdominal:      Palpations: Abdomen is soft. Neurological:      Mental Status: He is alert. Comments: Very mild Left arm and left weakness         ASSESSMENT and PLAN  Diagnoses and all orders for this visit:    1.  Sepsis, due to unspecified organism, unspecified whether acute organ dysfunction present ([facility]   resolved  2. Saddle embolus of pulmonary artery, unspecified chronicity, unspecified whether acute cor pulmonale present ([facility]   Completed eliquis x 6 months-resolved  3. Major depressive disorder, recurrent, mild ([facility]   Appears controlled   4. Prostate cancer ([facility]    5. Seizure ([facility]  -     levETIRAcetam (Keppra) 1,000 mg tablet; Take 1 Tablet by mouth two (2) times a day. -     REFERRAL TO NEUROLOGY  -     REFERRAL TO NEUROLOGY   ? If needs long tern keppa  6. Hypertension, unspecified type  -     cloNIDine HCL (CATAPRES) 0.1 mg tablet; Take 1 Tablet by mouth two (2) times a day. Take BID with rest of medicines  7. Difficulty walking  -     AMB SUPPLY ORDER-bariatric rollator   Continue HH PT OT    8. Type 2 diabetes mellitus with other specified complication, with long-term current use of insulin ([facility]   Refer to Pharm D for CGM and insulin adjustmnet   Recent a1c 12.5   smbg--rx glucometer sent to pharmacy  Other orders  -     aspirin 81 mg chewable tablet; Take 1 Tablet by mouth daily. -     pravastatin (PRAVACHOL) 10 mg tablet; Take 1 Tablet by mouth nightly. -     lisinopriL (PRINIVIL, ZESTRIL) 40 mg tablet; Take 1 Tablet by mouth daily.  -     Blood-Glucose Meter monitoring kit; Check fsbs bid 250.02==one touch      Follow-up and Dispositions    · Return in about 4 months (around 11/15/2022) for  medicare wellness x 30 minutes.

## 2022-07-19 ENCOUNTER — TELEPHONE (OUTPATIENT)
Dept: INTERNAL MEDICINE CLINIC | Age: 72
End: 2022-07-19

## 2022-07-19 NOTE — TELEPHONE ENCOUNTER
----- Message from Antolin Sharma sent at 7/19/2022  9:54 AM EDT -----  Subject: Message to Provider    QUESTIONS  Information for Provider? pt would like to speak the nurse about his   roller or wheel chair   ---------------------------------------------------------------------------  --------------  [address] [employer]  [phone]; OK to leave message on voicemail  ---------------------------------------------------------------------------  --------------  SCRIPT ANSWERS  undefined

## 2022-07-20 ENCOUNTER — TELEPHONE (OUTPATIENT)
Dept: INTERNAL MEDICINE CLINIC | Age: 72
End: 2022-07-20

## 2022-07-20 ENCOUNTER — PATIENT OUTREACH (OUTPATIENT)
Dept: CASE MANAGEMENT | Age: 72
End: 2022-07-20

## 2022-07-20 NOTE — TELEPHONE ENCOUNTER
Called patient, unable to reach patient at this time. Personal VM, left generic message to return call to office at earliest convenience. Awaiting call back at this time.

## 2022-07-20 NOTE — PROGRESS NOTES
Patient has graduated from the Transitions of Care Coordination  program on 7/20/2022. Patient/family has the ability to self-manage at this time Care management goals have been completed. Patient was not referred to the [facility] team for further management. Patient has Care Transition Nurse's contact information for any further questions, concerns, or needs.   Patients upcoming visits:    Future Appointments   Date Time Provider Yonathan Pena   8/3/2022  2:15 PM Matt Buck [facility] BS AMB   11/11/2022 11:30 AM Naveen Ceron MD [facility] BS AMB

## 2022-07-20 NOTE — TELEPHONE ENCOUNTER
----- Message from [employer] sent at 7/20/2022 12:13 PM EDT -----  Subject: Message to Provider    QUESTIONS  Information for Provider? pt called trying to return a call . asked for a   callback asap   ---------------------------------------------------------------------------  --------------  [address] [employer]  [phone]; OK to leave message on voicemail  ---------------------------------------------------------------------------  --------------  SCRIPT ANSWERS  Relationship to Patient?  Self

## 2022-07-21 RX ORDER — HUMAN INSULIN 100 [IU]/ML
12 INJECTION, SUSPENSION SUBCUTANEOUS 2 TIMES DAILY
Qty: 5 ADJUSTABLE DOSE PRE-FILLED PEN SYRINGE | Refills: 5 | Status: SHIPPED | OUTPATIENT
Start: 2022-07-21

## 2022-07-21 NOTE — TELEPHONE ENCOUNTER
Called patient. ID verified with Name and . Spoke with patient in regards to message received. Patient states that he was contacted by insurance in regards to his insulin not being covered and them wanting to change insulin to Novolin Mix 70/30 flex pen. Writer confirmed information with insurance company at this time. Patient states that he would be okay with switch if provider is okay with it and would like for new order to be sent to his mail order pharmacy at this time.

## 2022-07-28 DIAGNOSIS — R52 PAIN: ICD-10-CM

## 2022-07-28 RX ORDER — GABAPENTIN 300 MG/1
CAPSULE ORAL
Qty: 270 CAPSULE | Refills: 3 | Status: SHIPPED | OUTPATIENT
Start: 2022-07-28

## 2022-07-28 NOTE — TELEPHONE ENCOUNTER
Caller requests Refill of:  gabapentin (NEURONTIN) 300 mg capsule      Please send to:  [facility] ([facility] [address] [facility] - [address]  [phone]      Visit Appointment History:   Future:   11/11/22  Previous: 7/15/22      Caller confirmed instructions and dosages as correct. Caller was advised that Meds will be refilled as soon as possible, however there can be a 48-72 business hour turn around on refill requests.

## 2022-07-28 NOTE — TELEPHONE ENCOUNTER
PCP: Naveen Ceron MD    Last appt: 7/15/2022  Future Appointments   Date Time Provider Yonathan Pena   8/3/2022  2:15 PM BALTA Buck [facility] BS AMB   11/11/2022 11:30 AM Naveen Ceron MD [facility] BS AMB       Requested Prescriptions     Pending Prescriptions Disp Refills    gabapentin (NEURONTIN) 300 mg capsule 270 Capsule 3       Prior labs and Blood pressures:  BP Readings from Last 3 Encounters:   07/15/22 (!) 150/80   06/13/22 (!) 171/93   08/31/21 (!) 146/79     Lab Results   Component Value Date/Time    Sodium 142 06/10/2022 03:18 AM    Potassium 3.8 06/10/2022 03:18 AM    Chloride 107 06/10/2022 03:18 AM    CO2 25 06/10/2022 03:18 AM    Anion gap 10 06/10/2022 03:18 AM    Glucose 214 (H) 06/10/2022 03:18 AM    BUN 8 06/10/2022 03:18 AM    Creatinine 0.58 (L) 06/13/2022 12:19 AM    BUN/Creatinine ratio 9 (L) 06/10/2022 03:18 AM    GFR est AA >60 06/13/2022 12:19 AM    GFR est non-AA >60 06/13/2022 12:19 AM    Calcium 8.3 (L) 06/10/2022 03:18 AM     Lab Results   Component Value Date/Time    Hemoglobin A1c 12.5 (H) 06/06/2022 05:04 AM    Hemoglobin A1c (POC) 13.9 (A) 06/18/2021 12:42 PM     Lab Results   Component Value Date/Time    Cholesterol, total 142 06/06/2022 05:04 AM    HDL Cholesterol 28 06/06/2022 05:04 AM    LDL, calculated 83.8 06/06/2022 05:04 AM    VLDL, calculated 30.2 06/06/2022 05:04 AM    Triglyceride 151 (H) 06/06/2022 05:04 AM    CHOL/HDL Ratio 5.1 (H) 06/06/2022 05:04 AM     No results found for: JOSSELIN Hill    Lab Results   Component Value Date/Time    TSH 1.38 06/08/2022 09:46 AM

## 2022-08-08 NOTE — PROGRESS NOTES
Pharmacy Progress Note - Diabetes Management      Assessment / Plan:   Diabetes Management:  - Per ADA guidelines, Pt's A1c is not at goal of < 7%. - Will send in glucometer and diabetic testing supplies to [facility]. Recommend for patient to check fasting and pre-dinner.   - Will also initiate CGM order to DME  - Will mail home resources discussing s/sx of hypoglycemia and management  - Bring glucometer/log/ CGM reader to all future visits  - Continue Novolog 70/30 insulin 12 units twice daily for now  - Continue metformin 1 gm BID and glipizide 10 mg BID      S/O: Mr. Hilario Valdez is a 81 y.o. male, referred by Dr. Naveen Ceron MD, with a PMH of T2DM, HTN, HLD, Hx CVA, Hx PE, CKD, MDD, osteoporosis, was seen virtually today for diabetes management. Patient's last A1c was 12.5% (Jun 2022), 9% (Aug 2021), 13.9% (Jun 2021), 10.5% (Feb 2020), 8% (March 2019), 6.4% (Sept 2018). PHI verified. Patient had trouble securing transportation today. - Recent hospitalization in June for AMS and fall secondary to sepsis. A1c was 12.5%. - Has not been able to schedule neurology appt. Was told next neurology availability is not until Feb 2023. Still taking Keppra 1 gm BID for seizure. Medication Adherence/Access:  - Brought in home medications including prescription/non-prescriptions:  yes  - Endorses barriers to affording/accessing medications : no  - Uses a pill box/other methods to organize medications: yes  - Endorses adherence to current regimen?: yes  - Uses "[employer] ;  Rx insurance is: [employer]    Diabetes Management:  Current anti-hyperglycemic regimen includes:    - Novolin 70/30 - 12 units BID -- confirmed patient was able to  med  - Metformin 1 gm BID  - Glipizide 10 mg BID     Lisinopril 40 mg daily, Pravastatin 10 mg daily, ASA 81 mg daily  LDL 83 (Jun 2022)    ROS:  Today, Pt endorses:  - Symptoms of Hyperglycemia: none  - Symptoms of Hypoglycemia: none    Blood Glucose Monitoring (BGM) or CGM:  Not checking BG at this time. Last meter broke. Interested in CGM. Lifestyle modification(s):  Reports to eating 3 meals/day. Family has been helping w/ meal  Meals: varies - 1 sandwich + soup; mac & cheese and meat serving  Snack: sugar free or low sugar pudding, fruit cup  Denies sodas/juices intake    The ASCVD Risk score (Darius Bender, et al., 2013) failed to calculate for the following reasons: The patient has a prior MI or stroke diagnosis    Vitals: Wt Readings from Last 3 Encounters:   07/15/22 238 lb (108 kg)   06/13/22 253 lb 1.4 oz (114.8 kg)   08/31/21 254 lb 12.8 oz (115.6 kg)     BP Readings from Last 3 Encounters:   07/15/22 (!) 150/80   06/13/22 (!) 171/93   08/31/21 (!) 146/79     Pulse Readings from Last 3 Encounters:   07/15/22 73   06/13/22 65   08/31/21 67       Past Medical History:   Diagnosis Date    Allergic rhinitis     Arthritis     djd of knees    Chronic pain     Diabetes (HCC)     Hypercholesteremia     Hypertension     Obesity     Stroke (HCC)     left--ring finger/ pinky finger numb sensation     No Known Allergies    Current Outpatient Medications   Medication Sig    gabapentin (NEURONTIN) 300 mg capsule TAKE 1 CAPSULE BY MOUTH 3  TIMES DAILY    insulin nph-regular human rec (NovoLIN 70-30 FlexPen U-100) 100 unit/mL (70-30) inpn 12 Units by SubCUTAneous route two (2) times a day. aspirin 81 mg chewable tablet Take 1 Tablet by mouth daily. pravastatin (PRAVACHOL) 10 mg tablet Take 1 Tablet by mouth nightly. lisinopriL (PRINIVIL, ZESTRIL) 40 mg tablet Take 1 Tablet by mouth daily. cloNIDine HCL (CATAPRES) 0.1 mg tablet Take 1 Tablet by mouth two (2) times a day. levETIRAcetam (Keppra) 1,000 mg tablet Take 1 Tablet by mouth two (2) times a day. Blood-Glucose Meter monitoring kit Check fsbs bid 250.02==one touch    Blood-Glucose Meter monitoring kit USE TO CHECK BLOOD SUGARS DAILY.  (Patient not taking: Reported on 7/15/2022) glucose blood VI test strips (blood glucose test) strip USE AS DIRECTED TO CHECK BLOOD SUGARS DAILY (Patient not taking: Reported on 7/15/2022)    lancets misc USE AS DIRECTED TO CHECK BLOOD SUGARS DAILY. (Patient not taking: Reported on 7/15/2022)    amLODIPine (NORVASC) 10 mg tablet Take 1 Tablet by mouth daily. metFORMIN (GLUCOPHAGE) 1,000 mg tablet TAKE 1 TABLET BY MOUTH  TWICE DAILY WITH MEALS    glipiZIDE (GLUCOTROL) 10 mg tablet TAKE 1 TABLET BY MOUTH  TWICE DAILY    escitalopram oxalate (LEXAPRO) 5 mg tablet Take 1 Tablet by mouth daily. No current facility-administered medications for this visit. Lab Results   Component Value Date/Time    Sodium 142 06/10/2022 03:18 AM    Potassium 3.8 06/10/2022 03:18 AM    Chloride 107 06/10/2022 03:18 AM    CO2 25 06/10/2022 03:18 AM    Anion gap 10 06/10/2022 03:18 AM    Glucose 214 (H) 06/10/2022 03:18 AM    BUN 8 06/10/2022 03:18 AM    Creatinine 0.58 (L) 06/13/2022 12:19 AM    BUN/Creatinine ratio 9 (L) 06/10/2022 03:18 AM    GFR est AA >60 06/13/2022 12:19 AM    GFR est non-AA >60 06/13/2022 12:19 AM    Calcium 8.3 (L) 06/10/2022 03:18 AM    Bilirubin, total 0.3 06/10/2022 03:18 AM    Alk.  phosphatase 84 06/10/2022 03:18 AM    Protein, total 6.4 06/10/2022 03:18 AM    Albumin 2.5 (L) 06/10/2022 03:18 AM    Globulin 3.9 06/10/2022 03:18 AM    A-G Ratio 0.6 (L) 06/10/2022 03:18 AM    ALT (SGPT) 20 06/10/2022 03:18 AM     Lab Results   Component Value Date/Time    Cholesterol, total 142 06/06/2022 05:04 AM    HDL Cholesterol 28 06/06/2022 05:04 AM    LDL, calculated 83.8 06/06/2022 05:04 AM    VLDL, calculated 30.2 06/06/2022 05:04 AM    Triglyceride 151 (H) 06/06/2022 05:04 AM    CHOL/HDL Ratio 5.1 (H) 06/06/2022 05:04 AM     Lab Results   Component Value Date/Time    WBC 5.3 06/10/2022 03:18 AM    HGB 11.9 (L) 06/10/2022 03:18 AM    HCT 38.8 06/10/2022 03:18 AM    PLATELET 419 27/62/9027 03:18 AM    MCV 77.8 (L) 06/10/2022 03:18 AM       Lab Results Component Value Date/Time    Microalb/Creat ratio (ug/mg creat.) 15 2020 02:32 PM       Lab Results   Component Value Date/Time    Hemoglobin A1c 12.5 (H) 2022 05:04 AM    Hemoglobin A1c 9.0 (H) 2021 03:47 PM    Hemoglobin A1c 8.0 (H) 2019 02:18 PM     Hemoglobin A1c (POC)   Date Value Ref Range Status   2021 13.9 (A) 4.8 - 5.6 % Final        CrCl cannot be calculated (Unknown ideal weight. ). Medication reconciliation was completed during the visit. Medications Discontinued During This Encounter   Medication Reason    Blood-Glucose Meter monitoring kit DUPLICATE ORDER    lancets misc Therapy Completed    glucose blood VI test strips (blood glucose test) strip REORDER    Blood-Glucose Meter monitoring kit REORDER     Orders Placed This Encounter    Blood-Glucose Meter monitoring kit     Sig: Use to check blood sugar up to 3 times daily. E11.65. Use brand preferred by insurance     Dispense:  1 Kit     Refill:  0     Ok to change to [employer]ion if insurance does not cover    lancets misc     Sig: Use to check blood sugar up to 3 times daily. E11.65. Use brand preferred by insurance     Dispense:  100 Each     Refill:  11    glucose blood VI test strips (ASCENSIA AUTODISC VI, ONE TOUCH ULTRA TEST VI) strip     Sig: Use to check blood sugar up to 3 times daily. E11.65. Use brand preferred by insurance. Dispense:  100 Strip     Refill:  11     Patient verbalized understanding of the information presented and all of the patients questions were answered. Patient advised to call the office with any additional questions or concerns. Notifications of recommendations will be sent to Dr. Franco Balderas MD for review.     RTC 2 weeks     Thank you for the consult,  Anna Samayoa, PharmD, BCACP, [address] [place] [address] in place: Yes  Recommendation Provided To: Patient/Caregiver: 11 via Virtual Visit  Intervention Detail: Adherence Monitorin, Discontinued Rx: 4, reason: Duplicate Therapy, New Rx: 5, reason: Patient Preference, and Scheduled Appointment  Intervention Accepted By: Patient/Caregiver: 11  Time Spent (min): 45

## 2022-08-09 ENCOUNTER — VIRTUAL VISIT (OUTPATIENT)
Dept: INTERNAL MEDICINE CLINIC | Age: 72
End: 2022-08-09

## 2022-08-09 ENCOUNTER — DOCUMENTATION ONLY (OUTPATIENT)
Dept: INTERNAL MEDICINE CLINIC | Age: 72
End: 2022-08-09

## 2022-08-09 DIAGNOSIS — E11.40 TYPE 2 DIABETES MELLITUS WITH DIABETIC NEUROPATHY, WITHOUT LONG-TERM CURRENT USE OF INSULIN (HCC): Primary | ICD-10-CM

## 2022-08-09 RX ORDER — LANCETS
EACH MISCELLANEOUS
Qty: 100 EACH | Refills: 11 | Status: SHIPPED | OUTPATIENT
Start: 2022-08-09 | End: 2022-09-08 | Stop reason: SDUPTHER

## 2022-08-09 RX ORDER — INSULIN PUMP SYRINGE, 3 ML
EACH MISCELLANEOUS
Qty: 1 KIT | Refills: 0 | Status: SHIPPED | OUTPATIENT
Start: 2022-08-09 | End: 2022-11-02 | Stop reason: SDUPTHER

## 2022-08-09 NOTE — PATIENT INSTRUCTIONS
Continue Novolin 70/30 insulin --- 12 units twice daily  Continue metformin 1000 mg twice daily  Continue glipizide 10 mg twice daily  Resume checking your blood sugar at least twice daily --- fasting and pre-dinner. Bring meter to the next visit. Check with Reyna Soliz  Will work on getting your Clark sensor covered. Remember this will be with a durable medical supplier.

## 2022-08-10 NOTE — PROGRESS NOTES
Pharmacy Progress Note     CGM order for Mr. Lennox Lai 81 y.o. submitted to [facility]. Verdict pending. [employer]. Thank you for the consult,  Anna Esparza, PharmD, BCACP, [facility] in place: Yes  Recommendation Provided To:  Other: 1  Intervention Detail: New Rx: 1, reason: Patient Preference  Intervention Accepted By: Other: 1  Time Spent (min): 15

## 2022-08-11 ENCOUNTER — TELEPHONE (OUTPATIENT)
Dept: INTERNAL MEDICINE CLINIC | Age: 72
End: 2022-08-11

## 2022-08-11 NOTE — TELEPHONE ENCOUNTER
Deric Cutler, Physical Therapist [address]  [phone]          Pt discharging next week from home care services.

## 2022-08-20 NOTE — PROGRESS NOTES
Pharmacy Progress Note - Diabetes Management    Assessment / Plan:   Diabetes Management:  - Per ADA guidelines, Pt's A1c is not at goal of < 7-7.5% (advanced age, fall risk). - Review fasting and post prandial BG goals today <130 and <180 respectively  - Random POC BG in office today lower than target. Would like to keep all BG > 90.  - Emphasize 70/30 insulin should be giving BID rather than TID. Continue with 12 units twice daily. - Continue metformin 1 gm BID and glipizide 10 mg BID   - Bring all home medications and glucometer to the next visit. CGM access remains pending.  - Stay hydrated      S/O: Mr. Aicha Colón is a 81 y.o. male, referred by Dr. Wilbur Gutierrez MD, with a PMH of T2DM, HTN, HLD, Hx CVA, Hx PE, CKD, MDD, osteoporosis, was seen today for diabetes management. Patient's last A1c was 12.5% (Jun 2022), 9% (Aug 2021), 13.9% (Jun 2021), 10.5% (Feb 2020), 8% (March 2019), 6.4% (Sept 2018). Ambulates in a wheelchair. Accompanied by his son. Did not bring in medications today for review. Interim update:   Patient reports to giving his 70/30 insulin three times daily  Reports significant decrease in candy and sweets intake since hospitalization. Drinking water or sugar free ginger ale/coke instead of regular sodas  Scheduled neurology appt for Feb 2023    Current anti-hyperglycemic regimen include(s):    - Novolog 70/30 -- 12 units twice daily --- giving TID  - Metformin 1 gm BID  - Glipizide 10 mg BID     - Lisinopril 40 mg daily, ASA 81 mg daily, pravastatin 10 mg daily     ROS:  Today, Pt endorses:  - Symptoms of Hyperglycemia: polyuria  - Symptoms of Hypoglycemia: none    Blood Glucose Monitoring (BGM) or CGM:  CGM access pending   Reports to checking BG at home. Forgot meter/log today    Son reports fewer readings in the 200s.  Most readings in the low to 100 range  Denies BG < 80    Random post prandial BG in office today:  Last meal was 3-4 hrs ago  Results for orders placed or performed in visit on 08/24/22   AMB POC GLUCOSE BLOOD, BY GLUCOSE MONITORING DEVICE   Result Value Ref Range    Glucose POC 76 70 - 110 mg/dL     Nutrition/Lifestyle Modifications:  Remains sedentary  Last meal this morning: almeida, eggs, sausage, oatmeal + diet ginger ale       The ASCVD Risk score (Amy Rose, et al., 2013) failed to calculate for the following reasons: The patient has a prior MI or stroke diagnosis     Vitals: Wt Readings from Last 3 Encounters:   07/15/22 238 lb (108 kg)   06/13/22 253 lb 1.4 oz (114.8 kg)   08/31/21 254 lb 12.8 oz (115.6 kg)     BP Readings from Last 3 Encounters:   08/24/22 (!) 142/87   07/15/22 (!) 150/80   06/13/22 (!) 171/93     Pulse Readings from Last 3 Encounters:   08/24/22 (!) 54   07/15/22 73   06/13/22 65       Past Medical History:   Diagnosis Date    Allergic rhinitis     Arthritis     djd of knees    Chronic pain     Diabetes (HCC)     Hypercholesteremia     Hypertension     Obesity     Stroke (HCC)     left--ring finger/ pinky finger numb sensation     No Known Allergies    Current Outpatient Medications   Medication Sig    amLODIPine (NORVASC) 10 mg tablet TAKE 1 TABLET EVERY DAY    Blood-Glucose Meter monitoring kit Use to check blood sugar up to 3 times daily. E11.65. Use brand preferred by insurance    lancets misc Use to check blood sugar up to 3 times daily. E11.65. Use brand preferred by insurance    glucose blood VI test strips (ASCENSIA AUTODISC VI, ONE TOUCH ULTRA TEST VI) strip Use to check blood sugar up to 3 times daily. E11.65. Use brand preferred by insurance.    gabapentin (NEURONTIN) 300 mg capsule TAKE 1 CAPSULE BY MOUTH 3  TIMES DAILY    insulin nph-regular human rec (NovoLIN 70-30 FlexPen U-100) 100 unit/mL (70-30) inpn 12 Units by SubCUTAneous route two (2) times a day. aspirin 81 mg chewable tablet Take 1 Tablet by mouth daily. pravastatin (PRAVACHOL) 10 mg tablet Take 1 Tablet by mouth nightly.     lisinopriL (PRINIVIL, ZESTRIL) 40 mg tablet Take 1 Tablet by mouth daily. cloNIDine HCL (CATAPRES) 0.1 mg tablet Take 1 Tablet by mouth two (2) times a day. levETIRAcetam (Keppra) 1,000 mg tablet Take 1 Tablet by mouth two (2) times a day. metFORMIN (GLUCOPHAGE) 1,000 mg tablet TAKE 1 TABLET BY MOUTH  TWICE DAILY WITH MEALS    glipiZIDE (GLUCOTROL) 10 mg tablet TAKE 1 TABLET BY MOUTH  TWICE DAILY    escitalopram oxalate (LEXAPRO) 5 mg tablet Take 1 Tablet by mouth daily. No current facility-administered medications for this visit. Lab Results   Component Value Date/Time    Sodium 142 06/10/2022 03:18 AM    Potassium 3.8 06/10/2022 03:18 AM    Chloride 107 06/10/2022 03:18 AM    CO2 25 06/10/2022 03:18 AM    Anion gap 10 06/10/2022 03:18 AM    Glucose 214 (H) 06/10/2022 03:18 AM    BUN 8 06/10/2022 03:18 AM    Creatinine 0.58 (L) 06/13/2022 12:19 AM    BUN/Creatinine ratio 9 (L) 06/10/2022 03:18 AM    GFR est AA >60 06/13/2022 12:19 AM    GFR est non-AA >60 06/13/2022 12:19 AM    Calcium 8.3 (L) 06/10/2022 03:18 AM    Bilirubin, total 0.3 06/10/2022 03:18 AM    Alk.  phosphatase 84 06/10/2022 03:18 AM    Protein, total 6.4 06/10/2022 03:18 AM    Albumin 2.5 (L) 06/10/2022 03:18 AM    Globulin 3.9 06/10/2022 03:18 AM    A-G Ratio 0.6 (L) 06/10/2022 03:18 AM    ALT (SGPT) 20 06/10/2022 03:18 AM       Lab Results   Component Value Date/Time    Cholesterol, total 142 06/06/2022 05:04 AM    HDL Cholesterol 28 06/06/2022 05:04 AM    LDL, calculated 83.8 06/06/2022 05:04 AM    VLDL, calculated 30.2 06/06/2022 05:04 AM    Triglyceride 151 (H) 06/06/2022 05:04 AM    CHOL/HDL Ratio 5.1 (H) 06/06/2022 05:04 AM       Lab Results   Component Value Date/Time    WBC 5.3 06/10/2022 03:18 AM    HGB 11.9 (L) 06/10/2022 03:18 AM    HCT 38.8 06/10/2022 03:18 AM    PLATELET 062 74/70/0267 03:18 AM    MCV 77.8 (L) 06/10/2022 03:18 AM       Lab Results   Component Value Date/Time    Microalb/Creat ratio (ug/mg creat.) 15 02/06/2020 02:32 PM HbA1c:  Lab Results   Component Value Date/Time    Hemoglobin A1c 12.5 (H) 2022 05:04 AM    Hemoglobin A1c (POC) 13.9 (A) 2021 12:42 PM     No components found for: 2     Last Point of Care HGB A1C  Hemoglobin A1c (POC)   Date Value Ref Range Status   2021 13.9 (A) 4.8 - 5.6 % Final        CrCl cannot be calculated (Unknown ideal weight. ). Medication reconciliation was completed during the visit. There are no discontinued medications. Orders Placed This Encounter    AMB POC GLUCOSE BLOOD, BY GLUCOSE MONITORING DEVICE    acetaminophen (Acetaminophen Extra Strength) 500 mg tablet     Sig: Take 500 mg by mouth daily as needed for Pain (headache). Patient verbalized understanding of the information presented and all of the patients questions were answered. AVS was handed to the patient. Patient advised to call the office with any additional questions or concerns. Notifications of recommendations will be sent to Dr. Tyrese Nevarez MD for review. Patient will return to clinic in 6 week(s) for follow up.      Thank you for the consult,  Татьяна StarkD, BCACP, [facility] in place: Yes  Recommendation Provided To: Patient/Caregiver: 4 via In person  Intervention Detail: Adherence Monitorin, Lab(s) Ordered, New Rx: 1, reason: Patient Preference, and Scheduled Appointment  Intervention Accepted By: Patient/Caregiver: 4  Time Spent (min): 45

## 2022-08-24 ENCOUNTER — OFFICE VISIT (OUTPATIENT)
Dept: INTERNAL MEDICINE CLINIC | Age: 72
End: 2022-08-24
Payer: MEDICARE

## 2022-08-24 VITALS — HEART RATE: 53 BPM | SYSTOLIC BLOOD PRESSURE: 143 MMHG | OXYGEN SATURATION: 99 % | DIASTOLIC BLOOD PRESSURE: 81 MMHG

## 2022-08-24 DIAGNOSIS — E11.40 TYPE 2 DIABETES MELLITUS WITH DIABETIC NEUROPATHY, WITHOUT LONG-TERM CURRENT USE OF INSULIN (HCC): Primary | ICD-10-CM

## 2022-08-24 LAB — GLUCOSE POC: 76 MG/DL (ref 70–110)

## 2022-08-24 PROCEDURE — 82962 GLUCOSE BLOOD TEST: CPT | Performed by: PHARMACIST

## 2022-08-24 RX ORDER — ACETAMINOPHEN 500 MG
500 TABLET ORAL
COMMUNITY

## 2022-08-24 NOTE — PATIENT INSTRUCTIONS
Do not give insulin more than two times daily. Continue 70/30 insulin --- 12 units before breakfast and dinner  Continue metformin 1000 mg twice daily  Continue glipizide 10 mg twice daily with food  Bring in all home medications to the next visit  Bring in blood sugar meter     Your blood sugar goals:  - Fasting (first thing in the morning)  blood sugar: 80 - 130   - 1 to 2 hours after a meal: less than 180     When you experience symptoms of low blood sugar (example: less than 70):  - Confirm low reading by checking your blood sugar.   - Then treat with 15 grams of carbohydrates (one-half cup of juice or regular soda, or 4-5 glucose tablets).   - Wait 15 minutes to recheck blood sugar.   - Then eat a protein containing meal/snack to prevent another low blood sugar episode. (example: peanut butter + crackers)

## 2022-08-27 ENCOUNTER — DOCUMENTATION ONLY (OUTPATIENT)
Dept: INTERNAL MEDICINE CLINIC | Age: 72
End: 2022-08-27

## 2022-08-27 NOTE — PROGRESS NOTES
Pharmacy Progress Note     Received update from [facility] regarding Mr. Shane Sage 81 y.o. 's CGM order. They are not contracted with patient's [place]. Will resubmit CGM order to [facility]. Verdict pending. Thank you for the consult,  Anna Castle, PharmD, BCACP, [place] [address] in [address]: Yes  Recommendation Provided To:  Other: 2  Intervention Detail: New Rx: 2, reason: Cost/Formulary Change and Needs Additional Therapy  Intervention Accepted By: Other: 2  Time Spent (min): 15

## 2022-08-29 ENCOUNTER — TELEPHONE (OUTPATIENT)
Dept: INTERNAL MEDICINE CLINIC | Age: 72
End: 2022-08-29

## 2022-08-29 RX ORDER — PRAVASTATIN SODIUM 20 MG/1
20 TABLET ORAL
Qty: 90 TABLET | Refills: 3 | Status: SHIPPED | OUTPATIENT
Start: 2022-08-29

## 2022-08-29 NOTE — TELEPHONE ENCOUNTER
Patient states he needs a call back in reference to pravastatin (PRAVACHOL) 20 Mg that was prescribed while in the Hospital/Rehab. Patient states Dr. Melvin Martinez has him currently taking 10 Mg & needs to k ow which Dosage he is to stay on. Please call to discuss & advise.  Thank you

## 2022-08-29 NOTE — TELEPHONE ENCOUNTER
Returned pts call, reports when he was in the hospital and rehab they increased his pravastatin to 20 mg, would like to know if he should continue on 20 mg or go back to the 10 mg, if you increase he will need you to call in new prescription

## 2022-08-29 NOTE — TELEPHONE ENCOUNTER
Called pt and advised per Dr. Martin Weiss that His LDL was above goal on 10 mg every day in hospital so yes ,take 20 mg every day--refilled 20 mg

## 2022-09-08 ENCOUNTER — TELEPHONE (OUTPATIENT)
Dept: INTERNAL MEDICINE CLINIC | Age: 72
End: 2022-09-08

## 2022-09-08 RX ORDER — LANCETS
EACH MISCELLANEOUS
Qty: 100 EACH | Refills: 11 | Status: SHIPPED | OUTPATIENT
Start: 2022-09-08 | End: 2022-09-20 | Stop reason: SDUPTHER

## 2022-09-08 NOTE — TELEPHONE ENCOUNTER
----- Message from Dylan Plummer sent at 9/7/2022  4:03 PM EDT -----  Subject: Refill Request    QUESTIONS  Name of Medication? lancets misc  Patient-reported dosage and instructions? for the GuideMe moniter twice   daily up to three  How many days do you have left? 0  Preferred Pharmacy? [facility]  Pharmacy phone number (if available)? [phone]  Additional Information for Provider? Make sure to let pharmacy know to   order the Guide Me type lancets  ---------------------------------------------------------------------------  --------------  CALL BACK INFO  What is the best way for the office to contact you? OK to leave message on   voicemail  Preferred Call Back Phone Number? [phone]  ---------------------------------------------------------------------------  --------------  SCRIPT ANSWERS  Relationship to Patient?  Self

## 2022-09-08 NOTE — ED PROVIDER NOTES
EMERGENCY DEPARTMENT HISTORY AND PHYSICAL EXAM      Date: 7/20/2018  Patient Name: Azul Valdez    History of Presenting Illness     Chief Complaint   Patient presents with    Chest Pain     Pt. complains of chest pain since 0730 that comes and goes and has hx. of knee replacememt in July       History Provided By: Patient, Patient's wife    HPI: Azul Valdez, 74 y.o. male with PMHx significant for HTN, stroke on Plavix, DM, presents by POV with wife to the ED with c/o an episode of non-radiating substernal CP that began ~7:30 AM. He states pain was more of a discomfort than pain was 5/10 in severity. Pt notes chest discomfort resolved after he rested. He reports associated mild nausea and mild SOB that have also resolved. Per wife, his BP was noted to be 178/100 and 158/100, which concerned them. Pt states he is s/p L knee replacement with Dr. Guerin January last month. He reports noticing some L knee swelling today. Pt further adds he has been experiencing mild productive cough lately. He denies a hx of CAD or GI issues. Pt specifically denies any recent fevers, abdominal pain, or vomiting. There are no other complaints, changes, or physical findings at this time. PCP: Erick Bruce MD    Current Facility-Administered Medications   Medication Dose Route Frequency Provider Last Rate Last Dose    azithromycin (ZITHROMAX) tablet 500 mg  500 mg Oral NOW Edd Franklin DO         Current Outpatient Prescriptions   Medication Sig Dispense Refill    azithromycin (ZITHROMAX) 250 mg tablet Take 1 Tab by mouth daily for 4 days. 4 Tab 0    clopidogrel (PLAVIX) 75 mg tab TAKE ONE TABLET BY MOUTH ONCE DAILY    Resume 6/22/18 30 Tab 11    oxyCODONE IR (ROXICODONE) 5 mg immediate release tablet Take 1-2 Tabs by mouth every four (4) hours as needed. Max Daily Amount: 60 mg. 60 Tab 0    senna-docusate (SENNA PLUS) 8.6-50 mg per tablet Take 1 Tab by mouth two (2) times a day.  60 Tab 0    gabapentin (NEURONTIN) 100 mg capsule Take 100 mg by mouth three (3) times daily.  amLODIPine (NORVASC) 10 mg tablet TAKE ONE TABLET BY MOUTH ONCE DAILY 90 Tab 3    lisinopril (PRINIVIL, ZESTRIL) 40 mg tablet 40 mg daily.  alcohol swabs (ALCOHOL PREP SWABS) padm 10 Each by Apply Externally route two (2) times a day. 100 Pad 3    glucose blood VI test strips (ONETOUCH ULTRA TEST) strip Onetouch ultra blue test strips, check blood sugar twice daily Dx e11.9 100 Strip 3    glucose blood VI test strips (ASCENSIA AUTODISC VI, ONE TOUCH ULTRA TEST VI) strip Check fsbs bid E11.65  Contour next 100 Strip 11    hydroCHLOROthiazide (HYDRODIURIL) 25 mg tablet TAKE ONE TABLET BY MOUTH ONCE DAILY 30 Tab 11    metFORMIN (GLUCOPHAGE) 1,000 mg tablet Take 1 Tab by mouth two (2) times daily (with meals). 180 Tab 3    Lancets misc by IntraDERMal route two (2) times a day. 50 Each 11    pravastatin (PRAVACHOL) 20 mg tablet TAKE ONE TABLET BY MOUTH IN THE EVENING 30 Tab 11    insulin NPH/insulin regular (NOVOLIN 70/30, HUMULIN 70/30) 100 unit/mL (70-30) injection 10 units sc bid 10 mL 11    insulin syringe-needle U-100 Dispense ultrafine 0.5 mL syringes with 31 gauge needle 100 Syringe 11    clotrimazole (LOTRIMIN) 1 % topical cream Apply  to affected area two (2) times a day. 15 g 1    glucose blood VI test strips (ASCENSIA AUTODISC VI, ONE TOUCH ULTRA TEST VI) strip Embrace test strip--check fsbs bid 250.02 50 Each 11    glucose blood VI test strips (ASCENSIA CONTOUR) strip Test twice daily. 3 Package 1    Insulin Needles, Disposable, 31 X 5/16 \" ndle Inject once daily. 1 Package 3    glipiZIDE (GLUCOTROL) 10 mg tablet Take 1 Tab by mouth two (2) times a day.  61 Tab 11       Past History     Past Medical History:  Past Medical History:   Diagnosis Date    Allergic rhinitis     Arthritis     djd of knees    Chronic pain     Diabetes ([address]     Hypercholesteremia     Hypertension     Obesity     Stroke ([address]     left--ring finger/ pinky finger numb sensation       Past Surgical History:  Past Surgical History:   Procedure Laterality Date    HX CHOLECYSTECTOMY      laparoscopic       Family History:  Family History   Problem Relation Age of Onset    Diabetes Mother     Hypertension Mother     Kidney Disease Mother     Arthritis-osteo Father     Diabetes Father     No Known Problems Sister     No Known Problems Sister     No Known Problems Sister     No Known Problems Sister     Cancer Sister      cancer       Social History:  Social History   Substance Use Topics    Smoking status: Former Smoker     Years: 2.00     Quit date: 12/5/2017    Smokeless tobacco: Never Used    Alcohol use No      Comment: stopped 12/17-used to drink wine (7 days a week       Allergies:  No Known Allergies      Review of Systems   Review of Systems   Constitutional: Negative. Negative for appetite change, chills, fatigue and fever. HENT: Negative. Negative for congestion, rhinorrhea, sinus pressure and sore throat. Eyes: Negative. Respiratory: Positive for cough and shortness of breath. Negative for choking, chest tightness and wheezing. Cardiovascular: Positive for chest pain. Negative for palpitations and leg swelling. Gastrointestinal: Positive for nausea. Negative for abdominal pain, constipation and vomiting. Endocrine: Negative. Genitourinary: Negative. Negative for difficulty urinating, dysuria, flank pain and urgency. Musculoskeletal: Negative. Skin: Negative. Neurological: Negative. Negative for dizziness, speech difficulty, weakness, light-headedness, numbness and headaches. Psychiatric/Behavioral: Negative. All other systems reviewed and are negative. Physical Exam   Physical Exam   Constitutional: He is oriented to person, place, and time. He appears well-developed and well-nourished. No distress. HENT:   Head: Normocephalic and atraumatic.    Mouth/Throat: Oropharynx is clear and moist. No oropharyngeal exudate. Eyes: Conjunctivae and EOM are normal. Pupils are equal, round, and reactive to light. Neck: Normal range of motion. Neck supple. No JVD present. No tracheal deviation present. Cardiovascular: Normal rate, regular rhythm, normal heart sounds and intact distal pulses. No murmur heard. Pulmonary/Chest: Effort normal and breath sounds normal. No stridor. No respiratory distress. He has no wheezes. He has no rales. He exhibits no tenderness. Abdominal: Soft. He exhibits no distension. There is no tenderness. There is no rebound and no guarding. Obese     Musculoskeletal: Normal range of motion. Surgical scar over left knee, surgical site c/d/i, mild swelling noted, no overlying redness, there is trace edema to left lower leg   Neurological: He is alert and oriented to person, place, and time. No cranial nerve deficit. No gross motor or sensory deficits    Skin: Skin is warm and dry. He is not diaphoretic. Psychiatric: He has a normal mood and affect. His behavior is normal.   Nursing note and vitals reviewed.       Diagnostic Study Results     Labs -     Recent Results (from the past 12 hour(s))   EKG, 12 LEAD, INITIAL    Collection Time: 07/20/18 12:48 PM   Result Value Ref Range    Ventricular Rate 81 BPM    Atrial Rate 81 BPM    P-R Interval 170 ms    QRS Duration 74 ms    Q-T Interval 356 ms    QTC Calculation (Bezet) 413 ms    Calculated P Axis 18 degrees    Calculated R Axis -7 degrees    Calculated T Axis 5 degrees    Diagnosis       Sinus rhythm with occasional premature ventricular complexes  Nonspecific T wave abnormality      Confirmed by Edwige Tidwell MD, Sherren Slight ([id number]) on 7/20/2018 2:30:37 PM     POC CREATININE    Collection Time: 07/20/18  1:08 PM   Result Value Ref Range    Creatinine (POC) 1.0 0.6 - 1.3 mg/dL    GFRAA, POC >60 >60 ml/min/1.73m2    GFRNA, POC >60 >60 ml/min/1.73m2   CBC WITH AUTOMATED DIFF    Collection Time: 07/20/18  1:11 PM   Result Value Ref Range WBC 5.5 4.1 - 11.1 K/uL    RBC 4.70 4. 10 - 5.70 M/uL    HGB 12.1 12.1 - 17.0 g/dL    HCT 38.5 36.6 - 50.3 %    MCV 81.9 80.0 - 99.0 FL    MCH 25.7 (L) 26.0 - 34.0 PG    MCHC 31.4 30.0 - 36.5 g/dL    RDW 14.9 (H) 11.5 - 14.5 %    PLATELET 672 808 - 792 K/uL    MPV 9.6 8.9 - 12.9 FL    NRBC 0.0 0  WBC    ABSOLUTE NRBC 0.00 0.00 - 0.01 K/uL    NEUTROPHILS 56 32 - 75 %    LYMPHOCYTES 35 12 - 49 %    MONOCYTES 6 5 - 13 %    EOSINOPHILS 2 0 - 7 %    BASOPHILS 0 0 - 1 %    IMMATURE GRANULOCYTES 0 0.0 - 0.5 %    ABS. NEUTROPHILS 3.1 1.8 - 8.0 K/UL    ABS. LYMPHOCYTES 1.9 0.8 - 3.5 K/UL    ABS. MONOCYTES 0.3 0.0 - 1.0 K/UL    ABS. EOSINOPHILS 0.1 0.0 - 0.4 K/UL    ABS. BASOPHILS 0.0 0.0 - 0.1 K/UL    ABS. IMM. GRANS. 0.0 0.00 - 0.04 K/UL    DF AUTOMATED     METABOLIC PANEL, COMPREHENSIVE    Collection Time: 07/20/18  1:11 PM   Result Value Ref Range    Sodium 141 136 - 145 mmol/L    Potassium 3.5 3.5 - 5.1 mmol/L    Chloride 105 97 - 108 mmol/L    CO2 25 21 - 32 mmol/L    Anion gap 11 5 - 15 mmol/L    Glucose 91 65 - 100 mg/dL    BUN 19 6 - 20 MG/DL    Creatinine 1.18 0.70 - 1.30 MG/DL    BUN/Creatinine ratio 16 12 - 20      GFR est AA >60 >60 ml/min/1.73m2    GFR est non-AA >60 >60 ml/min/1.73m2    Calcium 9.5 8.5 - 10.1 MG/DL    Bilirubin, total 0.4 0.2 - 1.0 MG/DL    ALT (SGPT) 23 12 - 78 U/L    AST (SGOT) 13 (L) 15 - 37 U/L    Alk.  phosphatase 92 45 - 117 U/L    Protein, total 8.0 6.4 - 8.2 g/dL    Albumin 3.6 3.5 - 5.0 g/dL    Globulin 4.4 (H) 2.0 - 4.0 g/dL    A-G Ratio 0.8 (L) 1.1 - 2.2     CK W/ REFLX CKMB    Collection Time: 07/20/18  1:11 PM   Result Value Ref Range    CK 37 (L) 39 - 308 U/L   TROPONIN I    Collection Time: 07/20/18  1:11 PM   Result Value Ref Range    Troponin-I, Qt. <0.05 <0.05 ng/mL       Radiologic Studies -   DUPLEX LOWER EXT VENOUS LEFT   Final Result   EXAM:  DUPLEX LOWER EXT VENOUS LEFT     INDICATION:  Leg swelling, pain, DVT suspected     COMPARISON: None.     FINDINGS: Duplex Doppler sonography of the left lower extremity was performed  from the groin to the calf. The left common femoral, femoral and popliteal veins  are compressible with normal color-flow and wave forms and response to  physiologic maneuvers including Valsalva and augmentation.     IMPRESSION  IMPRESSION: No deep venous thrombosis identified. Medical Decision Making   I am the first provider for this patient. I reviewed the vital signs, available nursing notes, past medical history, past surgical history, family history and social history. Vital Signs-Reviewed the patient's vital signs. Patient Vitals for the past 12 hrs:   Temp Pulse Resp BP SpO2   07/20/18 1258 - 80 16 139/83 96 %   07/20/18 1241 97.7 °F (36.5 °C) 85 16 (!) 154/108 100 %       Pulse Oximetry Analysis - 100% on RA    EKG interpretation: (Preliminary)  Sinus, rate 81, PVCs, normal axis/pr/qrs, NSST, Miles Flake, DO      Records Reviewed: Nursing Notes, Old Medical Records, Previous Radiology Studies and Previous Laboratory Studies    Provider Notes (Medical Decision Making):   DDx: ACS, electrolyte abnormality, DVT    ED Course:   Initial assessment performed. The patients presenting problems have been discussed, and they are in agreement with the care plan formulated and outlined with them. I have encouraged them to ask questions as they arise throughout their visit. Upon arrival to the ED pt does not have any active discomfort, there is no SOA, no pleuritic pain, Pain was very brief and was not consistent with Cardiac source. Pt has had a cough, productive at times. 3:00 PM  Reviewed all labs and imaging with pt and his wife. Will discharge pt on zithromax and outpatient f/u with PCP and cardiology. Discharge Note:  3:07 PM  The patient has been re-evaluated and is ready for discharge. Reviewed available results with patient. Counseled patient on diagnosis and care plan.  Patient has expressed understanding, and all questions have been answered. Patient agrees with plan and agrees to follow up as recommended, or to return to the ED if their symptoms worsen. Discharge instructions have been provided and explained to the patient, along with reasons to return to the ED. Pt has a follow up appt with Dr. Sherri Herrera to have his post-op knee evaluated. PLAN:  1. Current Discharge Medication List      START taking these medications    Details   azithromycin (ZITHROMAX) 250 mg tablet Take 1 Tab by mouth daily for 4 days. Qty: 4 Tab, Refills: 0           2. Follow-up Information     Follow up With Details Buzz Paez MD   [address]  [facility] [address]  [phone]      Frederick Ibarra MD   [place] [address]       [facility]  [address]  [address] [address] [phone]          Return to ED if worse     Diagnosis     Clinical Impression:   1. Cough    2. Chest pain, unspecified type        Attestations: This note is prepared by Miller Spain, acting as Scribe for Lizett Fleming DO. The scribe's documentation has been prepared under my direction and personally reviewed by me in its entirety. I confirm that the note above accurately reflects all work, treatment, procedures, and medical decision making performed by me.   Lizett Fleming DO Purse String (Simple) Text: Given the location of the defect and the characteristics of the surrounding skin a purse string simple closure was deemed most appropriate.  Undermining was performed circumferentially around the surgical defect.  A purse string suture was then placed and tightened.

## 2022-09-08 NOTE — TELEPHONE ENCOUNTER
Pt transferred from [address] [facility], needs to get in touch with Shafer Plate, needs to verify how long he will check blood sugar in morning and afternoon, please call pt

## 2022-09-08 NOTE — TELEPHONE ENCOUNTER
Pharmacy Progress Note - Telephone Call    Mr. Iza Murry 81 y.o. was contacted via an outbound telephone call regarding his previous call today. A voicemail was left for patient to return my call. Patient is currently on 70/30 insulin BID. Will need to continue to monitor POC BG at least twice daily. CGM access remains pending with insurance and DME authorization. [employer] denied application. [place] [id number] pending.        Thank you,  Anna Yang, PharmD, BCACP, [address] [place] [address] in place: Yes  Recommendation Provided To: Patient/Caregiver: 1 via Telephone  Intervention Detail: Adherence Monitorin  Intervention Accepted By: Patient/Caregiver: 1  Time Spent (min): 15

## 2022-09-08 NOTE — TELEPHONE ENCOUNTER
Future Appointments:  Future Appointments   Date Time Provider Yonathan Pena   10/5/2022 10:00 AM BALTA Rosado [facility] BS AMB   11/11/2022 11:30 AM Jorge Aguilar MD [facility] BS AMB   2/10/2023 10:00 AM Ngozi Lozano MD [facility] BS AMB        Last Appointment With Me:  7/15/2022     Requested Prescriptions     Pending Prescriptions Disp Refills    lancets misc 100 Each 11     Sig: Use to check blood sugar up to 3 times daily. E11.65.  Use brand preferred by insurance

## 2022-09-13 ENCOUNTER — DOCUMENTATION ONLY (OUTPATIENT)
Dept: INTERNAL MEDICINE CLINIC | Age: 72
End: 2022-09-13

## 2022-09-13 NOTE — PROGRESS NOTES
Pharmacy Progress Note     Clinical notes faxed to [facility] per DME request for Mr. Juvenal Adkins 72 y.o.'s CGM order. Thank you for the consult,  Anna Medina, PharmD, BCACP, [address] in place: Yes  Recommendation Provided To:  Other: 1  Intervention Detail: Patient Access Assistance/Sample Provided  Intervention Accepted By: Other: 1  Time Spent (min): 5

## 2022-09-20 RX ORDER — LANCETS
EACH MISCELLANEOUS
Qty: 100 EACH | Refills: 11 | Status: SHIPPED | OUTPATIENT
Start: 2022-09-20

## 2022-09-20 NOTE — TELEPHONE ENCOUNTER
Future Appointments:  Future Appointments   Date Time Provider Yonathan Pena   10/5/2022 10:00 AM Matt Camargo [facility] BS AMB   11/11/2022 11:30 AM Mandeep Monaco MD [facility] BS AMB   2/10/2023 10:00 AM Consuelo Lozano MD [facility] BS AMB        Last Appointment With Me:  7/15/2022     Requested Prescriptions     Pending Prescriptions Disp Refills    lancets misc 100 Each 11     Sig: Use to check blood sugar up to 3 times daily. E11.65.  Use brand preferred by insurance

## 2022-09-22 ENCOUNTER — DOCUMENTATION ONLY (OUTPATIENT)
Dept: INTERNAL MEDICINE CLINIC | Age: 72
End: 2022-09-22

## 2022-09-22 NOTE — PROGRESS NOTES
Pharmacy Progress Note     [facility] shared update regarding Mr. Iza Murry 81 y.o. 's CGM access  Patient does not meet Medicare's criteria. Still waiting for [employer]'s final decision.        Thank you for the consult,  Anna Yang, PharmD, BCACP, [facility] in place: Yes  Time Spent (min): 5

## 2022-10-10 RX ORDER — BLOOD SUGAR DIAGNOSTIC
STRIP MISCELLANEOUS
Qty: 100 STRIP | Refills: 2 | Status: SHIPPED | OUTPATIENT
Start: 2022-10-10

## 2022-10-10 RX ORDER — LANCETS
EACH MISCELLANEOUS
Qty: 1 EACH | Refills: 11 | Status: SHIPPED | OUTPATIENT
Start: 2022-10-10

## 2022-10-10 NOTE — TELEPHONE ENCOUNTER
Patients son called in stating he received a new machine called accu-check guide me and patients needs test strips and pens. Patient is receiving pens that go with his old machine which is incorrect from [employer] and he has contacted [employer] multiple time to notify them and they are telling him that they have tried reaching out to us to correct.

## 2022-10-13 ENCOUNTER — TELEPHONE (OUTPATIENT)
Dept: INTERNAL MEDICINE CLINIC | Age: 72
End: 2022-10-13

## 2022-10-13 NOTE — TELEPHONE ENCOUNTER
Pt son states trying to send fax to office and getting fails for nurse station fax ending [phone] provided fax to phone room: [phone] caller he can try to send fmla form to phone room fax    Callers states understanding and thank you.

## 2022-10-14 NOTE — TELEPHONE ENCOUNTER
Fax received at 4:17 pm 10/13. Scanned blank forms into media and placed original in providers box for completion on 10/14    Called Mr Mely Smith and advised of receipt. No answer, left voicemail that forms received and will be completed as soon as able. Caller returned call, 2 identifiers confirmed, and caller was advised that there can be a 2 week turn around time on forms but it will be completed as soon as possible. Caller states understanding.     Please see forms and complete, due date on forms is 10/17

## 2022-10-21 ENCOUNTER — TELEPHONE (OUTPATIENT)
Dept: INTERNAL MEDICINE CLINIC | Age: 72
End: 2022-10-21

## 2022-10-21 RX ORDER — LANCETS
EACH MISCELLANEOUS
Qty: 100 EACH | Refills: 11 | Status: SHIPPED | OUTPATIENT
Start: 2022-10-21

## 2022-10-21 RX ORDER — LANCING DEVICE
1 EACH MISCELLANEOUS 2 TIMES DAILY
Qty: 1 EACH | Refills: 0 | Status: SHIPPED | OUTPATIENT
Start: 2022-10-21

## 2022-10-21 RX ORDER — LANCETS
EACH MISCELLANEOUS
Qty: 100 LANCET | Refills: 3 | Status: SHIPPED | OUTPATIENT
Start: 2022-10-21

## 2022-10-21 NOTE — TELEPHONE ENCOUNTER
Called, spoke to Colin(HIPAA). Two pt identifiers confirmed. Flores Goldberg informed [place] paperwork has been [place] and faxed to [place] [phone]. Fax confirmation received. Flores Goldberg request to have the FMLA form placed in the mail. FMLA paperwork placed in the mail. Flores Goldberg verbalized understanding of information discussed w/ no further questions at this time.      [facility], VIKKIN

## 2022-11-02 RX ORDER — INSULIN PUMP SYRINGE, 3 ML
EACH MISCELLANEOUS
Qty: 1 KIT | Refills: 0 | Status: SHIPPED | OUTPATIENT
Start: 2022-11-02

## 2022-11-02 NOTE — TELEPHONE ENCOUNTER
----- Message from [facility] sent at 11/2/2022  8:55 AM EDT -----  Subject: Message to Provider    QUESTIONS  Information for Provider? Spoke with patient and his son Duarte Garcia, they   state that his glucose meter is not working. It is giving them an error   message. The name of this brand name Accu-chek. They are asking to have a   prescription sent to pharmacy for a new one. Please return call to   address, thank you.  ---------------------------------------------------------------------------  --------------  Ky Fraction INFO  [phone]; OK to leave message on voicemail  ---------------------------------------------------------------------------  --------------  SCRIPT ANSWERS  Relationship to Patient?  Self

## 2022-11-02 NOTE — TELEPHONE ENCOUNTER
Future Appointments:  Future Appointments   Date Time Provider Yonathan Munizi   11/11/2022 11:15 AM Maylin Alicia MD [facility] BS AMB        Last Appointment With Me:  7/15/2022     Requested Prescriptions     Pending Prescriptions Disp Refills    Blood-Glucose Meter monitoring kit 1 Kit 0     Sig: Use to check blood sugar up to 3 times daily. E11.65.  Use brand preferred by insurance

## 2022-11-11 ENCOUNTER — OFFICE VISIT (OUTPATIENT)
Dept: INTERNAL MEDICINE CLINIC | Age: 72
End: 2022-11-11
Payer: MEDICARE

## 2022-11-11 VITALS
OXYGEN SATURATION: 99 % | HEART RATE: 66 BPM | WEIGHT: 238 LBS | DIASTOLIC BLOOD PRESSURE: 78 MMHG | RESPIRATION RATE: 16 BRPM | BODY MASS INDEX: 34.07 KG/M2 | SYSTOLIC BLOOD PRESSURE: 120 MMHG | TEMPERATURE: 97.9 F | HEIGHT: 70 IN

## 2022-11-11 DIAGNOSIS — Z00.00 MEDICARE ANNUAL WELLNESS VISIT, SUBSEQUENT: ICD-10-CM

## 2022-11-11 DIAGNOSIS — E78.5 DYSLIPIDEMIA: ICD-10-CM

## 2022-11-11 DIAGNOSIS — Z79.4 TYPE 2 DIABETES MELLITUS WITH OTHER SPECIFIED COMPLICATION, WITH LONG-TERM CURRENT USE OF INSULIN (HCC): Primary | ICD-10-CM

## 2022-11-11 DIAGNOSIS — Z12.5 PROSTATE CANCER SCREENING: ICD-10-CM

## 2022-11-11 DIAGNOSIS — Z23 ENCOUNTER FOR IMMUNIZATION: ICD-10-CM

## 2022-11-11 DIAGNOSIS — E11.69 TYPE 2 DIABETES MELLITUS WITH OTHER SPECIFIED COMPLICATION, WITH LONG-TERM CURRENT USE OF INSULIN (HCC): Primary | ICD-10-CM

## 2022-11-11 DIAGNOSIS — I10 HYPERTENSION, UNSPECIFIED TYPE: ICD-10-CM

## 2022-11-11 DIAGNOSIS — Z23 NEEDS FLU SHOT: ICD-10-CM

## 2022-11-11 PROCEDURE — 99214 OFFICE O/P EST MOD 30 MIN: CPT | Performed by: INTERNAL MEDICINE

## 2022-11-11 PROCEDURE — G8427 DOCREV CUR MEDS BY ELIG CLIN: HCPCS | Performed by: INTERNAL MEDICINE

## 2022-11-11 PROCEDURE — 3046F HEMOGLOBIN A1C LEVEL >9.0%: CPT | Performed by: INTERNAL MEDICINE

## 2022-11-11 PROCEDURE — 1101F PT FALLS ASSESS-DOCD LE1/YR: CPT | Performed by: INTERNAL MEDICINE

## 2022-11-11 PROCEDURE — G0009 ADMIN PNEUMOCOCCAL VACCINE: HCPCS | Performed by: INTERNAL MEDICINE

## 2022-11-11 PROCEDURE — G8752 SYS BP LESS 140: HCPCS | Performed by: INTERNAL MEDICINE

## 2022-11-11 PROCEDURE — 90694 VACC AIIV4 NO PRSRV 0.5ML IM: CPT | Performed by: INTERNAL MEDICINE

## 2022-11-11 PROCEDURE — G8417 CALC BMI ABV UP PARAM F/U: HCPCS | Performed by: INTERNAL MEDICINE

## 2022-11-11 PROCEDURE — 2022F DILAT RTA XM EVC RTNOPTHY: CPT | Performed by: INTERNAL MEDICINE

## 2022-11-11 PROCEDURE — G0008 ADMIN INFLUENZA VIRUS VAC: HCPCS | Performed by: INTERNAL MEDICINE

## 2022-11-11 PROCEDURE — G9717 DOC PT DX DEP/BP F/U NT REQ: HCPCS | Performed by: INTERNAL MEDICINE

## 2022-11-11 PROCEDURE — G0439 PPPS, SUBSEQ VISIT: HCPCS | Performed by: INTERNAL MEDICINE

## 2022-11-11 PROCEDURE — G8754 DIAS BP LESS 90: HCPCS | Performed by: INTERNAL MEDICINE

## 2022-11-11 PROCEDURE — 90732 PPSV23 VACC 2 YRS+ SUBQ/IM: CPT | Performed by: INTERNAL MEDICINE

## 2022-11-11 PROCEDURE — G8536 NO DOC ELDER MAL SCRN: HCPCS | Performed by: INTERNAL MEDICINE

## 2022-11-11 PROCEDURE — 3017F COLORECTAL CA SCREEN DOC REV: CPT | Performed by: INTERNAL MEDICINE

## 2022-11-11 RX ORDER — LANCING DEVICE
1 EACH MISCELLANEOUS 2 TIMES DAILY
Qty: 1 EACH | Refills: 0 | Status: SHIPPED | OUTPATIENT
Start: 2022-11-11

## 2022-11-11 RX ORDER — HYDROCHLOROTHIAZIDE 25 MG/1
25 TABLET ORAL DAILY
COMMUNITY
Start: 2022-10-24

## 2022-11-11 RX ORDER — BLOOD SUGAR DIAGNOSTIC
STRIP MISCELLANEOUS
Qty: 100 STRIP | Refills: 2 | Status: SHIPPED | OUTPATIENT
Start: 2022-11-11

## 2022-11-11 RX ORDER — INSULIN PUMP SYRINGE, 3 ML
EACH MISCELLANEOUS
Qty: 1 KIT | Refills: 0 | Status: SHIPPED | OUTPATIENT
Start: 2022-11-11

## 2022-11-11 RX ORDER — FUROSEMIDE 20 MG/1
20 TABLET ORAL
Qty: 30 TABLET | Refills: 1 | Status: SHIPPED | OUTPATIENT
Start: 2022-11-11

## 2022-11-11 NOTE — PROGRESS NOTES
HISTORY OF PRESENT ILLNESS  Drew Zuñiga is a 81 y.o. male. HPI  FU DM-2 HTN anemia  Hx CVA with left weakness, obesity, hx saddle PE and medicare wellness---  Last a1c 9.0 last year  Fsbs on 70/30 12 units bid around 50-60 per pt  Has 3 meals but careful with diet per pt--Son limits his carbs in diet  Needs new glucometer and lancets  Depression controlled  Hsi son lives with him  Uses wheelchair  C/o left hand and foot edema -his sweak side from CVA  Last OV  Here for FLY-admitted last month with fall and AMS due to severe sepsis--treated with IV abx with improvement  Blood cx and CSF negative  ? Seizure contributing and or postictal?  keppra started  Also pravastatin started  [facility] in SNF reutned home 7/1/22--clonidine 0.1 mg bid prn was added  Admit date: 6/5/2022  Discharge date and time: 6/13/2022       Patient Active Problem List    Diagnosis Date Noted    Severe sepsis ([facility] 06/05/2022    Major depressive disorder, recurrent, mild 12/30/2021    Type 2 diabetes mellitus with diabetic neuropathy ([address] 02/06/2020    Osteoarthrosis, localized, primary, knee, left 06/19/2018    Diabetes ([address]     Stroke ([address]     TIA (transient ischemic attack) 04/04/2017    Transient ischemic attack involving right internal carotid artery 02/15/2017    Left-sided weakness 02/14/2017    OA (osteoarthritis) of knee 08/06/2013    Prostate cancer ([address] 03/10/2011    Type II or unspecified type diabetes mellitus without mention of complication, uncontrolled 08/03/2009    Essential hypertension, benign 08/03/2009    Pure hypercholesterolemia 08/03/2009    Obesity 08/03/2009     Current Outpatient Medications   Medication Sig Dispense Refill    Blood-Glucose Meter monitoring kit Use to check blood sugar up to 3 times daily. E11.65. Use brand preferred by insurance 1 Kit 0    Lancing Device (Lancing Device with Lancets) misc 1 Each by Does Not Apply route two (2) times a day.  1 Each 0    lancets misc Check Blood sugar Bid 100 Lancet 3    lancets (Accu-Chek Softclix Lancets) misc Check fsbs bid 250.02  E11 100 Each 11    glucose blood VI test strips (Accu-Chek Guide test strips) strip Check fsbs bid 250.02 100 Strip 2    lancets misc For accucheck guide me 1 Each 11    lancets misc Use to check blood sugar up to 3 times daily. E11.65. Use brand preferred by insurance Guide Me Lancets 100 Each 11    pravastatin (PRAVACHOL) 20 mg tablet Take 1 Tablet by mouth nightly. 90 Tablet 3    acetaminophen (TYLENOL) 500 mg tablet Take 500 mg by mouth daily as needed for Pain (headache). amLODIPine (NORVASC) 10 mg tablet TAKE 1 TABLET EVERY DAY 90 Tablet 3    glucose blood VI test strips (ASCENSIA AUTODISC VI, ONE TOUCH ULTRA TEST VI) strip Use to check blood sugar up to 3 times daily. E11.65. Use brand preferred by insurance. 100 Strip 11    gabapentin (NEURONTIN) 300 mg capsule TAKE 1 CAPSULE BY MOUTH 3  TIMES DAILY 270 Capsule 3    insulin nph-regular human rec (NovoLIN 70-30 FlexPen U-100) 100 unit/mL (70-30) inpn 12 Units by SubCUTAneous route two (2) times a day. 5 Adjustable Dose Pre-filled Pen Syringe 5    aspirin 81 mg chewable tablet Take 1 Tablet by mouth daily. 90 Tablet 3    lisinopriL (PRINIVIL, ZESTRIL) 40 mg tablet Take 1 Tablet by mouth daily. 90 Tablet 3    cloNIDine HCL (CATAPRES) 0.1 mg tablet Take 1 Tablet by mouth two (2) times a day. 180 Tablet 3    levETIRAcetam (Keppra) 1,000 mg tablet Take 1 Tablet by mouth two (2) times a day. 180 Tablet 3    metFORMIN (GLUCOPHAGE) 1,000 mg tablet TAKE 1 TABLET BY MOUTH  TWICE DAILY WITH MEALS 180 Tablet 3    glipiZIDE (GLUCOTROL) 10 mg tablet TAKE 1 TABLET BY MOUTH  TWICE DAILY 180 Tablet 3    escitalopram oxalate (LEXAPRO) 5 mg tablet Take 1 Tablet by mouth daily. 90 Tablet 1    hydroCHLOROthiazide (HYDRODIURIL) 25 mg tablet Take 25 mg by mouth daily.        No Known Allergies   Lab Results   Component Value Date/Time    WBC 5.3 06/10/2022 03:18 AM    HGB 11.9 (L) 06/10/2022 03:18 AM HCT 38.8 06/10/2022 03:18 AM    PLATELET 717 51/76/4316 03:18 AM    MCV 77.8 (L) 06/10/2022 03:18 AM     Lab Results   Component Value Date/Time    Hemoglobin A1c 12.5 (H) 06/06/2022 05:04 AM    Hemoglobin A1c 9.0 (H) 08/31/2021 03:47 PM    Hemoglobin A1c 8.0 (H) 03/05/2019 02:18 PM    Glucose 214 (H) 06/10/2022 03:18 AM    Glucose (POC) 186 (H) 06/13/2022 11:05 AM    Glucose POC 76 08/24/2022 01:15 PM    Microalb/Creat ratio (ug/mg creat.) 15 02/06/2020 02:32 PM    LDL, calculated 83.8 06/06/2022 05:04 AM    Creatinine (POC) 1.0 07/20/2018 01:08 PM    Creatinine 0.58 (L) 06/13/2022 12:19 AM      Lab Results   Component Value Date/Time    Cholesterol, total 142 06/06/2022 05:04 AM    HDL Cholesterol 28 06/06/2022 05:04 AM    LDL, calculated 83.8 06/06/2022 05:04 AM    Triglyceride 151 (H) 06/06/2022 05:04 AM    CHOL/HDL Ratio 5.1 (H) 06/06/2022 05:04 AM     Lab Results   Component Value Date/Time    GFR est non-AA >60 06/13/2022 12:19 AM    GFRNA, POC >60 07/20/2018 01:08 PM    GFR est AA >60 06/13/2022 12:19 AM    GFRAA, POC >60 07/20/2018 01:08 PM    Creatinine 0.58 (L) 06/13/2022 12:19 AM    Creatinine (POC) 1.0 07/20/2018 01:08 PM    BUN 8 06/10/2022 03:18 AM    Sodium 142 06/10/2022 03:18 AM    Potassium 3.8 06/10/2022 03:18 AM    Chloride 107 06/10/2022 03:18 AM    CO2 25 06/10/2022 03:18 AM    Magnesium 1.6 06/07/2022 05:09 AM    Phosphorus 2.3 (L) 06/07/2022 05:09 AM     Lab Results   Component Value Date/Time    TSH 1.38 06/08/2022 09:46 AM         ROS    Physical Exam  Vitals and nursing note reviewed. Constitutional:       General: He is not in acute distress. Appearance: Normal appearance. He is well-developed. Comments: Appears stated age, obese, sitting in [facility]   HENT:      Head: Normocephalic. Cardiovascular:      Rate and Rhythm: Normal rate and regular rhythm. Heart sounds: Normal heart sounds.    Pulmonary:      Effort: Pulmonary effort is normal.      Breath sounds: Normal breath sounds. Abdominal:      Palpations: Abdomen is soft. Musculoskeletal:      Left lower leg: Edema present. Comments: Left hand edema   Neurological:      Mental Status: He is alert. ASSESSMENT and PLAN    ICD-10-CM ICD-9-CM    1. Type 2 diabetes mellitus with other specified complication, with long-term current use of insulin (HCC)  E11.69 250.80 MICROALBUMIN, UR, RAND W/ MICROALB/CREAT RATIO    Z79.4 V58.67 HEMOGLOBIN A1C WITH EAG      Blood-Glucose Meter monitoring kit      METABOLIC PANEL, BASIC      METABOLIC PANEL, BASIC      HEMOGLOBIN A1C WITH EAG      2. Hypertension, unspecified type  S74 511.1 METABOLIC PANEL, BASIC      METABOLIC PANEL, BASIC      3. Dyslipidemia  E78.5 272.4       4. Prostate cancer screening  Z12.5 V76.44 PSA SCREENING (SCREENING)      PSA SCREENING (SCREENING)      5. Encounter for immunization  Z23 V03.89 PNEUMOCOCCAL, PPSV23, (AGE 2 YRS+), SC/IM      6. Needs flu shot  Z23 V04.81 INFLUENZA, FLUAD, (AGE 65 Y+), IM, PF, 0.5 ML      This is the Subsequent Medicare Annual Wellness Exam, performed 12 months or more after the Initial AWV or the last Subsequent AWV    I have reviewed the patient's medical history in detail and updated the computerized patient record. Assessment/Plan   Education and counseling provided:  Are appropriate based on today's review and evaluation  End-of-Life planning (with patient's consent)  Pneumococcal Vaccine  Influenza Vaccine  Prostate cancer screening tests (PSA, covered annually)    1. Type 2 diabetes mellitus with other specified complication, with long-term current use of insulin (HCC)  -     MICROALBUMIN, UR, RAND W/ MICROALB/CREAT RATIO; Future  -     HEMOGLOBIN A1C WITH EAG; Future  -     Blood-Glucose Meter monitoring kit; Accuchek Guide me, Normal, Disp-1 Kit, R-0  -     METABOLIC PANEL, BASIC; Future   D/c glipizide duet to low fasting BS-might need to lower 70/30 insulin too-fu a1c   Rx replacement glucometer  2. Hypertension, unspecified type  -     METABOLIC PANEL, BASIC; Future   controlled  3. Dyslipidemia   LDL at goal  4. Prostate cancer screening  -     PSA SCREENING (SCREENING); Future  5. Encounter for immunization  -     PNEUMOCOCCAL, PPSV23, (AGE 2 YRS+), SC/IM  6. Needs flu shot  -     INFLUENZA, FLUAD, (AGE 65 Y+), IM, PF, 0.5 ML  7. Leg edema-left LE--elevated, support stockings-dependent edema. Can take lasix 20 mg every day prn--new rx    Depression Risk Factor Screening     3 most recent PHQ Screens 11/11/2022   Little interest or pleasure in doing things Not at all   Feeling down, depressed, irritable, or hopeless Not at all   Total Score PHQ 2 0   Trouble falling or staying asleep, or sleeping too much -   Feeling tired or having little energy -   Poor appetite, weight loss, or overeating -   Feeling bad about yourself - or that you are a failure or have let yourself or your family down -   Trouble concentrating on things such as school, work, reading, or watching TV -   Moving or speaking so slowly that other people could have noticed; or the opposite being so fidgety that others notice -   Thoughts of being better off dead, or hurting yourself in some way -   PHQ 9 Score -   How difficult have these problems made it for you to do your work, take care of your home and get along with others -       Alcohol & Drug Abuse Risk Screen    Do you average more than 1 drink per night or more than 7 drinks a week: No    In the past three months have you have had more than 4 drinks containing alcohol on one occasion: No          Functional Ability and Level of Safety    Hearing: Hearing is good. Activities of Daily Living: The home contains: handrails  Patient needs help with:  transportation, shopping, preparing meals, laundry, housework, bathing, hygiene, bathroom needs, and walking      Ambulation: wheelchair bound     Fall Risk:  Fall Risk Assessment, last 12 mths 8/31/2021   Able to walk?  Yes   Fall in past 12 months? 0   Do you feel unsteady? 1   Are you worried about falling 1   Is TUG test greater than 12 seconds? -   Is the gait abnormal? -   Number of falls in past 12 months -   Fall with injury?  -      Abuse Screen:  Patient is not abused       Cognitive Screening    Has your family/caregiver stated any concerns about your memory: yes -     Cognitive Screenin [facility]     Health Maintenance Due     Health Maintenance Due   Topic Date Due    DTaP/Tdap/Td series (1 - Tdap) Never done    Shingrix Vaccine Age 50> (1 of 2) Never done    Foot Exam Q1  2017    Medicare Yearly Exam  2020    MICROALBUMIN Q1  2021    COVID-19 Vaccine (3 - Booster for Pfizer series) 2021    Pneumococcal 65+ years (3 - PPSV23 if available, else PCV20) 10/17/2021    Flu Vaccine (1) 2022       Patient Care Team   Patient Care Team:  David Lyons MD as PCP - General  David Lyons MD as PCP - [facility] Provider  Anish Garcia MD (Urology)  Nikole Sloan MD as Physician (Cardiovascular Disease Physician)    History     Patient Active Problem List   Diagnosis Code    Type II or unspecified type diabetes mellitus without mention of complication, uncontrolled [id number]    Essential hypertension, benign I10    Pure hypercholesterolemia E78.00    Obesity E66.9    Prostate cancer ([address] C61    OA (osteoarthritis) of knee M17.9    Left-sided weakness R53.1    Transient ischemic attack involving right internal carotid artery G45.1    TIA (transient ischemic attack) G45.9    Diabetes ([address] E11.9    Stroke ([address] I63.9    Osteoarthrosis, localized, primary, knee, left M17.12    Type 2 diabetes mellitus with diabetic neuropathy ([address] E11.40    Major depressive disorder, recurrent, mild F33.0    Severe sepsis ([address] A41.9, R65.20     Past Medical History:   Diagnosis Date    Allergic rhinitis     Arthritis     djd of knees    Chronic pain     Diabetes ([address]     Hypercholesteremia     Hypertension Obesity     Stroke ([facility]     left--ring finger/ pinky finger numb sensation      Past Surgical History:   Procedure Laterality Date    HX CHOLECYSTECTOMY      laparoscopic     Current Outpatient Medications   Medication Sig Dispense Refill    glucose blood VI test strips (Accu-Chek Guide test strips) strip Check fsbs bid 250.02 100 Strip 2    Lancing Device (Lancing Device with Lancets) misc 1 Each by Does Not Apply route two (2) times a day. 1 Each 0    Blood-Glucose Meter monitoring kit Accuchek Guide me 1 Kit 0    Blood-Glucose Meter monitoring kit Use to check blood sugar up to 3 times daily. E11.65. Use brand preferred by insurance 1 Kit 0    lancets misc Check Blood sugar Bid 100 Lancet 3    lancets (Accu-Chek Softclix Lancets) misc Check fsbs bid 250.02  E11 100 Each 11    lancets misc For accucheck guide me 1 Each 11    lancets misc Use to check blood sugar up to 3 times daily. E11.65. Use brand preferred by insurance Guide Me Lancets 100 Each 11    pravastatin (PRAVACHOL) 20 mg tablet Take 1 Tablet by mouth nightly. 90 Tablet 3    acetaminophen (TYLENOL) 500 mg tablet Take 500 mg by mouth daily as needed for Pain (headache). amLODIPine (NORVASC) 10 mg tablet TAKE 1 TABLET EVERY DAY 90 Tablet 3    glucose blood VI test strips (ASCENSIA AUTODISC VI, ONE TOUCH ULTRA TEST VI) strip Use to check blood sugar up to 3 times daily. E11.65. Use brand preferred by insurance. 100 Strip 11    gabapentin (NEURONTIN) 300 mg capsule TAKE 1 CAPSULE BY MOUTH 3  TIMES DAILY 270 Capsule 3    insulin nph-regular human rec (NovoLIN 70-30 FlexPen U-100) 100 unit/mL (70-30) inpn 12 Units by SubCUTAneous route two (2) times a day. 5 Adjustable Dose Pre-filled Pen Syringe 5    aspirin 81 mg chewable tablet Take 1 Tablet by mouth daily. 90 Tablet 3    lisinopriL (PRINIVIL, ZESTRIL) 40 mg tablet Take 1 Tablet by mouth daily. 90 Tablet 3    cloNIDine HCL (CATAPRES) 0.1 mg tablet Take 1 Tablet by mouth two (2) times a day.  [address] [address] Tablet 3    levETIRAcetam (Keppra) 1,000 mg tablet Take 1 Tablet by mouth two (2) times a day. 180 Tablet 3    metFORMIN (GLUCOPHAGE) 1,000 mg tablet TAKE 1 TABLET BY MOUTH  TWICE DAILY WITH MEALS 180 Tablet 3    escitalopram oxalate (LEXAPRO) 5 mg tablet Take 1 Tablet by mouth daily. 90 Tablet 1    hydroCHLOROthiazide (HYDRODIURIL) 25 mg tablet Take 25 mg by mouth daily.        No Known Allergies    Family History   Problem Relation Age of Onset    Diabetes Mother     Hypertension Mother     Kidney Disease Mother     OSTEOARTHRITIS Father     Diabetes Father     No Known Problems Sister     No Known Problems Sister     No Known Problems Sister     No Known Problems Sister     Cancer Sister         cancer     Social History     Tobacco Use    Smoking status: Former     Years: 2.00     Types: Cigarettes     Quit date: 2017     Years since quittin.9    Smokeless tobacco: Never   Substance Use Topics    Alcohol use: No     Comment: stopped -used to drink wine (7 days a week         Reagan Hernandez MD

## 2022-11-11 NOTE — PROGRESS NOTES
1. \"Have you been to the ER, urgent care clinic since your last visit? Hospitalized since your last visit? \" No    2. \"Have you seen or consulted any other health care providers outside of the [address] [place] [address] since your last visit? \" No     3. For patients aged 39-70: Has the patient had a colonoscopy / FIT/ Cologuard? Yes - no Care Gap present      If the patient is female:    4. For patients aged 41-77: Has the patient had a mammogram within the past 2 years? NA - based on age or sex      11. For patients aged 21-65: Has the patient had a pap smear?  NA - based on age or sex

## 2022-11-12 LAB
ANION GAP SERPL CALC-SCNC: 4 MMOL/L (ref 5–15)
BUN SERPL-MCNC: 18 MG/DL (ref 6–20)
BUN/CREAT SERPL: 15 (ref 12–20)
CALCIUM SERPL-MCNC: 9 MG/DL (ref 8.5–10.1)
CHLORIDE SERPL-SCNC: 106 MMOL/L (ref 97–108)
CO2 SERPL-SCNC: 30 MMOL/L (ref 21–32)
CREAT SERPL-MCNC: 1.2 MG/DL (ref 0.7–1.3)
EST. AVERAGE GLUCOSE BLD GHB EST-MCNC: 157 MG/DL
GLUCOSE SERPL-MCNC: 101 MG/DL (ref 65–100)
HBA1C MFR BLD: 7.1 % (ref 4–5.6)
POTASSIUM SERPL-SCNC: 3.9 MMOL/L (ref 3.5–5.1)
PSA SERPL-MCNC: 2.9 NG/ML (ref 0.01–4)
SODIUM SERPL-SCNC: 140 MMOL/L (ref 136–145)

## 2022-11-29 RX ORDER — LANCETS
EACH MISCELLANEOUS
Qty: 100 EACH | Refills: 11 | Status: SHIPPED | OUTPATIENT
Start: 2022-11-29

## 2022-11-29 NOTE — TELEPHONE ENCOUNTER
----- Message from Donovan Rajput sent at 11/29/2022  2:24 PM EST -----  Subject: Refill Request    QUESTIONS  Name of Medication? lancets (Accu-Chek Softclix Lancets) misc  Patient-reported dosage and instructions? as directed  How many days do you have left? 0  Preferred Pharmacy? [address]  Pharmacy phone number (if available)? [phone]  ---------------------------------------------------------------------------  --------------,  Name of Medication? glucose blood VI test strips (ASCENSIA AUTODISC VI,   ONE TOUCH ULTRA TEST VI) strip  Patient-reported dosage and instructions? as directed  How many days do you have left? 0  Preferred Pharmacy? [facility]  Pharmacy phone number (if available)? [phone]  ---------------------------------------------------------------------------  --------------  CALL BACK INFO  What is the best way for the office to contact you? OK to leave message on   voicemail  Preferred Call Back Phone Number? [phone]  ---------------------------------------------------------------------------  --------------  SCRIPT ANSWERS  Relationship to Patient?  Self

## 2022-11-29 NOTE — TELEPHONE ENCOUNTER
Future Appointments:  Future Appointments   Date Time Provider Yonathan Munizi   5/30/2023 11:00 AM Daniel Fisher MD [facility] BS AMB        Last Appointment With Me:  11/11/2022     Requested Prescriptions     Pending Prescriptions Disp Refills    lancets (Accu-Chek Softclix Lancets) misc 100 Each 11     Sig: Check fsbs bid 250.02  E11    glucose blood VI test strips strip 100 Strip 11     Sig: Use to check blood sugar up to 3 times daily. E11.65. Use brand preferred by insurance. ----- Message from Luz [address] sent at 11/29/2022  2:24 PM EST -----  Subject: Refill Request    QUESTIONS  Name of Medication? lancets (Accu-Chek Softclix Lancets) misc  Patient-reported dosage and instructions? as directed  How many days do you have left? 0  Preferred Pharmacy? [address] [facility]  Pharmacy phone number (if available)? [phone]  ---------------------------------------------------------------------------  --------------,  Name of Medication? glucose blood VI test strips (ASCENSIA AUTODISC VI,   ONE TOUCH ULTRA TEST VI) strip  Patient-reported dosage and instructions? as directed  How many days do you have left? 0  Preferred Pharmacy? [address]  Pharmacy phone number (if available)? [phone]  ---------------------------------------------------------------------------  --------------  CALL BACK INFO  What is the best way for the office to contact you? OK to leave message on   voicemail  Preferred Call Back Phone Number? [phone]  ---------------------------------------------------------------------------  --------------  SCRIPT ANSWERS  Relationship to Patient?  Self

## 2022-12-19 ENCOUNTER — TELEPHONE (OUTPATIENT)
Dept: INTERNAL MEDICINE CLINIC | Age: 72
End: 2022-12-19

## 2022-12-19 NOTE — TELEPHONE ENCOUNTER
----- Message from Ervin Linn sent at 12/19/2022  8:40 AM EST -----  Subject: Message to Provider    QUESTIONS  Information for Provider? Patient is requesting a callback from a nurse to   discuss some issues that he is having with his medication.  ---------------------------------------------------------------------------  --------------  Ky Fraction XJGV  [phone]; OK to leave message on voicemail  ---------------------------------------------------------------------------  --------------  SCRIPT ANSWERS  Relationship to Patient?  Self

## 2023-01-02 ENCOUNTER — DOCUMENTATION ONLY (OUTPATIENT)
Dept: INTERNAL MEDICINE CLINIC | Age: 73
End: 2023-01-02

## 2023-01-02 NOTE — PROGRESS NOTES
Pts son called for fsbs 323 this afternoon, pt had candy yesterday. Prior to this readings were mostly ok. Pt feels ok . on 70/30 insulin 12 units bid. Instructed to take 16 units this evening ac dinner then resume 12 units bid and low carb diet.  To call if fsbs remains elevated this week

## 2023-01-13 DIAGNOSIS — R56.9 SEIZURE (HCC): Primary | ICD-10-CM

## 2023-01-13 RX ORDER — HUMAN INSULIN 100 [IU]/ML
12 INJECTION, SUSPENSION SUBCUTANEOUS 2 TIMES DAILY
Qty: 5 ADJUSTABLE DOSE PRE-FILLED PEN SYRINGE | Refills: 5 | Status: SHIPPED | OUTPATIENT
Start: 2023-01-13

## 2023-01-13 NOTE — TELEPHONE ENCOUNTER
Future Appointments:  Future Appointments   Date Time Provider Yonathan Munizi   2023 11:00 AM Joselito Santos MD [facility] BS AMB   2023  9:00 AM DO LEONIDAS Puri BS AMB        Last Appointment With Me:  2022     Requested Prescriptions     Pending Prescriptions Disp Refills    insulin nph-regular human rec (NovoLIN 70-30 FlexPen U-100) 100 unit/mL (70-30) inpn 5 Adjustable Dose Pre-filled Pen Syringe 5     Si Units by SubCUTAneous route two (2) times a day.

## 2023-01-27 ENCOUNTER — TELEPHONE (OUTPATIENT)
Dept: INTERNAL MEDICINE CLINIC | Age: 73
End: 2023-01-27

## 2023-01-27 NOTE — TELEPHONE ENCOUNTER
Called the pharmacy. Imformed them that I did not see the address they were speaking of. I informed them I would reach out to IT to find out what is the issue.

## 2023-01-27 NOTE — TELEPHONE ENCOUNTER
Apolinar Dillon from [facility] called  [phone]      State needs call from clinical team please    States they received the hctz script on 21st for pt. States however that the address that came with the escribe script showed:   [address]. [id number]    States that does not match the address on file for patient, however the send pt script to the address on the escribe. Confirmed with pharmacy that pt chart has a different address. Pharmacy states they have pt address in their files as listed on this chart:  [address] [address]    States escribe system has an error and asks if we entered the address in or if we can assist in finding the problem.     Please call pharmacy

## 2023-01-30 DIAGNOSIS — R52 PAIN: ICD-10-CM

## 2023-01-30 DIAGNOSIS — E78.00 PURE HYPERCHOLESTEROLEMIA: ICD-10-CM

## 2023-01-30 DIAGNOSIS — Z63.4 BEREAVEMENT: ICD-10-CM

## 2023-01-30 DIAGNOSIS — R56.9 SEIZURE (HCC): ICD-10-CM

## 2023-01-30 DIAGNOSIS — I10 ESSENTIAL HYPERTENSION, BENIGN: Primary | ICD-10-CM

## 2023-01-30 DIAGNOSIS — E11.40 TYPE 2 DIABETES MELLITUS WITH DIABETIC NEUROPATHY, WITHOUT LONG-TERM CURRENT USE OF INSULIN (HCC): ICD-10-CM

## 2023-01-30 DIAGNOSIS — R60.9 EDEMA, UNSPECIFIED TYPE: ICD-10-CM

## 2023-01-30 RX ORDER — AMLODIPINE BESYLATE 10 MG/1
TABLET ORAL
Qty: 90 TABLET | Refills: 3 | Status: SHIPPED | OUTPATIENT
Start: 2023-01-30

## 2023-01-30 RX ORDER — ESCITALOPRAM OXALATE 5 MG/1
5 TABLET ORAL DAILY
Qty: 90 TABLET | Refills: 1 | Status: SHIPPED | OUTPATIENT
Start: 2023-01-30

## 2023-01-30 RX ORDER — PRAVASTATIN SODIUM 20 MG/1
20 TABLET ORAL
Qty: 90 TABLET | Refills: 3 | Status: SHIPPED | OUTPATIENT
Start: 2023-01-30

## 2023-01-30 RX ORDER — LEVETIRACETAM 1000 MG/1
1000 TABLET ORAL 2 TIMES DAILY
Qty: 180 TABLET | Refills: 3 | Status: SHIPPED | OUTPATIENT
Start: 2023-01-30

## 2023-01-30 RX ORDER — METFORMIN HYDROCHLORIDE 1000 MG/1
TABLET ORAL
Qty: 180 TABLET | Refills: 3 | Status: SHIPPED | OUTPATIENT
Start: 2023-01-30

## 2023-01-30 RX ORDER — GABAPENTIN 300 MG/1
CAPSULE ORAL
Qty: 270 CAPSULE | Refills: 3 | Status: SHIPPED | OUTPATIENT
Start: 2023-01-30

## 2023-01-30 RX ORDER — FUROSEMIDE 20 MG/1
20 TABLET ORAL
Qty: 30 TABLET | Refills: 1 | Status: SHIPPED | OUTPATIENT
Start: 2023-01-30

## 2023-01-30 SDOH — SOCIAL STABILITY - SOCIAL INSECURITY: DISSAPEARANCE AND DEATH OF FAMILY MEMBER: Z63.4

## 2023-01-30 NOTE — TELEPHONE ENCOUNTER
PCP: Demetrius Alves MD    Last appt: 11/11/2022  Future Appointments   Date Time Provider Yonathan Pena   5/30/2023 11:00 AM Demetrius Alves MD [facility] BS AMB   6/16/2023  9:00 AM Ade Pitts DO [facility] BS AMB       Requested Prescriptions     Pending Prescriptions Disp Refills    amLODIPine (NORVASC) 10 mg tablet 90 Tablet 3     Sig: TAKE 1 TABLET EVERY DAY    escitalopram oxalate (LEXAPRO) 5 mg tablet 90 Tablet 1     Sig: Take 1 Tablet by mouth daily. pravastatin (PRAVACHOL) 20 mg tablet 90 Tablet 3     Sig: Take 1 Tablet by mouth nightly. levETIRAcetam (Keppra) 1,000 mg tablet 180 Tablet 3     Sig: Take 1 Tablet by mouth two (2) times a day. metFORMIN (GLUCOPHAGE) 1,000 mg tablet 180 Tablet 3     Sig: TAKE 1 TABLET BY MOUTH  TWICE DAILY WITH MEALS    furosemide (LASIX) 20 mg tablet 30 Tablet 1     Sig: Take 1 Tablet by mouth daily as needed (edema).     gabapentin (NEURONTIN) 300 mg capsule 270 Capsule 3     Sig: TAKE 1 CAPSULE BY MOUTH 3  TIMES DAILY

## 2023-01-30 NOTE — TELEPHONE ENCOUNTER
Received refill request from [facility], this pharmacy is not on patient profile.      LVM for patient to return call to confirm he wants to use this pharmacy

## 2023-01-30 NOTE — TELEPHONE ENCOUNTER
Pt returned call    States he is unsure about pharmacy, states he only knows he gets his stuff thru [place]"    Psr added the aetna plan to pt chart. Member ID [id number]    Pt requested psr contact insurance and find out about the pharmacy. Psr contacted member services at [phone]    Per Michael Casiano, they said pt mail service is [facility] and pt is to use his aetna card,    They states pt can contact [employer] using [phone]    Called pt, 2 identifiers confirmed. Advised pt regarding [employer]/[facility]. Pt provided with contact info for [employer].     Pt states ok to transfer scripts to new pharmacy [facility]

## 2023-02-17 DIAGNOSIS — Z63.4 BEREAVEMENT: ICD-10-CM

## 2023-02-17 DIAGNOSIS — E78.00 PURE HYPERCHOLESTEROLEMIA: ICD-10-CM

## 2023-02-17 DIAGNOSIS — R52 PAIN: ICD-10-CM

## 2023-02-17 DIAGNOSIS — I10 ESSENTIAL HYPERTENSION, BENIGN: ICD-10-CM

## 2023-02-17 DIAGNOSIS — R56.9 SEIZURE (HCC): ICD-10-CM

## 2023-02-17 DIAGNOSIS — E11.40 TYPE 2 DIABETES MELLITUS WITH DIABETIC NEUROPATHY, WITHOUT LONG-TERM CURRENT USE OF INSULIN (HCC): ICD-10-CM

## 2023-02-17 DIAGNOSIS — R60.9 EDEMA, UNSPECIFIED TYPE: ICD-10-CM

## 2023-02-17 RX ORDER — AMLODIPINE BESYLATE 10 MG/1
TABLET ORAL
Qty: 90 TABLET | Refills: 3 | Status: SHIPPED | OUTPATIENT
Start: 2023-02-17

## 2023-02-17 RX ORDER — LEVETIRACETAM 1000 MG/1
1000 TABLET ORAL 2 TIMES DAILY
Qty: 180 TABLET | Refills: 3 | Status: SHIPPED | OUTPATIENT
Start: 2023-02-17

## 2023-02-17 RX ORDER — GABAPENTIN 300 MG/1
CAPSULE ORAL
Qty: 270 CAPSULE | Refills: 3 | Status: SHIPPED | OUTPATIENT
Start: 2023-02-17

## 2023-02-17 RX ORDER — PRAVASTATIN SODIUM 20 MG/1
20 TABLET ORAL
Qty: 90 TABLET | Refills: 3 | Status: SHIPPED | OUTPATIENT
Start: 2023-02-17

## 2023-02-17 RX ORDER — HYDROCHLOROTHIAZIDE 25 MG/1
25 TABLET ORAL DAILY
Qty: 90 TABLET | Refills: 3 | Status: SHIPPED | OUTPATIENT
Start: 2023-02-17

## 2023-02-17 RX ORDER — FUROSEMIDE 20 MG/1
20 TABLET ORAL
Qty: 30 TABLET | Refills: 1 | Status: SHIPPED | OUTPATIENT
Start: 2023-02-17

## 2023-02-17 RX ORDER — ESCITALOPRAM OXALATE 5 MG/1
5 TABLET ORAL DAILY
Qty: 90 TABLET | Refills: 1 | Status: SHIPPED | OUTPATIENT
Start: 2023-02-17

## 2023-02-17 RX ORDER — METFORMIN HYDROCHLORIDE 1000 MG/1
TABLET ORAL
Qty: 180 TABLET | Refills: 3 | Status: SHIPPED | OUTPATIENT
Start: 2023-02-17

## 2023-02-17 SDOH — SOCIAL STABILITY - SOCIAL INSECURITY: DISSAPEARANCE AND DEATH OF FAMILY MEMBER: Z63.4

## 2023-02-17 NOTE — TELEPHONE ENCOUNTER
PCP: Gloria Cruz MD    Last appt: 11/11/2022  Future Appointments   Date Time Provider Yonathan Pena   3/27/2023 10:30 AM Gloria Cruz MD [facility] BS AMB   5/30/2023 11:00 AM Gloria Cruz MD [facility] BS AMB   6/16/2023  9:00 AM Ade Pitts DO [facility] BS AMB       Requested Prescriptions     Pending Prescriptions Disp Refills    amLODIPine (NORVASC) 10 mg tablet 90 Tablet 3     Sig: TAKE 1 TABLET EVERY DAY    escitalopram oxalate (LEXAPRO) 5 mg tablet 90 Tablet 1     Sig: Take 1 Tablet by mouth daily. furosemide (LASIX) 20 mg tablet 30 Tablet 1     Sig: Take 1 Tablet by mouth daily as needed (edema). gabapentin (NEURONTIN) 300 mg capsule 270 Capsule 3     Sig: TAKE 1 CAPSULE BY MOUTH 3  TIMES DAILY    hydroCHLOROthiazide (HYDRODIURIL) 25 mg tablet 90 Tablet 3     Sig: Take 1 Tablet by mouth daily. levETIRAcetam (Keppra) 1,000 mg tablet 180 Tablet 3     Sig: Take 1 Tablet by mouth two (2) times a day. metFORMIN (GLUCOPHAGE) 1,000 mg tablet 180 Tablet 3     Sig: TAKE 1 TABLET BY MOUTH  TWICE DAILY WITH MEALS    pravastatin (PRAVACHOL) 20 mg tablet 90 Tablet 3     Sig: Take 1 Tablet by mouth nightly.

## 2023-02-23 NOTE — TELEPHONE ENCOUNTER
----- Message from [facility] sent at 7/8/2021 12:09 PM EDT -----  Regarding: Dr. Abe Munguia Message/Vendor Calls    Caller's first and last name: Erin Veliz      Reason for call: Calling to confirm that the detailed written order had been received and signed.       Callback required yes/no and why: Yes      Best contact number(s): [phone]      Message from [facility] Detail Level: Zone Hide Include Location In Plan Question?: No Include Location In Plan?: Yes

## 2023-03-01 ENCOUNTER — TELEPHONE (OUTPATIENT)
Dept: INTERNAL MEDICINE CLINIC | Age: 73
End: 2023-03-01

## 2023-03-01 NOTE — TELEPHONE ENCOUNTER
Pt states that the mail order can not be mailed to him and mentioned a credit card. Pt then states that mail order just received the order today from us? Pt states he is out of medication. I advised that if this was delayed to due not having a credit card on file with mail order this is nothing that we would be able to assist with. Is there another problem that your aware of? Pt is asking for a call back this morning. Thanks.

## 2023-03-13 DIAGNOSIS — R60.9 EDEMA, UNSPECIFIED TYPE: ICD-10-CM

## 2023-03-13 DIAGNOSIS — R52 PAIN: ICD-10-CM

## 2023-03-13 RX ORDER — FUROSEMIDE 20 MG/1
20 TABLET ORAL
Qty: 30 TABLET | Refills: 1 | Status: SHIPPED | OUTPATIENT
Start: 2023-03-13

## 2023-03-13 RX ORDER — GABAPENTIN 300 MG/1
CAPSULE ORAL
Qty: 270 CAPSULE | Refills: 3 | Status: SHIPPED | OUTPATIENT
Start: 2023-03-13

## 2023-03-13 NOTE — TELEPHONE ENCOUNTER
Future Appointments:  Future Appointments   Date Time Provider Yonathan Becky   6/16/2023  9:00 AM DO LEONIDAS Hargrove BS AMB        Last Appointment With Me:  11/11/2022     Requested Prescriptions     Pending Prescriptions Disp Refills    furosemide (LASIX) 20 mg tablet 30 Tablet 1     Sig: Take 1 Tablet by mouth daily as needed (edema).     gabapentin (NEURONTIN) 300 mg capsule 270 Capsule 3     Sig: TAKE 1 CAPSULE BY MOUTH 3  TIMES DAILY

## 2023-03-13 NOTE — TELEPHONE ENCOUNTER
----- Message from Farnaz Michelle sent at 3/13/2023  8:12 AM EDT -----  Subject: Refill Request    QUESTIONS  Name of Medication? furosemide (LASIX) 20 mg tablet  Patient-reported dosage and instructions? Take 1 Tablet by mouth daily as   needed (edema). How many days do you have left? 0  Preferred Pharmacy? [facility]  Pharmacy phone number (if available)? [phone]  ---------------------------------------------------------------------------  --------------,  Name of Medication? gabapentin (NEURONTIN) 300 mg capsule  Patient-reported dosage and instructions? TAKE 1 CAPSULE BY MOUTH 3 TIMES   DAILY  How many days do you have left? 0  Preferred Pharmacy? [facility]  Pharmacy phone number (if available)? [phone]  ---------------------------------------------------------------------------  --------------  CALL BACK INFO  What is the best way for the office to contact you? OK to leave message on   voicemail  Preferred Call Back Phone Number? [phone]  ---------------------------------------------------------------------------  --------------  SCRIPT ANSWERS  Relationship to Patient?  Self 1-25  Pt called /left msg today about scheduling f/u colonoscopy.    Contact [phone]  Last @ em  1-13-12 w/ osmo prep.       Returned call / left msg on vmail re: emq  Feb 3, 17, 24 or at [facility] on a Tues /thurs in Feb.  Reviewed npo, clear liquids day prior, noasa, bowel prep.  Will await return call.

## 2023-03-13 NOTE — TELEPHONE ENCOUNTER
Patient called and stated again that he has not gotten his medication    Pt states he has not contacted [facility] to provide his information to them    Pt states he will not contact them    Psr advised that dr pablo's office sent [facility] all scripts and they cannot send medication if they do not have pt information and that is the patients responsibility    Pt states he will not contact them to provide anything and that is for this office to do    Pt mentioned [facility]. Psr stated we can re-send the meds to [facility] instead if he wants, pt states \"he will handle it\"    Please be advised.

## 2023-03-16 RX ORDER — LANCETS
EACH MISCELLANEOUS
Qty: 100 EACH | Refills: 11 | Status: SHIPPED | OUTPATIENT
Start: 2023-03-16

## 2023-03-16 RX ORDER — BLOOD SUGAR DIAGNOSTIC
STRIP MISCELLANEOUS
Qty: 100 STRIP | Refills: 2 | Status: SHIPPED | OUTPATIENT
Start: 2023-03-16

## 2023-03-16 NOTE — TELEPHONE ENCOUNTER
Future Appointments:  Future Appointments   Date Time Provider Yonathan Munizi   6/16/2023  9:00 AM Herma Aase, DO NEUM BS AMB        Last Appointment With Me:  11/11/2022     Requested Prescriptions     Pending Prescriptions Disp Refills    glucose blood VI test strips (Accu-Chek Guide test strips) strip 100 Strip 2     Sig: Check fsbs bid 250.02    lancets misc 100 Each 11     Sig: Use to check blood sugar up to 3 times daily. E11.65.  Use brand preferred by insurance Guide Me Lancets

## 2023-03-16 NOTE — TELEPHONE ENCOUNTER
----- Message from Lacy Lakhani sent at 3/16/2023  8:10 AM EDT -----  Subject: Refill Request    QUESTIONS  Name of Medication? glucose blood VI test strips (Accu-Chek Guide test   strips) strip  Patient-reported dosage and instructions? 1 per day  How many days do you have left? 0  Preferred Pharmacy? [facility] phone number (if available)? [phone]  ---------------------------------------------------------------------------  --------------,  Name of Medication? lancets misc  Patient-reported dosage and instructions? 1 per day  How many days do you have left? 0  Preferred Pharmacy? [facility] phone number (if available)? [phone]  ---------------------------------------------------------------------------  --------------  Sidra ENGEL  What is the best way for the office to contact you? OK to leave message on   voicemail  Preferred Call Back Phone Number? [phone]  ---------------------------------------------------------------------------  --------------  SCRIPT ANSWERS  Relationship to Patient?  Self

## 2023-03-21 RX ORDER — LANCETS
EACH MISCELLANEOUS
Qty: 1 EACH | Refills: 11 | Status: SHIPPED | OUTPATIENT
Start: 2023-03-21

## 2023-03-21 RX ORDER — BLOOD-GLUCOSE METER
1 EACH MISCELLANEOUS 2 TIMES DAILY
Qty: 1 EACH | Refills: 0 | Status: SHIPPED | OUTPATIENT
Start: 2023-03-21

## 2023-03-21 RX ORDER — BLOOD SUGAR DIAGNOSTIC
STRIP MISCELLANEOUS
Qty: 50 STRIP | Refills: 5 | Status: SHIPPED | OUTPATIENT
Start: 2023-03-21

## 2023-03-21 NOTE — TELEPHONE ENCOUNTER
ashwin from with [facility]      Regarding medication:  Accu-check guide test strips    Problem:  Brand not covered      Suggested potential alternate options:  OneTouch brand is covered    States needs new orders for:  One touch brand glucometer  One touch brand lancets  One touch brand test strips

## 2023-04-17 ENCOUNTER — TELEPHONE (OUTPATIENT)
Dept: INTERNAL MEDICINE CLINIC | Age: 73
End: 2023-04-17

## 2023-04-17 NOTE — TELEPHONE ENCOUNTER
----- Message from Lucíalana Buchanan sent at 4/17/2023  1:47 PM EDT -----  Subject: Message to Provider    QUESTIONS  Information for Provider? Patient said he is waiting on an approval from   his PCP so his wheel chair can be fixed he ask if the PCP can please get   it done so he can get it done please. He wants the PCP to call him back he   wants to talk to someone from the practice   ---------------------------------------------------------------------------  --------------  [address]  [phone]; OK to leave message on voicemail  ---------------------------------------------------------------------------  --------------  SCRIPT ANSWERS  Relationship to Patient?  Self

## 2023-05-12 RX ORDER — FUROSEMIDE 20 MG/1
20 TABLET ORAL DAILY PRN
Qty: 90 TABLET | Refills: 3 | Status: SHIPPED | OUTPATIENT
Start: 2023-05-12

## 2023-05-12 NOTE — TELEPHONE ENCOUNTER
PCP: Darrius Hanson MD    Last appt: [unfilled]  Future Appointments   Date Time Provider Ludivina Beckwith   6/16/2023  9:00 AM [facility]       Requested Prescriptions     Pending Prescriptions Disp Refills    furosemide (LASIX) 20 MG tablet 90 tablet 3     Sig: Take 1 tablet by mouth daily as needed (edema)

## 2023-05-15 RX ORDER — BLOOD-GLUCOSE METER
1 KIT MISCELLANEOUS DAILY
Qty: 1 KIT | Refills: 0 | Status: SHIPPED | OUTPATIENT
Start: 2023-05-15 | End: 2023-05-19 | Stop reason: SDUPTHER

## 2023-05-19 RX ORDER — BLOOD-GLUCOSE METER
1 KIT MISCELLANEOUS DAILY
Qty: 1 KIT | Refills: 0 | Status: SHIPPED | OUTPATIENT
Start: 2023-05-19

## 2023-06-02 ENCOUNTER — TELEPHONE (OUTPATIENT)
Age: 73
End: 2023-06-02

## 2023-06-02 DIAGNOSIS — E11.40 TYPE 2 DIABETES MELLITUS WITH DIABETIC NEUROPATHY, UNSPECIFIED WHETHER LONG TERM INSULIN USE (HCC): Primary | ICD-10-CM

## 2023-06-02 DIAGNOSIS — Z79.4 TYPE 2 DIABETES MELLITUS WITH OTHER SPECIFIED COMPLICATION, WITH LONG-TERM CURRENT USE OF INSULIN (HCC): Primary | ICD-10-CM

## 2023-06-02 DIAGNOSIS — E11.69 TYPE 2 DIABETES MELLITUS WITH OTHER SPECIFIED COMPLICATION, WITH LONG-TERM CURRENT USE OF INSULIN (HCC): Primary | ICD-10-CM

## 2023-06-02 RX ORDER — GLUCOSAMINE HCL/CHONDROITIN SU 500-400 MG
CAPSULE ORAL
Qty: 50 STRIP | Refills: 5 | Status: SHIPPED | OUTPATIENT
Start: 2023-06-02 | End: 2023-06-02

## 2023-06-02 NOTE — TELEPHONE ENCOUNTER
Pt states he has not test strips. One Touch Ultra Blue test strips. Pt is asking why he can't get these? [id number]     Pt must get these today.

## 2023-06-02 NOTE — TELEPHONE ENCOUNTER
PCP: Ubaldo Kang MD    Last appt: 11/11/2022  Future Appointments   Date Time Provider Ludivina Beckwith   6/16/2023  9:00 AM [facility]       Requested Prescriptions     Signed Prescriptions Disp Refills    blood glucose test strips (ASCENSIA AUTODISC VI;ONE TOUCH ULTRA TEST VI) strip 100 each 1     Sig: Apply 1 each topically daily One Touch Ultra Blue test Strips     Authorizing Provider: Fozia Hess     Ordering User: Esme WHITNEY notified of Gabriella Yepez, spoke with Ramesh Sousa

## 2023-06-05 ENCOUNTER — TELEPHONE (OUTPATIENT)
Age: 73
End: 2023-06-05

## 2023-07-26 ENCOUNTER — TELEPHONE (OUTPATIENT)
Age: 73
End: 2023-07-26

## 2023-07-26 NOTE — TELEPHONE ENCOUNTER
----- Message from Oksana Mcclendon sent at 7/26/2023  8:44 AM EDT -----  Subject: Message to Provider    QUESTIONS  Information for Provider? Patient would like for Dr. Emilio Warren to call him. Says   it is a personal matter and would not disclose. May need a scipt. Please   call patient.  ---------------------------------------------------------------------------  --------------  Gary CHILDS  [phone]; OK to leave message on voicemail  ---------------------------------------------------------------------------  --------------  SCRIPT ANSWERS  Relationship to Patient?  Self

## 2023-07-31 DIAGNOSIS — M25.519 SHOULDER PAIN, UNSPECIFIED CHRONICITY, UNSPECIFIED LATERALITY: Primary | ICD-10-CM

## 2023-07-31 DIAGNOSIS — M25.561 RIGHT KNEE PAIN, UNSPECIFIED CHRONICITY: ICD-10-CM

## 2023-07-31 NOTE — TELEPHONE ENCOUNTER
Left detailed message on patient voicemail. Advised Dr. Dinorah Guevara would like patient to see Dr. Dougie Mauricio for shoulder and knee pain and referral letter will be sent to home address. Advised to give office a call if have further questions. Referral letter sent. Closing encounter.

## 2023-07-31 NOTE — TELEPHONE ENCOUNTER
Left message to schedule an appt.   does not have any opening this week so I can offer an appt the following week if patient is able to wait

## 2023-08-03 ENCOUNTER — HOSPITAL ENCOUNTER (EMERGENCY)
Facility: HOSPITAL | Age: 73
Discharge: HOME | End: 2023-08-03
Attending: STUDENT IN AN ORGANIZED HEALTH CARE EDUCATION/TRAINING PROGRAM
Payer: COMMERCIAL

## 2023-08-03 ENCOUNTER — APPOINTMENT (OUTPATIENT)
Facility: HOSPITAL | Age: 73
End: 2023-08-03
Payer: COMMERCIAL

## 2023-08-03 VITALS
BODY MASS INDEX: 37.24 KG/M2 | RESPIRATION RATE: 17 BRPM | DIASTOLIC BLOOD PRESSURE: 84 MMHG | WEIGHT: 260.14 LBS | SYSTOLIC BLOOD PRESSURE: 149 MMHG | OXYGEN SATURATION: 98 % | HEART RATE: 71 BPM | TEMPERATURE: 97.8 F | HEIGHT: 70 IN

## 2023-08-03 DIAGNOSIS — R60.0 LEG EDEMA: Primary | ICD-10-CM

## 2023-08-03 LAB
ALBUMIN SERPL-MCNC: 3.2 G/DL (ref 3.5–5)
ALBUMIN/GLOB SERPL: 0.8 (ref 1.1–2.2)
ALP SERPL-CCNC: 78 U/L (ref 45–117)
ALT SERPL-CCNC: 19 U/L (ref 12–78)
ANION GAP SERPL CALC-SCNC: 5 MMOL/L (ref 5–15)
AST SERPL-CCNC: 10 U/L (ref 15–37)
BASOPHILS # BLD: 0 K/UL (ref 0–0.1)
BASOPHILS NFR BLD: 0 % (ref 0–1)
BILIRUB SERPL-MCNC: 0.4 MG/DL (ref 0.2–1)
BUN SERPL-MCNC: 18 MG/DL (ref 6–20)
BUN/CREAT SERPL: 16 (ref 12–20)
CALCIUM SERPL-MCNC: 8.9 MG/DL (ref 8.5–10.1)
CHLORIDE SERPL-SCNC: 104 MMOL/L (ref 97–108)
CO2 SERPL-SCNC: 31 MMOL/L (ref 21–32)
COMMENT:: NORMAL
CREAT SERPL-MCNC: 1.14 MG/DL (ref 0.7–1.3)
DIFFERENTIAL METHOD BLD: ABNORMAL
ECHO BSA: 2.41 M2
EOSINOPHIL # BLD: 0.1 K/UL (ref 0–0.4)
EOSINOPHIL NFR BLD: 2 % (ref 0–7)
ERYTHROCYTE [DISTWIDTH] IN BLOOD BY AUTOMATED COUNT: 15.9 % (ref 11.5–14.5)
GLOBULIN SER CALC-MCNC: 3.9 G/DL (ref 2–4)
GLUCOSE SERPL-MCNC: 106 MG/DL (ref 65–100)
HCT VFR BLD AUTO: 39 % (ref 36.6–50.3)
HGB BLD-MCNC: 12.1 G/DL (ref 12.1–17)
IMM GRANULOCYTES # BLD AUTO: 0 K/UL (ref 0–0.04)
IMM GRANULOCYTES NFR BLD AUTO: 0 % (ref 0–0.5)
LYMPHOCYTES # BLD: 1.7 K/UL (ref 0.8–3.5)
LYMPHOCYTES NFR BLD: 38 % (ref 12–49)
MCH RBC QN AUTO: 25.3 PG (ref 26–34)
MCHC RBC AUTO-ENTMCNC: 31 G/DL (ref 30–36.5)
MCV RBC AUTO: 81.6 FL (ref 80–99)
MONOCYTES # BLD: 0.4 K/UL (ref 0–1)
MONOCYTES NFR BLD: 8 % (ref 5–13)
NEUTS SEG # BLD: 2.3 K/UL (ref 1.8–8)
NEUTS SEG NFR BLD: 52 % (ref 32–75)
NRBC # BLD: 0 K/UL (ref 0–0.01)
NRBC BLD-RTO: 0 PER 100 WBC
NT PRO BNP: 70 PG/ML
PLATELET # BLD AUTO: 267 K/UL (ref 150–400)
PMV BLD AUTO: 10 FL (ref 8.9–12.9)
POTASSIUM SERPL-SCNC: 3.2 MMOL/L (ref 3.5–5.1)
PROT SERPL-MCNC: 7.1 G/DL (ref 6.4–8.2)
RBC # BLD AUTO: 4.78 M/UL (ref 4.1–5.7)
SODIUM SERPL-SCNC: 140 MMOL/L (ref 136–145)
SPECIMEN HOLD: NORMAL
TSH SERPL DL<=0.05 MIU/L-ACNC: 1.68 UIU/ML (ref 0.36–3.74)
WBC # BLD AUTO: 4.5 K/UL (ref 4.1–11.1)

## 2023-08-03 PROCEDURE — 36415 COLL VENOUS BLD VENIPUNCTURE: CPT

## 2023-08-03 PROCEDURE — 6360000002 HC RX W HCPCS: Performed by: STUDENT IN AN ORGANIZED HEALTH CARE EDUCATION/TRAINING PROGRAM

## 2023-08-03 PROCEDURE — 99284 EMERGENCY DEPT VISIT MOD MDM: CPT

## 2023-08-03 PROCEDURE — 83880 ASSAY OF NATRIURETIC PEPTIDE: CPT

## 2023-08-03 PROCEDURE — 96374 THER/PROPH/DIAG INJ IV PUSH: CPT

## 2023-08-03 PROCEDURE — 84443 ASSAY THYROID STIM HORMONE: CPT

## 2023-08-03 PROCEDURE — 85025 COMPLETE CBC W/AUTO DIFF WBC: CPT

## 2023-08-03 PROCEDURE — 93971 EXTREMITY STUDY: CPT

## 2023-08-03 PROCEDURE — 80053 COMPREHEN METABOLIC PANEL: CPT

## 2023-08-03 RX ORDER — FUROSEMIDE 10 MG/ML
20 INJECTION INTRAMUSCULAR; INTRAVENOUS ONCE
Status: COMPLETED | OUTPATIENT
Start: 2023-08-03 | End: 2023-08-03

## 2023-08-03 RX ADMIN — FUROSEMIDE 20 MG: 10 INJECTION, SOLUTION INTRAMUSCULAR; INTRAVENOUS at 09:37

## 2023-08-03 ASSESSMENT — LIFESTYLE VARIABLES
HOW OFTEN DO YOU HAVE A DRINK CONTAINING ALCOHOL: NEVER
HOW MANY STANDARD DRINKS CONTAINING ALCOHOL DO YOU HAVE ON A TYPICAL DAY: PATIENT DOES NOT DRINK

## 2023-08-03 ASSESSMENT — PAIN - FUNCTIONAL ASSESSMENT: PAIN_FUNCTIONAL_ASSESSMENT: NONE - DENIES PAIN

## 2023-08-03 NOTE — DISCHARGE INSTRUCTIONS
You were seen in the emergency department for swelling in your lower legs. Your heart labs, protein levels, thyroid labs were reassuring and your ultrasound did not show any blood clots in your leg. You should discuss with your primary care doctor if you need to have your Lasix dose increased. Consider purchasing some compression stockings and elevating your legs to improve your symptoms. Return to the emergency department if you have any new or worsening symptoms.

## 2023-08-03 NOTE — ED NOTES
Pt wheeled out and helped in vehicle with paperwork and personal belongings     Baby Nicky, [place]  08/03/23 [id number]

## 2023-08-03 NOTE — ED NOTES
Pts shirts and pants placed in belongings bag with patient label and placed on bed next to pt.  Pt's jacket that contains cash, medication bottles, and a set of keys on bed with patient per patient request. Pt resting in bed comfortably, talking on phone with daughter     Fabian Carranza, [place]  08/03/23 3452

## 2023-08-04 ENCOUNTER — TELEPHONE (OUTPATIENT)
Age: 73
End: 2023-08-04

## 2023-08-04 NOTE — TELEPHONE ENCOUNTER
Pt is asking for a ED follow up for swelling legs. Pt states he was told to be seen as soon as possible. Please call to schedule. There  are no appts available for psr to schedule.

## 2023-08-07 RX ORDER — PRAVASTATIN SODIUM 20 MG
TABLET ORAL
Qty: 90 TABLET | Refills: 3 | Status: SHIPPED | OUTPATIENT
Start: 2023-08-07

## 2023-08-21 ENCOUNTER — OFFICE VISIT (OUTPATIENT)
Age: 73
End: 2023-08-21
Payer: COMMERCIAL

## 2023-08-21 VITALS
BODY MASS INDEX: 37.33 KG/M2 | HEIGHT: 70 IN | DIASTOLIC BLOOD PRESSURE: 64 MMHG | OXYGEN SATURATION: 97 % | HEART RATE: 61 BPM | SYSTOLIC BLOOD PRESSURE: 110 MMHG | RESPIRATION RATE: 18 BRPM

## 2023-08-21 DIAGNOSIS — Z91.89 STROKE RISK: Primary | ICD-10-CM

## 2023-08-21 DIAGNOSIS — Z09 HOSPITAL DISCHARGE FOLLOW-UP: ICD-10-CM

## 2023-08-21 DIAGNOSIS — R56.9 SEIZURE (HCC): ICD-10-CM

## 2023-08-21 PROCEDURE — 1123F ACP DISCUSS/DSCN MKR DOCD: CPT

## 2023-08-21 PROCEDURE — 3078F DIAST BP <80 MM HG: CPT

## 2023-08-21 PROCEDURE — 3074F SYST BP LT 130 MM HG: CPT

## 2023-08-21 PROCEDURE — 99215 OFFICE O/P EST HI 40 MIN: CPT

## 2023-08-21 RX ORDER — VARDENAFIL HYDROCHLORIDE 20 MG/1
TABLET ORAL
COMMUNITY
Start: 2023-08-01

## 2023-08-21 RX ORDER — SILDENAFIL 100 MG/1
TABLET, FILM COATED ORAL
COMMUNITY
Start: 2023-06-08

## 2023-08-21 ASSESSMENT — PATIENT HEALTH QUESTIONNAIRE - PHQ9
2. FEELING DOWN, DEPRESSED OR HOPELESS: 0
SUM OF ALL RESPONSES TO PHQ9 QUESTIONS 1 & 2: 0
SUM OF ALL RESPONSES TO PHQ QUESTIONS 1-9: 0
1. LITTLE INTEREST OR PLEASURE IN DOING THINGS: 0

## 2023-08-21 ASSESSMENT — ENCOUNTER SYMPTOMS
ALLERGIC/IMMUNOLOGIC NEGATIVE: 1
RESPIRATORY NEGATIVE: 1
EYES NEGATIVE: 1
GASTROINTESTINAL NEGATIVE: 1

## 2023-08-21 NOTE — ASSESSMENT & PLAN NOTE
Stable without recurrence of symptoms     Patient is to continue on aspirin 81 mg and pravastatin 40 mg daily. Patient is to continue to work to all of his comorbid conditions as managed by PCP and other specialists as appropriate    RECOMMENDATIONS:  - BP goal is less than 140/90  - Goal HbA1c is less than 7.   - LDL less than 70    Stroke education was provided today in regards to risk factors for stroke and lifestyle modifications to help minimize the risk of future stroke. This included medication compliance, regular follow up with primary care physician,  and healthy lifestyle habits (nutrition/exercise). Alcohol cessation education was provided to patient today. He verbalized understanding.

## 2023-08-21 NOTE — PATIENT INSTRUCTIONS
As per our discussion,    Your left shoulder and right knee pain all likely will be managed by orthopedics. Since you already have an orthopedic doctor, I would encourage you to reach out for an appointment. Our office is a neurology office and its not orthopedics. The injections that you need for your joints will be done at orthopedics. As for seizures, continue to take Keppra 1000 mg twice a day. I would encourage you to refrain from any alcohol since that can lower your seizure threshold and cause you to have a seizure. For seizure safety :  no driving, height/ladders, tub baths, pools, bodies of water, power tools until seizure free x 6 months  If you have another seizure and the shaking last under 5 minutes, you are not injured, and you return to yourself quickly, no need to call EMS, just call my office at the number above. If you have  A seizure and the shaking lasts longer than 5 minutes, you are hurt or you have multiple seizures back to back without returning to yourself then call EMS. The [facility]'s Seizure Policy states that a person must be seizure-free or blackout-free for at least six months before driving. If a person is currently licensed and [facility] is notified that the person has experienced a seizure, loss of consciousness or blackout, [facility] will suspend the person's driving privilege for a period of six months from the date of the last episode. As for stroke prophylaxis, continue to take aspirin 81 mg, pravastatin 20 mg daily. Continue to work to all of his comorbid conditions as managed by PCP and other specialists as appropriate    RECOMMENDATIONS:  - BP goal is less than 140/90  - Goal HbA1c is less than 7.   - LDL less than 70      I provided stroke education today in regards to risk factors for stroke and lifestyle modifications to help minimize the risk of future stroke.   This included medication compliance, regular follow up with primary care physician,  and

## 2023-08-21 NOTE — PROGRESS NOTES
1. Have you been to the ER, urgent care clinic since your last visit? Hospitalized since your last visit? Seen on 8/3/23    2. Have you seen or consulted any other health care providers outside of the [address] [place] [address] Avenue since your last visit? Include any pap smears or colon screening.    No.        Chief Complaint   Patient presents with    Harry willson for shoulder pain- HX of stroke 3 yrs ago
and other errors may be present. Corrections may be executed at a later time. Please feel free to contact us for any clarifications as needed.          Don Ramirez, APRN - NP

## 2023-08-21 NOTE — ASSESSMENT & PLAN NOTE
Patient was recently admitted in the ED on 8/3/2023 for bilateral leg swelling. He verbalized he was told to follow-up with neurology and he thought he would get injections for his right knee and left shoulder pain. I personally reviewed patient's x-ray of bilateral knee completed on 3/6/2018 through [address] [place] [address] showed complete loss of medial joint space with patellofemoral osteophytes. Patient is scheduled to see Dr. Gage Lord on 9/5/2023. Patient was encouraged to keep that appointment and follow-up with orthopedics as appropriate. As for the swelling of the lower extremities, patient was advised to elevate extremities and to continue to follow-up with PCP and other specialists for other comorbid conditions as appropriate.

## 2023-08-21 NOTE — ASSESSMENT & PLAN NOTE
Patient denies any seizure-like activities. Last seizure was in 2022    I was unable to find any EEG that was done last year. Patient is to continue to take Keppra 1000 mg twice a day. Per patient, Gillian Schirmer has been managed by his PCP Dr. Bouchra Watts. Seizure safety was discussed with patient which included:  No driving, height/ladders, tub baths, pools, bodies of water, power tools until seizure free x 6 months  If you have another seizure and the shaking last under 5 minutes, you are not injured, and you return to yourself quickly, no need to call EMS, just call my office at the number above. If you have  A seizure and the shaking lasts longer than 5 minutes, you are hurt or you have multiple seizures back to back without returning to yourself then call EMS. The [facility]'s Seizure Policy states that a person must be seizure-free or blackout-free for at least six months before driving. If a person is currently licensed and [facility] is notified that the person has experienced a seizure, loss of consciousness or blackout, [facility] will suspend the person's driving privilege for a period of six months from the date of the last episode.

## 2023-08-23 ENCOUNTER — TELEPHONE (OUTPATIENT)
Age: 73
End: 2023-08-23

## 2023-08-23 NOTE — TELEPHONE ENCOUNTER
He states there is medication that he needs to keep swelling down in his legs, but he doesn't have the name. This is a new med from the ED. He would like this to go to [facility] #[phone]    Pt is asking for a 90 day supply     Please call pt with any questions.

## 2023-08-24 RX ORDER — FUROSEMIDE 20 MG/1
20 TABLET ORAL DAILY PRN
Qty: 90 TABLET | Refills: 3 | Status: CANCELLED | OUTPATIENT
Start: 2023-08-24

## 2023-08-25 ENCOUNTER — TELEPHONE (OUTPATIENT)
Age: 73
End: 2023-08-25

## 2023-08-29 ENCOUNTER — TELEPHONE (OUTPATIENT)
Age: 73
End: 2023-08-29

## 2023-08-29 NOTE — TELEPHONE ENCOUNTER
[facility] states patient is there now & needs to get patient's Most Recent Medication List faxed over [address] Please call if any questions.  Thank you        Fax# is [phone]   ATTN:  Cruz Castellon

## 2023-09-05 ENCOUNTER — OFFICE VISIT (OUTPATIENT)
Age: 73
End: 2023-09-05

## 2023-09-05 VITALS
SYSTOLIC BLOOD PRESSURE: 129 MMHG | BODY MASS INDEX: 37.22 KG/M2 | WEIGHT: 260 LBS | TEMPERATURE: 98.3 F | HEART RATE: 85 BPM | RESPIRATION RATE: 17 BRPM | HEIGHT: 70 IN | OXYGEN SATURATION: 98 % | DIASTOLIC BLOOD PRESSURE: 76 MMHG

## 2023-09-05 DIAGNOSIS — M25.561 RIGHT KNEE PAIN, UNSPECIFIED CHRONICITY: Primary | ICD-10-CM

## 2023-09-05 DIAGNOSIS — M17.0 BILATERAL PRIMARY OSTEOARTHRITIS OF KNEE: ICD-10-CM

## 2023-09-05 DIAGNOSIS — M75.42 IMPINGEMENT SYNDROME OF LEFT SHOULDER: ICD-10-CM

## 2023-09-05 DIAGNOSIS — M17.12 UNILATERAL PRIMARY OSTEOARTHRITIS, LEFT KNEE: ICD-10-CM

## 2023-09-05 DIAGNOSIS — M25.512 LEFT SHOULDER PAIN, UNSPECIFIED CHRONICITY: ICD-10-CM

## 2023-09-05 RX ORDER — BETAMETHASONE SODIUM PHOSPHATE AND BETAMETHASONE ACETATE 3; 3 MG/ML; MG/ML
6 INJECTION, SUSPENSION INTRA-ARTICULAR; INTRALESIONAL; INTRAMUSCULAR; SOFT TISSUE ONCE
Status: COMPLETED | OUTPATIENT
Start: 2023-09-05 | End: 2023-09-05

## 2023-09-05 RX ADMIN — BETAMETHASONE SODIUM PHOSPHATE AND BETAMETHASONE ACETATE 6 MG: 3; 3 INJECTION, SUSPENSION INTRA-ARTICULAR; INTRALESIONAL; INTRAMUSCULAR; SOFT TISSUE at 14:50

## 2023-09-05 ASSESSMENT — PATIENT HEALTH QUESTIONNAIRE - PHQ9
1. LITTLE INTEREST OR PLEASURE IN DOING THINGS: 0
SUM OF ALL RESPONSES TO PHQ QUESTIONS 1-9: 0
SUM OF ALL RESPONSES TO PHQ QUESTIONS 1-9: 0
SUM OF ALL RESPONSES TO PHQ9 QUESTIONS 1 & 2: 0
SUM OF ALL RESPONSES TO PHQ QUESTIONS 1-9: 0
SUM OF ALL RESPONSES TO PHQ QUESTIONS 1-9: 0
2. FEELING DOWN, DEPRESSED OR HOPELESS: 0

## 2023-09-20 ENCOUNTER — SCHEDULED TELEPHONE ENCOUNTER (OUTPATIENT)
Age: 73
End: 2023-09-20

## 2023-09-20 DIAGNOSIS — M17.0 BILATERAL PRIMARY OSTEOARTHRITIS OF KNEE: ICD-10-CM

## 2023-09-20 DIAGNOSIS — M17.12 UNILATERAL PRIMARY OSTEOARTHRITIS, LEFT KNEE: ICD-10-CM

## 2023-09-20 DIAGNOSIS — M75.42 IMPINGEMENT SYNDROME OF LEFT SHOULDER: Primary | ICD-10-CM

## 2023-09-20 PROBLEM — Z09 HOSPITAL DISCHARGE FOLLOW-UP: Status: RESOLVED | Noted: 2023-08-21 | Resolved: 2023-09-20

## 2023-09-20 ASSESSMENT — PATIENT HEALTH QUESTIONNAIRE - PHQ9
SUM OF ALL RESPONSES TO PHQ9 QUESTIONS 1 & 2: 0
SUM OF ALL RESPONSES TO PHQ QUESTIONS 1-9: 0
2. FEELING DOWN, DEPRESSED OR HOPELESS: 0
1. LITTLE INTEREST OR PLEASURE IN DOING THINGS: 0
SUM OF ALL RESPONSES TO PHQ QUESTIONS 1-9: 0

## 2023-09-20 NOTE — PROGRESS NOTES
Yael Park is a 72 y.o. male  Chief Complaint   Patient presents with    Injections     LT Shoulder and LT Knee inj f/u     There were no vitals taken for this visit. 1. Have you been to the ER, urgent care clinic since your last visit? Hospitalized since your last visit? No    2. Have you seen or consulted any other health care providers outside of the [address] [place] [address] since your last visit? Include any pap smears or colon screening.  No

## 2023-09-20 NOTE — PROGRESS NOTES
9/20/2023      CC: left knee pain, left shoulder injection    HPI:      This is a 72y.o. year old male who presents for follow up of their left knee and shoulder injection. The patient states that they have had very good relief of their pain. The time since injection has been approximately a week.       PMH:  Past Medical History:   Diagnosis Date    Allergic rhinitis     Arthritis     djd of knees    Chronic pain     Diabetes ([address])     Hypercholesteremia     Hypertension     Obesity     Seizures ([address])     Stroke ([address])     left--ring finger/ pinky finger numb sensation       PSxHx:  Past Surgical History:   Procedure Laterality Date    CHOLECYSTECTOMY      laparoscopic       Meds:    Current Outpatient Medications:     sildenafil (VIAGRA) 100 MG tablet, TAKE 1 TABLET BY MOUTH ONCE DAILY ON AN EMPTY STOMACH AS NEEDED PRIOR TO SEXUAL ACTIVITY, Disp: , Rfl:     vardenafil (LEVITRA) 20 MG tablet, TAKE 1 TABLET BY MOUTH AS NEEDED ON AN EMPTY STOMACH BEFORE SEXUAL ACTIVITY, Disp: , Rfl:     pravastatin (PRAVACHOL) 20 MG tablet, Take 1 tablet by mouth nightly, Disp: 90 tablet, Rfl: 3    blood glucose test strips (ASCENSIA AUTODISC VI;ONE TOUCH ULTRA TEST VI) strip, Apply 1 each topically daily One Touch Ultra Blue test Strips, Disp: 100 each, Rfl: 1    glucose monitoring (FREESTYLE FREEDOM) kit, 1 kit by Does not apply route daily, Disp: 1 kit, Rfl: 0    furosemide (LASIX) 20 MG tablet, Take 1 tablet by mouth daily as needed (edema), Disp: 90 tablet, Rfl: 3    acetaminophen (TYLENOL) 500 MG tablet, Take by mouth daily as needed, Disp: , Rfl:     amLODIPine (NORVASC) 10 MG tablet, TAKE 1 TABLET EVERY DAY, Disp: , Rfl:     aspirin 81 MG chewable tablet, Take by mouth daily, Disp: , Rfl:     cloNIDine (CATAPRES) 0.1 MG tablet, Take by mouth daily, Disp: , Rfl:     escitalopram (LEXAPRO) 5 MG tablet, Take by mouth daily, Disp: , Rfl:     gabapentin (NEURONTIN) 300 MG capsule, TAKE 1 CAPSULE BY MOUTH 3  TIMES DAILY, Disp: ,

## 2023-11-16 ENCOUNTER — TELEPHONE (OUTPATIENT)
Age: 73
End: 2023-11-16

## 2023-11-16 RX ORDER — ESCITALOPRAM OXALATE 5 MG/1
5 TABLET ORAL DAILY
Qty: 90 TABLET | Refills: 1 | Status: SHIPPED | OUTPATIENT
Start: 2023-11-16

## 2023-11-16 NOTE — TELEPHONE ENCOUNTER
Pt would like to see Dr. Castillo Glancrory only. Pt states he has a couple of things he needs to see Dr. Castillo Glajon about. Health problems  he states. Pt would like to be seen before Ata. Is this possible? Please call pt. Thanks.

## 2023-11-17 RX ORDER — HUMAN INSULIN 100 [IU]/ML
INJECTION, SUSPENSION SUBCUTANEOUS
Qty: 15 ML | Refills: 5 | Status: SHIPPED | OUTPATIENT
Start: 2023-11-17

## 2023-11-24 ENCOUNTER — TELEPHONE (OUTPATIENT)
Age: 73
End: 2023-11-24

## 2023-11-24 NOTE — TELEPHONE ENCOUNTER
Spoke with patient  Questions answered    Advised he is to take his insulin twice daily.     States understanding

## 2023-11-24 NOTE — TELEPHONE ENCOUNTER
Pt has not had insulin as he just got it from the pharmacy, but he has already ate. Should he take the insulin now? Please call to advise right away.

## 2023-11-26 ENCOUNTER — APPOINTMENT (OUTPATIENT)
Facility: HOSPITAL | Age: 73
DRG: 638 | End: 2023-11-26
Payer: MEDICARE

## 2023-11-26 ENCOUNTER — HOSPITAL ENCOUNTER (INPATIENT)
Facility: HOSPITAL | Age: 73
LOS: 3 days | Discharge: HOME HEALTH | DRG: 638 | End: 2023-11-29
Attending: EMERGENCY MEDICINE | Admitting: INTERNAL MEDICINE
Payer: MEDICARE

## 2023-11-26 DIAGNOSIS — L03.116 CELLULITIS OF LEFT LOWER EXTREMITY: Primary | ICD-10-CM

## 2023-11-26 DIAGNOSIS — E87.20 LACTIC ACIDOSIS: ICD-10-CM

## 2023-11-26 PROBLEM — L03.90 CELLULITIS: Status: ACTIVE | Noted: 2023-11-26

## 2023-11-26 LAB
ALBUMIN SERPL-MCNC: 3.6 G/DL (ref 3.5–5)
ALBUMIN/GLOB SERPL: 1 (ref 1.1–2.2)
ALP SERPL-CCNC: 70 U/L (ref 45–117)
ALT SERPL-CCNC: 19 U/L (ref 12–78)
ANION GAP SERPL CALC-SCNC: 4 MMOL/L (ref 5–15)
APPEARANCE UR: CLEAR
AST SERPL-CCNC: 17 U/L (ref 15–37)
BACTERIA URNS QL MICRO: ABNORMAL /HPF
BASOPHILS # BLD: 0 K/UL (ref 0–0.1)
BASOPHILS NFR BLD: 1 % (ref 0–1)
BILIRUB SERPL-MCNC: 0.4 MG/DL (ref 0.2–1)
BILIRUB UR QL: NEGATIVE
BUN SERPL-MCNC: 17 MG/DL (ref 6–20)
BUN/CREAT SERPL: 14 (ref 12–20)
CALCIUM SERPL-MCNC: 9.1 MG/DL (ref 8.5–10.1)
CHLORIDE SERPL-SCNC: 102 MMOL/L (ref 97–108)
CO2 SERPL-SCNC: 33 MMOL/L (ref 21–32)
COLOR UR: ABNORMAL
COMMENT:: NORMAL
CREAT SERPL-MCNC: 1.25 MG/DL (ref 0.7–1.3)
DIFFERENTIAL METHOD BLD: ABNORMAL
ECHO BSA: 2.31 M2
EOSINOPHIL # BLD: 0.1 K/UL (ref 0–0.4)
EOSINOPHIL NFR BLD: 2 % (ref 0–7)
EPITH CASTS URNS QL MICRO: ABNORMAL /LPF
ERYTHROCYTE [DISTWIDTH] IN BLOOD BY AUTOMATED COUNT: 15.9 % (ref 11.5–14.5)
GLOBULIN SER CALC-MCNC: 3.6 G/DL (ref 2–4)
GLUCOSE BLD STRIP.AUTO-MCNC: 119 MG/DL (ref 65–117)
GLUCOSE BLD STRIP.AUTO-MCNC: 206 MG/DL (ref 65–117)
GLUCOSE BLD STRIP.AUTO-MCNC: 97 MG/DL (ref 65–117)
GLUCOSE SERPL-MCNC: 212 MG/DL (ref 65–100)
GLUCOSE UR STRIP.AUTO-MCNC: NEGATIVE MG/DL
HCT VFR BLD AUTO: 41.9 % (ref 36.6–50.3)
HGB BLD-MCNC: 13 G/DL (ref 12.1–17)
HGB UR QL STRIP: NEGATIVE
HYALINE CASTS URNS QL MICRO: ABNORMAL /LPF (ref 0–2)
IMM GRANULOCYTES # BLD AUTO: 0 K/UL (ref 0–0.04)
IMM GRANULOCYTES NFR BLD AUTO: 0 % (ref 0–0.5)
KETONES UR QL STRIP.AUTO: NEGATIVE MG/DL
LACTATE BLD-SCNC: 2.53 MMOL/L (ref 0.4–2)
LACTATE BLD-SCNC: 3.04 MMOL/L (ref 0.4–2)
LEUKOCYTE ESTERASE UR QL STRIP.AUTO: ABNORMAL
LYMPHOCYTES # BLD: 2.1 K/UL (ref 0.8–3.5)
LYMPHOCYTES NFR BLD: 37 % (ref 12–49)
MCH RBC QN AUTO: 26.2 PG (ref 26–34)
MCHC RBC AUTO-ENTMCNC: 31 G/DL (ref 30–36.5)
MCV RBC AUTO: 84.5 FL (ref 80–99)
MONOCYTES # BLD: 0.3 K/UL (ref 0–1)
MONOCYTES NFR BLD: 5 % (ref 5–13)
NEUTS SEG # BLD: 3.2 K/UL (ref 1.8–8)
NEUTS SEG NFR BLD: 55 % (ref 32–75)
NITRITE UR QL STRIP.AUTO: NEGATIVE
NRBC # BLD: 0 K/UL (ref 0–0.01)
NRBC BLD-RTO: 0 PER 100 WBC
PH UR STRIP: 7 (ref 5–8)
PLATELET # BLD AUTO: 278 K/UL (ref 150–400)
PMV BLD AUTO: 10.1 FL (ref 8.9–12.9)
POTASSIUM SERPL-SCNC: 4.1 MMOL/L (ref 3.5–5.1)
PROCALCITONIN SERPL-MCNC: <0.05 NG/ML
PROT SERPL-MCNC: 7.2 G/DL (ref 6.4–8.2)
PROT UR STRIP-MCNC: NEGATIVE MG/DL
RBC # BLD AUTO: 4.96 M/UL (ref 4.1–5.7)
RBC #/AREA URNS HPF: ABNORMAL /HPF (ref 0–5)
SERVICE CMNT-IMP: ABNORMAL
SERVICE CMNT-IMP: ABNORMAL
SERVICE CMNT-IMP: NORMAL
SODIUM SERPL-SCNC: 139 MMOL/L (ref 136–145)
SP GR UR REFRACTOMETRY: 1.01
SPECIMEN HOLD: NORMAL
TROPONIN I SERPL HS-MCNC: 5 NG/L (ref 0–76)
URINE CULTURE IF INDICATED: ABNORMAL
UROBILINOGEN UR QL STRIP.AUTO: 1 EU/DL (ref 0.2–1)
WBC # BLD AUTO: 5.8 K/UL (ref 4.1–11.1)
WBC URNS QL MICRO: ABNORMAL /HPF (ref 0–4)

## 2023-11-26 PROCEDURE — 87147 CULTURE TYPE IMMUNOLOGIC: CPT

## 2023-11-26 PROCEDURE — 2580000003 HC RX 258: Performed by: INTERNAL MEDICINE

## 2023-11-26 PROCEDURE — 85025 COMPLETE CBC W/AUTO DIFF WBC: CPT

## 2023-11-26 PROCEDURE — 6370000000 HC RX 637 (ALT 250 FOR IP): Performed by: EMERGENCY MEDICINE

## 2023-11-26 PROCEDURE — 90471 IMMUNIZATION ADMIN: CPT | Performed by: EMERGENCY MEDICINE

## 2023-11-26 PROCEDURE — 6360000002 HC RX W HCPCS: Performed by: INTERNAL MEDICINE

## 2023-11-26 PROCEDURE — 96365 THER/PROPH/DIAG IV INF INIT: CPT

## 2023-11-26 PROCEDURE — 2580000003 HC RX 258: Performed by: EMERGENCY MEDICINE

## 2023-11-26 PROCEDURE — 73590 X-RAY EXAM OF LOWER LEG: CPT

## 2023-11-26 PROCEDURE — 87040 BLOOD CULTURE FOR BACTERIA: CPT

## 2023-11-26 PROCEDURE — 6370000000 HC RX 637 (ALT 250 FOR IP): Performed by: INTERNAL MEDICINE

## 2023-11-26 PROCEDURE — 93971 EXTREMITY STUDY: CPT

## 2023-11-26 PROCEDURE — 71045 X-RAY EXAM CHEST 1 VIEW: CPT

## 2023-11-26 PROCEDURE — 36415 COLL VENOUS BLD VENIPUNCTURE: CPT

## 2023-11-26 PROCEDURE — 84145 PROCALCITONIN (PCT): CPT

## 2023-11-26 PROCEDURE — 84484 ASSAY OF TROPONIN QUANT: CPT

## 2023-11-26 PROCEDURE — 87205 SMEAR GRAM STAIN: CPT

## 2023-11-26 PROCEDURE — 81001 URINALYSIS AUTO W/SCOPE: CPT

## 2023-11-26 PROCEDURE — 82962 GLUCOSE BLOOD TEST: CPT

## 2023-11-26 PROCEDURE — 80053 COMPREHEN METABOLIC PANEL: CPT

## 2023-11-26 PROCEDURE — 1100000000 HC RM PRIVATE

## 2023-11-26 PROCEDURE — 87077 CULTURE AEROBIC IDENTIFY: CPT

## 2023-11-26 PROCEDURE — 99285 EMERGENCY DEPT VISIT HI MDM: CPT

## 2023-11-26 PROCEDURE — 87070 CULTURE OTHR SPECIMN AEROBIC: CPT

## 2023-11-26 PROCEDURE — 90714 TD VACC NO PRESV 7 YRS+ IM: CPT | Performed by: EMERGENCY MEDICINE

## 2023-11-26 PROCEDURE — 6360000002 HC RX W HCPCS: Performed by: EMERGENCY MEDICINE

## 2023-11-26 PROCEDURE — 93005 ELECTROCARDIOGRAM TRACING: CPT | Performed by: EMERGENCY MEDICINE

## 2023-11-26 PROCEDURE — 83605 ASSAY OF LACTIC ACID: CPT

## 2023-11-26 RX ORDER — HYDROCHLOROTHIAZIDE 25 MG/1
25 TABLET ORAL DAILY
Status: DISCONTINUED | OUTPATIENT
Start: 2023-11-26 | End: 2023-11-29 | Stop reason: HOSPADM

## 2023-11-26 RX ORDER — 0.9 % SODIUM CHLORIDE 0.9 %
30 INTRAVENOUS SOLUTION INTRAVENOUS ONCE
Status: COMPLETED | OUTPATIENT
Start: 2023-11-26 | End: 2023-11-26

## 2023-11-26 RX ORDER — SODIUM CHLORIDE 0.9 % (FLUSH) 0.9 %
5-40 SYRINGE (ML) INJECTION PRN
Status: DISCONTINUED | OUTPATIENT
Start: 2023-11-26 | End: 2023-11-29 | Stop reason: HOSPADM

## 2023-11-26 RX ORDER — ACETAMINOPHEN 325 MG/1
650 TABLET ORAL EVERY 6 HOURS PRN
Status: DISCONTINUED | OUTPATIENT
Start: 2023-11-26 | End: 2023-11-29 | Stop reason: HOSPADM

## 2023-11-26 RX ORDER — LEVETIRACETAM 500 MG/1
1000 TABLET ORAL 2 TIMES DAILY
Status: DISCONTINUED | OUTPATIENT
Start: 2023-11-26 | End: 2023-11-29 | Stop reason: HOSPADM

## 2023-11-26 RX ORDER — INSULIN LISPRO 100 [IU]/ML
0-4 INJECTION, SOLUTION INTRAVENOUS; SUBCUTANEOUS NIGHTLY
Status: DISCONTINUED | OUTPATIENT
Start: 2023-11-26 | End: 2023-11-29 | Stop reason: HOSPADM

## 2023-11-26 RX ORDER — FUROSEMIDE 40 MG/1
20 TABLET ORAL DAILY PRN
Status: DISCONTINUED | OUTPATIENT
Start: 2023-11-26 | End: 2023-11-29 | Stop reason: HOSPADM

## 2023-11-26 RX ORDER — POTASSIUM CHLORIDE 7.45 MG/ML
10 INJECTION INTRAVENOUS PRN
Status: DISCONTINUED | OUTPATIENT
Start: 2023-11-26 | End: 2023-11-29

## 2023-11-26 RX ORDER — ENOXAPARIN SODIUM 100 MG/ML
30 INJECTION SUBCUTANEOUS 2 TIMES DAILY
Status: DISCONTINUED | OUTPATIENT
Start: 2023-11-26 | End: 2023-11-29 | Stop reason: HOSPADM

## 2023-11-26 RX ORDER — TETANUS AND DIPHTHERIA TOXOIDS ADSORBED 2; 2 [LF]/.5ML; [LF]/.5ML
0.5 INJECTION INTRAMUSCULAR ONCE
Status: COMPLETED | OUTPATIENT
Start: 2023-11-26 | End: 2023-11-26

## 2023-11-26 RX ORDER — ASPIRIN 81 MG/1
81 TABLET, CHEWABLE ORAL DAILY
Status: DISCONTINUED | OUTPATIENT
Start: 2023-11-26 | End: 2023-11-29 | Stop reason: HOSPADM

## 2023-11-26 RX ORDER — INSULIN GLARGINE 100 [IU]/ML
13 INJECTION, SOLUTION SUBCUTANEOUS NIGHTLY
Status: DISCONTINUED | OUTPATIENT
Start: 2023-11-26 | End: 2023-11-28

## 2023-11-26 RX ORDER — ONDANSETRON 2 MG/ML
4 INJECTION INTRAMUSCULAR; INTRAVENOUS EVERY 6 HOURS PRN
Status: DISCONTINUED | OUTPATIENT
Start: 2023-11-26 | End: 2023-11-29 | Stop reason: HOSPADM

## 2023-11-26 RX ORDER — PRAVASTATIN SODIUM 10 MG
20 TABLET ORAL NIGHTLY
Status: DISCONTINUED | OUTPATIENT
Start: 2023-11-26 | End: 2023-11-29 | Stop reason: HOSPADM

## 2023-11-26 RX ORDER — GABAPENTIN 300 MG/1
300 CAPSULE ORAL 3 TIMES DAILY
Status: DISCONTINUED | OUTPATIENT
Start: 2023-11-26 | End: 2023-11-29 | Stop reason: HOSPADM

## 2023-11-26 RX ORDER — POLYETHYLENE GLYCOL 3350 17 G/17G
17 POWDER, FOR SOLUTION ORAL DAILY PRN
Status: DISCONTINUED | OUTPATIENT
Start: 2023-11-26 | End: 2023-11-29 | Stop reason: HOSPADM

## 2023-11-26 RX ORDER — DEXTROSE MONOHYDRATE 100 MG/ML
INJECTION, SOLUTION INTRAVENOUS CONTINUOUS PRN
Status: DISCONTINUED | OUTPATIENT
Start: 2023-11-26 | End: 2023-11-29 | Stop reason: HOSPADM

## 2023-11-26 RX ORDER — ONDANSETRON 4 MG/1
4 TABLET, ORALLY DISINTEGRATING ORAL EVERY 8 HOURS PRN
Status: DISCONTINUED | OUTPATIENT
Start: 2023-11-26 | End: 2023-11-29 | Stop reason: HOSPADM

## 2023-11-26 RX ORDER — AMLODIPINE BESYLATE 5 MG/1
10 TABLET ORAL DAILY
Status: DISCONTINUED | OUTPATIENT
Start: 2023-11-26 | End: 2023-11-29 | Stop reason: HOSPADM

## 2023-11-26 RX ORDER — INSULIN LISPRO 100 [IU]/ML
0-8 INJECTION, SOLUTION INTRAVENOUS; SUBCUTANEOUS
Status: DISCONTINUED | OUTPATIENT
Start: 2023-11-26 | End: 2023-11-29 | Stop reason: HOSPADM

## 2023-11-26 RX ORDER — ACETAMINOPHEN 650 MG/1
650 SUPPOSITORY RECTAL EVERY 6 HOURS PRN
Status: DISCONTINUED | OUTPATIENT
Start: 2023-11-26 | End: 2023-11-29 | Stop reason: HOSPADM

## 2023-11-26 RX ORDER — CLONIDINE HYDROCHLORIDE 0.1 MG/1
0.1 TABLET ORAL DAILY
Status: DISCONTINUED | OUTPATIENT
Start: 2023-11-26 | End: 2023-11-29 | Stop reason: HOSPADM

## 2023-11-26 RX ORDER — ACETAMINOPHEN 325 MG/1
650 TABLET ORAL
Status: COMPLETED | OUTPATIENT
Start: 2023-11-26 | End: 2023-11-26

## 2023-11-26 RX ORDER — MAGNESIUM SULFATE IN WATER 40 MG/ML
2000 INJECTION, SOLUTION INTRAVENOUS PRN
Status: DISCONTINUED | OUTPATIENT
Start: 2023-11-26 | End: 2023-11-29

## 2023-11-26 RX ORDER — SODIUM CHLORIDE 0.9 % (FLUSH) 0.9 %
5-40 SYRINGE (ML) INJECTION EVERY 12 HOURS SCHEDULED
Status: DISCONTINUED | OUTPATIENT
Start: 2023-11-26 | End: 2023-11-29 | Stop reason: HOSPADM

## 2023-11-26 RX ORDER — ESCITALOPRAM OXALATE 10 MG/1
5 TABLET ORAL DAILY
Status: DISCONTINUED | OUTPATIENT
Start: 2023-11-26 | End: 2023-11-29 | Stop reason: HOSPADM

## 2023-11-26 RX ORDER — INSULIN LISPRO 100 [IU]/ML
2 INJECTION, SOLUTION INTRAVENOUS; SUBCUTANEOUS
Status: DISCONTINUED | OUTPATIENT
Start: 2023-11-26 | End: 2023-11-29 | Stop reason: HOSPADM

## 2023-11-26 RX ORDER — POTASSIUM CHLORIDE 20 MEQ/1
40 TABLET, EXTENDED RELEASE ORAL PRN
Status: DISCONTINUED | OUTPATIENT
Start: 2023-11-26 | End: 2023-11-29

## 2023-11-26 RX ORDER — SODIUM CHLORIDE 9 MG/ML
INJECTION, SOLUTION INTRAVENOUS PRN
Status: DISCONTINUED | OUTPATIENT
Start: 2023-11-26 | End: 2023-11-29 | Stop reason: HOSPADM

## 2023-11-26 RX ADMIN — INSULIN GLARGINE 13 UNITS: 100 INJECTION, SOLUTION SUBCUTANEOUS at 21:28

## 2023-11-26 RX ADMIN — SODIUM CHLORIDE 2190 ML: 9 INJECTION, SOLUTION INTRAVENOUS at 11:27

## 2023-11-26 RX ADMIN — GABAPENTIN 300 MG: 300 CAPSULE ORAL at 21:29

## 2023-11-26 RX ADMIN — GABAPENTIN 300 MG: 300 CAPSULE ORAL at 14:31

## 2023-11-26 RX ADMIN — TETANUS AND DIPHTHERIA TOXOIDS ADSORBED 0.5 ML: 2; 2 INJECTION INTRAMUSCULAR at 10:35

## 2023-11-26 RX ADMIN — ENOXAPARIN SODIUM 30 MG: 100 INJECTION SUBCUTANEOUS at 21:28

## 2023-11-26 RX ADMIN — PIPERACILLIN AND TAZOBACTAM 4500 MG: 4; .5 INJECTION, POWDER, LYOPHILIZED, FOR SOLUTION INTRAVENOUS at 11:27

## 2023-11-26 RX ADMIN — ENOXAPARIN SODIUM 30 MG: 100 INJECTION SUBCUTANEOUS at 14:43

## 2023-11-26 RX ADMIN — Medication 2500 MG: at 12:31

## 2023-11-26 RX ADMIN — ACETAMINOPHEN 650 MG: 325 TABLET ORAL at 12:29

## 2023-11-26 RX ADMIN — PRAVASTATIN SODIUM 20 MG: 10 TABLET ORAL at 21:29

## 2023-11-26 RX ADMIN — ACETAMINOPHEN 650 MG: 325 TABLET ORAL at 22:19

## 2023-11-26 RX ADMIN — PIPERACILLIN AND TAZOBACTAM 3375 MG: 3; .375 INJECTION, POWDER, LYOPHILIZED, FOR SOLUTION INTRAVENOUS at 18:02

## 2023-11-26 RX ADMIN — LEVETIRACETAM 1000 MG: 500 TABLET, FILM COATED ORAL at 21:29

## 2023-11-26 ASSESSMENT — PAIN DESCRIPTION - LOCATION: LOCATION: LEG

## 2023-11-26 ASSESSMENT — PAIN SCALES - GENERAL
PAINLEVEL_OUTOF10: 0
PAINLEVEL_OUTOF10: 3
PAINLEVEL_OUTOF10: 3

## 2023-11-26 ASSESSMENT — PAIN DESCRIPTION - DESCRIPTORS: DESCRIPTORS: THROBBING

## 2023-11-26 ASSESSMENT — PAIN DESCRIPTION - ORIENTATION: ORIENTATION: LEFT

## 2023-11-26 ASSESSMENT — PAIN - FUNCTIONAL ASSESSMENT: PAIN_FUNCTIONAL_ASSESSMENT: 0-10
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
## 2023-11-26 NOTE — ED NOTES
Verbal shift change report given to Alexandra Pritchett (oncoming nurse) by Alyssa Rivera RN (offgoing nurse). Report included the following information Nurse Handoff Report, ED Encounter Summary, ED SBAR, Intake/Output, MAR, and Recent Results.         Bambi Lyons RN  11/26/23 [id number]

## 2023-11-26 NOTE — H&P
Hospitalist Admission Note    NAME:   Kathrin Mireles   : 1950   MRN: [id number]     Date/Time: 2023 2:09 PM    Patient PCP: Srikanth Benavidez MD    ______________________________________________________________________  Given the patient's current clinical presentation, I have a high level of concern for decompensation if discharged from the emergency department. Complex decision making was performed, which includes reviewing the patient's available past medical records, laboratory results, and x-ray films. My assessment of this patient's clinical condition and my plan of care is as follows. Assessment / Plan:    Left lower extremity cellulitis-patient presents with lumbar symptomatology consistent with left lower extremity cellulitis, patient currently does not meet sepsis criteria at this time  Obtain blood cultures  Obtain wound cultures  Vancomycin and Zosyn for broad-spectrum antibiotic coverage  Continue to monitor patient's clinical status    Hypertensive urgency-reinitiate home antihypertensive medications, hydralazine as needed for systolic blood pressure of over 160    Hyperlipidemia-continue statin    Depression/anxiety-continue medications    Hyperglycemia type 2 diabetes-continue home insulin regimen with sliding scale for additional coverage    Prophylaxis-Lovenox  FEN-cardiac/diabetic diet, replete potassium and magnesium  Full code, will clarify about surrogate decision-maker, admitted further medical management                    Medical Decision Making:   I personally reviewed labs: CBC, CMP, magnesium  I personally reviewed imaging: X-ray chest, negative for any acute intrapulmonary pathologies  I personally reviewed EKG:  Toxic drug monitoring: Hydrochlorothiazide, monitoring for acute kidney injury  Discussed case with: ED provider. After discussion I am in agreement that acuity of patient's medical condition necessitates hospital stay.       Code Status: Full code  DVT

## 2023-11-27 LAB
ALBUMIN SERPL-MCNC: 3.2 G/DL (ref 3.5–5)
ALBUMIN/GLOB SERPL: 0.8 (ref 1.1–2.2)
ALP SERPL-CCNC: 66 U/L (ref 45–117)
ALT SERPL-CCNC: 18 U/L (ref 12–78)
ANION GAP SERPL CALC-SCNC: 4 MMOL/L (ref 5–15)
AST SERPL-CCNC: 17 U/L (ref 15–37)
BASOPHILS # BLD: 0 K/UL (ref 0–0.1)
BASOPHILS NFR BLD: 1 % (ref 0–1)
BILIRUB SERPL-MCNC: 0.4 MG/DL (ref 0.2–1)
BUN SERPL-MCNC: 13 MG/DL (ref 6–20)
BUN/CREAT SERPL: 13 (ref 12–20)
CALCIUM SERPL-MCNC: 8.8 MG/DL (ref 8.5–10.1)
CHLORIDE SERPL-SCNC: 107 MMOL/L (ref 97–108)
CO2 SERPL-SCNC: 29 MMOL/L (ref 21–32)
CREAT SERPL-MCNC: 1 MG/DL (ref 0.7–1.3)
DIFFERENTIAL METHOD BLD: ABNORMAL
EKG ATRIAL RATE: 53 BPM
EKG DIAGNOSIS: NORMAL
EKG P AXIS: 44 DEGREES
EKG P-R INTERVAL: 228 MS
EKG Q-T INTERVAL: 426 MS
EKG QRS DURATION: 86 MS
EKG QTC CALCULATION (BAZETT): 399 MS
EKG R AXIS: 6 DEGREES
EKG T AXIS: 23 DEGREES
EKG VENTRICULAR RATE: 53 BPM
EOSINOPHIL # BLD: 0.1 K/UL (ref 0–0.4)
EOSINOPHIL NFR BLD: 1 % (ref 0–7)
ERYTHROCYTE [DISTWIDTH] IN BLOOD BY AUTOMATED COUNT: 16.1 % (ref 11.5–14.5)
GLOBULIN SER CALC-MCNC: 3.9 G/DL (ref 2–4)
GLUCOSE BLD STRIP.AUTO-MCNC: 110 MG/DL (ref 65–117)
GLUCOSE BLD STRIP.AUTO-MCNC: 200 MG/DL (ref 65–117)
GLUCOSE BLD STRIP.AUTO-MCNC: 211 MG/DL (ref 65–117)
GLUCOSE BLD STRIP.AUTO-MCNC: 282 MG/DL (ref 65–117)
GLUCOSE SERPL-MCNC: 180 MG/DL (ref 65–100)
HCT VFR BLD AUTO: 40.9 % (ref 36.6–50.3)
HGB BLD-MCNC: 12.8 G/DL (ref 12.1–17)
IMM GRANULOCYTES # BLD AUTO: 0 K/UL (ref 0–0.04)
IMM GRANULOCYTES NFR BLD AUTO: 0 % (ref 0–0.5)
LYMPHOCYTES # BLD: 2.1 K/UL (ref 0.8–3.5)
LYMPHOCYTES NFR BLD: 34 % (ref 12–49)
MCH RBC QN AUTO: 25.8 PG (ref 26–34)
MCHC RBC AUTO-ENTMCNC: 31.3 G/DL (ref 30–36.5)
MCV RBC AUTO: 82.5 FL (ref 80–99)
MONOCYTES # BLD: 0.4 K/UL (ref 0–1)
MONOCYTES NFR BLD: 7 % (ref 5–13)
NEUTS SEG # BLD: 3.6 K/UL (ref 1.8–8)
NEUTS SEG NFR BLD: 57 % (ref 32–75)
NRBC # BLD: 0 K/UL (ref 0–0.01)
NRBC BLD-RTO: 0 PER 100 WBC
PLATELET # BLD AUTO: 263 K/UL (ref 150–400)
PMV BLD AUTO: 9.9 FL (ref 8.9–12.9)
POTASSIUM SERPL-SCNC: 3.8 MMOL/L (ref 3.5–5.1)
PROT SERPL-MCNC: 7.1 G/DL (ref 6.4–8.2)
RBC # BLD AUTO: 4.96 M/UL (ref 4.1–5.7)
SERVICE CMNT-IMP: ABNORMAL
SERVICE CMNT-IMP: NORMAL
SODIUM SERPL-SCNC: 140 MMOL/L (ref 136–145)
WBC # BLD AUTO: 6.2 K/UL (ref 4.1–11.1)

## 2023-11-27 PROCEDURE — 6370000000 HC RX 637 (ALT 250 FOR IP): Performed by: INTERNAL MEDICINE

## 2023-11-27 PROCEDURE — 36415 COLL VENOUS BLD VENIPUNCTURE: CPT

## 2023-11-27 PROCEDURE — 80202 ASSAY OF VANCOMYCIN: CPT

## 2023-11-27 PROCEDURE — 2580000003 HC RX 258: Performed by: INTERNAL MEDICINE

## 2023-11-27 PROCEDURE — 2580000003 HC RX 258

## 2023-11-27 PROCEDURE — 85025 COMPLETE CBC W/AUTO DIFF WBC: CPT

## 2023-11-27 PROCEDURE — 80053 COMPREHEN METABOLIC PANEL: CPT

## 2023-11-27 PROCEDURE — 1100000000 HC RM PRIVATE

## 2023-11-27 PROCEDURE — 82962 GLUCOSE BLOOD TEST: CPT

## 2023-11-27 PROCEDURE — 6360000002 HC RX W HCPCS: Performed by: INTERNAL MEDICINE

## 2023-11-27 RX ADMIN — GABAPENTIN 300 MG: 300 CAPSULE ORAL at 13:38

## 2023-11-27 RX ADMIN — INSULIN GLARGINE 13 UNITS: 100 INJECTION, SOLUTION SUBCUTANEOUS at 21:53

## 2023-11-27 RX ADMIN — GABAPENTIN 300 MG: 300 CAPSULE ORAL at 09:51

## 2023-11-27 RX ADMIN — ACETAMINOPHEN 650 MG: 325 TABLET ORAL at 18:03

## 2023-11-27 RX ADMIN — ENOXAPARIN SODIUM 30 MG: 100 INJECTION SUBCUTANEOUS at 09:37

## 2023-11-27 RX ADMIN — ASPIRIN 81 MG: 81 TABLET, CHEWABLE ORAL at 09:38

## 2023-11-27 RX ADMIN — ESCITALOPRAM OXALATE 5 MG: 10 TABLET ORAL at 09:39

## 2023-11-27 RX ADMIN — VANCOMYCIN HYDROCHLORIDE 750 MG: 750 INJECTION, POWDER, LYOPHILIZED, FOR SOLUTION INTRAVENOUS at 00:47

## 2023-11-27 RX ADMIN — INSULIN LISPRO 2 UNITS: 100 INJECTION, SOLUTION INTRAVENOUS; SUBCUTANEOUS at 13:36

## 2023-11-27 RX ADMIN — SODIUM CHLORIDE, PRESERVATIVE FREE 10 ML: 5 INJECTION INTRAVENOUS at 21:54

## 2023-11-27 RX ADMIN — PIPERACILLIN AND TAZOBACTAM 3375 MG: 3; .375 INJECTION, POWDER, LYOPHILIZED, FOR SOLUTION INTRAVENOUS at 11:13

## 2023-11-27 RX ADMIN — AMLODIPINE BESYLATE 10 MG: 5 TABLET ORAL at 09:38

## 2023-11-27 RX ADMIN — PIPERACILLIN AND TAZOBACTAM 3375 MG: 3; .375 INJECTION, POWDER, LYOPHILIZED, FOR SOLUTION INTRAVENOUS at 17:21

## 2023-11-27 RX ADMIN — INSULIN LISPRO 2 UNITS: 100 INJECTION, SOLUTION INTRAVENOUS; SUBCUTANEOUS at 17:19

## 2023-11-27 RX ADMIN — PIPERACILLIN AND TAZOBACTAM 3375 MG: 3; .375 INJECTION, POWDER, LYOPHILIZED, FOR SOLUTION INTRAVENOUS at 02:18

## 2023-11-27 RX ADMIN — VANCOMYCIN HYDROCHLORIDE 750 MG: 750 INJECTION, POWDER, LYOPHILIZED, FOR SOLUTION INTRAVENOUS at 13:36

## 2023-11-27 RX ADMIN — CLONIDINE HYDROCHLORIDE 0.1 MG: 0.1 TABLET ORAL at 09:39

## 2023-11-27 RX ADMIN — SODIUM CHLORIDE: 900 INJECTION INTRAVENOUS at 11:15

## 2023-11-27 RX ADMIN — ENOXAPARIN SODIUM 30 MG: 100 INJECTION SUBCUTANEOUS at 21:52

## 2023-11-27 RX ADMIN — INSULIN LISPRO 2 UNITS: 100 INJECTION, SOLUTION INTRAVENOUS; SUBCUTANEOUS at 17:20

## 2023-11-27 RX ADMIN — LEVETIRACETAM 1000 MG: 500 TABLET, FILM COATED ORAL at 21:53

## 2023-11-27 RX ADMIN — PRAVASTATIN SODIUM 20 MG: 10 TABLET ORAL at 21:52

## 2023-11-27 RX ADMIN — HYDROCHLOROTHIAZIDE 25 MG: 25 TABLET ORAL at 09:39

## 2023-11-27 RX ADMIN — INSULIN LISPRO 4 UNITS: 100 INJECTION, SOLUTION INTRAVENOUS; SUBCUTANEOUS at 13:38

## 2023-11-27 RX ADMIN — GABAPENTIN 300 MG: 300 CAPSULE ORAL at 21:53

## 2023-11-27 RX ADMIN — LEVETIRACETAM 1000 MG: 500 TABLET, FILM COATED ORAL at 09:40

## 2023-11-27 RX ADMIN — SODIUM CHLORIDE, PRESERVATIVE FREE 10 ML: 5 INJECTION INTRAVENOUS at 09:46

## 2023-11-27 RX ADMIN — INSULIN LISPRO 2 UNITS: 100 INJECTION, SOLUTION INTRAVENOUS; SUBCUTANEOUS at 09:40

## 2023-11-27 ASSESSMENT — PAIN SCALES - GENERAL
PAINLEVEL_OUTOF10: 0
PAINLEVEL_OUTOF10: 0
PAINLEVEL_OUTOF10: 4

## 2023-11-27 ASSESSMENT — PAIN DESCRIPTION - DESCRIPTORS: DESCRIPTORS: ACHING

## 2023-11-27 ASSESSMENT — PAIN DESCRIPTION - ORIENTATION: ORIENTATION: LEFT

## 2023-11-27 ASSESSMENT — PAIN DESCRIPTION - LOCATION: LOCATION: FOOT

## 2023-11-27 ASSESSMENT — PAIN - FUNCTIONAL ASSESSMENT: PAIN_FUNCTIONAL_ASSESSMENT: ACTIVITIES ARE NOT PREVENTED

## 2023-11-27 NOTE — PLAN OF CARE
Problem: Pain  Goal: Verbalizes/displays adequate comfort level or baseline comfort level  11/26/2023 2300 by Greta Ku RN  Outcome: Progressing  11/26/2023 1948 by Jaswinder Larry RN  Outcome: Progressing     Problem: Safety - Adult  Goal: Free from fall injury  11/26/2023 2300 by Greta Ku RN  Outcome: Progressing  11/26/2023 1948 by Jaswinder Larry RN  Outcome: Progressing     Problem: ABCDS Injury Assessment  Goal: Absence of physical injury  11/26/2023 2300 by Greta Ku RN  Outcome: Progressing  11/26/2023 1948 by Jaswinder Larry RN  Outcome: Progressing     Problem: Discharge Planning  Goal: Discharge to home or other facility with appropriate resources  11/26/2023 2300 by Greta Ku RN  Outcome: Not Progressing  11/26/2023 1948 by Jaswinder Larry RN  Outcome: Progressing     Problem: Skin/Tissue Integrity  Goal: Absence of new skin breakdown  Description: 1. Monitor for areas of redness and/or skin breakdown  2. Assess vascular access sites hourly  3. Every 4-6 hours minimum:  Change oxygen saturation probe site  4. Every 4-6 hours:  If on nasal continuous positive airway pressure, respiratory therapy assess nares and determine need for appliance change or resting period.   11/26/2023 2300 by Greta Ku RN  Outcome: Not Progressing  11/26/2023 1948 by Jaswinder Larry RN  Outcome: Progressing

## 2023-11-28 LAB
BACTERIA SPEC CULT: ABNORMAL
BACTERIA SPEC CULT: ABNORMAL
GLUCOSE BLD STRIP.AUTO-MCNC: 125 MG/DL (ref 65–117)
GLUCOSE BLD STRIP.AUTO-MCNC: 213 MG/DL (ref 65–117)
GLUCOSE BLD STRIP.AUTO-MCNC: 219 MG/DL (ref 65–117)
GLUCOSE BLD STRIP.AUTO-MCNC: 222 MG/DL (ref 65–117)
GRAM STN SPEC: ABNORMAL
GRAM STN SPEC: ABNORMAL
SERVICE CMNT-IMP: ABNORMAL
VANCOMYCIN SERPL-MCNC: 13.4 UG/ML

## 2023-11-28 PROCEDURE — 6360000002 HC RX W HCPCS: Performed by: STUDENT IN AN ORGANIZED HEALTH CARE EDUCATION/TRAINING PROGRAM

## 2023-11-28 PROCEDURE — 2580000003 HC RX 258: Performed by: INTERNAL MEDICINE

## 2023-11-28 PROCEDURE — 82962 GLUCOSE BLOOD TEST: CPT

## 2023-11-28 PROCEDURE — 6360000002 HC RX W HCPCS: Performed by: INTERNAL MEDICINE

## 2023-11-28 PROCEDURE — 6370000000 HC RX 637 (ALT 250 FOR IP): Performed by: INTERNAL MEDICINE

## 2023-11-28 PROCEDURE — 1100000003 HC PRIVATE W/ TELEMETRY

## 2023-11-28 PROCEDURE — 2060000000 HC ICU INTERMEDIATE R&B

## 2023-11-28 PROCEDURE — 6370000000 HC RX 637 (ALT 250 FOR IP): Performed by: STUDENT IN AN ORGANIZED HEALTH CARE EDUCATION/TRAINING PROGRAM

## 2023-11-28 PROCEDURE — 2580000003 HC RX 258: Performed by: STUDENT IN AN ORGANIZED HEALTH CARE EDUCATION/TRAINING PROGRAM

## 2023-11-28 RX ORDER — FUROSEMIDE 10 MG/ML
20 INJECTION INTRAMUSCULAR; INTRAVENOUS 2 TIMES DAILY
Status: DISCONTINUED | OUTPATIENT
Start: 2023-11-28 | End: 2023-11-29 | Stop reason: HOSPADM

## 2023-11-28 RX ORDER — INSULIN GLARGINE 100 [IU]/ML
20 INJECTION, SOLUTION SUBCUTANEOUS NIGHTLY
Status: DISCONTINUED | OUTPATIENT
Start: 2023-11-28 | End: 2023-11-29 | Stop reason: HOSPADM

## 2023-11-28 RX ADMIN — ENOXAPARIN SODIUM 30 MG: 100 INJECTION SUBCUTANEOUS at 09:29

## 2023-11-28 RX ADMIN — LEVETIRACETAM 1000 MG: 500 TABLET, FILM COATED ORAL at 09:29

## 2023-11-28 RX ADMIN — PIPERACILLIN AND TAZOBACTAM 3375 MG: 3; .375 INJECTION, POWDER, LYOPHILIZED, FOR SOLUTION INTRAVENOUS at 00:56

## 2023-11-28 RX ADMIN — GABAPENTIN 300 MG: 300 CAPSULE ORAL at 21:50

## 2023-11-28 RX ADMIN — VANCOMYCIN HYDROCHLORIDE 1000 MG: 1 INJECTION, POWDER, LYOPHILIZED, FOR SOLUTION INTRAVENOUS at 22:06

## 2023-11-28 RX ADMIN — SODIUM CHLORIDE, PRESERVATIVE FREE 10 ML: 5 INJECTION INTRAVENOUS at 21:50

## 2023-11-28 RX ADMIN — INSULIN LISPRO 2 UNITS: 100 INJECTION, SOLUTION INTRAVENOUS; SUBCUTANEOUS at 18:46

## 2023-11-28 RX ADMIN — ESCITALOPRAM OXALATE 5 MG: 10 TABLET ORAL at 09:28

## 2023-11-28 RX ADMIN — ASPIRIN 81 MG: 81 TABLET, CHEWABLE ORAL at 09:28

## 2023-11-28 RX ADMIN — GABAPENTIN 300 MG: 300 CAPSULE ORAL at 14:06

## 2023-11-28 RX ADMIN — INSULIN LISPRO 2 UNITS: 100 INJECTION, SOLUTION INTRAVENOUS; SUBCUTANEOUS at 13:49

## 2023-11-28 RX ADMIN — INSULIN LISPRO 2 UNITS: 100 INJECTION, SOLUTION INTRAVENOUS; SUBCUTANEOUS at 09:30

## 2023-11-28 RX ADMIN — GABAPENTIN 300 MG: 300 CAPSULE ORAL at 09:29

## 2023-11-28 RX ADMIN — CLONIDINE HYDROCHLORIDE 0.1 MG: 0.1 TABLET ORAL at 09:29

## 2023-11-28 RX ADMIN — PIPERACILLIN AND TAZOBACTAM 3375 MG: 3; .375 INJECTION, POWDER, LYOPHILIZED, FOR SOLUTION INTRAVENOUS at 09:37

## 2023-11-28 RX ADMIN — VANCOMYCIN HYDROCHLORIDE 750 MG: 750 INJECTION, POWDER, LYOPHILIZED, FOR SOLUTION INTRAVENOUS at 00:34

## 2023-11-28 RX ADMIN — FUROSEMIDE 20 MG: 10 INJECTION, SOLUTION INTRAMUSCULAR; INTRAVENOUS at 18:46

## 2023-11-28 RX ADMIN — LEVETIRACETAM 1000 MG: 500 TABLET, FILM COATED ORAL at 21:50

## 2023-11-28 RX ADMIN — PRAVASTATIN SODIUM 20 MG: 10 TABLET ORAL at 21:50

## 2023-11-28 RX ADMIN — PIPERACILLIN AND TAZOBACTAM 3375 MG: 3; .375 INJECTION, POWDER, LYOPHILIZED, FOR SOLUTION INTRAVENOUS at 17:54

## 2023-11-28 RX ADMIN — VANCOMYCIN HYDROCHLORIDE 1000 MG: 1 INJECTION, POWDER, LYOPHILIZED, FOR SOLUTION INTRAVENOUS at 13:54

## 2023-11-28 RX ADMIN — INSULIN GLARGINE 20 UNITS: 100 INJECTION, SOLUTION SUBCUTANEOUS at 21:50

## 2023-11-28 RX ADMIN — INSULIN LISPRO 2 UNITS: 100 INJECTION, SOLUTION INTRAVENOUS; SUBCUTANEOUS at 13:48

## 2023-11-28 RX ADMIN — HYDROCHLOROTHIAZIDE 25 MG: 25 TABLET ORAL at 09:29

## 2023-11-28 RX ADMIN — ENOXAPARIN SODIUM 30 MG: 100 INJECTION SUBCUTANEOUS at 21:49

## 2023-11-28 RX ADMIN — AMLODIPINE BESYLATE 10 MG: 5 TABLET ORAL at 09:28

## 2023-11-28 ASSESSMENT — PAIN SCALES - GENERAL
PAINLEVEL_OUTOF10: 0
PAINLEVEL_OUTOF10: 0
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
## 2023-11-29 VITALS
BODY MASS INDEX: 34.07 KG/M2 | RESPIRATION RATE: 16 BRPM | TEMPERATURE: 97.8 F | DIASTOLIC BLOOD PRESSURE: 77 MMHG | SYSTOLIC BLOOD PRESSURE: 143 MMHG | HEART RATE: 54 BPM | WEIGHT: 238 LBS | OXYGEN SATURATION: 93 % | HEIGHT: 70 IN

## 2023-11-29 LAB
GLUCOSE BLD STRIP.AUTO-MCNC: 127 MG/DL (ref 65–117)
GLUCOSE BLD STRIP.AUTO-MCNC: 193 MG/DL (ref 65–117)
SERVICE CMNT-IMP: ABNORMAL
SERVICE CMNT-IMP: ABNORMAL

## 2023-11-29 PROCEDURE — 6370000000 HC RX 637 (ALT 250 FOR IP): Performed by: INTERNAL MEDICINE

## 2023-11-29 PROCEDURE — 2580000003 HC RX 258: Performed by: INTERNAL MEDICINE

## 2023-11-29 PROCEDURE — 6360000002 HC RX W HCPCS: Performed by: INTERNAL MEDICINE

## 2023-11-29 PROCEDURE — 2580000003 HC RX 258: Performed by: STUDENT IN AN ORGANIZED HEALTH CARE EDUCATION/TRAINING PROGRAM

## 2023-11-29 PROCEDURE — 82962 GLUCOSE BLOOD TEST: CPT

## 2023-11-29 PROCEDURE — 6360000002 HC RX W HCPCS: Performed by: STUDENT IN AN ORGANIZED HEALTH CARE EDUCATION/TRAINING PROGRAM

## 2023-11-29 RX ORDER — DOXYCYCLINE HYCLATE 100 MG
100 TABLET ORAL 2 TIMES DAILY
Qty: 10 TABLET | Refills: 0 | Status: SHIPPED | OUTPATIENT
Start: 2023-11-29 | End: 2023-11-30 | Stop reason: ALTCHOICE

## 2023-11-29 RX ORDER — FUROSEMIDE 20 MG/1
20 TABLET ORAL DAILY PRN
Qty: 30 TABLET | Refills: 0 | Status: SHIPPED | OUTPATIENT
Start: 2023-11-29 | End: 2023-11-30 | Stop reason: SDUPTHER

## 2023-11-29 RX ORDER — ONDANSETRON 4 MG/1
4 TABLET, ORALLY DISINTEGRATING ORAL 3 TIMES DAILY PRN
Qty: 21 TABLET | Refills: 0 | Status: SHIPPED | OUTPATIENT
Start: 2023-11-29

## 2023-11-29 RX ORDER — SODIUM CHLORIDE 9 MG/ML
INJECTION, SOLUTION INTRAVENOUS PRN
Status: DISCONTINUED | OUTPATIENT
Start: 2023-11-29 | End: 2023-11-29 | Stop reason: HOSPADM

## 2023-11-29 RX ADMIN — INSULIN LISPRO 2 UNITS: 100 INJECTION, SOLUTION INTRAVENOUS; SUBCUTANEOUS at 12:43

## 2023-11-29 RX ADMIN — SODIUM CHLORIDE: 9 INJECTION, SOLUTION INTRAVENOUS at 09:32

## 2023-11-29 RX ADMIN — VANCOMYCIN HYDROCHLORIDE 1000 MG: 1 INJECTION, POWDER, LYOPHILIZED, FOR SOLUTION INTRAVENOUS at 12:53

## 2023-11-29 RX ADMIN — ASPIRIN 81 MG: 81 TABLET, CHEWABLE ORAL at 09:22

## 2023-11-29 RX ADMIN — PIPERACILLIN AND TAZOBACTAM 3375 MG: 3; .375 INJECTION, POWDER, LYOPHILIZED, FOR SOLUTION INTRAVENOUS at 09:33

## 2023-11-29 RX ADMIN — LEVETIRACETAM 1000 MG: 500 TABLET, FILM COATED ORAL at 09:20

## 2023-11-29 RX ADMIN — GABAPENTIN 300 MG: 300 CAPSULE ORAL at 09:20

## 2023-11-29 RX ADMIN — AMLODIPINE BESYLATE 10 MG: 5 TABLET ORAL at 09:20

## 2023-11-29 RX ADMIN — CLONIDINE HYDROCHLORIDE 0.1 MG: 0.1 TABLET ORAL at 09:20

## 2023-11-29 RX ADMIN — HYDROCHLOROTHIAZIDE 25 MG: 25 TABLET ORAL at 09:20

## 2023-11-29 RX ADMIN — ESCITALOPRAM OXALATE 5 MG: 10 TABLET ORAL at 09:20

## 2023-11-29 RX ADMIN — SODIUM CHLORIDE: 9 INJECTION, SOLUTION INTRAVENOUS at 12:50

## 2023-11-29 RX ADMIN — SODIUM CHLORIDE, PRESERVATIVE FREE 10 ML: 5 INJECTION INTRAVENOUS at 09:35

## 2023-11-29 RX ADMIN — ENOXAPARIN SODIUM 30 MG: 100 INJECTION SUBCUTANEOUS at 09:19

## 2023-11-29 RX ADMIN — PIPERACILLIN AND TAZOBACTAM 3375 MG: 3; .375 INJECTION, POWDER, LYOPHILIZED, FOR SOLUTION INTRAVENOUS at 00:53

## 2023-11-29 RX ADMIN — FUROSEMIDE 20 MG: 10 INJECTION, SOLUTION INTRAMUSCULAR; INTRAVENOUS at 09:20

## 2023-11-29 ASSESSMENT — PAIN SCALES - GENERAL
PAINLEVEL_OUTOF10: 0
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
## 2023-11-29 NOTE — DISCHARGE SUMMARY
Discharge Summary    Name: Nettie Fabry  [id number]  YOB: 1950 (Age: 72 y.o.)   Date of Admission: 11/26/2023  Date of Discharge: 11/29/2023  Attending Physician: Katie Fritz MD    Discharge Diagnosis:  Left lower extremity cellulitis  Hypertensive urgency  Hyperlipidemia  Depression/anxiety  Hyperglycemia  Peripheral edema      Consultations:  IP CONSULT TO PHARMACY  IP CONSULT TO PODIATRY  IP WOUND CARE NURSE CONSULT TO EVAL  IP CONSULT TO CASE MANAGEMENT      Brief Admission History/Reason for Admission Per Wicho Freed MD:   Smita Velasquez is a 72 y.o.  male with PMHx significant for multiple comorbidities presents to [facility] with complaints of left leg pain. Patient states over the past few days has been having gradual onset persistent progressive left leg pain as well as redness as well as discharge following which patient presented to the ED. Patient denies any fevers chills nausea vomiting lightheadedness dizziness dyspnea orthopnea paroxysmal nocturnal dyspnea chest pain palpitations headache focal weakness loss sensation auditory or visual symptoms abdominal stool or urinary complaints or any other associated symptoms. Brief Hospital Course by Main Problems:     Left lower extremity cellulitis   Blood cultures preliminary negative  Wound cultures heavy MRSA  Status post vancomycin and Zosyn, will discharge on doxycycline to complete 7-day course  Wound care evaluated, dressing present  Discharge home today with possible home health for wound care     Hypertensive urgency  Resume home medications  Lower extremity swelling better after Lasix, resume home Lasix     Hyperlipidemia  Continue statin     Depression/anxiety  Continue Lexapro     Hyperglycemia type 2 diabetes  Resume home medications       Discharge Exam:  Patient seen and examined by me on discharge day.   Pertinent Findings:  Patient Vitals

## 2023-11-29 NOTE — CARE COORDINATION
Care Management Initial Assessment       RUR: 13%  Readmission? No  1st IM letter given? No  1st  letter given: No    CM completed assessment w/pt at bedside. No history of HH. DME use includes, elec w/c & [employer]. Patient lives w/both of his son's, who assist w/cooking, toileting, mobility & transportation. Patient also uses a w/c van for transportation, when son is unavailable. Patient states he is independent in dressing and washing up. Patient also stated that one of his son's is always at home. Never left alone. Patients son will transport at d/c.        11/27/23 1510   Service Assessment   Patient Orientation Alert and Oriented   Cognition Alert   History Provided By Patient   Primary Caregiver Self  (son's assist w/ADL's)   Support Systems Family Members  (two son's)   PCP Verified by CM Yes   Last Visit to PCP Within last 6 months   Prior Functional Level Assistance with the following:;Toileting;Cooking;Housework; Shopping;Mobility   Current Functional Level Assistance with the following:;Toileting;Cooking;Housework; Shopping;Mobility   Can patient return to prior living arrangement Yes   Family able to assist with home care needs: Yes   Would you like for me to discuss the discharge plan with any other family members/significant others, and if so, who?  No   Financial Resources [employer] None   Social/Functional History   Lives With Son  (two son's)   Type of Home House  (two story w/a ramp to enter)   Bathroom Toilet Bedside commode   Home Equipment Wheelchair-electric   Active  No   Patient's  Info son or w/c Kailyn Ty  Ext [phone]
list was provided with basic dialogue that supports the patient's individualized plan of care/goals, treatment preferences, and shares the quality data associated with the providers?   Yes         ASHLEY SolisN, RN    Care Management  [phone]

## 2023-11-30 ENCOUNTER — TELEMEDICINE (OUTPATIENT)
Age: 73
End: 2023-11-30

## 2023-11-30 DIAGNOSIS — L03.116 LEFT LEG CELLULITIS: ICD-10-CM

## 2023-11-30 DIAGNOSIS — R60.0 LEG EDEMA: ICD-10-CM

## 2023-11-30 DIAGNOSIS — I10 HYPERTENSION, UNSPECIFIED TYPE: Primary | ICD-10-CM

## 2023-11-30 RX ORDER — FUROSEMIDE 20 MG/1
20 TABLET ORAL DAILY PRN
Qty: 30 TABLET | Refills: 0 | Status: SHIPPED | OUTPATIENT
Start: 2023-11-30

## 2023-11-30 RX ORDER — DOXYCYCLINE HYCLATE 100 MG
100 TABLET ORAL 2 TIMES DAILY
Qty: 14 TABLET | Refills: 0 | Status: SHIPPED | OUTPATIENT
Start: 2023-11-30 | End: 2023-12-07

## 2023-11-30 SDOH — ECONOMIC STABILITY: INCOME INSECURITY: HOW HARD IS IT FOR YOU TO PAY FOR THE VERY BASICS LIKE FOOD, HOUSING, MEDICAL CARE, AND HEATING?: NOT HARD AT ALL

## 2023-11-30 SDOH — ECONOMIC STABILITY: HOUSING INSECURITY
IN THE LAST 12 MONTHS, WAS THERE A TIME WHEN YOU DID NOT HAVE A STEADY PLACE TO SLEEP OR SLEPT IN A SHELTER (INCLUDING NOW)?: NO

## 2023-11-30 SDOH — ECONOMIC STABILITY: FOOD INSECURITY: WITHIN THE PAST 12 MONTHS, YOU WORRIED THAT YOUR FOOD WOULD RUN OUT BEFORE YOU GOT MONEY TO BUY MORE.: NEVER TRUE

## 2023-11-30 SDOH — ECONOMIC STABILITY: FOOD INSECURITY: WITHIN THE PAST 12 MONTHS, THE FOOD YOU BOUGHT JUST DIDN'T LAST AND YOU DIDN'T HAVE MONEY TO GET MORE.: NEVER TRUE

## 2023-11-30 ASSESSMENT — PATIENT HEALTH QUESTIONNAIRE - PHQ9
SUM OF ALL RESPONSES TO PHQ QUESTIONS 1-9: 0
SUM OF ALL RESPONSES TO PHQ QUESTIONS 1-9: 0
1. LITTLE INTEREST OR PLEASURE IN DOING THINGS: 0
2. FEELING DOWN, DEPRESSED OR HOPELESS: 0
SUM OF ALL RESPONSES TO PHQ9 QUESTIONS 1 & 2: 0
SUM OF ALL RESPONSES TO PHQ QUESTIONS 1-9: 0
SUM OF ALL RESPONSES TO PHQ QUESTIONS 1-9: 0

## 2023-11-30 ASSESSMENT — LIFESTYLE VARIABLES
HOW MANY STANDARD DRINKS CONTAINING ALCOHOL DO YOU HAVE ON A TYPICAL DAY: PATIENT DOES NOT DRINK
HOW OFTEN DO YOU HAVE A DRINK CONTAINING ALCOHOL: NEVER

## 2023-11-30 NOTE — PROGRESS NOTES
1. \"Have you been to the ER, urgent care clinic since your last visit? Hospitalized since your last visit? \"         Discharged 11/26/2023 - cellulitis of lower extremity     2. \"Have you seen or consulted any other health care providers outside of the [address] [place] [address] since your last visit? \" No     3.  For patients aged 43-73: Has the patient had a colonoscopy / FIT/ Cologuard? unsure

## 2023-12-01 ENCOUNTER — TELEPHONE (OUTPATIENT)
Age: 73
End: 2023-12-01

## 2023-12-01 NOTE — TELEPHONE ENCOUNTER
[address]  wound care calling  to  follow up with the first initial visit for lower left cellucitsis - Cyndee Lau stated that there was no open wound for the pt mostly swelling -supposed to be going to do [address] on Mon Wed and Fri -for the next couple of weeks - however wanted to know how you wanted to proceed with his care   She is with [facility]

## 2023-12-01 NOTE — TELEPHONE ENCOUNTER
Spoke with Unruly from [facility]. Advised.    Per Dr. Ene Taylor    If no open wound they can jst check his again next week maybe once

## 2023-12-02 LAB
BACTERIA SPEC CULT: NORMAL
BACTERIA SPEC CULT: NORMAL
SERVICE CMNT-IMP: NORMAL
SERVICE CMNT-IMP: NORMAL

## 2023-12-06 DIAGNOSIS — E11.40 TYPE 2 DIABETES MELLITUS WITH DIABETIC NEUROPATHY, UNSPECIFIED WHETHER LONG TERM INSULIN USE (HCC): ICD-10-CM

## 2023-12-06 NOTE — TELEPHONE ENCOUNTER
PCP: Arlene Leone MD    Last appt: 11/30/2023  Future Appointments   Date Time Provider [address]   12/12/2023  1:10 PM Monica Staton DO [facility] BS AMB   1/5/2024 11:45 AM Arlene Leone MD [facility] BS AMB   8/2/2024  2:00 PM Samantha Alan APRN - NP [facility] BS AMB       Requested Prescriptions     Pending Prescriptions Disp Refills    blood glucose test strips (ASCENSIA AUTODISC VI;ONE TOUCH ULTRA TEST VI) strip 100 each 3     Sig: Apply 1 each topically daily One Touch Ultra Blue test Strips

## 2023-12-07 RX ORDER — CLONIDINE HYDROCHLORIDE 0.1 MG/1
0.1 TABLET ORAL DAILY
Qty: 60 TABLET | Refills: 0 | Status: SHIPPED | OUTPATIENT
Start: 2023-12-07

## 2023-12-07 RX ORDER — DOXYCYCLINE HYCLATE 100 MG
100 TABLET ORAL 2 TIMES DAILY
Qty: 14 TABLET | Refills: 0 | Status: SHIPPED | OUTPATIENT
Start: 2023-12-07 | End: 2023-12-14

## 2023-12-07 NOTE — TELEPHONE ENCOUNTER
----- Message from [facility] sent at 12/7/2023  8:27 AM EST -----  Subject: Medication Problem    Medication: doxycycline hyclate (VIBRA-TABS) 100 MG tablet  Dosage: 2x daily  Ordering Provider: shirley    Question/Problem: The Pt went to the hospital and they put him on two new   medications and he is not sure if his PCP wants him to continue taking   these. If so he will need these filled because the hospital only gave him   enough for a few days. The second medication is CloNIDine 0.1mg 2x daily. Please advise.        Pharmacy: [address], [address] [place] [address]   [phone] Bal Raman [phone]    ---------------------------------------------------------------------------  --------------  Pankaj EPSTEIN  [phone]; OK to leave message on voicemail  ---------------------------------------------------------------------------  --------------    SCRIPT ANSWERS  Relationship to Patient: Self

## 2023-12-09 DIAGNOSIS — R52 PAIN: Primary | ICD-10-CM

## 2023-12-11 RX ORDER — GABAPENTIN 300 MG/1
300 CAPSULE ORAL 3 TIMES DAILY
Qty: 270 CAPSULE | Refills: 1 | Status: SHIPPED | OUTPATIENT
Start: 2023-12-11 | End: 2024-06-08

## 2023-12-12 ENCOUNTER — OFFICE VISIT (OUTPATIENT)
Age: 73
End: 2023-12-12
Payer: MEDICARE

## 2023-12-12 VITALS
DIASTOLIC BLOOD PRESSURE: 74 MMHG | RESPIRATION RATE: 16 BRPM | BODY MASS INDEX: 34.07 KG/M2 | HEIGHT: 70 IN | WEIGHT: 238 LBS | SYSTOLIC BLOOD PRESSURE: 120 MMHG | HEART RATE: 71 BPM | OXYGEN SATURATION: 98 %

## 2023-12-12 DIAGNOSIS — M75.42 IMPINGEMENT SYNDROME OF LEFT SHOULDER: Primary | ICD-10-CM

## 2023-12-12 DIAGNOSIS — M17.11 UNILATERAL PRIMARY OSTEOARTHRITIS, RIGHT KNEE: ICD-10-CM

## 2023-12-12 PROCEDURE — 3078F DIAST BP <80 MM HG: CPT | Performed by: ORTHOPAEDIC SURGERY

## 2023-12-12 PROCEDURE — 3074F SYST BP LT 130 MM HG: CPT | Performed by: ORTHOPAEDIC SURGERY

## 2023-12-12 PROCEDURE — 1123F ACP DISCUSS/DSCN MKR DOCD: CPT | Performed by: ORTHOPAEDIC SURGERY

## 2023-12-12 PROCEDURE — 20610 DRAIN/INJ JOINT/BURSA W/O US: CPT | Performed by: ORTHOPAEDIC SURGERY

## 2023-12-12 PROCEDURE — 99213 OFFICE O/P EST LOW 20 MIN: CPT | Performed by: ORTHOPAEDIC SURGERY

## 2023-12-12 RX ORDER — BETAMETHASONE SODIUM PHOSPHATE AND BETAMETHASONE ACETATE 3; 3 MG/ML; MG/ML
6 INJECTION, SUSPENSION INTRA-ARTICULAR; INTRALESIONAL; INTRAMUSCULAR; SOFT TISSUE ONCE
Status: COMPLETED | OUTPATIENT
Start: 2023-12-12 | End: 2023-12-12

## 2023-12-12 RX ADMIN — BETAMETHASONE SODIUM PHOSPHATE AND BETAMETHASONE ACETATE 6 MG: 3; 3 INJECTION, SUSPENSION INTRA-ARTICULAR; INTRALESIONAL; INTRAMUSCULAR; SOFT TISSUE at 13:09

## 2024-01-02 ENCOUNTER — TELEPHONE (OUTPATIENT)
Age: 74
End: 2024-01-02

## 2024-01-02 NOTE — TELEPHONE ENCOUNTER
----- Message from Niki Leal sent at 1/2/2024 11:06 AM EST -----  Subject: Appointment Request    Reason for Call: Established Patient Appointment needed: Routine Medicare   AWV    QUESTIONS    Reason for appointment request? No appointments available during search     Additional Information for Provider? Patient wants to schedule his AWV.   When can he come in for this? Please advise.  ---------------------------------------------------------------------------  --------------  CALL BACK INFO  [phone]; OK to leave message on voicemail  ---------------------------------------------------------------------------  --------------  SCRIPT ANSWERS

## 2024-01-11 ENCOUNTER — TELEPHONE (OUTPATIENT)
Age: 74
End: 2024-01-11

## 2024-01-11 NOTE — TELEPHONE ENCOUNTER
Spoke with patient. States he was told by pcp to inject 16units 3x daily. States the pharmacy told him to inject twice daily per script. Script states to inject 12 units twice daily. Patient no longer wants to stick his finger and wants the \"patch\". I assume freestyle.     Please advise.

## 2024-01-11 NOTE — TELEPHONE ENCOUNTER
Attempted to reach patient. Left detailed message on vm. Vm identified patient. Advised patient to return call to schedule appt w/ haile if willing.     Per Dr. Jones    His insulin dose is 12 units bid. See if pt will see Pharm D Dr Badillo for Dm-2 on insulin and Free style hafsa set up and instruction

## 2024-01-11 NOTE — TELEPHONE ENCOUNTER
----- Message from Koki Mayers sent at 1/11/2024 12:44 PM EST -----  Subject: Message to Provider    QUESTIONS  Information for Provider? Pt would like to speak to Jolene regarding his   blood sugar meter or Dr. Jones or his nurse as soon as possible.  ---------------------------------------------------------------------------  --------------  CALL BACK INFO  [phone]; Do not leave any message, patient will call back for answer  ---------------------------------------------------------------------------  --------------  SCRIPT ANSWERS  Relationship to Patient? Self

## 2024-01-11 NOTE — TELEPHONE ENCOUNTER
----- Message from Yvrose Sorto sent at 1/11/2024  4:33 PM EST -----  Subject: Message to Provider    QUESTIONS  Information for Provider? Patient returning call from Sharla. Patient   couldn't make it to the phone soon enough to . Please contact   patient.   ---------------------------------------------------------------------------  --------------  CALL BACK INFO  [phone]; OK to leave message on voicemail  ---------------------------------------------------------------------------  --------------  SCRIPT ANSWERS  undefined

## 2024-01-12 NOTE — TELEPHONE ENCOUNTER
Spoke with patient. Declines seeing   Diana for now. Advised of insulin dose. Verbalized understanding.   States he will call back if wants to schedule.
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
## 2024-01-16 ENCOUNTER — TELEMEDICINE (OUTPATIENT)
Age: 74
End: 2024-01-16
Payer: MEDICARE

## 2024-01-16 DIAGNOSIS — E78.5 HYPERLIPIDEMIA, UNSPECIFIED HYPERLIPIDEMIA TYPE: ICD-10-CM

## 2024-01-16 DIAGNOSIS — Z00.00 MEDICARE ANNUAL WELLNESS VISIT, SUBSEQUENT: ICD-10-CM

## 2024-01-16 DIAGNOSIS — M25.561 CHRONIC PAIN OF RIGHT KNEE: ICD-10-CM

## 2024-01-16 DIAGNOSIS — G89.29 CHRONIC PAIN OF RIGHT KNEE: ICD-10-CM

## 2024-01-16 DIAGNOSIS — Z23 ENCOUNTER FOR IMMUNIZATION: Primary | ICD-10-CM

## 2024-01-16 DIAGNOSIS — E11.9 TYPE 2 DIABETES MELLITUS WITHOUT COMPLICATION, WITH LONG-TERM CURRENT USE OF INSULIN (HCC): ICD-10-CM

## 2024-01-16 DIAGNOSIS — R26.2 DIFFICULTY WALKING: ICD-10-CM

## 2024-01-16 DIAGNOSIS — Z79.4 TYPE 2 DIABETES MELLITUS WITHOUT COMPLICATION, WITH LONG-TERM CURRENT USE OF INSULIN (HCC): ICD-10-CM

## 2024-01-16 DIAGNOSIS — Z86.73 HISTORY OF CEREBROVASCULAR ACCIDENT: ICD-10-CM

## 2024-01-16 PROCEDURE — 99214 OFFICE O/P EST MOD 30 MIN: CPT | Performed by: INTERNAL MEDICINE

## 2024-01-16 PROCEDURE — G0439 PPPS, SUBSEQ VISIT: HCPCS | Performed by: INTERNAL MEDICINE

## 2024-01-16 PROCEDURE — 1123F ACP DISCUSS/DSCN MKR DOCD: CPT | Performed by: INTERNAL MEDICINE

## 2024-01-16 ASSESSMENT — PATIENT HEALTH QUESTIONNAIRE - PHQ9
4. FEELING TIRED OR HAVING LITTLE ENERGY: 0
5. POOR APPETITE OR OVEREATING: 0
SUM OF ALL RESPONSES TO PHQ QUESTIONS 1-9: 0
2. FEELING DOWN, DEPRESSED OR HOPELESS: 0
6. FEELING BAD ABOUT YOURSELF - OR THAT YOU ARE A FAILURE OR HAVE LET YOURSELF OR YOUR FAMILY DOWN: 0
10. IF YOU CHECKED OFF ANY PROBLEMS, HOW DIFFICULT HAVE THESE PROBLEMS MADE IT FOR YOU TO DO YOUR WORK, TAKE CARE OF THINGS AT HOME, OR GET ALONG WITH OTHER PEOPLE: 0
8. MOVING OR SPEAKING SO SLOWLY THAT OTHER PEOPLE COULD HAVE NOTICED. OR THE OPPOSITE, BEING SO FIGETY OR RESTLESS THAT YOU HAVE BEEN MOVING AROUND A LOT MORE THAN USUAL: 0
1. LITTLE INTEREST OR PLEASURE IN DOING THINGS: 0
SUM OF ALL RESPONSES TO PHQ QUESTIONS 1-9: 0
SUM OF ALL RESPONSES TO PHQ9 QUESTIONS 1 & 2: 0
SUM OF ALL RESPONSES TO PHQ QUESTIONS 1-9: 0
7. TROUBLE CONCENTRATING ON THINGS, SUCH AS READING THE NEWSPAPER OR WATCHING TELEVISION: 0
9. THOUGHTS THAT YOU WOULD BE BETTER OFF DEAD, OR OF HURTING YOURSELF: 0
3. TROUBLE FALLING OR STAYING ASLEEP: 0
SUM OF ALL RESPONSES TO PHQ QUESTIONS 1-9: 0

## 2024-01-16 NOTE — PROGRESS NOTES
1. \"Have you been to the ER, urgent care clinic since your last visit?  Hospitalized since your last visit?\" No    2. \"Have you seen or consulted any other health care providers outside of the [facility] since your last visit?\" No     3. For patients aged 45-75: Has the patient had a colonoscopy / FIT/ Cologuard? No

## 2024-01-22 RX ORDER — AMLODIPINE BESYLATE 10 MG/1
10 TABLET ORAL DAILY
Qty: 90 TABLET | Refills: 3 | Status: SHIPPED | OUTPATIENT
Start: 2024-01-22

## 2024-01-22 NOTE — TELEPHONE ENCOUNTER
----- Message from Malika Dimas sent at 1/22/2024  9:30 AM EST -----  Subject: Refill Request    QUESTIONS  Name of Medication? amLODIPine (NORVASC) 10 MG tablet  Patient-reported dosage and instructions? 10 mg tablet once a day  How many days do you have left? 1  Preferred Pharmacy? [facility]  Pharmacy phone number (if available)? [phone]  Additional Information for Provider? 90 day supply is needed   ---------------------------------------------------------------------------  --------------  CALL BACK INFO  What is the best way for the office to contact you? Do not leave any   message, patient will call back for answer  Preferred Call Back Phone Number? [phone]  ---------------------------------------------------------------------------  --------------  SCRIPT ANSWERS  Relationship to Patient? Self

## 2024-02-05 RX ORDER — CLONIDINE HYDROCHLORIDE 0.1 MG/1
0.1 TABLET ORAL DAILY
Qty: 60 TABLET | Refills: 5 | Status: SHIPPED | OUTPATIENT
Start: 2024-02-05 | End: 2024-02-05 | Stop reason: SDUPTHER

## 2024-02-05 RX ORDER — CLONIDINE HYDROCHLORIDE 0.1 MG/1
0.1 TABLET ORAL DAILY
Qty: 60 TABLET | Refills: 5 | Status: SHIPPED | OUTPATIENT
Start: 2024-02-05

## 2024-02-05 NOTE — TELEPHONE ENCOUNTER
cloNIDine (CATAPRES) 0.1 MG tablet    Refill to [facility] #[phone]    Pt is out of medication/can this be filled as soon as possible?    Advised of turn around time.

## 2024-02-12 DIAGNOSIS — E11.40 TYPE 2 DIABETES MELLITUS WITH DIABETIC NEUROPATHY, UNSPECIFIED WHETHER LONG TERM INSULIN USE (HCC): ICD-10-CM

## 2024-02-12 NOTE — TELEPHONE ENCOUNTER
blood glucose test strips (ASCENSIA AUTODISC VI;ONE TOUCH ULTRA TEST VI) strip      90 day mail order [facility]

## 2024-02-12 NOTE — TELEPHONE ENCOUNTER
PCP: Niles Jones MD    Last appt: 1/16/2024   Future Appointments   Date Time Provider Department Center   3/12/2024  1:20 PM Obed Bonner DO [facility] BS AMB   8/2/2024  2:00 PM May Giordano APRN - [facility] BS AMB       Requested Prescriptions     Pending Prescriptions Disp Refills    blood glucose test strips (ASCENSIA AUTODISC VI;ONE TOUCH ULTRA TEST VI) strip 100 each 3     Sig: Apply 1 each topically daily One Touch Ultra Blue test Strips

## 2024-02-26 RX ORDER — HYDROCHLOROTHIAZIDE 25 MG/1
25 TABLET ORAL DAILY
Qty: 90 TABLET | Refills: 3 | Status: SHIPPED | OUTPATIENT
Start: 2024-02-26

## 2024-03-13 RX ORDER — LEVETIRACETAM 1000 MG/1
1000 TABLET ORAL 2 TIMES DAILY
Qty: 180 TABLET | Refills: 3 | Status: SHIPPED | OUTPATIENT
Start: 2024-03-13

## 2024-03-13 NOTE — TELEPHONE ENCOUNTER
Received faxed refill request from pharmacy      PCP: Niles Jones MD    Last appt: 1/16/2024  Future Appointments   Date Time Provider Department Center   8/2/2024  2:00 PM May Goirdano APRN - NP [facility] BS AMB       Requested Prescriptions     Pending Prescriptions Disp Refills    levETIRAcetam (KEPPRA) 1000 MG tablet 180 tablet 0     Sig: Take 1 tablet by mouth 2 times daily    metFORMIN (GLUCOPHAGE) 1000 MG tablet 180 tablet 0     Sig: Take 1 tablet by mouth 2 times daily (with meals)

## 2024-05-07 RX ORDER — FUROSEMIDE 20 MG/1
TABLET ORAL
Qty: 90 TABLET | Refills: 3 | Status: SHIPPED | OUTPATIENT
Start: 2024-05-07

## 2024-05-28 RX ORDER — ESCITALOPRAM OXALATE 5 MG/1
5 TABLET ORAL DAILY
Qty: 90 TABLET | Refills: 1 | Status: SHIPPED | OUTPATIENT
Start: 2024-05-28

## 2024-06-24 ENCOUNTER — TELEPHONE (OUTPATIENT)
Age: 74
End: 2024-06-24

## 2024-06-24 NOTE — TELEPHONE ENCOUNTER
Patient requesting call back to find out when is his surgery scheduled for. Patient's phone number is [phone]

## 2024-07-02 ENCOUNTER — OFFICE VISIT (OUTPATIENT)
Age: 74
End: 2024-07-02
Payer: MEDICARE

## 2024-07-02 VITALS
SYSTOLIC BLOOD PRESSURE: 127 MMHG | OXYGEN SATURATION: 99 % | DIASTOLIC BLOOD PRESSURE: 83 MMHG | HEART RATE: 65 BPM | HEIGHT: 70 IN | BODY MASS INDEX: 34.15 KG/M2 | RESPIRATION RATE: 18 BRPM

## 2024-07-02 DIAGNOSIS — M17.11 UNILATERAL PRIMARY OSTEOARTHRITIS, RIGHT KNEE: ICD-10-CM

## 2024-07-02 DIAGNOSIS — M75.42 IMPINGEMENT SYNDROME OF LEFT SHOULDER: ICD-10-CM

## 2024-07-02 DIAGNOSIS — M25.561 RIGHT KNEE PAIN, UNSPECIFIED CHRONICITY: Primary | ICD-10-CM

## 2024-07-02 PROCEDURE — 1123F ACP DISCUSS/DSCN MKR DOCD: CPT | Performed by: ORTHOPAEDIC SURGERY

## 2024-07-02 PROCEDURE — 3079F DIAST BP 80-89 MM HG: CPT | Performed by: ORTHOPAEDIC SURGERY

## 2024-07-02 PROCEDURE — 20610 DRAIN/INJ JOINT/BURSA W/O US: CPT | Performed by: ORTHOPAEDIC SURGERY

## 2024-07-02 PROCEDURE — 99214 OFFICE O/P EST MOD 30 MIN: CPT | Performed by: ORTHOPAEDIC SURGERY

## 2024-07-02 PROCEDURE — 3074F SYST BP LT 130 MM HG: CPT | Performed by: ORTHOPAEDIC SURGERY

## 2024-07-02 RX ORDER — BETAMETHASONE SODIUM PHOSPHATE AND BETAMETHASONE ACETATE 3; 3 MG/ML; MG/ML
6 INJECTION, SUSPENSION INTRA-ARTICULAR; INTRALESIONAL; INTRAMUSCULAR; SOFT TISSUE ONCE
Status: COMPLETED | OUTPATIENT
Start: 2024-07-02 | End: 2024-07-02

## 2024-07-02 RX ADMIN — BETAMETHASONE SODIUM PHOSPHATE AND BETAMETHASONE ACETATE 6 MG: 3; 3 INJECTION, SUSPENSION INTRA-ARTICULAR; INTRALESIONAL; INTRAMUSCULAR; SOFT TISSUE at 16:18

## 2024-07-02 RX ADMIN — BETAMETHASONE SODIUM PHOSPHATE AND BETAMETHASONE ACETATE 6 MG: 3; 3 INJECTION, SUSPENSION INTRA-ARTICULAR; INTRALESIONAL; INTRAMUSCULAR; SOFT TISSUE at 16:19

## 2024-07-02 ASSESSMENT — PATIENT HEALTH QUESTIONNAIRE - PHQ9
SUM OF ALL RESPONSES TO PHQ QUESTIONS 1-9: 0
SUM OF ALL RESPONSES TO PHQ9 QUESTIONS 1 & 2: 0
SUM OF ALL RESPONSES TO PHQ QUESTIONS 1-9: 0
SUM OF ALL RESPONSES TO PHQ QUESTIONS 1-9: 0
1. LITTLE INTEREST OR PLEASURE IN DOING THINGS: NOT AT ALL
SUM OF ALL RESPONSES TO PHQ QUESTIONS 1-9: 0
2. FEELING DOWN, DEPRESSED OR HOPELESS: NOT AT ALL

## 2024-07-02 NOTE — PROGRESS NOTES
7/3/2024      CC: right knee pain, left shoulder pain    HPI:      This is a 73 y.o. year old male who presents for a follow up visit.  The patient was last seen and diagnosed with right knee osteoarthritis.   The patient's treatments since the most recent visit have comprised of injections.   The patient has had no relief of the chief complaint.  Left shoulder pain is consistent, he has had successful injections in the past.  Of note, he is nonambulatory, he has had a knee replacement on the left side.  He does get around with an electric scooter.      PMH:  Past Medical History:   Diagnosis Date    Allergic rhinitis     Arthritis     djd of knees    Chronic pain     Diabetes (HCC)     Hypercholesteremia     Hypertension     Obesity     Seizures (HCC)     Stroke (HCC)     left--ring finger/ pinky finger numb sensation       PSxHx:  Past Surgical History:   Procedure Laterality Date    CHOLECYSTECTOMY      laparoscopic       Meds:    Current Outpatient Medications:     escitalopram (LEXAPRO) 5 MG tablet, TAKE 1 TABLET DAILY, Disp: 90 tablet, Rfl: 1    furosemide (LASIX) 20 MG tablet, TAKE 1 TABLET BY MOUTH ONCE DAILY AS NEEDED (EDEMA), Disp: 90 tablet, Rfl: 3    levETIRAcetam (KEPPRA) 1000 MG tablet, Take 1 tablet by mouth 2 times daily, Disp: 180 tablet, Rfl: 3    metFORMIN (GLUCOPHAGE) 1000 MG tablet, Take 1 tablet by mouth 2 times daily (with meals), Disp: 180 tablet, Rfl: 3    hydroCHLOROthiazide (HYDRODIURIL) 25 MG tablet, TAKE 1 TABLET DAILY, Disp: 90 tablet, Rfl: 3    blood glucose test strips (ASCENSIA AUTODISC VI;ONE TOUCH ULTRA TEST VI) strip, Apply 1 each topically daily One Touch Ultra Blue test Strips, Disp: 100 each, Rfl: 3    cloNIDine (CATAPRES) 0.1 MG tablet, Take 1 tablet by mouth daily, Disp: 60 tablet, Rfl: 5    amLODIPine (NORVASC) 10 MG tablet, TAKE 1 TABLET DAILY, Disp: 90 tablet, Rfl: 3    ondansetron (ZOFRAN-ODT) 4 MG disintegrating tablet, Take 1 tablet by mouth 3 times daily as needed for

## 2024-07-02 NOTE — PROGRESS NOTES
Identified pt with two pt identifiers (name and ). Reviewed chart in preparation for visit and have obtained necessary documentation.    Shawn Lawton Jr is a 73 y.o. male Knee Pain (RT knee pain )  .    Vitals:    24 1415   BP: 127/83   Site: Left Upper Arm   Position: Sitting   Cuff Size: Large Adult   Pulse: 65   Resp: 18   SpO2: 99%   Height: 1.778 m (5' 10\")          1. Have you been to the ER, urgent care clinic since your last visit?  Hospitalized since your last visit?  no     2. Have you seen or consulted any other health care providers outside of the [facility] since your last visit?  Include any pap smears or colon screening.  no

## 2024-07-11 ENCOUNTER — TELEPHONE (OUTPATIENT)
Age: 74
End: 2024-07-11

## 2024-07-11 DIAGNOSIS — M17.11 UNILATERAL PRIMARY OSTEOARTHRITIS, RIGHT KNEE: Primary | ICD-10-CM

## 2024-07-15 NOTE — TELEPHONE ENCOUNTER
Attempted to reach patient. Left message on vm to return call.     Message was unclear. Need further information on if patient is requesting a referral for HH and why HH is needed.   
Spoke with patient. Getting HH set up with [facility] - difficulty walking.   Pt states does not need anything from PCP. No further concerns or questions.   
Yenni with [facility] states she sent a list of home health providers in network.    Pt has chosen to use [facility].    Can this be started as soon as possible?  Thanks.   
(2) very limited

## 2024-08-29 ENCOUNTER — TELEPHONE (OUTPATIENT)
Age: 74
End: 2024-08-29

## 2024-08-29 NOTE — TELEPHONE ENCOUNTER
Pt states he would like to get the patch/sensor to check his blood sugar.    Pt also states that he need prosthetics for his legs.    He needs to check his weight and can't stand so he needs something in order to do this.     Can you please call pt and help with all?  Thanks.

## 2024-08-30 RX ORDER — PRAVASTATIN SODIUM 20 MG
TABLET ORAL
Qty: 90 TABLET | Refills: 3 | Status: SHIPPED | OUTPATIENT
Start: 2024-08-30

## 2024-09-12 RX ORDER — HYDROCHLOROTHIAZIDE 25 MG/1
25 TABLET ORAL DAILY
Qty: 90 TABLET | Refills: 3 | Status: SHIPPED | OUTPATIENT
Start: 2024-09-12

## 2024-09-26 DIAGNOSIS — R52 PAIN: ICD-10-CM

## 2024-09-26 RX ORDER — GABAPENTIN 300 MG/1
CAPSULE ORAL
Qty: 270 CAPSULE | Refills: 1 | Status: SHIPPED | OUTPATIENT
Start: 2024-09-26 | End: 2025-03-25

## 2024-10-04 ENCOUNTER — TELEPHONE (OUTPATIENT)
Age: 74
End: 2024-10-04

## 2024-10-04 NOTE — TELEPHONE ENCOUNTER
Bernardo Logan  LVM on behalf of the PT requesting a CB as the PT needs assistance with his referral for HH. Bernardo can be reached at [phone].

## 2024-10-04 NOTE — TELEPHONE ENCOUNTER
Spoke with Td // Doreen. Advised referral for HH was ordered by ortho Dr. Bonner and to reach out to their office for questions. Veralized understanding.

## 2024-10-04 NOTE — TELEPHONE ENCOUNTER
Joy from [facility] called asking for a referral for home health.     Joy states patient has not started home health at the moment. Joy was wondering if the referral was sent.     Please call the patient.

## 2024-10-10 ENCOUNTER — TELEPHONE (OUTPATIENT)
Age: 74
End: 2024-10-10

## 2024-10-10 NOTE — TELEPHONE ENCOUNTER
Doreen Hyman  called this morning requesting a new referral for the PT HH be sent [facility] [address] [place] [address]. [phone]  Yenni can be reached at [phone]

## 2024-10-12 DIAGNOSIS — M25.561 RIGHT KNEE PAIN, UNSPECIFIED CHRONICITY: Primary | ICD-10-CM

## 2024-10-24 ENCOUNTER — TELEPHONE (OUTPATIENT)
Age: 74
End: 2024-10-24

## 2024-10-24 NOTE — TELEPHONE ENCOUNTER
Spoke with patient sonRayo. Advised received DME forms from home care delivered. Advised did not receive forms specially regarding PT.     Son verbalized understanding.

## 2024-10-27 ENCOUNTER — APPOINTMENT (OUTPATIENT)
Facility: HOSPITAL | Age: 74
End: 2024-10-27
Payer: MEDICARE

## 2024-10-27 ENCOUNTER — HOSPITAL ENCOUNTER (EMERGENCY)
Facility: HOSPITAL | Age: 74
Discharge: HOME | End: 2024-10-27
Attending: EMERGENCY MEDICINE
Payer: MEDICARE

## 2024-10-27 VITALS
WEIGHT: 273.37 LBS | RESPIRATION RATE: 17 BRPM | HEART RATE: 70 BPM | HEIGHT: 70 IN | OXYGEN SATURATION: 97 % | BODY MASS INDEX: 39.14 KG/M2 | SYSTOLIC BLOOD PRESSURE: 139 MMHG | DIASTOLIC BLOOD PRESSURE: 90 MMHG | TEMPERATURE: 98.1 F

## 2024-10-27 DIAGNOSIS — R60.0 BILATERAL LOWER EXTREMITY EDEMA: Primary | ICD-10-CM

## 2024-10-27 LAB — ECHO BSA: 2.47 M2

## 2024-10-27 PROCEDURE — 93970 EXTREMITY STUDY: CPT

## 2024-10-27 PROCEDURE — 99284 EMERGENCY DEPT VISIT MOD MDM: CPT

## 2024-10-27 ASSESSMENT — PAIN - FUNCTIONAL ASSESSMENT: PAIN_FUNCTIONAL_ASSESSMENT: 0-10

## 2024-10-27 ASSESSMENT — PAIN SCALES - GENERAL: PAINLEVEL_OUTOF10: 6

## 2024-10-27 NOTE — ED PROVIDER NOTES
CONTINUE taking these medications      glucose monitoring kit  1 kit by Does not apply route daily            ASK your doctor about these medications      acetaminophen 500 MG tablet  Commonly known as: TYLENOL     amLODIPine 10 MG tablet  Commonly known as: NORVASC  TAKE 1 TABLET DAILY     aspirin 81 MG chewable tablet     blood glucose test strips strip  Commonly known as: ASCENSIA AUTODISC VI;ONE TOUCH ULTRA TEST VI  Apply 1 each topically daily One Touch Ultra Blue test Strips     cloNIDine 0.1 MG tablet  Commonly known as: CATAPRES  Take 1 tablet by mouth daily     escitalopram 5 MG tablet  Commonly known as: LEXAPRO  TAKE 1 TABLET DAILY     furosemide 20 MG tablet  Commonly known as: LASIX  TAKE 1 TABLET BY MOUTH ONCE DAILY AS NEEDED (EDEMA)     gabapentin 300 MG capsule  Commonly known as: NEURONTIN  TAKE 1 CAPSULE BY MOUTH THREE TIMES DAILY . DO NOT EXCEED 3 PER 24 HOURS     hydroCHLOROthiazide 25 MG tablet  Commonly known as: HYDRODIURIL  Take 1 tablet by mouth once daily     levETIRAcetam 1000 MG tablet  Commonly known as: KEPPRA  Take 1 tablet by mouth 2 times daily     metFORMIN 1000 MG tablet  Commonly known as: GLUCOPHAGE  Take 1 tablet by mouth 2 times daily (with meals)     NovoLIN 70/30 FlexPen (70-30) 100 UNIT/ML injection pen  Generic drug: Insulin NPH Isophane & Regular  INJECT 12 UNITS SUBCUTANEOUSLY TWICE DAILY     ondansetron 4 MG disintegrating tablet  Commonly known as: ZOFRAN-ODT  Take 1 tablet by mouth 3 times daily as needed for Nausea or Vomiting     pravastatin 20 MG tablet  Commonly known as: PRAVACHOL  Take 1 tablet by mouth nightly                DISCONTINUED MEDICATIONS:  Current Discharge Medication List          I am the Primary Clinician of Record.   Tereso Cardozo MD (electronically signed)    (Please note that parts of this dictation were completed with voice recognition software. Quite often unanticipated grammatical, syntax, homophones, and other interpretive errors are

## 2024-10-27 NOTE — ED NOTES
RN assumed care of pt at this time, per triage note:    Leg Swelling (Pt BIBEMS with a cc of left leg swellling x 3 days; pt just refilled lasix medications after missing it for 1 month; pt bed bound at baseline    eval- Mr. Adams was assessed with the [place] 

## 2024-10-27 NOTE — ED NOTES
[facility] at bedside to transport pt home    I have reviewed the provider's instructions with the patient, answering all questions to his satisfaction.

## 2024-11-01 ENCOUNTER — TELEPHONE (OUTPATIENT)
Age: 74
End: 2024-11-01

## 2024-11-01 ENCOUNTER — OFFICE VISIT (OUTPATIENT)
Age: 74
End: 2024-11-01
Payer: MEDICARE

## 2024-11-01 VITALS
DIASTOLIC BLOOD PRESSURE: 80 MMHG | RESPIRATION RATE: 18 BRPM | HEART RATE: 62 BPM | SYSTOLIC BLOOD PRESSURE: 130 MMHG | TEMPERATURE: 97.1 F | OXYGEN SATURATION: 95 %

## 2024-11-01 DIAGNOSIS — R60.0 BILATERAL LEG EDEMA: ICD-10-CM

## 2024-11-01 DIAGNOSIS — Z23 ENCOUNTER FOR IMMUNIZATION: ICD-10-CM

## 2024-11-01 DIAGNOSIS — Z86.711 HX OF PULMONARY EMBOLUS: ICD-10-CM

## 2024-11-01 DIAGNOSIS — Z86.73 HISTORY OF CEREBROVASCULAR ACCIDENT: ICD-10-CM

## 2024-11-01 DIAGNOSIS — Z79.4 TYPE 2 DIABETES MELLITUS WITH OTHER SPECIFIED COMPLICATION, WITH LONG-TERM CURRENT USE OF INSULIN (HCC): Primary | ICD-10-CM

## 2024-11-01 DIAGNOSIS — E78.5 HYPERLIPIDEMIA, UNSPECIFIED HYPERLIPIDEMIA TYPE: ICD-10-CM

## 2024-11-01 DIAGNOSIS — E11.69 TYPE 2 DIABETES MELLITUS WITH OTHER SPECIFIED COMPLICATION, WITH LONG-TERM CURRENT USE OF INSULIN (HCC): Primary | ICD-10-CM

## 2024-11-01 DIAGNOSIS — I10 HYPERTENSION, UNSPECIFIED TYPE: ICD-10-CM

## 2024-11-01 LAB
ALBUMIN SERPL-MCNC: 3.5 G/DL (ref 3.5–5)
ALBUMIN/GLOB SERPL: 1.1 (ref 1.1–2.2)
ALP SERPL-CCNC: 103 U/L (ref 45–117)
ALT SERPL-CCNC: 17 U/L (ref 12–78)
ANION GAP SERPL CALC-SCNC: 8 MMOL/L (ref 2–12)
AST SERPL-CCNC: 20 U/L (ref 15–37)
BILIRUB SERPL-MCNC: 0.5 MG/DL (ref 0.2–1)
BUN SERPL-MCNC: 14 MG/DL (ref 6–20)
BUN/CREAT SERPL: 13 (ref 12–20)
CALCIUM SERPL-MCNC: 9.2 MG/DL (ref 8.5–10.1)
CHLORIDE SERPL-SCNC: 107 MMOL/L (ref 97–108)
CHOLEST SERPL-MCNC: 129 MG/DL
CO2 SERPL-SCNC: 27 MMOL/L (ref 21–32)
CREAT SERPL-MCNC: 1.04 MG/DL (ref 0.7–1.3)
ERYTHROCYTE [DISTWIDTH] IN BLOOD BY AUTOMATED COUNT: 15.1 % (ref 11.5–14.5)
EST. AVERAGE GLUCOSE BLD GHB EST-MCNC: 169 MG/DL
GLOBULIN SER CALC-MCNC: 3.3 G/DL (ref 2–4)
GLUCOSE SERPL-MCNC: 135 MG/DL (ref 65–100)
HBA1C MFR BLD: 7.5 % (ref 4–5.6)
HCT VFR BLD AUTO: 43.7 % (ref 36.6–50.3)
HDLC SERPL-MCNC: 27 MG/DL
HDLC SERPL: 4.8 (ref 0–5)
HGB BLD-MCNC: 13.6 G/DL (ref 12.1–17)
LDLC SERPL CALC-MCNC: 70.8 MG/DL (ref 0–100)
MCH RBC QN AUTO: 26.5 PG (ref 26–34)
MCHC RBC AUTO-ENTMCNC: 31.1 G/DL (ref 30–36.5)
MCV RBC AUTO: 85.2 FL (ref 80–99)
NRBC # BLD: 0 K/UL (ref 0–0.01)
NRBC BLD-RTO: 0 PER 100 WBC
PLATELET # BLD AUTO: 296 K/UL (ref 150–400)
PMV BLD AUTO: 10.2 FL (ref 8.9–12.9)
POTASSIUM SERPL-SCNC: 4.2 MMOL/L (ref 3.5–5.1)
PROT SERPL-MCNC: 6.8 G/DL (ref 6.4–8.2)
RBC # BLD AUTO: 5.13 M/UL (ref 4.1–5.7)
SODIUM SERPL-SCNC: 142 MMOL/L (ref 136–145)
TRIGL SERPL-MCNC: 156 MG/DL
VLDLC SERPL CALC-MCNC: 31.2 MG/DL
WBC # BLD AUTO: 5.3 K/UL (ref 4.1–11.1)

## 2024-11-01 PROCEDURE — 99214 OFFICE O/P EST MOD 30 MIN: CPT | Performed by: INTERNAL MEDICINE

## 2024-11-01 PROCEDURE — 90653 IIV ADJUVANT VACCINE IM: CPT | Performed by: INTERNAL MEDICINE

## 2024-11-01 RX ORDER — CLONIDINE HYDROCHLORIDE 0.1 MG/1
0.1 TABLET ORAL DAILY
Qty: 60 TABLET | Refills: 5
Start: 2024-11-01

## 2024-11-01 NOTE — TELEPHONE ENCOUNTER
Attempted to reach patient. Left message on vm to return call    Patient will need to contact their insurance company in regards to home health agencies that are covered within his network as [facility] as they are not within insurance network

## 2024-11-01 NOTE — TELEPHONE ENCOUNTER
----- Message from SHANI MCFADDEN LPN sent at 11/1/2024 11:31 AM EDT -----  I am sorry  but we cannot accept for Home health. We are not in network.    Jacquie

## 2024-11-13 RX ORDER — AMLODIPINE BESYLATE 10 MG/1
10 TABLET ORAL DAILY
Qty: 90 TABLET | Refills: 3 | Status: SHIPPED | OUTPATIENT
Start: 2024-11-13

## 2024-11-13 NOTE — TELEPHONE ENCOUNTER
amLODIPine (NORVASC) 10 MG tablet    Refill to [facility] #[phone]    Pt is OUT of medication and forgot to call us.     Advised of turn around time.

## 2024-11-13 NOTE — TELEPHONE ENCOUNTER
PCP: Niles Jones MD    Last appt: 11/1/2024   No future appointments.    Requested Prescriptions     Pending Prescriptions Disp Refills    amLODIPine (NORVASC) 10 MG tablet 90 tablet 3     Sig: Take 1 tablet by mouth daily

## 2024-11-19 RX ORDER — HUMAN INSULIN 100 [IU]/ML
INJECTION, SUSPENSION SUBCUTANEOUS
Qty: 15 ML | Refills: 5 | Status: SHIPPED | OUTPATIENT
Start: 2024-11-19

## 2024-11-22 ENCOUNTER — TELEPHONE (OUTPATIENT)
Age: 74
End: 2024-11-22

## 2024-11-22 NOTE — TELEPHONE ENCOUNTER
PCP: Niles Jones MD    Last appt: 11/1/2024   No future appointments.    Requested Prescriptions     Pending Prescriptions Disp Refills    metFORMIN (GLUCOPHAGE) 1000 MG tablet 180 tablet 3     Sig: Take 1 tablet by mouth 2 times daily (with meals)

## 2024-11-22 NOTE — TELEPHONE ENCOUNTER
Spoke with patient. Advised rx was sent to pharmacy on 11/19/2024. Per telephone encounter 11/1/2024 - advised pt. Verbalized understanding.     Patient will need to contact their insurance company in regards to home health agencies that are covered within his network as [facility] home health cannot accept patient as they are not within insurance network

## 2024-11-22 NOTE — TELEPHONE ENCOUNTER
Caller requests Refill of:    metFORMIN (GLUCOPHAGE) 1000 MG tablet       Please send to:    [facility] [place] [place] - [address] - P [phone] - F [phone]  [address]  [place] [address]  Phone: [phone] Fax: [phone]       Visit / Appointment History:  Future Appointment at [facility]:  Visit date not found   Last Appointment With PCP:  11/1/2024       Caller confirmed instructions and dosages as correct.    Caller was advised that Meds will be refilled as soon as possible, however there can be a 48-72 business hour turn around on refill requests.

## 2024-11-22 NOTE — TELEPHONE ENCOUNTER
NOVOLIN 70/30 FLEXPEN (70-30) 100 UNIT/ML injection pen    Pt is almost out of insulin and will have to have today please.     Refill to [facility] #[phone]

## 2024-12-03 ENCOUNTER — TELEPHONE (OUTPATIENT)
Age: 74
End: 2024-12-03

## 2024-12-03 NOTE — TELEPHONE ENCOUNTER
Carlos Mcmillan the patient's  with [facility] is requesting the most recent home health referral from Dr. Bonner be faxed over to her at [phone]. She may also be reached at [phone]. This has been completed as of 12/03/24.

## 2024-12-06 RX ORDER — FUROSEMIDE 20 MG/1
TABLET ORAL
Qty: 30 TABLET | Refills: 3 | Status: SHIPPED | OUTPATIENT
Start: 2024-12-06

## 2024-12-19 ENCOUNTER — TELEPHONE (OUTPATIENT)
Age: 74
End: 2024-12-19

## 2024-12-19 NOTE — TELEPHONE ENCOUNTER
Attempted to reach patient. Left message on vm to return call.    PCP can offer home health physical and occupation therapy for exercises and evaluation.   Pt will need to contact his insurance company and confirm which home health agency is in their network and to let office know.     Please see telephone encounter from 11/1/2024 for reference.

## 2024-12-19 NOTE — TELEPHONE ENCOUNTER
Pt called in states unable to walk. Pt states PCP is aware of condition. Pt doesn't want to rely on wheelchair wants advice from PCP for exercises to help strengthen legs.    Please call to discuss

## 2024-12-20 NOTE — TELEPHONE ENCOUNTER
Spoke with patient and son Rayo. Advised below. Both parties verbalized understanding.     PCP can offer home health physical and occupation therapy for exercises and evaluation.   Pt will need to contact his insurance company and confirm which home health agency is in their network and to let office know.

## 2024-12-23 ENCOUNTER — TELEPHONE (OUTPATIENT)
Age: 74
End: 2024-12-23

## 2024-12-23 DIAGNOSIS — G89.29 CHRONIC PAIN OF RIGHT KNEE: Primary | ICD-10-CM

## 2024-12-23 DIAGNOSIS — R60.0 LEG EDEMA: ICD-10-CM

## 2024-12-23 DIAGNOSIS — L03.116 LEFT LEG CELLULITIS: ICD-10-CM

## 2024-12-23 DIAGNOSIS — R52 PAIN: ICD-10-CM

## 2024-12-23 DIAGNOSIS — Z86.73 HISTORY OF CEREBROVASCULAR ACCIDENT: ICD-10-CM

## 2024-12-23 DIAGNOSIS — R26.2 DIFFICULTY WALKING: ICD-10-CM

## 2024-12-23 DIAGNOSIS — M17.9 OSTEOARTHRITIS OF KNEE, UNSPECIFIED LATERALITY, UNSPECIFIED OSTEOARTHRITIS TYPE: ICD-10-CM

## 2024-12-23 DIAGNOSIS — R53.1 LEFT-SIDED WEAKNESS: ICD-10-CM

## 2024-12-23 DIAGNOSIS — M25.561 CHRONIC PAIN OF RIGHT KNEE: Primary | ICD-10-CM

## 2024-12-23 DIAGNOSIS — R60.0 BILATERAL LEG EDEMA: ICD-10-CM

## 2024-12-23 NOTE — TELEPHONE ENCOUNTER
Regarding Wheelchair    Is covered by insurance per pt    State call Carmelina:  Carmelina with [employer] [phone]  Wheelchair is called: [facility], Code [id number]  [employer].      States carmelina needs to be given authorization to get chair.        Pt confirms insurance is [employer]

## 2024-12-24 NOTE — TELEPHONE ENCOUNTER
Spoke with Eleanor    She states they do not work directly with insurance companies, patient would need to purchase chair directly from them then submit the receipts to his insurance for reimbursement.     Called, no answer left message to call office back.

## 2024-12-27 NOTE — TELEPHONE ENCOUNTER
Caller is returning a call from clinical team regarding wheelchair (and to discuss physical therapy)      Clinical team not available to take the call    Please call pt back.

## 2024-12-30 NOTE — TELEPHONE ENCOUNTER
PT referral for [facility] fax confirmed. Pt advised and verbalized understanding to contact them for scheduling.

## 2024-12-30 NOTE — TELEPHONE ENCOUNTER
Spoke with patient. Advised message below. Pt verbalized understanding.     She states they do not work directly with insurance companies, patient would need to purchase chair directly from them then submit the receipts to his insurance for reimbursement.     Pt states [facility] cover his insurance regarding PT. States needs a referral.     Please advise.

## 2024-12-31 ENCOUNTER — TELEPHONE (OUTPATIENT)
Age: 74
End: 2024-12-31

## 2024-12-31 DIAGNOSIS — G89.29 CHRONIC PAIN OF RIGHT KNEE: Primary | ICD-10-CM

## 2024-12-31 DIAGNOSIS — R60.0 LEG EDEMA: ICD-10-CM

## 2024-12-31 DIAGNOSIS — R26.2 DIFFICULTY WALKING: ICD-10-CM

## 2024-12-31 DIAGNOSIS — R53.1 LEFT-SIDED WEAKNESS: ICD-10-CM

## 2024-12-31 DIAGNOSIS — R60.0 BILATERAL LEG EDEMA: ICD-10-CM

## 2024-12-31 DIAGNOSIS — Z86.73 HISTORY OF CEREBROVASCULAR ACCIDENT: ICD-10-CM

## 2024-12-31 DIAGNOSIS — L03.116 LEFT LEG CELLULITIS: ICD-10-CM

## 2024-12-31 DIAGNOSIS — M17.9 OSTEOARTHRITIS OF KNEE, UNSPECIFIED LATERALITY, UNSPECIFIED OSTEOARTHRITIS TYPE: ICD-10-CM

## 2024-12-31 DIAGNOSIS — R52 PAIN: ICD-10-CM

## 2024-12-31 DIAGNOSIS — M25.561 CHRONIC PAIN OF RIGHT KNEE: Primary | ICD-10-CM

## 2024-12-31 NOTE — TELEPHONE ENCOUNTER
Pt states he needs a new order for out patient therapy so they can come to the house to help him with therapy.      This is [facility].  They will need the order.    This is all pt gave me.

## 2025-01-09 DIAGNOSIS — E11.69 TYPE 2 DIABETES MELLITUS WITH OTHER SPECIFIED COMPLICATION, WITH LONG-TERM CURRENT USE OF INSULIN (HCC): Primary | ICD-10-CM

## 2025-01-09 DIAGNOSIS — E11.40 TYPE 2 DIABETES MELLITUS WITH DIABETIC NEUROPATHY, UNSPECIFIED WHETHER LONG TERM INSULIN USE (HCC): ICD-10-CM

## 2025-01-09 DIAGNOSIS — Z79.4 TYPE 2 DIABETES MELLITUS WITHOUT COMPLICATION, WITH LONG-TERM CURRENT USE OF INSULIN (HCC): ICD-10-CM

## 2025-01-09 DIAGNOSIS — E11.9 TYPE 2 DIABETES MELLITUS WITHOUT COMPLICATION, WITH LONG-TERM CURRENT USE OF INSULIN (HCC): ICD-10-CM

## 2025-01-09 DIAGNOSIS — Z79.4 TYPE 2 DIABETES MELLITUS WITH OTHER SPECIFIED COMPLICATION, WITH LONG-TERM CURRENT USE OF INSULIN (HCC): Primary | ICD-10-CM

## 2025-01-09 RX ORDER — HUMAN INSULIN 100 [IU]/ML
12 INJECTION, SUSPENSION SUBCUTANEOUS 2 TIMES DAILY
Qty: 15 ML | Refills: 5 | Status: SHIPPED | OUTPATIENT
Start: 2025-01-09 | End: 2025-01-10 | Stop reason: SDUPTHER

## 2025-01-09 NOTE — TELEPHONE ENCOUNTER
Caller requests Refill of:    NOVOLIN 70/30 FLEXPEN (70-30) 100 UNIT/ML injection pen        Please send to:    [facility] - [address] P [phone] - F [phone]  [address] [address]  Phone: [phone] Fax: [phone]           Visit / Appointment History:  Future Appointment at [facility]:  Visit date not found   Last Appointment With PCP:  11/1/2024       Caller confirmed instructions and dosages as correct.    Caller was advised that Meds will be refilled as soon as possible, however there can be a 48-72 business hour turn around on refill requests.

## 2025-01-09 NOTE — TELEPHONE ENCOUNTER
PCP: Niles Jones MD    Last appt: 11/1/2024   No future appointments.    Requested Prescriptions     Pending Prescriptions Disp Refills    NOVOLIN 70/30 FLEXPEN (70-30) 100 UNIT/ML injection pen 15 mL 5     Sig: Inject 12 Units into the skin 2 times daily

## 2025-01-10 ENCOUNTER — TELEPHONE (OUTPATIENT)
Age: 75
End: 2025-01-10

## 2025-01-10 DIAGNOSIS — Z79.4 TYPE 2 DIABETES MELLITUS WITHOUT COMPLICATION, WITH LONG-TERM CURRENT USE OF INSULIN (HCC): ICD-10-CM

## 2025-01-10 DIAGNOSIS — E11.9 TYPE 2 DIABETES MELLITUS WITHOUT COMPLICATION, WITH LONG-TERM CURRENT USE OF INSULIN (HCC): ICD-10-CM

## 2025-01-10 DIAGNOSIS — E11.40 TYPE 2 DIABETES MELLITUS WITH DIABETIC NEUROPATHY, UNSPECIFIED WHETHER LONG TERM INSULIN USE (HCC): ICD-10-CM

## 2025-01-10 DIAGNOSIS — Z79.4 TYPE 2 DIABETES MELLITUS WITH OTHER SPECIFIED COMPLICATION, WITH LONG-TERM CURRENT USE OF INSULIN (HCC): ICD-10-CM

## 2025-01-10 DIAGNOSIS — E11.69 TYPE 2 DIABETES MELLITUS WITH OTHER SPECIFIED COMPLICATION, WITH LONG-TERM CURRENT USE OF INSULIN (HCC): ICD-10-CM

## 2025-01-10 RX ORDER — HUMAN INSULIN 100 [IU]/ML
14 INJECTION, SUSPENSION SUBCUTANEOUS 2 TIMES DAILY
Qty: 15 ML | Refills: 5 | Status: SHIPPED | OUTPATIENT
Start: 2025-01-10

## 2025-01-10 NOTE — TELEPHONE ENCOUNTER
Pt called in states out of insulin and blood sugar is 300.     Called transferred directly to nurse.

## 2025-01-10 NOTE — TELEPHONE ENCOUNTER
Patient can not get his insulin due to it being too early to fill. The pharmacy states that the patient has use more than he normally does due to high sugars. Please make a change to patient insulin and send to the pharmacy,.

## 2025-02-23 ENCOUNTER — HOSPITAL ENCOUNTER (EMERGENCY)
Facility: HOSPITAL | Age: 75
Discharge: HOME | End: 2025-02-23
Attending: STUDENT IN AN ORGANIZED HEALTH CARE EDUCATION/TRAINING PROGRAM
Payer: MEDICARE

## 2025-02-23 VITALS
OXYGEN SATURATION: 99 % | DIASTOLIC BLOOD PRESSURE: 77 MMHG | SYSTOLIC BLOOD PRESSURE: 152 MMHG | TEMPERATURE: 97.9 F | BODY MASS INDEX: 39.22 KG/M2 | RESPIRATION RATE: 18 BRPM | HEIGHT: 70 IN | HEART RATE: 75 BPM

## 2025-02-23 DIAGNOSIS — R60.0 EDEMA OF FOOT: Primary | ICD-10-CM

## 2025-02-23 LAB
ALBUMIN SERPL-MCNC: 3.2 G/DL (ref 3.5–5)
ALBUMIN/GLOB SERPL: 0.7 (ref 1.1–2.2)
ALP SERPL-CCNC: 86 U/L (ref 45–117)
ALT SERPL-CCNC: 17 U/L (ref 12–78)
ANION GAP SERPL CALC-SCNC: 6 MMOL/L (ref 2–12)
AST SERPL-CCNC: 35 U/L (ref 15–37)
BASOPHILS # BLD: 0.02 K/UL (ref 0–0.1)
BASOPHILS NFR BLD: 0.3 % (ref 0–1)
BILIRUB SERPL-MCNC: 0.7 MG/DL (ref 0.2–1)
BUN SERPL-MCNC: 17 MG/DL (ref 6–20)
BUN/CREAT SERPL: 17 (ref 12–20)
CALCIUM SERPL-MCNC: 9.2 MG/DL (ref 8.5–10.1)
CHLORIDE SERPL-SCNC: 106 MMOL/L (ref 97–108)
CO2 SERPL-SCNC: 24 MMOL/L (ref 21–32)
CREAT SERPL-MCNC: 1 MG/DL (ref 0.7–1.3)
DIFFERENTIAL METHOD BLD: ABNORMAL
EOSINOPHIL # BLD: 0.1 K/UL (ref 0–0.4)
EOSINOPHIL NFR BLD: 1.6 % (ref 0–7)
ERYTHROCYTE [DISTWIDTH] IN BLOOD BY AUTOMATED COUNT: 14.8 % (ref 11.5–14.5)
GLOBULIN SER CALC-MCNC: 4.4 G/DL (ref 2–4)
GLUCOSE SERPL-MCNC: 89 MG/DL (ref 65–100)
HCT VFR BLD AUTO: 46.3 % (ref 36.6–50.3)
HGB BLD-MCNC: 14.3 G/DL (ref 12.1–17)
IMM GRANULOCYTES # BLD AUTO: 0.01 K/UL (ref 0–0.04)
IMM GRANULOCYTES NFR BLD AUTO: 0.2 % (ref 0–0.5)
LYMPHOCYTES # BLD: 2.49 K/UL (ref 0.8–3.5)
LYMPHOCYTES NFR BLD: 40.8 % (ref 12–49)
MCH RBC QN AUTO: 26 PG (ref 26–34)
MCHC RBC AUTO-ENTMCNC: 30.9 G/DL (ref 30–36.5)
MCV RBC AUTO: 84.2 FL (ref 80–99)
MONOCYTES # BLD: 0.49 K/UL (ref 0–1)
MONOCYTES NFR BLD: 8 % (ref 5–13)
NEUTS SEG # BLD: 2.99 K/UL (ref 1.8–8)
NEUTS SEG NFR BLD: 49.1 % (ref 32–75)
NRBC # BLD: 0 K/UL (ref 0–0.01)
NRBC BLD-RTO: 0 PER 100 WBC
PLATELET # BLD AUTO: 262 K/UL (ref 150–400)
PMV BLD AUTO: 10 FL (ref 8.9–12.9)
POTASSIUM SERPL-SCNC: 5.5 MMOL/L (ref 3.5–5.1)
PROT SERPL-MCNC: 7.6 G/DL (ref 6.4–8.2)
RBC # BLD AUTO: 5.5 M/UL (ref 4.1–5.7)
SODIUM SERPL-SCNC: 136 MMOL/L (ref 136–145)
WBC # BLD AUTO: 6.1 K/UL (ref 4.1–11.1)

## 2025-02-23 PROCEDURE — 85025 COMPLETE CBC W/AUTO DIFF WBC: CPT

## 2025-02-23 PROCEDURE — 6370000000 HC RX 637 (ALT 250 FOR IP): Performed by: STUDENT IN AN ORGANIZED HEALTH CARE EDUCATION/TRAINING PROGRAM

## 2025-02-23 PROCEDURE — 36415 COLL VENOUS BLD VENIPUNCTURE: CPT

## 2025-02-23 PROCEDURE — 80053 COMPREHEN METABOLIC PANEL: CPT

## 2025-02-23 PROCEDURE — 99283 EMERGENCY DEPT VISIT LOW MDM: CPT

## 2025-02-23 RX ORDER — FUROSEMIDE 40 MG/1
40 TABLET ORAL 2 TIMES DAILY
Qty: 20 TABLET | Refills: 0 | Status: SHIPPED | OUTPATIENT
Start: 2025-02-23 | End: 2025-03-05

## 2025-02-23 RX ORDER — ACETAMINOPHEN 325 MG/1
650 TABLET ORAL
Status: COMPLETED | OUTPATIENT
Start: 2025-02-23 | End: 2025-02-23

## 2025-02-23 RX ADMIN — ACETAMINOPHEN 650 MG: 325 TABLET ORAL at 15:20

## 2025-02-23 ASSESSMENT — PAIN DESCRIPTION - DESCRIPTORS
DESCRIPTORS: ACHING
DESCRIPTORS: ACHING

## 2025-02-23 ASSESSMENT — PAIN DESCRIPTION - PAIN TYPE: TYPE: ACUTE PAIN

## 2025-02-23 ASSESSMENT — PAIN DESCRIPTION - ONSET: ONSET: ON-GOING

## 2025-02-23 ASSESSMENT — PAIN - FUNCTIONAL ASSESSMENT
PAIN_FUNCTIONAL_ASSESSMENT: ACTIVITIES ARE NOT PREVENTED
PAIN_FUNCTIONAL_ASSESSMENT: PREVENTS OR INTERFERES SOME ACTIVE ACTIVITIES AND ADLS

## 2025-02-23 ASSESSMENT — PAIN SCALES - GENERAL
PAINLEVEL_OUTOF10: 3
PAINLEVEL_OUTOF10: 3

## 2025-02-23 ASSESSMENT — PAIN DESCRIPTION - LOCATION
LOCATION: LEG
LOCATION: FOOT

## 2025-02-23 ASSESSMENT — PAIN DESCRIPTION - FREQUENCY: FREQUENCY: CONTINUOUS

## 2025-02-23 ASSESSMENT — PAIN DESCRIPTION - ORIENTATION
ORIENTATION: LEFT;RIGHT
ORIENTATION: LEFT

## 2025-02-23 NOTE — ED NOTES
Spoke with pt son jania, son was asking about pt admission to hospital. No medical reason for admit. Discussed with son that geriatric consult was placed and they will be contacted for resources at home. [facility] transport at bedside and provided with DC papers, ED summary, Facesheet and report given including SBAR.

## 2025-02-23 NOTE — ED TRIAGE NOTES
Pt arrives with EMS from home for c/o bilateral leg swelling and pain x 5-7 months. Swelling worse to left foot. Pt is bed bound

## 2025-02-28 NOTE — ED PROVIDER NOTES
HISTORY OF PRESENT ILLNESS    A 74-year-old male with a past medical history significant for diabetes, hypercholesterolemia, cirrhosis, arthritis, chronic pain, hypertension, and seizures presents with bilateral lower extremity swelling and pain. The patient provided the history himself. Symptoms have been present for the last 7 months, with the left lower extremity, specifically the ankle and foot, being worse than the right. He was previously evaluated with no evidence of deep vein thrombosis (DVT). The patient denies chest pain or shortness of breath. Medications include Hydrochlorothiazide and Lasix.    PAST MEDICAL HISTORY    - Diabetes    - Hypercholesterolemia    - Cirrhosis    - Arthritis    - Chronic pain    - Hypertension    - Seizures      PHYSICAL EXAM    Vitals: Interpreted as normal for this patient.    General: Chronically ill-appearing male adult.    Eyes: Appear normal with no scleral icterus.    HENT: Atraumatic. Moist mucous membranes, no pharyngeal erythema, edema or lesions.    Neck: Atraumatic, supple.    Cardiac: Regular rate, regular rhythm, no significant murmurs appreciated.    Respiratory: No respiratory distress, clear lungs bilaterally with no abnormal breath sounds.    Abdomen: Soft. Nontender. Nondistended. No rebound. No guarding. No tenderness over McBurney's point, no tenderness over the liver or spleen, no Garcia's sign, no pulsatile abdominal mass.    : No CVAT.    MS: Extremities show bilateral lower extremity edema, more prominent in the left lower extremity with moderate swelling distal to the left ankle involving the foot. Slight tenderness to touch. No evidence of skin breakdown, ulceration, or cellulitis.    Skin: No exanthems, no cyanosis, no diaphoresis.    Back exam: Atraumatic.    Neurologic: No altered mental status, speech is fluent. Gait is within normal limits. No cerebellar deficits. No motor deficits. No sensory deficits.    Psychological: Cooperative and

## 2025-03-04 ENCOUNTER — TELEPHONE (OUTPATIENT)
Age: 75
End: 2025-03-04

## 2025-03-04 NOTE — TELEPHONE ENCOUNTER
Brittany with [facility] states pt is being discharged today 03/04/25 and would need a hospital follow up appt with two weeks of discharge.      Please call pt directly to schedule appt.

## 2025-03-05 ENCOUNTER — TELEPHONE (OUTPATIENT)
Age: 75
End: 2025-03-05

## 2025-03-05 NOTE — TELEPHONE ENCOUNTER
LAISHA  Son called. Rayo Lawton (Child)  [phone] (Home Phone)     Patient discharged from [facility] with influenza yesterday. Was told to hold insulin.  Glucose 207.  Per son, he has not eaten much today, no insulin.  Patient is hungry and son concerned regarding high glucose.      Recommend resume 1/2 usual insulin schedule,  70/30  7 units BID until eating normally.  To schedule hospital follow up with Dr Jones, messaged front office to contact son.

## 2025-03-11 DIAGNOSIS — E11.40 TYPE 2 DIABETES MELLITUS WITH DIABETIC NEUROPATHY, UNSPECIFIED WHETHER LONG TERM INSULIN USE (HCC): ICD-10-CM

## 2025-03-11 NOTE — TELEPHONE ENCOUNTER
Received faxed refill request from pharmacy      PCP: Niles Jones MD    Last appt: 11/1/2024  Future Appointments   Date Time Provider Department Center   3/17/2025  9:00 AM Niles Jones MD [facility] DEP       Requested Prescriptions     Pending Prescriptions Disp Refills    blood glucose test strips (ASCENSIA AUTODISC VI;ONE TOUCH ULTRA TEST VI) strip 100 each 3     Sig: Apply 1 each topically daily One Touch Ultra Blue test Strips

## 2025-03-18 RX ORDER — LEVETIRACETAM 1000 MG/1
1000 TABLET ORAL 2 TIMES DAILY
Qty: 180 TABLET | Refills: 3 | Status: SHIPPED | OUTPATIENT
Start: 2025-03-18

## 2025-03-21 RX ORDER — CLONIDINE HYDROCHLORIDE 0.1 MG/1
0.1 TABLET ORAL DAILY
Qty: 60 TABLET | Refills: 5 | Status: SHIPPED | OUTPATIENT
Start: 2025-03-21

## 2025-03-31 RX ORDER — FUROSEMIDE 20 MG/1
TABLET ORAL
Qty: 30 TABLET | Refills: 5 | Status: SHIPPED | OUTPATIENT
Start: 2025-03-31

## 2025-06-09 DIAGNOSIS — E11.40 TYPE 2 DIABETES MELLITUS WITH DIABETIC NEUROPATHY, UNSPECIFIED WHETHER LONG TERM INSULIN USE (HCC): ICD-10-CM

## 2025-06-09 RX ORDER — BLOOD SUGAR DIAGNOSTIC
STRIP MISCELLANEOUS
Qty: 100 STRIP | Refills: 3 | Status: SHIPPED | OUTPATIENT
Start: 2025-06-09

## 2025-06-09 NOTE — TELEPHONE ENCOUNTER
PCP: Niles Jones MD    Last appt: 11/1/2024  No future appointments.    Requested Prescriptions     Pending Prescriptions Disp Refills    ONETOUCH ULTRA strip [Pharmacy Med Name: ONE TOUCH DIEGO ULTRA] 100 strip 3     Sig: USE AND DISCARD 1 TEST     STRIP DAILY

## 2025-06-19 DIAGNOSIS — R52 PAIN: ICD-10-CM

## 2025-06-19 RX ORDER — GABAPENTIN 300 MG/1
CAPSULE ORAL
Qty: 270 CAPSULE | Refills: 1 | Status: SHIPPED | OUTPATIENT
Start: 2025-06-19 | End: 2025-12-16

## 2025-08-06 ENCOUNTER — HOSPITAL ENCOUNTER (EMERGENCY)
Facility: HOSPITAL | Age: 75
Discharge: HOME | End: 2025-08-06
Payer: MEDICARE

## 2025-08-06 ENCOUNTER — APPOINTMENT (OUTPATIENT)
Facility: HOSPITAL | Age: 75
End: 2025-08-06
Payer: MEDICARE

## 2025-08-06 VITALS
BODY MASS INDEX: 31.38 KG/M2 | TEMPERATURE: 98.7 F | OXYGEN SATURATION: 96 % | DIASTOLIC BLOOD PRESSURE: 82 MMHG | RESPIRATION RATE: 18 BRPM | WEIGHT: 218.7 LBS | HEART RATE: 80 BPM | SYSTOLIC BLOOD PRESSURE: 158 MMHG

## 2025-08-06 DIAGNOSIS — N39.0 URINARY TRACT INFECTION WITHOUT HEMATURIA, SITE UNSPECIFIED: ICD-10-CM

## 2025-08-06 DIAGNOSIS — R33.9 URINARY RETENTION: Primary | ICD-10-CM

## 2025-08-06 LAB
ALBUMIN SERPL-MCNC: 3.6 G/DL (ref 3.5–5.2)
ALBUMIN/GLOB SERPL: 0.8 (ref 1.1–2.2)
ALP SERPL-CCNC: 80 U/L (ref 40–129)
ALT SERPL-CCNC: 6 U/L (ref 10–50)
ANION GAP SERPL CALC-SCNC: 11 MMOL/L (ref 2–14)
APPEARANCE UR: CLEAR
AST SERPL-CCNC: 13 U/L (ref 10–50)
BACTERIA URNS QL MICRO: ABNORMAL /HPF
BASOPHILS # BLD: 0.02 K/UL (ref 0–0.1)
BASOPHILS NFR BLD: 0.2 % (ref 0–1)
BILIRUB SERPL-MCNC: 0.4 MG/DL (ref 0–1.2)
BILIRUB UR QL: NEGATIVE
BUN SERPL-MCNC: 13 MG/DL (ref 8–23)
BUN/CREAT SERPL: 12 (ref 12–20)
CALCIUM SERPL-MCNC: 10 MG/DL (ref 8.8–10.2)
CHLORIDE SERPL-SCNC: 101 MMOL/L (ref 98–107)
CO2 SERPL-SCNC: 28 MMOL/L (ref 20–29)
COLOR UR: ABNORMAL
CREAT SERPL-MCNC: 1.06 MG/DL (ref 0.7–1.2)
DIFFERENTIAL METHOD BLD: NORMAL
EOSINOPHIL # BLD: 0.14 K/UL (ref 0–0.4)
EOSINOPHIL NFR BLD: 1.6 % (ref 0–7)
EPITH CASTS URNS QL MICRO: ABNORMAL /LPF
ERYTHROCYTE [DISTWIDTH] IN BLOOD BY AUTOMATED COUNT: 14.2 % (ref 11.5–14.5)
GLOBULIN SER CALC-MCNC: 4.3 G/DL (ref 2–4)
GLUCOSE BLD STRIP.AUTO-MCNC: 85 MG/DL (ref 65–117)
GLUCOSE SERPL-MCNC: 53 MG/DL (ref 65–100)
GLUCOSE UR STRIP.AUTO-MCNC: NEGATIVE MG/DL
HCT VFR BLD AUTO: 45 % (ref 36.6–50.3)
HGB BLD-MCNC: 14.3 G/DL (ref 12.1–17)
HGB UR QL STRIP: ABNORMAL
IMM GRANULOCYTES # BLD AUTO: 0.02 K/UL (ref 0–0.04)
IMM GRANULOCYTES NFR BLD AUTO: 0.2 % (ref 0–0.5)
KETONES UR QL STRIP.AUTO: NEGATIVE MG/DL
LEUKOCYTE ESTERASE UR QL STRIP.AUTO: ABNORMAL
LYMPHOCYTES # BLD: 1.9 K/UL (ref 0.8–3.5)
LYMPHOCYTES NFR BLD: 22 % (ref 12–49)
MCH RBC QN AUTO: 26.1 PG (ref 26–34)
MCHC RBC AUTO-ENTMCNC: 31.8 G/DL (ref 30–36.5)
MCV RBC AUTO: 82.1 FL (ref 80–99)
MONOCYTES # BLD: 0.55 K/UL (ref 0–1)
MONOCYTES NFR BLD: 6.4 % (ref 5–13)
NEUTS SEG # BLD: 6 K/UL (ref 1.8–8)
NEUTS SEG NFR BLD: 69.6 % (ref 32–75)
NITRITE UR QL STRIP.AUTO: POSITIVE
NRBC # BLD: 0 K/UL (ref 0–0.01)
NRBC BLD-RTO: 0 PER 100 WBC
PH UR STRIP: 5.5 (ref 5–8)
PLATELET # BLD AUTO: 342 K/UL (ref 150–400)
PMV BLD AUTO: 8.9 FL (ref 8.9–12.9)
POTASSIUM SERPL-SCNC: 3.5 MMOL/L (ref 3.5–5.1)
PROT SERPL-MCNC: 7.9 G/DL (ref 6.4–8.3)
PROT UR STRIP-MCNC: NEGATIVE MG/DL
RBC # BLD AUTO: 5.48 M/UL (ref 4.1–5.7)
RBC #/AREA URNS HPF: ABNORMAL /HPF (ref 0–5)
SERVICE CMNT-IMP: NORMAL
SODIUM SERPL-SCNC: 140 MMOL/L (ref 136–145)
SP GR UR REFRACTOMETRY: 1.02
URINE CULTURE IF INDICATED: ABNORMAL
UROBILINOGEN UR QL STRIP.AUTO: 0.2 EU/DL (ref 0.2–1)
WBC # BLD AUTO: 8.6 K/UL (ref 4.1–11.1)
WBC URNS QL MICRO: ABNORMAL /HPF (ref 0–4)

## 2025-08-06 PROCEDURE — 87086 URINE CULTURE/COLONY COUNT: CPT

## 2025-08-06 PROCEDURE — 74176 CT ABD & PELVIS W/O CONTRAST: CPT

## 2025-08-06 PROCEDURE — 6370000000 HC RX 637 (ALT 250 FOR IP): Performed by: PHYSICIAN ASSISTANT

## 2025-08-06 PROCEDURE — 36415 COLL VENOUS BLD VENIPUNCTURE: CPT

## 2025-08-06 PROCEDURE — 99284 EMERGENCY DEPT VISIT MOD MDM: CPT

## 2025-08-06 PROCEDURE — 6360000002 HC RX W HCPCS: Performed by: PHYSICIAN ASSISTANT

## 2025-08-06 PROCEDURE — 82962 GLUCOSE BLOOD TEST: CPT

## 2025-08-06 PROCEDURE — 81001 URINALYSIS AUTO W/SCOPE: CPT

## 2025-08-06 PROCEDURE — 96372 THER/PROPH/DIAG INJ SC/IM: CPT

## 2025-08-06 PROCEDURE — 85025 COMPLETE CBC W/AUTO DIFF WBC: CPT

## 2025-08-06 PROCEDURE — 87186 SC STD MICRODIL/AGAR DIL: CPT

## 2025-08-06 PROCEDURE — 87088 URINE BACTERIA CULTURE: CPT

## 2025-08-06 PROCEDURE — 80053 COMPREHEN METABOLIC PANEL: CPT

## 2025-08-06 RX ORDER — CEFDINIR 300 MG/1
300 CAPSULE ORAL 2 TIMES DAILY
Qty: 14 CAPSULE | Refills: 0 | Status: SHIPPED | OUTPATIENT
Start: 2025-08-06 | End: 2025-08-13

## 2025-08-06 RX ORDER — ACETAMINOPHEN 325 MG/1
650 TABLET ORAL
Status: COMPLETED | OUTPATIENT
Start: 2025-08-06 | End: 2025-08-06

## 2025-08-06 RX ADMIN — ACETAMINOPHEN 650 MG: 325 TABLET ORAL at 13:21

## 2025-08-06 RX ADMIN — LIDOCAINE HYDROCHLORIDE 1000 MG: 10 INJECTION, SOLUTION EPIDURAL; INFILTRATION; INTRACAUDAL; PERINEURAL at 12:10

## 2025-08-06 ASSESSMENT — PAIN DESCRIPTION - PAIN TYPE: TYPE: ACUTE PAIN

## 2025-08-06 ASSESSMENT — PAIN DESCRIPTION - LOCATION
LOCATION: FLANK
LOCATION: ABDOMEN;PELVIS;PENIS

## 2025-08-06 ASSESSMENT — PAIN SCALES - GENERAL
PAINLEVEL_OUTOF10: 2
PAINLEVEL_OUTOF10: 10
PAINLEVEL_OUTOF10: 6
PAINLEVEL_OUTOF10: 2
PAINLEVEL_OUTOF10: 2

## 2025-08-06 ASSESSMENT — PAIN DESCRIPTION - ORIENTATION
ORIENTATION: RIGHT
ORIENTATION: RIGHT

## 2025-08-06 ASSESSMENT — PAIN - FUNCTIONAL ASSESSMENT: PAIN_FUNCTIONAL_ASSESSMENT: 0-10

## 2025-08-06 ASSESSMENT — PAIN DESCRIPTION - DESCRIPTORS: DESCRIPTORS: ACHING

## 2025-08-06 ASSESSMENT — PAIN DESCRIPTION - FREQUENCY: FREQUENCY: CONTINUOUS

## 2025-08-08 DIAGNOSIS — R56.9 SEIZURE (HCC): Primary | ICD-10-CM

## 2025-08-08 RX ORDER — LEVETIRACETAM 1000 MG/1
1000 TABLET ORAL 2 TIMES DAILY
Qty: 180 TABLET | Refills: 3 | Status: SHIPPED | OUTPATIENT
Start: 2025-08-08

## 2025-08-09 LAB
BACTERIA SPEC CULT: ABNORMAL
BACTERIA SPEC CULT: ABNORMAL
CC UR VC: ABNORMAL
SERVICE CMNT-IMP: ABNORMAL

## 2025-08-11 RX ORDER — HYDROCHLOROTHIAZIDE 25 MG/1
25 TABLET ORAL DAILY
Qty: 90 TABLET | Refills: 3 | Status: SHIPPED | OUTPATIENT
Start: 2025-08-11

## 2025-08-14 RX ORDER — PRAVASTATIN SODIUM 20 MG
20 TABLET ORAL NIGHTLY
Qty: 90 TABLET | Refills: 3 | Status: SHIPPED | OUTPATIENT
Start: 2025-08-14

## 2025-08-22 ENCOUNTER — TELEPHONE (OUTPATIENT)
Age: 75
End: 2025-08-22

## 2025-08-28 ENCOUNTER — TELEPHONE (OUTPATIENT)
Age: 75
End: 2025-08-28

## 2025-08-28 ENCOUNTER — TELEMEDICINE (OUTPATIENT)
Age: 75
End: 2025-08-28

## 2025-08-28 DIAGNOSIS — N39.0 URINARY TRACT INFECTION WITHOUT HEMATURIA, SITE UNSPECIFIED: ICD-10-CM

## 2025-08-28 DIAGNOSIS — Z74.8 ASSISTANCE NEEDED WITH TRANSPORTATION: ICD-10-CM

## 2025-08-28 DIAGNOSIS — R33.9 URINE RETENTION: Primary | ICD-10-CM

## 2025-08-28 DIAGNOSIS — G81.94 LEFT HEMIPARESIS (HCC): ICD-10-CM

## 2025-08-28 RX ORDER — CEPHALEXIN 500 MG/1
500 CAPSULE ORAL 3 TIMES DAILY
Qty: 21 CAPSULE | Refills: 0 | Status: SHIPPED | OUTPATIENT
Start: 2025-08-28 | End: 2025-09-04

## 2025-08-28 SDOH — ECONOMIC STABILITY: FOOD INSECURITY: WITHIN THE PAST 12 MONTHS, THE FOOD YOU BOUGHT JUST DIDN'T LAST AND YOU DIDN'T HAVE MONEY TO GET MORE.: NEVER TRUE

## 2025-08-28 SDOH — ECONOMIC STABILITY: FOOD INSECURITY: WITHIN THE PAST 12 MONTHS, YOU WORRIED THAT YOUR FOOD WOULD RUN OUT BEFORE YOU GOT MONEY TO BUY MORE.: NEVER TRUE

## 2025-08-28 ASSESSMENT — PATIENT HEALTH QUESTIONNAIRE - PHQ9
SUM OF ALL RESPONSES TO PHQ QUESTIONS 1-9: 0
SUM OF ALL RESPONSES TO PHQ QUESTIONS 1-9: 0
2. FEELING DOWN, DEPRESSED OR HOPELESS: NOT AT ALL
SUM OF ALL RESPONSES TO PHQ QUESTIONS 1-9: 0
1. LITTLE INTEREST OR PLEASURE IN DOING THINGS: NOT AT ALL
SUM OF ALL RESPONSES TO PHQ QUESTIONS 1-9: 0

## 2025-08-29 ENCOUNTER — HOSPITAL ENCOUNTER (EMERGENCY)
Facility: HOSPITAL | Age: 75
Discharge: HOME | End: 2025-08-29
Payer: MEDICARE

## 2025-08-29 ENCOUNTER — TELEPHONE (OUTPATIENT)
Age: 75
End: 2025-08-29

## 2025-08-29 VITALS
WEIGHT: 220 LBS | TEMPERATURE: 98.4 F | RESPIRATION RATE: 18 BRPM | OXYGEN SATURATION: 95 % | DIASTOLIC BLOOD PRESSURE: 74 MMHG | BODY MASS INDEX: 31.57 KG/M2 | SYSTOLIC BLOOD PRESSURE: 163 MMHG | HEART RATE: 75 BPM

## 2025-08-29 DIAGNOSIS — N48.89 PENILE IRRITATION: Primary | ICD-10-CM

## 2025-08-29 DIAGNOSIS — Z97.8 FOLEY CATHETER PRESENT: ICD-10-CM

## 2025-08-29 DIAGNOSIS — Z86.73 HISTORY OF CEREBROVASCULAR ACCIDENT: Primary | ICD-10-CM

## 2025-08-29 LAB
APPEARANCE UR: CLEAR
BACTERIA URNS QL MICRO: NEGATIVE /HPF
BILIRUB UR QL: NEGATIVE
COLOR UR: ABNORMAL
EPITH CASTS URNS QL MICRO: ABNORMAL /LPF
GLUCOSE UR STRIP.AUTO-MCNC: NEGATIVE MG/DL
HGB UR QL STRIP: ABNORMAL
KETONES UR QL STRIP.AUTO: NEGATIVE MG/DL
LEUKOCYTE ESTERASE UR QL STRIP.AUTO: NEGATIVE
NITRITE UR QL STRIP.AUTO: NEGATIVE
PH UR STRIP: 6.5 (ref 5–8)
PROT UR STRIP-MCNC: 30 MG/DL
RBC #/AREA URNS HPF: ABNORMAL /HPF (ref 0–5)
SP GR UR REFRACTOMETRY: 1.01 (ref 1–1.03)
URINE CULTURE IF INDICATED: ABNORMAL
UROBILINOGEN UR QL STRIP.AUTO: 0.2 EU/DL (ref 0.2–1)
WBC URNS QL MICRO: ABNORMAL /HPF (ref 0–4)

## 2025-08-29 PROCEDURE — 99283 EMERGENCY DEPT VISIT LOW MDM: CPT

## 2025-08-29 PROCEDURE — 51702 INSERT TEMP BLADDER CATH: CPT

## 2025-08-29 PROCEDURE — 81001 URINALYSIS AUTO W/SCOPE: CPT

## 2025-09-02 ENCOUNTER — TELEPHONE (OUTPATIENT)
Age: 75
End: 2025-09-02

## (undated) DEVICE — STERILE POLYISOPRENE POWDER-FREE SURGICAL GLOVES: Brand: PROTEXIS

## (undated) DEVICE — T4 HOOD

## (undated) DEVICE — DUAL CUT SAGITTAL BLADE

## (undated) DEVICE — 3M™ IOBAN™ 2 ANTIMICROBIAL INCISE DRAPE 6651EZ: Brand: IOBAN™ 2

## (undated) DEVICE — NEEDLE HYPO 18GA L1.5IN PNK S STL HUB POLYPR SHLD REG BVL

## (undated) DEVICE — HOOD: Brand: FLYTE

## (undated) DEVICE — INTENDED FOR TISSUE SEPARATION, AND OTHER PROCEDURES THAT REQUIRE A SHARP SURGICAL BLADE TO PUNCTURE OR CUT.: Brand: BARD-PARKER ® CARBON RIB-BACK BLADES

## (undated) DEVICE — HANDLE LT SNAP ON ULT DURABLE LENS FOR TRUMPF ALC DISPOSABLE

## (undated) DEVICE — (D)PREP SKN CHLRAPRP APPL 26ML -- CONVERT TO ITEM 371833

## (undated) DEVICE — KENDALL SCD EXPRESS SLEEVES, KNEE LENGTH, MEDIUM: Brand: KENDALL SCD

## (undated) DEVICE — SUTURE ABSORBABLE BRAIDED 2-0 CT-1 27 IN UD VICRYL J259H

## (undated) DEVICE — CEMENT MIXING SYSTEM WITH FEMORAL BREAKWAY NOZZLE: Brand: REVOLUTION

## (undated) DEVICE — DRAIN KT WND 10FR RND 400ML --

## (undated) DEVICE — HOOK LOCK LATEX FREE ELASTIC BANDAGE D/L 6INX10YD

## (undated) DEVICE — INFECTION CONTROL KIT SYS

## (undated) DEVICE — TRAY CATH 16F DRN BG LTX -- CONVERT TO ITEM 363158

## (undated) DEVICE — Device

## (undated) DEVICE — REM POLYHESIVE ADULT PATIENT RETURN ELECTRODE: Brand: VALLEYLAB

## (undated) DEVICE — SOLUTION IRRIG 3000ML 0.9% SOD CHL FLX CONT 0797208] ICU MEDICAL INC]

## (undated) DEVICE — 3000CC GUARDIAN II: Brand: GUARDIAN

## (undated) DEVICE — SOLUTION IRRIG 1000ML H2O STRL BLT

## (undated) DEVICE — CUSTOM CAST PD STR

## (undated) DEVICE — DEVON™ KNEE AND BODY STRAP 60" X 3" (1.5 M X 7.6 CM): Brand: DEVON

## (undated) DEVICE — SLIM BODY SKIN STAPLER: Brand: APPOSE ULC

## (undated) DEVICE — SUTURE VCRL SZ 1 L27IN ABSRB VLT L36MM CT-1 1/2 CIR J341H

## (undated) DEVICE — HANDPIECE SET WITH COAXIAL HIGH FLOW TIP AND SUCTION TUBE: Brand: INTERPULSE

## (undated) DEVICE — SUTURE ETHBND EXCEL SZ 1 L30IN NONABSORBABLE GRN L36MM CT-1 X425H

## (undated) DEVICE — MEDI-VAC SUCTION HIGH CAPACITY: Brand: CARDINAL HEALTH

## (undated) DEVICE — ZIMMER® STERILE DISPOSABLE TOURNIQUET CUFF WITH PROTECTIVE SLEEVE AND PLC, DUAL PORT, SINGLE BLADDER, 34 IN. (86 CM)

## (undated) DEVICE — SYR 50ML LR LCK 1ML GRAD NSAF --